# Patient Record
Sex: FEMALE | Race: BLACK OR AFRICAN AMERICAN | NOT HISPANIC OR LATINO | Employment: UNEMPLOYED | URBAN - METROPOLITAN AREA
[De-identification: names, ages, dates, MRNs, and addresses within clinical notes are randomized per-mention and may not be internally consistent; named-entity substitution may affect disease eponyms.]

---

## 2017-01-07 ENCOUNTER — HOSPITAL ENCOUNTER (EMERGENCY)
Facility: OTHER | Age: 57
Discharge: HOME | End: 2017-01-08
Attending: EMERGENCY MEDICINE
Payer: MEDICAID

## 2017-01-07 DIAGNOSIS — F10.921 ALCOHOL INTOXICATION, WITH DELIRIUM: Primary | ICD-10-CM

## 2017-01-07 PROCEDURE — 99283 EMERGENCY DEPT VISIT LOW MDM: CPT

## 2017-01-07 NOTE — ED AVS SNAPSHOT
[facility]  [address]  [facility] [address]               Marianne Gottlieb   2017  6:56 PM   ED    Description:  Female : 1960   Department:  [facility]           Your Care was Coordinated By:     Provider Role From To    Nixon Bennett MD Attending Provider 17 1911 --      Reason for Visit     Alcohol Intoxication           Diagnoses this Visit        Comments    Alcohol intoxication, with delirium    -  Primary       ED Disposition     None           To Do List           Follow-up Information     Follow up with [facility]. Schedule an appointment as soon as possible for a visit in 3 days.    Contact information:    [address]  [facility] [address]  [phone]          Follow up with [facility].    Specialty:  Emergency Medicine    Why:  If symptoms worsen    Contact information:    [address]  [facility] [id number] [phone]      [facility]     [facility] Nurse Care Line -  Assistance  Registered nurses in the [facility] provide clinical advisement, health education, appointment booking, and other advisory services.  Call for this free service at [phone].             Medications           Message regarding Medications     Verify the changes and/or additions to your medication regime listed below are the same as discussed with your clinician today.  If any of these changes or additions are incorrect, please notify your healthcare provider.             Verify that the below list of medications is an accurate representation of the medications you are currently taking.  If none reported, the list may be blank. If incorrect, please contact your healthcare provider. Carry this list with you in case of emergency.           Current Medications     aspirin (ECOTRIN) 81 MG EC tablet Take 81 mg by mouth once daily.    ibuprofen (ADVIL,MOTRIN) 600 MG  "tablet Take 1 tablet (600 mg total) by mouth every 6 (six) hours as needed for Pain.    olmesartan-amlodipin-hcthiazid 40-10-25 mg Tab Take by mouth once daily.    pravastatin (PRAVACHOL) 40 MG tablet Take 40 mg by mouth once daily.    UNKNOWN TO PATIENT            Clinical Reference Information           Your Vitals Were     BP Pulse Temp Resp Height Weight    91/54 72 98.1 °F (36.7 °C) (Oral) 18 5' 3" (1.6 m) 86.2 kg (190 lb)    SpO2 BMI             91% 33.66 kg/m2         Allergies as of 1/8/2017     No Known Allergies      Immunizations Administered on Date of Encounter - 1/8/2017     None      ED Micro, Lab, POCT     None      ED Imaging Orders     None      Discharge References/Attachments     ALCOHOL INTOXICATION (ENGLISH)      [facility] Sign-Up     Activating your [facility] account is as easy as 1-2-3!     1) Visit [email or web address], select Sign Up Now, enter this activation code and your date of birth, then select Next.  [email or web address]  Expires: 2/22/2017 12:15 AM      2) Create a username and password to use when you visit [facility] in the future and select a security question in case you lose your password and select Next.    3) Enter your e-mail address and click Sign Up!    Additional Information  If you have questions, please e-mail [email or web address] or call [phone] to talk to our [facility] staff. Remember, [facility] is NOT to be used for urgent needs. For medical emergencies, dial 911.         Smoking Cessation     If you would like to quit smoking:   You may be eligible for free services if you are a [place] resident and started smoking cigarettes before September 1, 1988.  Call the Smoking Cessation Trust (SCT) toll free at (501) 628-5282 or (997) 152-5311.   Call 6-795-QUIT-NOW if you do not meet the above criteria.             [facility] complies with applicable Federal civil rights laws and does not discriminate on the basis of race, color, national origin, age, " disability, or sex.        Language Assistance Services     ATTENTION: Language assistance services are available, free of charge. Please call [phone].      ATENCIÓN: Si habla hugoañol, tiene a mckeon disposición servicios gratuitos de asistencia lingüística. Llame al [phone].     CHÚ Ý: N?u b?n nói Ti?ng Vi?t, có các d?ch v? h? tr? ngôn ng? mi?n phí dành cho b?n. G?i s? [phone].

## 2017-01-08 VITALS
SYSTOLIC BLOOD PRESSURE: 115 MMHG | DIASTOLIC BLOOD PRESSURE: 57 MMHG | HEART RATE: 72 BPM | RESPIRATION RATE: 18 BRPM | BODY MASS INDEX: 33.66 KG/M2 | HEIGHT: 63 IN | OXYGEN SATURATION: 92 % | WEIGHT: 190 LBS | TEMPERATURE: 98 F

## 2017-01-08 NOTE — ED NOTES
Rounding on the patient has been done. Pt is AAOx 4, no acute distress noted, respirations even, unlabored, vital signs stable with monitoring in process, skin warm and dry. The patient has been updated on the plan of care and current status. Pain was assessed and at pain level 0/10 . Comfort positioning and restroom needs were addressed. She denies any needs. She complains of 0. Necessary items were placed within reach and was advised when a reassessment would take place. The call bell remains at the bedside for any additional patient needs. The patient is resting comfortably on the stretcher, bed locked, low position, side rails up x 2.  Will continue to monitor.
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
## 2017-01-08 NOTE — ED NOTES
Pt able to ambulate without assistance to restroom and back without incident.  Speech clear, no nystagmus noted, ate 100 supper, ready for discharge

## 2017-03-03 ENCOUNTER — HOSPITAL ENCOUNTER (EMERGENCY)
Facility: OTHER | Age: 57
Discharge: HOME | End: 2017-03-03
Attending: EMERGENCY MEDICINE
Payer: MEDICAID

## 2017-03-03 VITALS
TEMPERATURE: 98 F | WEIGHT: 229 LBS | HEART RATE: 72 BPM | SYSTOLIC BLOOD PRESSURE: 150 MMHG | BODY MASS INDEX: 40.57 KG/M2 | OXYGEN SATURATION: 96 % | RESPIRATION RATE: 18 BRPM | HEIGHT: 63 IN | DIASTOLIC BLOOD PRESSURE: 72 MMHG
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
## 2017-03-03 DIAGNOSIS — S20.211A CONTUSION OF RIGHT FRONT WALL OF THORAX, INITIAL ENCOUNTER: ICD-10-CM

## 2017-03-03 PROCEDURE — 99283 EMERGENCY DEPT VISIT LOW MDM: CPT

## 2017-03-03 RX ORDER — IBUPROFEN 600 MG/1
600 TABLET ORAL EVERY 6 HOURS PRN
Qty: 30 TABLET | Refills: 0 | Status: SHIPPED | OUTPATIENT
Start: 2017-03-03 | End: 2017-09-15
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
## 2017-03-03 NOTE — DISCHARGE INSTRUCTIONS
Chest Contusion    A contusion is a bruise to the skin, muscle, or ribs. It may cause pain, tenderness, and swelling. It may turn the skin purple until it heals. Contusions take a few days to a few weeks to heal.  Home care  Follow these guidelines when caring for yourself at home:  · Rest. Dont do any heavy lifting or strenuous activity. Dont do any activity that causes pain.  · Put an ice pack on the injured area. Do this for 20 minutes every 1 to 2 hours the first day. You can make an ice pack by wrapping a plastic bag of ice cubes in a thin towel. Continue to use the ice pack 3 to 4 times a day for the next 2 days. Then use the ice pack as needed to ease pain and swelling.  · After 1 to 2 days you may put a warm compress on the area. Do this for 10 minutes several times a day. A warm compress is a clean cloth thats damp with warm water.  · Hold a pillow to the affected area when you cough. This will help ease pain.  · You may use acetaminophen or ibuprofen to control pain, unless another pain medicine was prescribed. If you have chronic liver or kidney disease, talk with your health care provider before using these medicines. Also talk with your provider if youve had a stomach ulcer or GI bleeding.  Follow-up care  Follow up with your health care provider during the next week, or as advised.  When to seek medical advice  Call your health care provider right away if any of these occur:  · Shortness of breath, difficulty breathing, or breathing fast  · Chest pain gets worse when you breathe  · Severe pain that comes on suddenly or lasts more than an hour  · Dizziness, weakness, or fainting  · New abdominal pain or abdominal pain that gets worse  ·  Fever of 101ºF (38.3ºC) or higher, or as directed by your health care provider  Date Last Reviewed: 2/15/2015  © 9704-5956 [employer]. [address] [place] [address], [place] [address]. All rights reserved. This information is not intended as a substitute  for professional medical care. Always follow your healthcare professional's instructions.
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
## 2017-05-05 ENCOUNTER — HOSPITAL ENCOUNTER (EMERGENCY)
Facility: OTHER | Age: 57
Discharge: HOME | End: 2017-05-05
Attending: EMERGENCY MEDICINE
Payer: MEDICAID

## 2017-05-05 VITALS
SYSTOLIC BLOOD PRESSURE: 119 MMHG | TEMPERATURE: 98 F | OXYGEN SATURATION: 97 % | RESPIRATION RATE: 16 BRPM | HEART RATE: 80 BPM | DIASTOLIC BLOOD PRESSURE: 67 MMHG | HEIGHT: 62 IN

## 2017-05-05 DIAGNOSIS — F10.920 ALCOHOL INTOXICATION, UNCOMPLICATED: Primary | ICD-10-CM

## 2017-05-05 DIAGNOSIS — S51.811A LACERATION OF FOREARM, RIGHT, INITIAL ENCOUNTER: ICD-10-CM

## 2017-05-05 DIAGNOSIS — S41.151A DOG BITE OF RIGHT ARM, INITIAL ENCOUNTER: ICD-10-CM
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
## 2017-05-05 LAB — POCT GLUCOSE: 139 MG/DL (ref 70–110)

## 2017-05-05 PROCEDURE — 25000003 PHARM REV CODE 250: Performed by: EMERGENCY MEDICINE

## 2017-05-05 PROCEDURE — 12001 RPR S/N/AX/GEN/TRNK 2.5CM/<: CPT

## 2017-05-05 PROCEDURE — 99284 EMERGENCY DEPT VISIT MOD MDM: CPT | Mod: 25

## 2017-05-05 PROCEDURE — 90471 IMMUNIZATION ADMIN: CPT | Performed by: EMERGENCY MEDICINE

## 2017-05-05 PROCEDURE — 82962 GLUCOSE BLOOD TEST: CPT

## 2017-05-05 PROCEDURE — 63600175 PHARM REV CODE 636 W HCPCS: Performed by: EMERGENCY MEDICINE

## 2017-05-05 PROCEDURE — 90715 TDAP VACCINE 7 YRS/> IM: CPT | Performed by: EMERGENCY MEDICINE

## 2017-05-05 RX ADMIN — BACITRACIN, NEOMYCIN, POLYMYXIN B 1 EACH: 400; 3.5; 5 OINTMENT TOPICAL at 09:05

## 2017-05-05 RX ADMIN — CLOSTRIDIUM TETANI TOXOID ANTIGEN (FORMALDEHYDE INACTIVATED), CORYNEBACTERIUM DIPHTHERIAE TOXOID ANTIGEN (FORMALDEHYDE INACTIVATED), BORDETELLA PERTUSSIS TOXOID ANTIGEN (GLUTARALDEHYDE INACTIVATED), BORDETELLA PERTUSSIS FILAMENTOUS HEMAGGLUTININ ANTIGEN (FORMALDEHYDE INACTIVATED), BORDETELLA PERTUSSIS PERTACTIN ANTIGEN, AND BORDETELLA PERTUSSIS FIMBRIAE 2/3 ANTIGEN 0.5 ML: 5; 2; 2.5; 5; 3; 5 INJECTION, SUSPENSION INTRAMUSCULAR at 09:05

## 2017-05-05 NOTE — ED AVS SNAPSHOT
[facility]  [address]  [facility] [address]               Marianne Gottlieb   2017  8:57 PM   ED    Description:  Female : 1960   Department:  [facility]           Your Care was Coordinated By:     Provider Role From To    Mariana Joseph MD Attending Provider [phone] --      Reason for Visit     Alcohol Intoxication     Animal Bite           Diagnoses this Visit        Comments    Alcohol intoxication, uncomplicated    -  Primary     Dog bite of right arm, initial encounter         Laceration of forearm, right, initial encounter           ED Disposition     None           To Do List           Follow-up Information     Follow up with [facility] In 14 days.    Specialty:  Emergency Medicine    Why:  For wound staple removal    Contact information:    [address]  [facility] [id number] [phone]      [facility]     [facility] Care [facility] - 24/7 Assistance  Unless otherwise directed by your provider, please contact [facility], our nurse care line that is available for 24/7 assistance.     Registered nurses in the [facility] provide: appointment scheduling, clinical advisement, health education, and other advisory services.  Call: [phone] (toll free)               Medications           Message regarding Medications     Verify the changes and/or additions to your medication regime listed below are the same as discussed with your clinician today.  If any of these changes or additions are incorrect, please notify your healthcare provider.        These medications were administered today        Dose Freq    Tdap vaccine injection 0.5 mL 0.5 mL Once    Sig: Inject 0.5 mLs into the muscle once.    Class: Normal    Route: Intramuscular    neomycin-bacitracnZn-polymyxnB packet  ED 1 Time    Sig: Apply topically ED 1 Time.    Class: Normal    Route: Topical (Top)          "  Verify that the below list of medications is an accurate representation of the medications you are currently taking.  If none reported, the list may be blank. If incorrect, please contact your healthcare provider. Carry this list with you in case of emergency.           Current Medications     aspirin (ECOTRIN) 81 MG EC tablet Take 81 mg by mouth once daily.    ibuprofen (ADVIL,MOTRIN) 600 MG tablet Take 1 tablet (600 mg total) by mouth every 6 (six) hours as needed for Pain.    olmesartan-amlodipin-hcthiazid 40-10-25 mg Tab Take by mouth once daily.    pravastatin (PRAVACHOL) 40 MG tablet Take 40 mg by mouth once daily.    UNKNOWN TO PATIENT            Clinical Reference Information           Your Vitals Were     BP Pulse Temp Resp Height SpO2    119/67 84 97.8 °F (36.6 °C) (Oral) 20 5' 2" (1.575 m) 98%      Allergies as of 5/5/2017     No Known Allergies      Immunizations Administered on Date of Encounter - 5/5/2017     Name Date Dose VIS Date Route    TDAP 5/5/2017 0.5 mL 2/24/2015 Intramuscular      ED Micro, Lab, POCT     Start Ordered       Status Ordering Provider    05/05/17 2113 05/05/17 2113  POCT glucose  Once      Final result     05/05/17 2100 05/05/17 2059  POCT glucose  Once      Acknowledged       ED Imaging Orders     Start Ordered       Status Ordering Provider    05/05/17 2125 05/05/17 2126  X-Ray Forearm Right  1 time imaging      Final result       [facility] Sign-Up     Activating your [facility] account is as easy as 1-2-3!     1) Visit [email or web address], select Sign Up Now, enter this activation code and your date of birth, then select Next.  [email or web address]  Expires: 6/19/2017 10:14 PM      2) Create a username and password to use when you visit [facility] in the future and select a security question in case you lose your password and select Next.    3) Enter your e-mail address and click Sign Up!    Additional Information  If you have questions, please e-mail [email or web address] or call " [phone] to talk to our [facility] staff. Remember, [facility] is NOT to be used for urgent needs. For medical emergencies, dial 911.         Smoking Cessation     If you would like to quit smoking:   You may be eligible for free services if you are a [place] resident and started smoking cigarettes before September 1, 1988.  Call the Smoking Cessation Trust (SCT) toll free at (537) 123-3152 or (398) 126-9138.   Call 1-800-QUIT-NOW if you do not meet the above criteria.   Contact us via email: [email or web address]   View our website for more information: [email or web address]         [facility] complies with applicable Federal civil rights laws and does not discriminate on the basis of race, color, national origin, age, disability, or sex.        Language Assistance Services     ATTENTION: Language assistance services are available, free of charge. Please call [phone].      ATENCIÓN: Si habla español, tiene a mckeon disposición servicios gratuitos de asistencia lingüística. Llame al [phone].     CHÚ Ý: N?u b?n nói Ti?ng Vi?t, có các d?ch v? h? tr? ngôn ng? mi?n phí dành cho b?n. G?i s? [phone].

## 2017-05-06 NOTE — ED PROVIDER NOTES
Encounter Date: 5/5/2017    SCRIBE #1 NOTE: I, David Salmon, am scribing for, and in the presence of, Dr. Joseph.       History     Chief Complaint   Patient presents with    Alcohol Intoxication     pt reports she was bitten by a dirty dog on her right forearm. puncture wound noted to right forearm with a teeth payal. pt smells heavily of etoh.     Animal Bite     Review of patient's allergies indicates:  No Known Allergies  HPI Comments: Time seen by provider: 9:06 PM    This is a 56 y.o. female who presents to the ED per EMS with a chief complaint of alcohol intoxication. EMS was called by [employer] after she was found down signs with signs of alcohol intoxication. She was unable to ambulate without assistance on scene. The patient reports no head trauma or LOC. She notes that her only pain is from a recent suspected dog bite. The patient is a poor historian due to her intoxicated state. Hx several presentations for alcohol intoxication. She reports no drug use. Hx of HTN and HLD noted.     The history is provided by the patient.     Past Medical History:   Diagnosis Date    ETOH abuse     Hyperlipemia     Hypertension     Psychiatric illness      Past Surgical History:   Procedure Laterality Date    BREAST SURGERY       History reviewed. No pertinent family history.  Social History   Substance Use Topics    Smoking status: Current Every Day Smoker     Packs/day: 0.50     Types: Cigarettes    Smokeless tobacco: None    Alcohol use 25.2 oz/week     42 Standard drinks or equivalent per week      Comment: heavy drinker     Review of Systems   Constitutional: Negative for fever.   HENT: Negative for sore throat.    Respiratory: Negative for shortness of breath.    Cardiovascular: Negative for chest pain.   Gastrointestinal: Negative for nausea.   Genitourinary: Negative for dysuria.   Musculoskeletal: Negative for back pain.   Skin: Positive for wound (right forearm). Negative for rash.   Neurological: Negative  for weakness.   Hematological: Does not bruise/bleed easily.       Physical Exam   Initial Vitals   BP Pulse Resp Temp SpO2   05/05/17 2100 05/05/17 2100 05/05/17 2100 05/05/17 2100 05/05/17 2100   124/94 90 20 97.8 °F (36.6 °C) 98 %     Physical Exam    Nursing note and vitals reviewed.  Constitutional: She appears well-developed and well-nourished. She is not diaphoretic. No distress.   Smells of EtOH.    HENT:   Head: Normocephalic and atraumatic.   Right Ear: External ear normal.   Left Ear: External ear normal.   Mouth/Throat: Oropharynx is clear and moist.   Eyes: Conjunctivae and EOM are normal. Pupils are equal, round, and reactive to light. Right eye exhibits no discharge. Left eye exhibits no discharge.   No nystagmus.    Neck: Normal range of motion.   Cardiovascular: Normal rate, regular rhythm and normal heart sounds. Exam reveals no gallop and no friction rub.    No murmur heard.  Pulmonary/Chest: Breath sounds normal. No respiratory distress. She has no wheezes. She has no rhonchi. She has no rales.   Abdominal: Soft. There is no tenderness. There is no rebound and no guarding.   Musculoskeletal: Normal range of motion. She exhibits no edema or tenderness.   Neurological: She is alert and oriented to person, place, and time. She has normal strength. No cranial nerve deficit or sensory deficit.   Skin: Skin is warm and dry. No rash and no abscess noted. No erythema. No pallor.   1.5 cm linear laceration to the right medial forearm. No ecchymosis or TTP.   Psychiatric: She has a normal mood and affect. Her behavior is normal. Judgment and thought content normal.         ED Course   Lac Repair  Date/Time: 5/5/2017 10:47 PM  Performed by: ROSIE MCFARLAND.  Authorized by: ROSIE MCFARLAND   Body area: upper extremity  Location details: right lower arm  Laceration length: 1.5 cm  Irrigation solution: saline  Amount of cleaning: standard  Skin closure: staples  Number of sutures: 2        Labs Reviewed   POCT  GLUCOSE - Abnormal; Notable for the following:        Result Value    POCT Glucose 139 (*)     All other components within normal limits         Imaging Results         X-Ray Forearm Right (Final result) Result time:  05/05/17 21:51:59    Final result by Torsten Garner MD (05/05/17 21:51:59)    Impression:       No evidence of a fracture or dislocation of the right forearm.    Nonspecific subcutaneous emphysema.              Electronically signed by: TORSTEN GARNER MD  Date:     05/05/17  Time:    21:51     Narrative:    Exam: [id number]  05/05/17  21:40:48 IMG97 ([facility]) : XR FOREARM RIGHT    Technique:    Frontal and lateral radiographs of the right forearm.    Comparison:     None     Findings:      The bone mineralization is within normal limits.  The  articulation of the radius and ulna are unremarkable.  No definite joint effusion is noted.  There are lucencies over the subcutaneous regions along the dorsal aspect of the right forearm.                      Additional MDM:   Comments: 57 y/o female with h/o alcohol abuse BIB EMS 2/2 public alcohol intoxication and report of a dog bite to her right forearm.  On exam she had only a linear laceration to right forearm which was not c/w the pattern of a dog bite and she was too intoxicated to provide any details.  The forearm was xrayed and c/w soft tissue swelling only.  Laceration was irrigated copiously and stapled closed.  Her TT was updated.  At the time of discharge the patient was able to ambulate without requiring assistance.    .          Scribe Attestation:   Scribe #1: I performed the above scribed service and the documentation accurately describes the services I performed. I attest to the accuracy of the note.    Attending Attestation:           Physician Attestation for Scribe:  Physician Attestation Statement for Scribe #1: I, Dr. Joseph, reviewed documentation, as scribed by David Salmon in my presence, and it is both accurate and complete.                 ED  Course     Clinical Impression:     1. Alcohol intoxication, uncomplicated    2. Dog bite of right arm, initial encounter    3. Laceration of forearm, right, initial encounter                  Mariana Joseph MD  05/06/17 5203

## 2017-05-06 NOTE — ED NOTES
Pt presented to ED via [facility], 911 was activated via [employer] with man down, pt shows signs of clinical intoxication, unable to ambulate without assistance needed, puncture wound to right forearm noted secondary to dog bit, bleeding controlled on arrival with clean dry dressing applied at this time. On arrival pt presents with slurred speech, and nystagmus. RR easy non labored, NAD. Negative complaints of pain or distress at this time, side rails up x2, call light within reach, bed in lowest locked position, will continue to monitor for changes

## 2017-05-06 NOTE — ED NOTES
"Pt ambulates to restroom with steady gait, negative assistance needed at this time, RR easy non labored, NAD. Negative signs of distress noted. Upon finishing voiding in restroom pt becomes verbally aggressive and inappropriate with staff stating "I like white boys, yall got big dicks like cucumbers", pt instructed to not speak with such profanity, MD states pt is appropriate for discharge at this time, pt escorted out of ED down ramp at this time.      "

## 2017-05-11 ENCOUNTER — HOSPITAL ENCOUNTER (EMERGENCY)
Facility: OTHER | Age: 57
Discharge: HOME | End: 2017-05-11
Attending: EMERGENCY MEDICINE
Payer: MEDICAID

## 2017-05-11 VITALS
HEIGHT: 63 IN | OXYGEN SATURATION: 97 % | RESPIRATION RATE: 14 BRPM | WEIGHT: 160 LBS | SYSTOLIC BLOOD PRESSURE: 100 MMHG | DIASTOLIC BLOOD PRESSURE: 56 MMHG | TEMPERATURE: 97 F | BODY MASS INDEX: 28.35 KG/M2 | HEART RATE: 88 BPM

## 2017-05-11 DIAGNOSIS — F10.920 ALCOHOL INTOXICATION, UNCOMPLICATED: Primary | ICD-10-CM

## 2017-05-11 PROCEDURE — 99283 EMERGENCY DEPT VISIT LOW MDM: CPT

## 2017-05-11 RX ORDER — GABAPENTIN 800 MG/1
800 TABLET ORAL 2 TIMES DAILY
COMMUNITY
End: 2019-02-24

## 2017-05-11 NOTE — ED TRIAGE NOTES
Pt found by EMS yelling at her significant other and slurring words and smelling of ETOH. Pt is well known to this ED. Placed in bed, on monitor. Pt cursing and upset about statue removal in [place].

## 2017-05-11 NOTE — ED PROVIDER NOTES
Encounter Date: 5/11/2017    SCRIBE #1 NOTE: I, Stephany Trevino, am scribing for, and in the presence of,  Dr. Bains I have scribed the entire note.       History     Chief Complaint   Patient presents with    Alcohol Intoxication     c/o SOB, ambulatory on scene and yelling at s/o. ETOH on breath, slurred speech     Review of patient's allergies indicates:  No Known Allergies  HPI Comments: Time seen by provider: 6:14 PM    This is a 56 y.o. female who presents with alcohol intoxication. As per EMS the patient's symptoms began hours prior arrival in the ED. EMS note the police were called for the patient who was yelling at significant other. As per police to EMS upon arrival on scene the patient appears intoxicated. The patient was then given to the care of EMS for transport and evaluation of intoxication. EMS note the patient has an odor of alcohol on breath with slurred speech. The patient currently has no complaints. She does not admit to any chest pain, shortness of breath, palpitations, nausea, vomiting, diarrhea, abdominal pain, urinary symptoms, fever or chills. As per medical record the patient has been seen and evaluated in the past for similar symptoms. As noted in her record she drinks alcohol several times a week.     The history is provided by the patient.     Past Medical History:   Diagnosis Date    ETOH abuse     Hyperlipemia     Hypertension     Psychiatric illness      Past Surgical History:   Procedure Laterality Date    BREAST SURGERY       History reviewed. No pertinent family history.  Social History   Substance Use Topics    Smoking status: Current Every Day Smoker     Packs/day: 0.50     Types: Cigarettes    Smokeless tobacco: None    Alcohol use 25.2 oz/week     42 Standard drinks or equivalent per week      Comment: heavy drinker     Review of Systems   Constitutional: Negative for chills and fever.   HENT: Negative for congestion and sore throat.    Eyes: Negative for redness and  visual disturbance.   Respiratory: Negative for cough and shortness of breath.    Cardiovascular: Negative for chest pain and palpitations.   Gastrointestinal: Negative for abdominal pain, diarrhea, nausea and vomiting.   Genitourinary: Negative for dysuria.   Musculoskeletal: Negative for back pain.   Skin: Negative for rash.   Neurological: Negative for weakness and headaches.   Psychiatric/Behavioral: Negative for confusion.       Physical Exam   Initial Vitals   BP Pulse Resp Temp SpO2   05/11/17 1805 05/11/17 1805 05/11/17 1805 05/11/17 1805 05/11/17 1805   131/78 92 16 97.3 °F (36.3 °C) 98 %     Physical Exam    Nursing note and vitals reviewed.  Constitutional: She appears well-developed and well-nourished. She is not diaphoretic. No distress.   HENT:   Head: Normocephalic and atraumatic.   Right Ear: External ear normal.   Left Ear: External ear normal.   Eyes: EOM are normal.   Injected sclera   Neck: Normal range of motion. Neck supple.   Cardiovascular: Normal rate, regular rhythm and normal heart sounds.   Pulmonary/Chest: Breath sounds normal. She has no wheezes. She has no rhonchi. She has no rales.   Abdominal: Soft. Bowel sounds are normal. There is no tenderness. There is no rebound and no guarding.   Musculoskeletal: Normal range of motion. She exhibits no edema or tenderness.   Lymphadenopathy:     She has no cervical adenopathy.   Neurological: She is alert.   Slurred speech and unsteady gait   Skin: Skin is warm and dry. No rash noted.         ED Course   Procedures  Labs Reviewed - No data to display          Medical Decision Making:   History:   Old Medical Records: I decided to obtain old medical records.  Old Records Summarized: records from previous admission(s) and other records.  Initial Assessment:   6:14PM:  Pt is a 57 y/o F who presents to ED with ETOH intoxication. Pt appears well, nontoxic. She had had multiple visits to this ED for similar symptoms. She does have slurred speech and  slightly wobbly on gait. Will plan to observe, monitor, will continue to follow and reassess.      9:22 PM:  I have discussed this patient with Dr. Joseph, who will assume care of the patient.              Scribe Attestation:   Scribe #1: I performed the above scribed service and the documentation accurately describes the services I performed. I attest to the accuracy of the note.    Attending Attestation:           Physician Attestation for Scribe:  Physician Attestation Statement for Scribe #1: I, Dr. Javier, reviewed documentation, as scribed by Stephany Trevino in my presence, and it is both accurate and complete.                 ED Course     Clinical Impression:     1. Alcohol intoxication, uncomplicated                Maria Fernanda Javier MD  05/11/17 2129

## 2017-05-11 NOTE — ED AVS SNAPSHOT
[facility]  [address]  [facility] [address]               Marianne Gottlieb   2017  6:01 PM   ED    Description:  Female : 1960   Department:  [facility]           Your Care was Coordinated By:     Provider Role From To    Maria Fernanda Javier MD Attending Provider [phone] --      Reason for Visit     Alcohol Intoxication           Diagnoses this Visit        Comments    Alcohol intoxication, uncomplicated    -  Primary       ED Disposition     None           To Do List           Follow-up Information     Follow up with Primary Care Physician  .      [facility]     [facility] Care Line -  Assistance  Unless otherwise directed by your provider, please contact [facility], our nurse care line that is available for  assistance.     Registered nurses in the [facility] provide: appointment scheduling, clinical advisement, health education, and other advisory services.  Call: [phone] (toll free)               Medications           Message regarding Medications     Verify the changes and/or additions to your medication regime listed below are the same as discussed with your clinician today.  If any of these changes or additions are incorrect, please notify your healthcare provider.        STOP taking these medications     UNKNOWN TO PATIENT            Verify that the below list of medications is an accurate representation of the medications you are currently taking.  If none reported, the list may be blank. If incorrect, please contact your healthcare provider. Carry this list with you in case of emergency.           Current Medications     aspirin (ECOTRIN) 81 MG EC tablet Take 81 mg by mouth once daily.    gabapentin (NEURONTIN) 800 MG tablet Take 800 mg by mouth 2 (two) times daily.    ibuprofen (ADVIL,MOTRIN) 600 MG tablet Take 1 tablet (600 mg total) by mouth every 6 (six) hours as needed for Pain.  "   olmesartan-amlodipin-hcthiazid 40-10-25 mg Tab Take 1 tablet by mouth once daily.     pravastatin (PRAVACHOL) 40 MG tablet Take 40 mg by mouth once daily.           Clinical Reference Information           Your Vitals Were     BP Pulse Temp Resp Height Weight    132/78 82 97.3 °F (36.3 °C) (Oral) 16 5' 3" (1.6 m) 72.6 kg (160 lb)    SpO2 BMI             95% 28.34 kg/m2         Allergies as of 5/11/2017     No Known Allergies      Immunizations Administered on Date of Encounter - 5/11/2017     None      ED Micro, Lab, POCT     None      ED Imaging Orders     None      Discharge References/Attachments     ALCOHOL INTOXICATION (ENGLISH)      [facility] Sign-Up     Activating your [facility] account is as easy as 1-2-3!     1) Visit [email or web address], select Sign Up Now, enter this activation code and your date of birth, then select Next.  [email or web address]  Expires: 6/19/2017 10:14 PM      2) Create a username and password to use when you visit [facility] in the future and select a security question in case you lose your password and select Next.    3) Enter your e-mail address and click Sign Up!    Additional Information  If you have questions, please e-mail [email or web address] or call [phone] to talk to our [facility] staff. Remember, [facility] is NOT to be used for urgent needs. For medical emergencies, dial 911.         Smoking Cessation     If you would like to quit smoking:   You may be eligible for free services if you are a [place] resident and started smoking cigarettes before September 1, 1988.  Call the Smoking Cessation Trust (SCT) toll free at (097) 214-6736 or (486) 398-0765.   Call 7-308-QUIT-NOW if you do not meet the above criteria.   Contact us via email: [email or web address]   View our website for more information: [email or web address]         [facility] complies with applicable Federal civil rights laws and does not discriminate on the basis of race, color, " national origin, age, disability, or sex.        Language Assistance Services     ATTENTION: Language assistance services are available, free of charge. Please call [phone].      ATENCIÓN: Si habla evelio, tiene a mckeon disposición servicios gratuitos de asistencia lingüística. Llame al [phone].     CHÚ Ý: N?u b?n nói Ti?ng Vi?t, có các d?ch v? h? tr? ngôn ng? mi?n phí dành cho b?n. G?i s? [phone].

## 2017-05-12 NOTE — ED NOTES
"Pt yelling for RN. Pt states she needs to use the restroom. BS commode in @ BS. Pt states she was unable to move her legs, but has the ability to put her legs straight up in the air and "shoot" urine across the floor spraying a RN in the process. No obvious distress noted. Pt denies SOB and N/V/D. VSS. Will continue to monitor closely.  "

## 2017-05-12 NOTE — ED NOTES
"Pt loud and obnoxious, yelling for a RN. Pt advised there was another pt in the room, pt started cursing. Pt wanted to go outside and smoke. Pt advised about this campus' non- smoking policy. Pt said" I don't give a fuck".   "

## 2017-05-12 NOTE — ED NOTES
Pt A & O x 3, yellling for RN, states she is hungry. NO sandwiches available, pt ate the last one. Pt given 2 puddings and 2 apple juices. NO respiratory distress noted. Pt is aware of plan of care and agree Will continue to monitor

## 2017-05-12 NOTE — ED NOTES
Pt in semi- fowlers position in bed yelling for the RN wl the call bell on her lap. Pt wanted to talk about the [place] statue being taken down. Pt had no medical complaint, wants or needs.  Pt has slurred speech, is loud and obnoxious. Pt denies SOB, fever, chills and N/V/D. Skin is warm and dry w/ pink mucosa.  After pt was told politics would not be entertained, she requested another sandwich. VSS.

## 2017-05-20 ENCOUNTER — HOSPITAL ENCOUNTER (EMERGENCY)
Facility: OTHER | Age: 57
Discharge: HOME | End: 2017-05-20
Attending: EMERGENCY MEDICINE
Payer: MEDICAID

## 2017-05-20 VITALS
RESPIRATION RATE: 18 BRPM | WEIGHT: 195 LBS | OXYGEN SATURATION: 94 % | HEIGHT: 63 IN | HEART RATE: 85 BPM | SYSTOLIC BLOOD PRESSURE: 106 MMHG | DIASTOLIC BLOOD PRESSURE: 51 MMHG | BODY MASS INDEX: 34.55 KG/M2 | TEMPERATURE: 98 F

## 2017-05-20 DIAGNOSIS — F10.929 ACUTE ALCOHOL INTOXICATION, WITH UNSPECIFIED COMPLICATION: Primary | ICD-10-CM

## 2017-05-20 PROCEDURE — 96372 THER/PROPH/DIAG INJ SC/IM: CPT

## 2017-05-20 PROCEDURE — 99284 EMERGENCY DEPT VISIT MOD MDM: CPT | Mod: 25

## 2017-05-20 PROCEDURE — 63600175 PHARM REV CODE 636 W HCPCS: Performed by: EMERGENCY MEDICINE

## 2017-05-20 RX ORDER — HALOPERIDOL 5 MG/ML
5 INJECTION INTRAMUSCULAR
Status: COMPLETED | OUTPATIENT
Start: 2017-05-20 | End: 2017-05-20

## 2017-05-20 RX ADMIN — HALOPERIDOL LACTATE 5 MG: 5 INJECTION, SOLUTION INTRAMUSCULAR at 02:05

## 2017-05-20 NOTE — ED AVS SNAPSHOT
[facility]  [address]  [place] [address]               Marianne Gottlieb   2017  1:17 AM   ED    Description:  Female : 1960   Department:  [facility]           Your Care was Coordinated By:     Provider Role From To    Valdemar Barnes II, MD Attending Provider 17 0124 --      Reason for Visit     Alcohol Intoxication           Diagnoses this Visit        Comments    Acute alcohol intoxication, with unspecified complication    -  Primary       ED Disposition     None           To Do List           Follow-up Information     Follow up with [facility] In 1 week.      [facility]     [facility] Care [facility] -  Assistance  Unless otherwise directed by your provider, please contact [facility], our nurse care line that is available for  assistance.     Registered nurses in the [facility] provide: appointment scheduling, clinical advisement, health education, and other advisory services.  Call: [phone] (toll free)               Medications           Message regarding Medications     Verify the changes and/or additions to your medication regime listed below are the same as discussed with your clinician today.  If any of these changes or additions are incorrect, please notify your healthcare provider.        These medications were administered today        Dose Freq    haloperidol lactate injection 5 mg 5 mg ED 1 Time    Sig: Inject 1 mL (5 mg total) into the muscle ED 1 Time.    Class: Normal    Route: Intramuscular           Verify that the below list of medications is an accurate representation of the medications you are currently taking.  If none reported, the list may be blank. If incorrect, please contact your healthcare provider. Carry this list with you in case of emergency.           Current Medications     aspirin (ECOTRIN) 81 MG EC tablet Take 81 mg by mouth once daily.     "gabapentin (NEURONTIN) 800 MG tablet Take 800 mg by mouth 2 (two) times daily.    ibuprofen (ADVIL,MOTRIN) 600 MG tablet Take 1 tablet (600 mg total) by mouth every 6 (six) hours as needed for Pain.    olmesartan-amlodipin-hcthiazid 40-10-25 mg Tab Take 1 tablet by mouth once daily.     pravastatin (PRAVACHOL) 40 MG tablet Take 40 mg by mouth once daily.           Clinical Reference Information           Your Vitals Were     BP Pulse Temp Resp Height Weight    149/78 70 98.1 °F (36.7 °C) (Oral) 14 5' 3" (1.6 m) 88.5 kg (195 lb)    SpO2 BMI             93% 34.54 kg/m2         Allergies as of 5/20/2017     No Known Allergies      Immunizations Administered on Date of Encounter - 5/20/2017     None      ED Micro, Lab, POCT     None      ED Imaging Orders     None        Discharge Instructions         Alcohol Abuse  Alcoholic drinks are harmful when you have too many of them. There is no set number of drinks that defines too much. Drinking that disrupts your life or your health is called alcohol abuse. Alcohol abuse can hurt your relationships with others. You may lose friends, a spouse, or even your job. You may be abusing alcohol if any of the following are true for you:  · Duties at home or with  suffer because of drinking.  · Duties at work or in school suffer because of drinking.  · You have missed work or school because of drinking.  · You use alcohol while driving or operating machinery.  · You have legal problems such as arrests due to drinking.  · You keep drinking even though it causes serious problems in your life.  Health effects  Alcohol abuse causes health problems. Sometimes this can happen after only drinking a little." There is no set number of drinks or amount of alcohol that defines too much. The more you drink at one time, and the more often you drink determine both the short-term and long-term health effects. It affects all parts of your body and your health, including your:  · Brain. " Alcohol is a central nervous system depressant. It can damage parts of the brain that affect your balance, memory, thinking, and emotions. It can cause memory loss, blackouts, depression, agitation, sleep cycle changes, and seizures. These changes may or may not be reversible.  · Heart and vascular system. Alcohol affects multiple areas. It can damage heart muscle causing cardiomyopathy, which is a weakening and stretching of the heart muscle. This can lead to trouble breathing, an irregular heartbeat, atrial fibrillation, leg swelling, and heart failure. Alcohol use makes the blood vessels stiffen causing high blood pressure. All of these problems increase your risk of having heart attacks or strokes.  · Liver. Alcohol causes fat to build up in the liver, affecting its normal function. This increases the risk for hepatitis, leading to abdominal pain, appetite loss, jaundice, bleeding problems, liver fibrosis, and cirrhosis. This, in turn, can affect your ability to fight off infections, and can cause diabetes. The liver changes prevent it from removing toxins in your blood that can cause encephalopathy, which may show with confusion, altered level of consciousness, personality changes, memory loss, seizures, coma, and death.  · Pancreas. Alcohol can cause inflammation of the pancreas, or pancreatitis. This can cause abdominal pain, fever, and diabetes.  · Immune system. Alcohol weakens your immune system in a number of ways. It suppresses your immune system making it harder to fight infections and colds. It also increases the chance of getting pneumonia and tuberculosis.  · Cancer. Alcohol is a risk factor for developing cancer of the mouth, esophagus, pharynx, larynx, liver, and breast.  · Sexual function. Alcohol can lead to sexual problems.  Home care  The following guidelines will help you deal with alcohol abuse:  · Admit you have a problem with alcohol.  · Ask for help from your healthcare provider and  trusted family members or close friends.  · Get help from people trained in dealing with alcohol abuse. This may be individual counseling or group therapy, or it may be a supervised alcohol treatment program.  · Join a self-help group for alcohol abuse such as Alcoholics Anonymous (AA).  · Avoid people who abuse alcohol or tempt you to drink.  Follow-up care  Follow up as advised by the healthcare provider, or as advised. Contact these groups to get help:  · Alcoholics Anonymous (AA): Go to www.aa.org or check the phone book for meetings near you.  · National Alcohol and Substance Abuse Information Center (NASAIC): 464.248.4699 www.addictioncareTribzi.Balloon  · National [place] on Alcoholism and Drug Dependence (NCADD): 667-UQY-AXBJ (557-8320) www.ncadd.org  Call 911  Call 911 if any of these occur:  · Trouble breathing or slow irregular breathing  · Chest pain  · Sudden weakness on one side of your body or sudden trouble speaking  · Heavy bleeding or vomiting blood  · Very drowsy or trouble awakening  · Fainting or loss of consciousness  · Rapid heart rate  · Seizure  When to seek medical care  Call your healthcare provider right away if any of these occur:   · Confusion  · Hallucinations (seeing, hearing, or feeling things that arent there)  · Pain in your upper abdomen that gets worse  · Repeated vomiting or black or tarry stools  · Severe shakiness  Date Last Reviewed: 6/1/2016  © 2259-8881 [employer]. [address] [place] [address]. All rights reserved. This information is not intended as a substitute for professional medical care. Always follow your healthcare professional's instructions.          [facility] Sign-Up     Activating your [facility] account is as easy as 1-2-3!     1) Visit [email or web address], select Sign Up Now, enter this activation code and your date of birth, then select Next.  [email or web address]  Expires: 6/19/2017 10:14 PM      2) Create a username and password to use when  you visit [facility] in the future and select a security question in case you lose your password and select Next.    3) Enter your e-mail address and click Sign Up!    Additional Information  If you have questions, please e-mail [email or web address] or call [phone] to talk to our [facility] staff. Remember, [facility] is NOT to be used for urgent needs. For medical emergencies, dial 911.         Smoking Cessation     If you would like to quit smoking:   You may be eligible for free services if you are a [place] resident and started smoking cigarettes before September 1, 1988.  Call the Smoking Cessation Trust ([facility]) toll free at (793) 284-0271 or (937) 965-4613.   Call 9-800-QUIT-NOW if you do not meet the above criteria.   Contact us via email: [email or web address]   View our website for more information: [email or web address]         [facility] complies with applicable Federal civil rights laws and does not discriminate on the basis of race, color, national origin, age, disability, or sex.        Language Assistance Services     ATTENTION: Language assistance services are available, free of charge. Please call [phone].      ATENCIÓN: Si habla español, tiene a mckeon disposición servicios gratuitos de asistencia lingüística. Llame al [phone].     CHÚ Ý: N?u b?n nói Ti?ng Vi?t, có các d?ch v? h? tr? ngôn ng? mi?n phí dành cho b?n. G?i s? [phone].

## 2017-05-20 NOTE — ED NOTES
Pt presents via [facility] excessive ETOH abuse, Pt smells of alcohol, yelling obscenities, non cooperative. Pt placed on monitor, placed close to nurse's station for close monitoring.

## 2017-05-20 NOTE — DISCHARGE INSTRUCTIONS
"  Alcohol Abuse  Alcoholic drinks are harmful when you have too many of them. There is no set number of drinks that defines too much. Drinking that disrupts your life or your health is called alcohol abuse. Alcohol abuse can hurt your relationships with others. You may lose friends, a spouse, or even your job. You may be abusing alcohol if any of the following are true for you:  · Duties at home or with  suffer because of drinking.  · Duties at work or in school suffer because of drinking.  · You have missed work or school because of drinking.  · You use alcohol while driving or operating machinery.  · You have legal problems such as arrests due to drinking.  · You keep drinking even though it causes serious problems in your life.  Health effects  Alcohol abuse causes health problems. Sometimes this can happen after only drinking a little." There is no set number of drinks or amount of alcohol that defines too much. The more you drink at one time, and the more often you drink determine both the short-term and long-term health effects. It affects all parts of your body and your health, including your:  · Brain. Alcohol is a central nervous system depressant. It can damage parts of the brain that affect your balance, memory, thinking, and emotions. It can cause memory loss, blackouts, depression, agitation, sleep cycle changes, and seizures. These changes may or may not be reversible.  · Heart and vascular system. Alcohol affects multiple areas. It can damage heart muscle causing cardiomyopathy, which is a weakening and stretching of the heart muscle. This can lead to trouble breathing, an irregular heartbeat, atrial fibrillation, leg swelling, and heart failure. Alcohol use makes the blood vessels stiffen causing high blood pressure. All of these problems increase your risk of having heart attacks or strokes.  · Liver. Alcohol causes fat to build up in the liver, affecting its normal function. This " increases the risk for hepatitis, leading to abdominal pain, appetite loss, jaundice, bleeding problems, liver fibrosis, and cirrhosis. This, in turn, can affect your ability to fight off infections, and can cause diabetes. The liver changes prevent it from removing toxins in your blood that can cause encephalopathy, which may show with confusion, altered level of consciousness, personality changes, memory loss, seizures, coma, and death.  · Pancreas. Alcohol can cause inflammation of the pancreas, or pancreatitis. This can cause abdominal pain, fever, and diabetes.  · Immune system. Alcohol weakens your immune system in a number of ways. It suppresses your immune system making it harder to fight infections and colds. It also increases the chance of getting pneumonia and tuberculosis.  · Cancer. Alcohol is a risk factor for developing cancer of the mouth, esophagus, pharynx, larynx, liver, and breast.  · Sexual function. Alcohol can lead to sexual problems.  Home care  The following guidelines will help you deal with alcohol abuse:  · Admit you have a problem with alcohol.  · Ask for help from your healthcare provider and trusted family members or close friends.  · Get help from people trained in dealing with alcohol abuse. This may be individual counseling or group therapy, or it may be a supervised alcohol treatment program.  · Join a self-help group for alcohol abuse such as Alcoholics Anonymous (AA).  · Avoid people who abuse alcohol or tempt you to drink.  Follow-up care  Follow up as advised by the healthcare provider, or as advised. Contact these groups to get help:  · Alcoholics Anonymous (AA): Go to www.aa.org or check the phone book for meetings near you.  · National Alcohol and Substance Abuse Information Center (NASAIC): 577.852.9892 www.addictioncareoptions.com  · National [place] on Alcoholism and Drug Dependence (NCADD): 474-YUK-MMMT (713-7324) www.ncadd.org  Call 911  Call 911 if any of these  occur:  · Trouble breathing or slow irregular breathing  · Chest pain  · Sudden weakness on one side of your body or sudden trouble speaking  · Heavy bleeding or vomiting blood  · Very drowsy or trouble awakening  · Fainting or loss of consciousness  · Rapid heart rate  · Seizure  When to seek medical care  Call your healthcare provider right away if any of these occur:   · Confusion  · Hallucinations (seeing, hearing, or feeling things that arent there)  · Pain in your upper abdomen that gets worse  · Repeated vomiting or black or tarry stools  · Severe shakiness  Date Last Reviewed: 6/1/2016  © 7390-7132 [employer]. [address] [place] [address]. All rights reserved. This information is not intended as a substitute for professional medical care. Always follow your healthcare professional's instructions.

## 2017-05-20 NOTE — ED NOTES
Pt resting in bed with eyes closed, continues to be on cardiac monitor and pulse ox. Will continue to monitor

## 2017-05-20 NOTE — ED NOTES
Patient able to ambulate down hallway with steady gait and without assistance.  Speech clear, recognized me while walking with steady gait to bathroom. AAO to time, place, person and situation

## 2017-05-20 NOTE — ED PROVIDER NOTES
Encounter Date: 5/20/2017    SCRIBE #1 NOTE: I, Faye Weathers , am scribing for, and in the presence of, Dr. Barnes .       History     Chief Complaint   Patient presents with    Alcohol Intoxication     found near gas station, ams secondary to etoh     Review of patient's allergies indicates:  No Known Allergies  HPI Comments: Time seen by provider: 1:44 AM    This is a 56 y.o. female who presents to the ED via EMS with complaint of alcohol intoxication. Gradual onset of symptoms began prior to arrival. Per EMS, the patient was found near a gas station. Pt has no complaints, and denies fever, chills, nausea, vomiting, abdominal pain, chest pain, SOB, head trauma, or pain to the extremities.    The history is provided by the patient and the EMS personnel. The history is limited by the condition of the patient.     Past Medical History:   Diagnosis Date    ETOH abuse     Hyperlipemia     Hypertension     Psychiatric illness      Past Surgical History:   Procedure Laterality Date    BREAST SURGERY       No family history on file.  Social History   Substance Use Topics    Smoking status: Current Every Day Smoker     Packs/day: 0.50     Types: Cigarettes    Smokeless tobacco: None    Alcohol use 25.2 oz/week     42 Standard drinks or equivalent per week      Comment: heavy drinker     Review of Systems   Unable to perform ROS: Acuity of condition       Physical Exam   Initial Vitals   BP Pulse Resp Temp SpO2   05/20/17 0127 05/20/17 0127 05/20/17 0127 05/20/17 0127 05/20/17 0127   129/90 82 18 98.1 °F (36.7 °C) 96 %     Physical Exam    Nursing note and vitals reviewed.  Constitutional: She appears well-developed and well-nourished. She is not diaphoretic. No distress.   Disheveled. Pt smells of alcohol.    HENT:   Head: Normocephalic and atraumatic.   Right Ear: External ear normal.   Left Ear: External ear normal.   Eyes: Conjunctivae and EOM are normal. Right eye exhibits no discharge. Left eye exhibits no  discharge. No scleral icterus.   Neck: Normal range of motion. Neck supple.   Cardiovascular: Normal rate, regular rhythm, normal heart sounds and intact distal pulses. Exam reveals no gallop and no friction rub.    No murmur heard.  Pulmonary/Chest: Breath sounds normal. No stridor. No respiratory distress. She has no wheezes. She has no rhonchi. She has no rales.   Abdominal: Soft. She exhibits no distension. There is no tenderness. There is no rebound and no guarding.   Obese.    Musculoskeletal: Normal range of motion. She exhibits no edema or tenderness.   Neurological: She is alert.   Pt is non-compliant with detailed neurological exam. No focal deficits. Slurred speech.    Skin: Skin is warm and dry. No rash noted. No erythema. No pallor.   Psychiatric:   Hypersexual. Pt is shouting profanity and lewd comments incessantly.          ED Course   Critical Care  Date/Time: 5/20/2017 8:01 AM  Performed by: SARAH BARNES II  Authorized by: SARAH BARNES II   Direct patient critical care time: 20 minutes  Additional history critical care time: 8 minutes  Documentation critical care time: 10 minutes  Total critical care time (exclusive of procedural time) : 38 minutes  Critical care was necessary to treat or prevent imminent or life-threatening deterioration of the following conditions: toxidrome and CNS failure or compromise.        Labs Reviewed - No data to display                     Scribe Attestation:   Scribe #1: I performed the above scribed service and the documentation accurately describes the services I performed. I attest to the accuracy of the note.    Attending Attestation:           Physician Attestation for Scribe:  Physician Attestation Statement for Scribe #1: I, Dr. Barnes , reviewed documentation, as scribed by Faye Weathers  in my presence, and it is both accurate and complete.                 ED Course     Patient with history of chronic alcoholism, presents by EMS at request of  bystanders.  No witnessed trauma.  She is inebriated, quite labile.  Repeatedly cursing and screaming foul sexual comments.  Absolutely unable to be redirected.  This is consistent with prior presentations.  She does not have any focal neurologic deficits.  Patient repeatedly trying to get out of bed.  Required sedation and prolonged observation.  At the end of this she is now calm, is ambulating with steady gait.  Stable for discharge.    Clinical Impression:     1. Acute alcohol intoxication, with unspecified complication                Valdemar Barnes II, MD  05/20/17 0802

## 2017-08-09 ENCOUNTER — HOSPITAL ENCOUNTER (EMERGENCY)
Facility: OTHER | Age: 57
Discharge: HOME | End: 2017-08-09
Attending: EMERGENCY MEDICINE
Payer: MEDICAID

## 2017-08-09 VITALS
SYSTOLIC BLOOD PRESSURE: 118 MMHG | TEMPERATURE: 98 F | BODY MASS INDEX: 30.01 KG/M2 | HEIGHT: 68 IN | HEART RATE: 98 BPM | OXYGEN SATURATION: 97 % | WEIGHT: 198 LBS | DIASTOLIC BLOOD PRESSURE: 67 MMHG | RESPIRATION RATE: 16 BRPM

## 2017-08-09 DIAGNOSIS — F10.920 ALCOHOL INTOXICATION, UNCOMPLICATED: Primary | ICD-10-CM

## 2017-08-09 LAB — POCT GLUCOSE: 104 MG/DL (ref 70–110)

## 2017-08-09 PROCEDURE — 63600175 PHARM REV CODE 636 W HCPCS: Performed by: EMERGENCY MEDICINE

## 2017-08-09 PROCEDURE — 96372 THER/PROPH/DIAG INJ SC/IM: CPT

## 2017-08-09 PROCEDURE — 82962 GLUCOSE BLOOD TEST: CPT

## 2017-08-09 PROCEDURE — 99285 EMERGENCY DEPT VISIT HI MDM: CPT | Mod: 25

## 2017-08-09 RX ORDER — HALOPERIDOL 5 MG/ML
5 INJECTION INTRAMUSCULAR
Status: COMPLETED | OUTPATIENT
Start: 2017-08-09 | End: 2017-08-09

## 2017-08-09 RX ADMIN — HALOPERIDOL LACTATE 5 MG: 5 INJECTION, SOLUTION INTRAMUSCULAR at 07:08

## 2017-08-10 NOTE — ED NOTES
Pt keeps taking the B/P cuff and pulse ox off. RN repeatedly put the pulse ox back on the pt to monitor her HR and pulse ox since she was given Haldol.

## 2017-08-10 NOTE — ED PROVIDER NOTES
Encounter Date: 8/9/2017       History     Chief Complaint   Patient presents with    Alcohol Intoxication     pt to ed smells heavily of etoh. pt unable to stay awake throughout triage. responds to verbal stimuli but falls back asleep. pt is oriented x 2 with no outward signs of trauma.      Patient is a 48-year-old female past medical history of hypertension, alcohol abuse, presenting to the emergency department via EMS for EtOH.  Per EMS, the patient was found drunk outside and brought to the emergency department.  Patient denies any complaints at this time.  She admits to drinking alcohol prior to arrival.  She denies any pain or complaints.      The history is provided by the patient.     Review of patient's allergies indicates:  No Known Allergies  Past Medical History:   Diagnosis Date    ETOH abuse     Hyperlipemia     Hypertension     Psychiatric illness      Past Surgical History:   Procedure Laterality Date    BREAST SURGERY       No family history on file.  Social History   Substance Use Topics    Smoking status: Current Every Day Smoker     Packs/day: 0.50     Types: Cigarettes    Smokeless tobacco: Not on file    Alcohol use 25.2 oz/week     42 Standard drinks or equivalent per week      Comment: heavy drinker     Review of Systems   Reason unable to perform ROS: EtOH intoxication.       Physical Exam     Initial Vitals [08/09/17 1853]   BP Pulse Resp Temp SpO2   113/66 101 14 98.1 °F (36.7 °C) (!) 91 %      MAP       81.67         Physical Exam    Nursing note and vitals reviewed.  Constitutional: Vital signs are normal. She appears well-developed and well-nourished. She is not diaphoretic. She is Obese . She is cooperative.  Non-toxic appearance. She does not have a sickly appearance. She does not appear ill. No distress.   Obese, , presenting via EMS.  She is in no acute distress.  Slurred speech, EtOH on breath.   HENT:   Head: Normocephalic and atraumatic.   Eyes:  Conjunctivae and lids are normal.   Nystagmus noted bilaterally on horizontal gaze   Neck: Normal range of motion. Neck supple.   Cardiovascular: Normal rate and regular rhythm.   Pulmonary/Chest: Breath sounds normal.   Abdominal: Soft.   Musculoskeletal: Normal range of motion.   Neurological: She is alert and oriented to person, place, and time. GCS eye subscore is 4. GCS verbal subscore is 5. GCS motor subscore is 6.   Skin: Skin is warm.         ED Course   Procedures  Labs Reviewed   POCT GLUCOSE   POCT GLUCOSE MONITORING CONTINUOUS             Medical Decision Making:   Initial Assessment:   Urgent evaluation of a 56 y.o. female with a past medical history of HTN, EtOH abuse, presenting to the emergency department complaining of EtOH intoxiciation. Patient is afebrile, nontoxic appearing and hemodynamically stable.  Physical exam reveals regular rate and rhythm, lungs are clear to auscultation bilaterally.  EtOH on breath, slurred speech noted.  Nystagmus on exam.  Patient is clinically intoxicated.  Will plan for cardiac monitoring, Accu-Chek, continue to observe until clinically sober.    Clinical Tests:   Lab Tests: Ordered and Reviewed       <> Summary of Lab: Accu-Chek  ED Management:  Accucheck is 104. Patient has normal vital signs. Will continue to monitor.  7:25 PM Patient is becoming agitated and will not remain in her bed. Will administer IM haldol.  8:20 PM Patient's  has arrived at the bedside. He reports he would like to take her home. Patient is in no acute distress, is responding to questions and can now ambulate with a steady gait. She will be discharged home to the care of the . The patient was instructed to follow up with a primary care provider in 2 days or to return to the emergency department for worsening symptoms. The treatment plan was discussed with the patient who demonstrated understanding and comfort with plan. The patient's history, physical exam, and plan of care  was discussed with and agreed upon with my supervising physician.    Other:   I have discussed this case with another health care provider.       <> Summary of the Discussion: Dr. Alfred  This note was created using Dragon Medical Dictation. There may be typographical errors secondary to dictation.                    ED Course     Clinical Impression:     1. Alcohol intoxication, uncomplicated         Disposition:   Disposition: Discharged  Condition: Stable                        Siomara Orlando PA-C  08/09/17 2025

## 2017-08-10 NOTE — ED NOTES
Pt brought in by EMS for ETOH intoxication, pt was found down. NO obvious trauma observed. Pt is A & O x 3, has slurred speech, denies SOB, fever, chills and N/V/D. No obvious respiratory distress noted. Respirations are even and unlabored. NO nasal flaring and no use of accessory muscles. Pt is able to maintain own airway. Skin is warm and dry w/ pink mucosa. Skin is not cyanotic,clammy or diaphoretic. No redness or flushing to the face. VS. GINO x 3mm. No JVD. BBS- CTA w/out rales, rhonchi or wheezing. All fields equal upon auscultation. =chest rise observed. Abd- SNT. BS x 4 quadrants. Pt denies dysuria and constipation. PSM x 4 exts. MORRISON w/difficulty. +2 pulses x 4 exts. No swelling, redness, difference in temperature or deformity to exts x 4. Relative/Friend @ BS. Bed is locked and in the low position w/ the side rails up and locked for safety. Call bell @ BS. Will continue to monitor closely.

## 2017-08-10 NOTE — ED NOTES
Pt ambulated to restroom and back to stretcher w/out assistance. No staggered gait noted. Pt is A & O x 3. Pt denies SOB, respirations are even and unlabored. Skin is warm and dry w/ pink mucosa. Skin is not pale. Bed remains locked and in the low position w/ the side rails up and locked for safety. Call bell continues @ BS. Will continue to monitor closely. Pts  at her side, states he will take her home.  agrees to this, and will discharge pt to husbands care.

## 2017-08-10 NOTE — ED NOTES
Pt consistently tries to get out of bed. Pt redirected to stay in bed. Pt wants to walk to the restroom, pt offered a bedpan, but refuses. Pt repeatedly told she cannot get up and walk to the restroom, but keeps trying. Pt has to be put back in the bed numerous times. Dr. Alfred notified of pts behavior.

## 2017-08-22 ENCOUNTER — HOSPITAL ENCOUNTER (EMERGENCY)
Facility: OTHER | Age: 57
Discharge: HOME | End: 2017-08-22
Attending: EMERGENCY MEDICINE
Payer: MEDICAID

## 2017-08-22 VITALS
TEMPERATURE: 99 F | RESPIRATION RATE: 16 BRPM | HEART RATE: 104 BPM | WEIGHT: 190 LBS | OXYGEN SATURATION: 92 % | DIASTOLIC BLOOD PRESSURE: 76 MMHG | BODY MASS INDEX: 32.44 KG/M2 | HEIGHT: 64 IN | SYSTOLIC BLOOD PRESSURE: 136 MMHG

## 2017-08-22 DIAGNOSIS — F10.929 ALCOHOL INTOXICATION, WITH UNSPECIFIED COMPLICATION: Primary | ICD-10-CM

## 2017-08-22 PROCEDURE — 99283 EMERGENCY DEPT VISIT LOW MDM: CPT

## 2017-08-22 NOTE — ED NOTES
Patient able to ambulate down hallway with steady gait and without assistance.       LOC: The patient is awake, alert and aware of environment with an appropriate affect, the patient is oriented x 3 and speaking appropriately.    SKIN: The skin is warm and dry, patient has normal skin turgor and moist mucus membranes, skin intact, no breakdown or brusing noted.  MUSKULOSKELETAL: Patient moving all extremities well, no obvious swelling or deformities noted.  RESPIRATORY: Airway is open and patent, respirations are spontaneous, patient has a normal effort and rate.   CARDIAC: Normal heart sounds. No peripheral edema.

## 2017-08-22 NOTE — ED PROVIDER NOTES
Encounter Date: 8/22/2017    SCRIBE #1 NOTE: I, Salina Gordon , am scribing for, and in the presence of, Dr. Zapata .       History     Chief Complaint   Patient presents with    Alcohol Intoxication     brought in by EMS, slurred speech, ambulatory w/ steady gait     08/22/2017  4:52 PM     Chief Complaint: Alcohol intoxication       The patient is a 56 y.o. female who is presenting per EMS for L leg pain and ETOH intoxication. After further questioning, the pt reports that the pain is chronic and has not changed from baseline. No other comments or complaints. Pertinent PMHx includes ETOH abuse, HDL, HTN. No pertinent past surgical hx. HPI and ROS limited secondary to condition of pt.             The history is provided by the patient and the EMS personnel.     Review of patient's allergies indicates:  No Known Allergies  Past Medical History:   Diagnosis Date    ETOH abuse     Hyperlipemia     Hypertension     Psychiatric illness      Past Surgical History:   Procedure Laterality Date    BREAST SURGERY       No family history on file.  Social History   Substance Use Topics    Smoking status: Current Every Day Smoker     Packs/day: 0.50     Types: Cigarettes    Smokeless tobacco: Not on file    Alcohol use 25.2 oz/week     42 Standard drinks or equivalent per week      Comment: heavy drinker     Review of Systems   Unable to perform ROS: Other       Physical Exam     Initial Vitals [08/22/17 1629]   BP Pulse Resp Temp SpO2   136/76 104 16 98.5 °F (36.9 °C) (!) 92 %      MAP       96         Physical Exam    Nursing note and vitals reviewed.  Constitutional: She appears well-developed and well-nourished.   Pt smells of ETOH.    HENT:   Head: Normocephalic and atraumatic.   Cardiovascular: Normal rate, regular rhythm, normal heart sounds and intact distal pulses. Exam reveals no gallop and no friction rub.    No murmur heard.  Pulmonary/Chest: Breath sounds normal. She has no wheezes. She has no rhonchi.  She has no rales.   Musculoskeletal: Normal range of motion. She exhibits no edema or tenderness.   Upon exam there are is no sign of deformity, tenderness, is able bear weight without complaint. Full ROM of the LLE. Legs are symmetric and non tender.    Neurological: She is alert and oriented to person, place, and time. She has normal strength. No sensory deficit.   Able to ambulate normally without ataxic gait and is dancing in hallway during physical exam. Slightly slurred speech.    Skin: Skin is warm and dry. Capillary refill takes less than 2 seconds. No erythema.   No erythema or overlaying skin changes noted to LLE.          ED Course   Procedures  Labs Reviewed - No data to display          Medical Decision Making:   History:   Old Medical Records: I decided to obtain old medical records.  ED Management:  56-year-old female brought in for alcohol intoxication.  Patient well-known to this emergency Department.  Patient does complain of some leg pain.  I do not see any signs of injury or infection or anything that needs acute workup.  Patient is able ambulate on her own.  Can answer all questions appropriately.  I feel she is stable for discharge.  I feel she is clinically sober.            Scribe Attestation:   Scribe #1: I performed the above scribed service and the documentation accurately describes the services I performed. I attest to the accuracy of the note.    Attending Attestation:           Physician Attestation for Scribe:  Physician Attestation Statement for Scribe #1: I, Dr. Zapata, reviewed documentation, as scribed by  Salina Gordon  in my presence, and it is both accurate and complete.                 ED Course     Clinical Impression:     1. Alcohol intoxication, with unspecified complication        Disposition:   Disposition: Discharged                        Andrew Zapata, DO  08/22/17 6761

## 2017-09-15 ENCOUNTER — HOSPITAL ENCOUNTER (EMERGENCY)
Facility: OTHER | Age: 57
Discharge: LEFT BEFORE DISCHARGE | End: 2017-09-15
Attending: EMERGENCY MEDICINE
Payer: MEDICAID

## 2017-09-15 VITALS
TEMPERATURE: 98 F | WEIGHT: 204 LBS | RESPIRATION RATE: 16 BRPM | SYSTOLIC BLOOD PRESSURE: 127 MMHG | HEART RATE: 94 BPM | BODY MASS INDEX: 32.78 KG/M2 | HEIGHT: 66 IN | OXYGEN SATURATION: 95 % | DIASTOLIC BLOOD PRESSURE: 73 MMHG

## 2017-09-15 DIAGNOSIS — R10.9 ACUTE RIGHT FLANK PAIN: Primary | ICD-10-CM

## 2017-09-15 DIAGNOSIS — H57.89 EYE REDNESS: ICD-10-CM

## 2017-09-15 PROCEDURE — 99283 EMERGENCY DEPT VISIT LOW MDM: CPT

## 2017-09-15 NOTE — ED TRIAGE NOTES
"Pt states "I'm a mental patient, and I need to rest myself." Unable to obtain any complaints from pt. + ETOH today. Pt appears intoxicated at this time.   "

## 2017-09-15 NOTE — ED NOTES
Pt's speech is slurred during triage. Pt walks with an unsteady gait and is falling asleep in waiting room. In NAD, even, unlabored respirations.

## 2017-09-16 NOTE — ED PROVIDER NOTES
Encounter Date: 9/15/2017       History     Chief Complaint   Patient presents with    eye redness     c/o eye redness x 5 days; denies eye pain, vision changes or itching    Flank Pain     also c/o 10/10 right flank pain     Patient is a 56 y.o. female with a past medical history of EtOH abuse, HTN, HLD, presenting to the emergency department with complaints of right sided flank pain and bilateral eye redness.  The patient reports that her right-sided flank pain started approximately 2 weeks ago.  She denies dysuria or hematuria.  She denies urinary frequency or urgency, denies fever.  She also reports that she's had bilateral eye redness for the past 2 days.  She denies drainage, itching, pain, blurred vision.  Of note, she does report that she has had at least 2 x 40oz beers today.      The history is provided by the patient.     Review of patient's allergies indicates:  No Known Allergies  Past Medical History:   Diagnosis Date    ETOH abuse     Hyperlipemia     Hypertension     Psychiatric illness      Past Surgical History:   Procedure Laterality Date    BREAST SURGERY       History reviewed. No pertinent family history.  Social History   Substance Use Topics    Smoking status: Current Some Day Smoker     Packs/day: 0.50     Types: Cigarettes    Smokeless tobacco: Never Used    Alcohol use 25.2 oz/week     42 Standard drinks or equivalent per week      Comment: heavy drinker     Review of Systems   Constitutional: Negative for activity change, appetite change, chills, fatigue and fever.   HENT: Negative for congestion, rhinorrhea and sore throat.    Eyes: Positive for redness. Negative for photophobia and visual disturbance.   Respiratory: Negative for cough, shortness of breath and wheezing.    Cardiovascular: Negative for chest pain.   Gastrointestinal: Negative for abdominal pain, diarrhea, nausea and vomiting.   Genitourinary: Positive for flank pain. Negative for dysuria, hematuria and urgency.    Musculoskeletal: Negative for back pain, myalgias and neck pain.   Skin: Negative for color change and wound.   Neurological: Negative for weakness and headaches.   Psychiatric/Behavioral: Negative for agitation and confusion.       Physical Exam     Initial Vitals [09/15/17 1802]   BP Pulse Resp Temp SpO2   127/73 94 16 98.4 °F (36.9 °C) 95 %      MAP       91         Physical Exam    Nursing note and vitals reviewed.  Constitutional: Vital signs are normal. She appears well-developed and well-nourished. She is not diaphoretic. She is cooperative.  Non-toxic appearance. She does not have a sickly appearance. She does not appear ill. No distress.   Well appearing, obese, -American female, unaccompanied in the emergency department.  She is speaking in clear and full sentences.  She is in no acute distress.   HENT:   Head: Normocephalic and atraumatic.   Right Ear: External ear normal.   Left Ear: External ear normal.   Nose: Nose normal.   Mouth/Throat: Oropharynx is clear and moist.   Eyes: Lids are normal. Pupils are equal, round, and reactive to light.   Injected conjunctiva bilaterally   Neck: Normal range of motion. Neck supple.   Cardiovascular: Normal rate, regular rhythm and normal heart sounds.   Pulmonary/Chest: Breath sounds normal. No respiratory distress. She has no wheezes.   Abdominal: There is no CVA tenderness.   Musculoskeletal: Normal range of motion.   Neurological: She is alert and oriented to person, place, and time. GCS eye subscore is 4. GCS verbal subscore is 5. GCS motor subscore is 6.   Skin: Skin is warm.   Psychiatric: She has a normal mood and affect. Her behavior is normal. Judgment and thought content normal.         ED Course   Procedures  Labs Reviewed   URINALYSIS             Medical Decision Making:   Initial Assessment:   Urgent evaluation of a 56-year-old female with a past medical history of EtOH abuse, presenting to the emergency department with complaints of  right-sided flank pain and bilateral eye redness.  Patient is afebrile, nontoxic appearing, hemodynamically stable.  Injected bilateral conjunctivae with no active drainage, no pain with EOM.  No CVA tenderness bilaterally.  Will plan for visual acuity and UA.  ED Management:  Prior to completing orders, the patient reported that she no longer desired to stay in the emergency department with like to leave.  I explained the patient that I would like to perform these tests for further evaluation.  She declined and stated she was ready to go home.  Patient signed AMA forms. The patient's history, physical exam, and plan of care was discussed with and agreed upon with my supervising physician.    Other:   I have discussed this case with another health care provider.       <> Summary of the Discussion: Dr. Valencia  This note was created using Dragon Medical Dictation. There may be typographical errors secondary to dictation.                    ED Course      Clinical Impression:     1. Acute right flank pain    2. Eye redness         Disposition:   Disposition: Discharged  Condition: Stable                        Siomara Orlando PA-C  09/15/17 1949

## 2017-09-16 NOTE — ED NOTES
"While patient was in [facility] awaiting orders she went to the registration window and stated, "Them hoes dont know nothing about my eyes, Im ready to go."  Registration came to nursing station and informed us of patient wanting to go.  Provider, SUNNY Orlando PA-C, went to speak with patient and patient stated she wanted to AMA.  AMA form filled out by provider, signed by Charge nurse and myself.   "

## 2017-10-17 ENCOUNTER — HOSPITAL ENCOUNTER (EMERGENCY)
Facility: OTHER | Age: 57
Discharge: HOME | End: 2017-10-18
Attending: EMERGENCY MEDICINE
Payer: MEDICAID

## 2017-10-17 DIAGNOSIS — F10.920 ALCOHOLIC INTOXICATION WITHOUT COMPLICATION: Primary | ICD-10-CM

## 2017-10-17 PROCEDURE — 99283 EMERGENCY DEPT VISIT LOW MDM: CPT | Mod: 25

## 2017-10-17 PROCEDURE — 82962 GLUCOSE BLOOD TEST: CPT

## 2017-10-18 VITALS
DIASTOLIC BLOOD PRESSURE: 72 MMHG | HEART RATE: 82 BPM | RESPIRATION RATE: 16 BRPM | SYSTOLIC BLOOD PRESSURE: 138 MMHG | TEMPERATURE: 99 F | OXYGEN SATURATION: 95 %

## 2017-10-18 LAB
BACTERIA #/AREA URNS HPF: ABNORMAL /HPF
BILIRUB UR QL STRIP: NEGATIVE
CLARITY UR: CLEAR
COLOR UR: YELLOW
GLUCOSE UR QL STRIP: NEGATIVE
HGB UR QL STRIP: ABNORMAL
KETONES UR QL STRIP: NEGATIVE
LEUKOCYTE ESTERASE UR QL STRIP: ABNORMAL
MICROSCOPIC COMMENT: ABNORMAL
NITRITE UR QL STRIP: NEGATIVE
PH UR STRIP: 6 [PH] (ref 5–8)
POCT GLUCOSE: 145 MG/DL (ref 70–110)
PROT UR QL STRIP: NEGATIVE
RBC #/AREA URNS HPF: 8 /HPF (ref 0–4)
SP GR UR STRIP: 1.01 (ref 1–1.03)
SQUAMOUS #/AREA URNS HPF: 12 /HPF
URN SPEC COLLECT METH UR: ABNORMAL
UROBILINOGEN UR STRIP-ACNC: NEGATIVE EU/DL
WBC #/AREA URNS HPF: 2 /HPF (ref 0–5)
WBC CLUMPS URNS QL MICRO: ABNORMAL
YEAST URNS QL MICRO: ABNORMAL

## 2017-10-18 PROCEDURE — 81000 URINALYSIS NONAUTO W/SCOPE: CPT

## 2017-10-18 PROCEDURE — 25000003 PHARM REV CODE 250: Performed by: EMERGENCY MEDICINE

## 2017-10-18 RX ORDER — ACETAMINOPHEN 325 MG/1
650 TABLET ORAL
Status: COMPLETED | OUTPATIENT
Start: 2017-10-18 | End: 2017-10-18

## 2017-10-18 RX ADMIN — ACETAMINOPHEN 650 MG: 325 TABLET ORAL at 01:10

## 2017-10-18 NOTE — ED NOTES
Pt ambulated to bathroom and back to stretcher without complication. Pt directly across from nurses station to maintain safety

## 2017-10-18 NOTE — ED TRIAGE NOTES
"PT presented to the ed with a complaint of "my liver is shrivelling up and I need to get it checked."  ETOH odor on breath.   "

## 2017-10-18 NOTE — ED NOTES
Rounding on patient completed. Pt sleeping on stretcher, respirations even and unlabored, pt in no distress. Will continue to monitor.

## 2017-10-18 NOTE — ED PROVIDER NOTES
"Encounter Date: 10/17/2017    SCRIBE #1 NOTE: I, Faye Jasiel , am scribing for, and in the presence of, Dr. Joseph.       History     Chief Complaint   Patient presents with    Fatty Liver     "I think my liver is going bad, I need it checked."     Time seen by provider: 11:52 PM    This is a 57 y.o. female who presents via EMS with complaint of mild right-sided back pain. Pt has noticed pain since she was told that her "liver is going bad" by a PCP one week ago. She admits to consuming alcohol prior to arrival, and has been seen in the ED multiple times for alcohol intoxication.  She has no other complaints, and denies fever, chills, nausea, vomiting, diarrhea, constipation, abdominal pain, chest pain, SOB, or any urinary symptoms.        The history is provided by the patient.     Review of patient's allergies indicates:  No Known Allergies  Past Medical History:   Diagnosis Date    ETOH abuse     Hyperlipemia     Hypertension     Psychiatric illness      Past Surgical History:   Procedure Laterality Date    BREAST SURGERY       No family history on file.  Social History   Substance Use Topics    Smoking status: Current Some Day Smoker     Packs/day: 0.50     Types: Cigarettes    Smokeless tobacco: Never Used    Alcohol use 25.2 oz/week     42 Standard drinks or equivalent per week      Comment: heavy drinker     Review of Systems   Constitutional: Negative for chills and fever.   HENT: Negative for congestion and sore throat.    Eyes: Negative for photophobia and redness.   Respiratory: Negative for cough and shortness of breath.    Cardiovascular: Negative for chest pain.   Gastrointestinal: Negative for abdominal pain, constipation, diarrhea, nausea and vomiting.   Genitourinary: Negative for decreased urine volume, difficulty urinating, dysuria, frequency, hematuria and urgency.   Musculoskeletal: Positive for back pain.   Skin: Negative for rash.   Neurological: Negative for weakness, " light-headedness and headaches.   Psychiatric/Behavioral: Negative for confusion.       Physical Exam     Initial Vitals [10/17/17 2222]   BP Pulse Resp Temp SpO2   131/84 104 16 98 °F (36.7 °C) 98 %      MAP       99.67         Physical Exam    Nursing note and vitals reviewed.  Constitutional: She appears well-developed and well-nourished. She is not diaphoretic. No distress.   Pt smells of alcohol.    HENT:   Head: Normocephalic and atraumatic.   Right Ear: External ear normal.   Left Ear: External ear normal.   Eyes: EOM are normal. Pupils are equal, round, and reactive to light. Right eye exhibits no discharge. Left eye exhibits no discharge.   Neck: Normal range of motion.   Cardiovascular: Normal rate, regular rhythm and normal heart sounds. Exam reveals no gallop and no friction rub.    No murmur heard.  Pulmonary/Chest: Breath sounds normal. No respiratory distress. She has no wheezes. She has no rhonchi. She has no rales.   Abdominal: Soft. There is no tenderness. There is no rebound and no guarding.   No CVA tenderness.    Musculoskeletal: Normal range of motion. She exhibits no edema or tenderness.   Neurological: She is alert and oriented to person, place, and time.   Slurred speech.    Skin: Skin is warm and dry. No rash and no abscess noted. No erythema. No pallor.   Psychiatric: She has a normal mood and affect. Her behavior is normal. Judgment and thought content normal.         ED Course   Procedures  Labs Reviewed   URINALYSIS - Abnormal; Notable for the following:        Result Value    Occult Blood UA 2+ (*)     Leukocytes, UA 3+ (*)     All other components within normal limits   URINALYSIS MICROSCOPIC - Abnormal; Notable for the following:     RBC, UA 8 (*)     All other components within normal limits   POCT GLUCOSE - Abnormal; Notable for the following:     POCT Glucose 145 (*)     All other components within normal limits             Medical Decision Making:   Clinical Tests:   Lab Tests:  Ordered and Reviewed    Additional MDM:   Comments: 57-year-old female well-known to this department secondary to frequent visits for alcohol intoxication presented with complaint that her liver  Was shriveling and wanted to be evaluated for this.  She reported that her primary care doctor told her last week that she had liver problems and complained of having right sided back pain since then.  On exam she was intoxicated with smell of alcohol and slurred speech. The remainder of her exam was unremarkable.  She was observed in the emergency department for over 6 hours without any events.  At the time of discharge patient was clinically sober and asymptomatic.  She stated that she needed to go home to get her nephew on the bus.  Patient discharged in stable condition..                 ED Course      Clinical Impression:     1. Alcoholic intoxication without complication                                 Mariana Joseph MD  10/18/17 0610

## 2017-10-18 NOTE — ED NOTES
"Pt got up from wheelchair and laid on the floor. Pt acting like she is asleep, pt sternal rubbed and opened her eyes and states "I just want to lie down." Pt educated she could not lay on the floor. Pt states " i'm sorry I just didn't want to go home yet, can I pee." pt able to get herself up off the floor and ambulated to bathroom with tech. Tech stayed in bathroom to ensure pt safety. Stretcher brought to hallway and pt got on stretcher without assistance and placed on portable pulse ox and HR monitor.   "

## 2017-10-26 ENCOUNTER — HOSPITAL ENCOUNTER (EMERGENCY)
Facility: OTHER | Age: 57
Discharge: HOME | End: 2017-10-27
Attending: EMERGENCY MEDICINE
Payer: MEDICAID

## 2017-10-26 DIAGNOSIS — F10.920 ALCOHOLIC INTOXICATION WITHOUT COMPLICATION: Primary | ICD-10-CM

## 2017-10-26 PROCEDURE — 99283 EMERGENCY DEPT VISIT LOW MDM: CPT | Mod: 25

## 2017-10-26 PROCEDURE — 96372 THER/PROPH/DIAG INJ SC/IM: CPT

## 2017-10-26 PROCEDURE — 63600175 PHARM REV CODE 636 W HCPCS: Performed by: EMERGENCY MEDICINE

## 2017-10-26 RX ORDER — DIPHENHYDRAMINE HYDROCHLORIDE 50 MG/ML
12.5 INJECTION INTRAMUSCULAR; INTRAVENOUS
Status: COMPLETED | OUTPATIENT
Start: 2017-10-26 | End: 2017-10-26

## 2017-10-26 RX ORDER — HALOPERIDOL 5 MG/ML
2.5 INJECTION INTRAMUSCULAR
Status: COMPLETED | OUTPATIENT
Start: 2017-10-26 | End: 2017-10-26

## 2017-10-26 RX ADMIN — LORAZEPAM 2 MG: 2 INJECTION INTRAMUSCULAR; INTRAVENOUS at 08:10

## 2017-10-26 RX ADMIN — HALOPERIDOL LACTATE 2.5 MG: 5 INJECTION, SOLUTION INTRAMUSCULAR at 08:10

## 2017-10-26 RX ADMIN — DIPHENHYDRAMINE HYDROCHLORIDE 12.5 MG: 50 INJECTION, SOLUTION INTRAMUSCULAR; INTRAVENOUS at 08:10

## 2017-10-26 RX ADMIN — HALOPERIDOL LACTATE 2.5 MG: 5 INJECTION, SOLUTION INTRAMUSCULAR at 10:10

## 2017-10-26 RX ADMIN — LORAZEPAM 1 MG: 2 INJECTION INTRAMUSCULAR; INTRAVENOUS at 10:10

## 2017-10-27 VITALS
OXYGEN SATURATION: 94 % | RESPIRATION RATE: 18 BRPM | TEMPERATURE: 99 F | DIASTOLIC BLOOD PRESSURE: 69 MMHG | HEIGHT: 66 IN | BODY MASS INDEX: 32.78 KG/M2 | WEIGHT: 204 LBS | HEART RATE: 94 BPM | SYSTOLIC BLOOD PRESSURE: 106 MMHG

## 2017-10-27 NOTE — ED NOTES
Pt in bed, respirations even/unlabored. Pt trying to get out of bed. [place] at bedside to sit and monitor pt. Will continue to monitor.

## 2017-10-27 NOTE — ED NOTES
Pt in bed, respirations even/unlabored.NAD noted. Pt reacts to tactile stimuli. [place] at bedside to sit and monitor pt. Will continue to monitor.

## 2017-10-27 NOTE — ED NOTES
Pt in bed, respirations even/unlabored.NAD noted. [place] at bedside to sit and monitor pt. Will continue to monitor.

## 2017-10-27 NOTE — ED PROVIDER NOTES
Encounter Date: 10/26/2017    SCRIBE #1 NOTE: I, Harriett Guzman, am scribing for, and in the presence of,  Dr. Zapata. I have scribed the entire note.       History     Chief Complaint   Patient presents with    Alcohol Intoxication     patient walked into ER lobby with unsteady gait, and incoherent slurred speech.     Time seen by provider: 8:34 PM    This is a 57 y.o. female with HTN, HLD, psychiatric illness, and hx of EtOH abuse who presents to ED due to EtOH intoxication at the moment. She came to the ED on her own with unsteady gait and slurred speech. She has been seen multiple times in this ED for alcohol intoxication. Pt is a poor historian due to her current condition.       The history is provided by the patient. The history is limited by the condition of the patient.     Review of patient's allergies indicates:  No Known Allergies  Past Medical History:   Diagnosis Date    ETOH abuse     Hyperlipemia     Hypertension     Psychiatric illness      Past Surgical History:   Procedure Laterality Date    BREAST SURGERY       No family history on file.  Social History   Substance Use Topics    Smoking status: Current Some Day Smoker     Packs/day: 0.50     Types: Cigarettes    Smokeless tobacco: Never Used    Alcohol use 25.2 oz/week     42 Standard drinks or equivalent per week      Comment: heavy drinker     Review of Systems   Unable to perform ROS: Acuity of condition   Neurological: Positive for speech difficulty (slurred).        Positive for unsteady gate.       Physical Exam     Initial Vitals [10/26/17 2038]   BP Pulse Resp Temp SpO2   135/68 101 18 98.9 °F (37.2 °C) 97 %      MAP       90.33         Physical Exam    Nursing note and vitals reviewed.  Constitutional: She appears well-developed and well-nourished. She is not diaphoretic. No distress.   Intoxicated.   HENT:   Head: Normocephalic and atraumatic.   Right Ear: External ear normal.   Left Ear: External ear normal.   Eyes:  Right eye exhibits no discharge. Left eye exhibits no discharge.   Nystagmus.   Neck: Normal range of motion. Neck supple.   Pulmonary/Chest: No respiratory distress.   Musculoskeletal: Normal range of motion.   No obvious deformity. Moving all extremities.   Neurological: She is alert and oriented to person, place, and time.   Ataxic gait.   Skin: Skin is warm and dry. No rash noted. No erythema.   Psychiatric:   Slurred speech. Belligerent.         ED Course   Procedures  Labs Reviewed - No data to display          Medical Decision Making:   ED Management:  Marianne Gottlieb is a 57 y.o. female Smells of alcohol and appears to be clinically intoxicated.  Vital signs otherwise normal. No signs of trauma.  At this time I do not feel any urgent intervention such as IV fluids or any blood work is indicated.  There is no indication for radiologic studies at this time.  We'll observe the patient and reevaluate when he is clinically sober, with no slurred speech, being alert and oriented, and able to ambulate on their own.     1:45 AM patient resting comfortably.  Vital signs stable.  3:30 AM easily arousable.  Still somnolent.  We'll continue to observe.    5:40 AM patient able to stand and ambulate but with assistance.  Speech is coming more clear but still slurred.  We'll continue to observe.    6:07 AM patient's  came and picked the patient up.  She was alert and oriented person place and time.            Scribe Attestation:   Scribe #1: I performed the above scribed service and the documentation accurately describes the services I performed. I attest to the accuracy of the note.    I, Dr. Andrew Zapata, personally performed the services described in this documentation. All medical record entries made by the scribe were at my direction and in my presence.  I have reviewed the chart and agree that the record reflects my personal performance and is accurate and complete. Andrew Zapata, DO.  2:28 AM  10/27/2017         ED Course      Clinical Impression:     1. Alcoholic intoxication without complication                                 Andrew Zapata, DO  10/27/17 0607

## 2017-11-05 ENCOUNTER — HOSPITAL ENCOUNTER (EMERGENCY)
Facility: OTHER | Age: 57
Discharge: HOME | End: 2017-11-05
Attending: EMERGENCY MEDICINE
Payer: MEDICAID

## 2017-11-05 VITALS
HEIGHT: 62 IN | TEMPERATURE: 98 F | OXYGEN SATURATION: 97 % | RESPIRATION RATE: 18 BRPM | HEART RATE: 78 BPM | DIASTOLIC BLOOD PRESSURE: 78 MMHG | SYSTOLIC BLOOD PRESSURE: 130 MMHG | WEIGHT: 208 LBS | BODY MASS INDEX: 38.28 KG/M2

## 2017-11-05 DIAGNOSIS — M79.2 NEUROPATHIC PAIN OF RIGHT THIGH: Primary | ICD-10-CM

## 2017-11-05 DIAGNOSIS — M54.31 SCIATICA OF RIGHT SIDE: ICD-10-CM

## 2017-11-05 PROCEDURE — 63600175 PHARM REV CODE 636 W HCPCS: Performed by: EMERGENCY MEDICINE

## 2017-11-05 PROCEDURE — 99283 EMERGENCY DEPT VISIT LOW MDM: CPT | Mod: 25

## 2017-11-05 PROCEDURE — 96372 THER/PROPH/DIAG INJ SC/IM: CPT

## 2017-11-05 RX ORDER — KETOROLAC TROMETHAMINE 30 MG/ML
15 INJECTION, SOLUTION INTRAMUSCULAR; INTRAVENOUS
Status: COMPLETED | OUTPATIENT
Start: 2017-11-05 | End: 2017-11-05

## 2017-11-05 RX ORDER — NAPROXEN 500 MG/1
500 TABLET ORAL 2 TIMES DAILY WITH MEALS
Qty: 30 TABLET | Refills: 1 | OUTPATIENT
Start: 2017-11-05 | End: 2019-02-24

## 2017-11-05 RX ORDER — METHOCARBAMOL 500 MG/1
1000 TABLET, FILM COATED ORAL 3 TIMES DAILY PRN
Qty: 30 TABLET | Refills: 0 | Status: SHIPPED | OUTPATIENT
Start: 2017-11-05 | End: 2017-11-10

## 2017-11-05 RX ADMIN — KETOROLAC TROMETHAMINE 15 MG: 30 INJECTION, SOLUTION INTRAMUSCULAR at 03:11

## 2017-11-05 NOTE — ED PROVIDER NOTES
"Encounter Date: 11/5/2017    SCRIBE #1 NOTE: I, Harriett Guzman, am scribing for, and in the presence of,  Dr. Alfred. I have scribed the entire note.       History     Chief Complaint   Patient presents with    Leg Pain     c/o right leg pain x1 day pta, denies recent trauma      Time seen by provider: 3:38 AM    This is a 57 y.o. female with HTN, HLD, and hx of EtOH abuse who presents with complaint of chronic R leg pain. Pain has increased in severity. She states that the pain is most severe at the thigh, radiating to the R lower back, and worse with ambulation and movement. It is described as a "thumping" pain. She took 800 mg gabapentin earlier this morning and 10 hours PTA, with no relief. Pt has not taken anything else for relief. Pt states that the area of pain used to feel constantly numb. She denies any current numbness or tingling. She reports no associated weakness, bowel incontinence, urinary incontinence, or neck pain. Pt admits to binge drinking twice a week. She is currently being evaluated for liver problems by her PCP. She does not use drugs. She denies any chance of pregnancy. Pt has NKDA.      The history is provided by the patient.     Review of patient's allergies indicates:  No Known Allergies  Past Medical History:   Diagnosis Date    ETOH abuse     Hyperlipemia     Hypertension     Psychiatric illness      Past Surgical History:   Procedure Laterality Date    BREAST SURGERY       History reviewed. No pertinent family history.  Social History   Substance Use Topics    Smoking status: Current Some Day Smoker     Packs/day: 0.50     Types: Cigarettes    Smokeless tobacco: Never Used    Alcohol use 25.2 oz/week     42 Standard drinks or equivalent per week      Comment: heavy drinker     Review of Systems   Constitutional: Negative for chills and fever.   Eyes: Negative for visual disturbance.   Respiratory: Negative for cough and shortness of breath.    Cardiovascular: Negative for " chest pain and palpitations.   Gastrointestinal: Negative for abdominal pain, diarrhea, nausea and vomiting.        No bowel incontinence.    Genitourinary: Negative for dysuria and vaginal discharge.        No urinary incontinence.   Musculoskeletal: Positive for back pain (R, lower). Negative for joint swelling, neck pain and neck stiffness.        R leg pain.   Skin: Negative for rash.   Neurological: Negative for weakness, numbness and headaches.        No tingling.   Psychiatric/Behavioral: Negative for confusion.       Physical Exam     Initial Vitals [11/05/17 0109]   BP Pulse Resp Temp SpO2   128/72 72 16 98.3 °F (36.8 °C) 96 %      MAP       90.67         Physical Exam    Nursing note and vitals reviewed.  Constitutional: She appears well-developed and well-nourished. No distress.   HENT:   Head: Normocephalic and atraumatic.   Mouth/Throat: Oropharynx is clear and moist.   Eyes: Conjunctivae and EOM are normal. Pupils are equal, round, and reactive to light.   Neck: Normal range of motion. Neck supple.   Cardiovascular: Normal rate, regular rhythm and normal heart sounds.   No murmur heard.  Pulmonary/Chest: Breath sounds normal. No respiratory distress. She has no wheezes. She has no rhonchi. She has no rales.   Abdominal: Soft. Bowel sounds are normal. There is no tenderness.   Musculoskeletal: Normal range of motion.   No midline C-T-L-spine tenderness to palpation. No reproducible TTP of the back or R thigh.   Neurological: She is alert and oriented to person, place, and time. She has normal strength. No cranial nerve deficit or sensory deficit.   Strength of all 4 extremities and sensation normal    Skin: Skin is warm and dry. No rash noted.   Psychiatric: She has a normal mood and affect. Her behavior is normal.         ED Course   Procedures  Labs Reviewed - No data to display       X-Rays:   Independently Interpreted Readings:   Other Readings:  X-Ray Lumbar Spine Ap And Lateral: Mild degenerative  changes throughout. No fracture or dislocation. Slight anterolisthesis of L5 on S1.      Imaging Results          X-Ray Lumbar Spine Ap And Lateral (Final result)  Result time 11/05/17 04:15:54    Final result by Maggie Gong MD (11/05/17 04:15:54)                 Impression:        No acute fractures.      Electronically signed by: MAGGIE GONG MD  Date:     11/05/17  Time:    04:15              Narrative:    Technique: AP and lateral views of the lumbosacral spine.    Comparison: None.    Findings:    There are 2-3 mm of anterolisthesis of L4 on L5.  No spondylolysis.  Vertebral body heights are well-maintained without evidence for fracture.  Vertebral body heights are well-maintained without evidence for fracture.  No lytic or blastic lesions.  Facet joints are well aligned.  Spinous processes, pedicles and transverse processes are unremarkable.  SI joints are normal and symmetric.  Visualized sacrum is intact.                              Medical Decision Making:   Independently Interpreted Test(s):   I have ordered and independently interpreted X-rays - see prior notes.  Clinical Tests:   Radiological Study: Ordered and Reviewed  ED Management:  Emergent evaluation a 57-year-old female with complaint of thigh pain, acute on chronic.  Vital signs are benign, afebrile.  Physical exam reveals no evidence of cauda equina syndrome or cord compression, no reproducible pain.  Area of pain is consistent with likely sciatica.  Patient's never had x-rays of her back.  X-ray shows slight anterolisthesis of L5 on S1, no fracture.  She was treated with Toradol injection here with improvement.  She is discharged in good condition with prescriptions for Naproxen and Robaxin, encouraged close follow-up with PCP or return for any new or worsening symptoms.  I suspect neuropathic pain and sciatica as cause of her symptoms.            Scribe Attestation:   Scribe #1: I performed the above scribed service and the  documentation accurately describes the services I performed. I attest to the accuracy of the note.    I, Dr. Ijeoma Alfred, personally performed the services described in this documentation. All medical record entries made by the scribe were at my direction and in my presence.  I have reviewed the chart and agree that the record reflects my personal performance and is accurate and complete. Ijeoma Alfred MD.  6:40 AM 11/06/2017          ED Course      Clinical Impression:     1. Neuropathic pain of right thigh    2. Sciatica of right side                                 Ijeoma Alfred MD  11/06/17 0641

## 2017-11-14 ENCOUNTER — HOSPITAL ENCOUNTER (EMERGENCY)
Facility: OTHER | Age: 57
Discharge: HOME | End: 2017-11-14
Attending: EMERGENCY MEDICINE
Payer: MEDICAID

## 2017-11-14 VITALS
SYSTOLIC BLOOD PRESSURE: 131 MMHG | OXYGEN SATURATION: 94 % | TEMPERATURE: 99 F | DIASTOLIC BLOOD PRESSURE: 74 MMHG | RESPIRATION RATE: 18 BRPM | HEART RATE: 88 BPM

## 2017-11-14 DIAGNOSIS — F10.921 ALCOHOL INTOXICATION WITH DELIRIUM: Primary | ICD-10-CM

## 2017-11-14 PROCEDURE — 99283 EMERGENCY DEPT VISIT LOW MDM: CPT

## 2017-11-15 NOTE — ED NOTES
Patient repeatedly out of bed, attempted to redirect patient to bed, security at bedside. Patient given sandwich tray, sitting up in bed alert, eating meal tray at this time. Sitter at bedside in direct observation monitoring patient.

## 2017-11-15 NOTE — ED NOTES
Patient lying in bed resting calmly, in no acute distress. Respirations E/UL. Sitter at bedside. Will continue to monitor.
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
## 2017-11-15 NOTE — ED NOTES
Pt standing and banging on the door stating she is ready to come home. MD came to bedside to evaluate pt and found ready for d/c. Pt ambulating with steady gait, no nystagmus and clear speech. Pt given d/c instructions to include follow up care and verbalized understanding. Pt ambulated with steady gait to d/c window and d/c in stable condition.

## 2017-11-15 NOTE — ED NOTES
Patient lying in bed resting calmly. Respirations E/UL, in no acute distress. Sitter at bedside monitoring patient. Will continue to monitor.

## 2017-11-28 ENCOUNTER — HOSPITAL ENCOUNTER (EMERGENCY)
Facility: OTHER | Age: 57
Discharge: HOME | End: 2017-11-29
Attending: EMERGENCY MEDICINE
Payer: MEDICAID

## 2017-11-28 VITALS
RESPIRATION RATE: 18 BRPM | HEART RATE: 93 BPM | OXYGEN SATURATION: 97 % | TEMPERATURE: 99 F | DIASTOLIC BLOOD PRESSURE: 88 MMHG | SYSTOLIC BLOOD PRESSURE: 140 MMHG

## 2017-11-28 DIAGNOSIS — F10.921 ALCOHOL INTOXICATION WITH DELIRIUM: Primary | ICD-10-CM

## 2017-11-28 PROCEDURE — 99282 EMERGENCY DEPT VISIT SF MDM: CPT

## 2017-11-29 NOTE — ED NOTES
Pt laying in bed, appears to be sleeping, visible rise and fall of the chest.  Bed in lowest, locked position, call light within reach, side rails up x 2.  Will continue to monitor.

## 2017-11-29 NOTE — ED TRIAGE NOTES
Pt states has been drinking since earlier in the day.  States drank liquor but does not state how much or what kind.

## 2017-11-29 NOTE — ED NOTES
Patient Identifiers for Marianne Gottlieb checked and correct  LOC: The patient is awake, alert and aware of environment with an appropriate affect, the patient is oriented x 3 and speaking appropriate.  APPEARANCE: Patient resting comfortably and in no acute distress. The patient is clean and well groomed. The patient's clothing is properly fastened.  SKIN: The skin is warm and dry. The patient has normal skin turgor and moist mucus membranes. No rashes or lesions upon observation. Skin Intact , no breakdown noted.  Musculoskeletal :  Normal range of motion noted. Moves all extremities well, No swelling or tenderness noted  RESPIRATORY: Airway is open and patent, respirations are spontaneous, patient has a normal effort and rate.   PULSES: 2+ radialpulses, symmetrical in all extremities.  NEUROLOGIC: PERRL,  Motor strength 5/5 all extremities.  The pt's facial expression is symmetrical, patient moving all extremities, normal sensation in all extremities when touched with a finger.The patient is awake, alert and cooperative with a calm affect, patient is aware of environment.      Will continue to monitor

## 2017-11-29 NOTE — ED NOTES
Pt getting out of bed, ambulating around the room.  Pt informed that she needs to stay in her bed.  Pt assisted back to bed.  Bed in lowest, locked position, call light within reach.  Pt given a sandwich and juice that was requested.  Will continue to monitor.

## 2017-11-29 NOTE — ED PROVIDER NOTES
"Encounter Date: 11/28/2017    SCRIBE #1 NOTE: I, Faye Weathers , am scribing for, and in the presence of, Dr. Bennett.       History     Chief Complaint   Patient presents with    Alcohol Intoxication     "Im drunk, and I pissed on myself"     Time seen by provider: 10:04 PM    This is a 57 y.o. female who presents with complaint of alcohol intoxication. Gradual onset began prior to arrival. The patient has no complaints, and denies nausea, vomiting, abdominal pain, chest pain, SOB, head trauma, back pain, neck pain, pain to the extremities, light-headedness, dizziness, or headache. She has been seen in the ED on multiple occasions for alcohol intoxication.       The history is provided by the patient. The history is limited by the condition of the patient.     Review of patient's allergies indicates:  No Known Allergies  Past Medical History:   Diagnosis Date    ETOH abuse     Hyperlipemia     Hypertension     Psychiatric illness      Past Surgical History:   Procedure Laterality Date    BREAST SURGERY       History reviewed. No pertinent family history.  Social History   Substance Use Topics    Smoking status: Current Some Day Smoker     Packs/day: 0.50     Types: Cigarettes    Smokeless tobacco: Never Used    Alcohol use 25.2 oz/week     42 Standard drinks or equivalent per week      Comment: heavy drinker     Review of Systems   Unable to perform ROS: Acuity of condition       Physical Exam     Initial Vitals [11/28/17 2140]   BP Pulse Resp Temp SpO2   (!) 140/88 93 18 98.9 °F (37.2 °C) 97 %      MAP       105.33         Physical Exam    Nursing note and vitals reviewed.  Constitutional: She appears well-developed and well-nourished. She is not diaphoretic. No distress.   Dirty and disheveled.    HENT:   Head: Normocephalic and atraumatic.   Right Ear: External ear normal.   Left Ear: External ear normal.   Neck: Normal range of motion.   Cardiovascular: Normal rate, regular rhythm and normal heart sounds. " "Exam reveals no gallop and no friction rub.    No murmur heard.  Pulmonary/Chest: Breath sounds normal. No respiratory distress. She has no wheezes. She has no rhonchi. She has no rales.   Abdominal: Soft. There is no tenderness. There is no rebound and no guarding.   Musculoskeletal: Normal range of motion. She exhibits no edema or tenderness.   Neurological: She is alert.   Slurred speech. Ataxic gait.    Skin: Skin is warm and dry. No rash and no abscess noted. No erythema. No pallor.   Psychiatric: She has a normal mood and affect. Her behavior is normal. Judgment and thought content normal.         ED Course   Procedures  Labs Reviewed - No data to display          Medical Decision Making:   ED Management:  Patient with frequent presentations, her eighth in the past 6 months, 14th in the past 12 months, comes in reporting that she is "drunk".  Patient reports she lives around the neighborhood and she came in tonight because she was drunk.  She denies any injuries.  Denies any medical complaints.  This is a familiar presentation for her.  There is no obvious signs of trauma.  She is allowed to rest and sober, reevaluation complaining of some right leg pain, no obvious injuries swellings or signs of infection.  Encouraged to take Tylenol and Motrin at home, discharge follow-up with primary care.    I did have an extensive talk regarding signs to return for and need for follow up. Patient expressed understanding and will monitor symptoms closely and follow-up as needed.    JENN Bennett M.D.  11/29/2017  3:37 AM              Scribe Attestation:   Scribe #1: I performed the above scribed service and the documentation accurately describes the services I performed. I attest to the accuracy of the note.    Attending Attestation:           Physician Attestation for Scribe:  Physician Attestation Statement for Scribe #1: I, Dr. Bennett, reviewed documentation, as scribed by Faye Weathers  in my presence, and it is " both accurate and complete.                 ED Course      Clinical Impression:     1. Alcohol intoxication with delirium                               Nixon Bennett MD  11/29/17 2656

## 2017-12-03 ENCOUNTER — HOSPITAL ENCOUNTER (EMERGENCY)
Facility: OTHER | Age: 57
Discharge: HOME | End: 2017-12-04
Attending: EMERGENCY MEDICINE
Payer: MEDICAID

## 2017-12-03 DIAGNOSIS — F10.920 ALCOHOLIC INTOXICATION WITHOUT COMPLICATION: Primary | ICD-10-CM

## 2017-12-03 PROCEDURE — 82962 GLUCOSE BLOOD TEST: CPT

## 2017-12-03 PROCEDURE — 99283 EMERGENCY DEPT VISIT LOW MDM: CPT | Mod: 25

## 2017-12-04 VITALS
HEIGHT: 68 IN | RESPIRATION RATE: 18 BRPM | SYSTOLIC BLOOD PRESSURE: 148 MMHG | DIASTOLIC BLOOD PRESSURE: 86 MMHG | BODY MASS INDEX: 31.52 KG/M2 | HEART RATE: 81 BPM | WEIGHT: 208 LBS | OXYGEN SATURATION: 98 % | TEMPERATURE: 98 F

## 2017-12-04 LAB — POCT GLUCOSE: 140 MG/DL (ref 70–110)
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
## 2017-12-04 NOTE — ED NOTES
Has been sleeping soundly for about 1 1/2 hours.  Respirations are easy and non labored. Skin warm and dry

## 2017-12-04 NOTE — ED PROVIDER NOTES
"Encounter Date: 12/3/2017    SCRIBE #1 NOTE: I, Awa Ibrahim, am scribing for, and in the presence of, Dr. Joseph.       History     Chief Complaint   Patient presents with    Alcohol Intoxication     pt has no complaints at this time, obvious intoxication     Time seen by provider: 9:37 PM    This is a 57 y.o. female, with history of ETOH abuse, HTN, and HLD, who presents for concern of ETOH intoxication. The patient admits to use of ETOH PTA, but denies use of tobacco or illicit drugs. Patient states "i've been drinking all day." She has no additional complaints.      The history is provided by the patient. The history is limited by the condition of the patient (ETOH intoxication).     Review of patient's allergies indicates:  No Known Allergies  Past Medical History:   Diagnosis Date    ETOH abuse     Hyperlipemia     Hypertension     Psychiatric illness      Past Surgical History:   Procedure Laterality Date    BREAST SURGERY       No family history on file.  Social History   Substance Use Topics    Smoking status: Current Some Day Smoker     Packs/day: 0.50     Types: Cigarettes    Smokeless tobacco: Never Used    Alcohol use 25.2 oz/week     42 Standard drinks or equivalent per week      Comment: heavy drinker     Review of Systems   Unable to perform ROS: Other (ETOH intoxication)       Physical Exam     Initial Vitals [12/03/17 2132]   BP Pulse Resp Temp SpO2   (!) 152/88 98 18 98.2 °F (36.8 °C) 96 %      MAP       109.33         Physical Exam    Constitutional: She appears well-developed and well-nourished. She is not diaphoretic. No distress.   Slurred speech. Unsteady gait.   HENT:   Head: Normocephalic and atraumatic.   Mouth/Throat: Oropharynx is clear and moist.   Eyes: EOM are normal. Pupils are equal, round, and reactive to light. Right eye exhibits no discharge. Left eye exhibits no discharge.   Neck: Normal range of motion. Neck supple.   Cardiovascular: Normal rate, regular rhythm, normal " heart sounds and intact distal pulses. Exam reveals no gallop and no friction rub.    No murmur heard.  Pulmonary/Chest: Breath sounds normal. No respiratory distress. She has no wheezes. She has no rhonchi. She has no rales.   Abdominal: Soft. Bowel sounds are normal. She exhibits no distension. There is no tenderness. There is no rebound and no guarding.   Musculoskeletal: Normal range of motion. She exhibits no edema or tenderness.   Neurological: She is alert and oriented to person, place, and time.   Skin: Skin is warm and dry. Capillary refill takes less than 2 seconds. No rash and no abscess noted. No erythema. No pallor.   Psychiatric: She has a normal mood and affect. Her behavior is normal. Judgment and thought content normal.         ED Course   Procedures  Labs Reviewed   POCT GLUCOSE MONITORING CONTINUOUS             Medical Decision Making:   Clinical Tests:   Lab Tests: Ordered and Reviewed    Additional MDM:   Comments: 57-year-old female well-known to this department secondary to frequent visits with alcohol intoxication presented asymptomatic but intoxicated.  Patient was observed on a cardiac monitor throughout her emergency department visit without any events.  At the time of discharge patient was alert and oriented and remained asymptomatic.  She is able to ambulate without difficulty and requiring assistance.  Patient was discharged home in stable condition..          Scribe Attestation:   Scribe #1: I performed the above scribed service and the documentation accurately describes the services I performed. I attest to the accuracy of the note.    Attending Attestation:           Physician Attestation for Scribe:  Physician Attestation Statement for Scribe #1: I, Dr. Joseph, reviewed documentation, as scribed by Awa Ibrahim in my presence, and it is both accurate and complete.                 ED Course      Clinical Impression:     1. Alcoholic intoxication without complication                                  Mariana Joseph MD  12/04/17 0211

## 2017-12-20 ENCOUNTER — HOSPITAL ENCOUNTER (EMERGENCY)
Facility: OTHER | Age: 57
Discharge: HOME | End: 2017-12-21
Attending: EMERGENCY MEDICINE
Payer: MEDICAID

## 2017-12-20 DIAGNOSIS — F10.920 ALCOHOLIC INTOXICATION WITHOUT COMPLICATION: Primary | ICD-10-CM

## 2017-12-20 LAB — POCT GLUCOSE: 129 MG/DL (ref 70–110)

## 2017-12-20 PROCEDURE — 82962 GLUCOSE BLOOD TEST: CPT

## 2017-12-20 PROCEDURE — 99283 EMERGENCY DEPT VISIT LOW MDM: CPT | Mod: 25

## 2017-12-21 VITALS
RESPIRATION RATE: 16 BRPM | HEART RATE: 86 BPM | DIASTOLIC BLOOD PRESSURE: 66 MMHG | SYSTOLIC BLOOD PRESSURE: 118 MMHG | OXYGEN SATURATION: 99 % | TEMPERATURE: 99 F

## 2017-12-21 NOTE — ED PROVIDER NOTES
"Encounter Date: 12/20/2017    SCRIBE #1 NOTE: I, Harriettprabhjot Guzman, am scribing for, and in the presence of, Dr. Joseph.       History     Chief Complaint   Patient presents with    Alcohol Intoxication     slurred speech with heavy amounts of etoh odor noted, states "I just want to lay down"     Time seen by provider: 8:34 PM    This is a 57 y.o. female with HTN, HLD, psychiatric illness, and hx of EtOH abuse who presents due to alcohol intoxication this evening. She denies any other drug use. Pt reports no N/V/D, fever, chills, abdominal pain, chest pain, and SOB.      The history is provided by the patient.     Review of patient's allergies indicates:  No Known Allergies  Past Medical History:   Diagnosis Date    ETOH abuse     Hyperlipemia     Hypertension     Psychiatric illness      Past Surgical History:   Procedure Laterality Date    BREAST SURGERY       No family history on file.  Social History   Substance Use Topics    Smoking status: Current Some Day Smoker     Packs/day: 0.50     Types: Cigarettes    Smokeless tobacco: Never Used    Alcohol use 25.2 oz/week     42 Standard drinks or equivalent per week      Comment: heavy drinker     Review of Systems   Constitutional: Negative for chills and fever.   HENT: Negative for congestion, rhinorrhea and sore throat.    Respiratory: Negative for cough and shortness of breath.    Cardiovascular: Negative for chest pain.   Gastrointestinal: Negative for abdominal pain, diarrhea, nausea and vomiting.   Endocrine: Negative for polyuria.   Genitourinary: Negative for decreased urine volume and dysuria.   Musculoskeletal: Negative for back pain.   Skin: Negative for rash.   Allergic/Immunologic: Negative for immunocompromised state.   Neurological: Negative for dizziness and weakness.   Hematological: Does not bruise/bleed easily.   Psychiatric/Behavioral: Negative for confusion.       Physical Exam     Initial Vitals [12/20/17 2012]   BP Pulse Resp Temp SpO2 "   (!) 142/82 (!) 112 14 98.2 °F (36.8 °C) 98 %      MAP       102         Physical Exam    Nursing note and vitals reviewed.  Constitutional: She appears well-developed and well-nourished. She is not diaphoretic. No distress.   Smells of alcohol.   HENT:   Head: Normocephalic and atraumatic.   Right Ear: External ear normal.   Left Ear: External ear normal.   Eyes: Pupils are equal, round, and reactive to light. Right eye exhibits no discharge. Left eye exhibits no discharge. Right conjunctiva is injected. Left conjunctiva is injected.   Neck: Normal range of motion. Neck supple.   Cardiovascular: Normal rate, regular rhythm and normal heart sounds. Exam reveals no gallop and no friction rub.    No murmur heard.  Pulmonary/Chest: Breath sounds normal. No respiratory distress. She has no wheezes. She has no rhonchi. She has no rales.   Abdominal: Soft. Bowel sounds are normal. She exhibits no distension. There is no tenderness. There is no rebound and no guarding.   Musculoskeletal: Normal range of motion. She exhibits no edema or tenderness.   Lymphadenopathy:     She has no cervical adenopathy.   Neurological: She is alert and oriented to person, place, and time. She has normal strength. No sensory deficit.   Skin: Skin is warm and dry. No rash noted. No erythema.   Psychiatric: Thought content normal. Her speech is slurred.         ED Course   Procedures  Labs Reviewed   POCT GLUCOSE - Abnormal; Notable for the following:        Result Value    POCT Glucose 129 (*)     All other components within normal limits   POCT GLUCOSE MONITORING CONTINUOUS             Medical Decision Making:   Clinical Tests:   Lab Tests: Ordered and Reviewed    Additional MDM:   Comments:  57-year-ol female well-known to this department secondary to frequent visits for alcohol intoxication presented again intoxicated.  She denied any complaints.  She was observed on a cardiac monitor without any events.  At time of discharge patient was  alert ad oriented and able to ambulate without any difficulty..          Scribe Attestation:   Scribe #1: I performed the above scribed service and the documentation accurately describes the services I performed. I attest to the accuracy of the note.    Attending Attestation:           Physician Attestation for Scribe:  Physician Attestation Statement for Scribe #1: I, Dr. Joseph, reviewed documentation, as scribed by Harriett Guzman in my presence, and it is both accurate and complete.                 ED Course      Clinical Impression:     1. Alcoholic intoxication without complication                               Mariana Joseph MD  12/21/17 0418

## 2017-12-21 NOTE — ED NOTES
Pt presents to the ED with ETOH. Patient dirty, disshelved, and malodorous. ETOH like odor on breath. Incontinent of urine. Unsteady gait noted. Pt reported to have been laying out on the floor in the lobby. Pt states she wants her heart and her liver checked. Admits to heavy drinking PTA.

## 2018-02-04 ENCOUNTER — HOSPITAL ENCOUNTER (EMERGENCY)
Facility: OTHER | Age: 58
Discharge: HOME | End: 2018-02-05
Attending: EMERGENCY MEDICINE
Payer: MEDICAID

## 2018-02-04 DIAGNOSIS — F10.921 ALCOHOL INTOXICATION WITH DELIRIUM: Primary | ICD-10-CM

## 2018-02-04 PROCEDURE — 99285 EMERGENCY DEPT VISIT HI MDM: CPT

## 2018-02-05 VITALS
WEIGHT: 230 LBS | SYSTOLIC BLOOD PRESSURE: 155 MMHG | HEIGHT: 68 IN | HEART RATE: 99 BPM | BODY MASS INDEX: 34.86 KG/M2 | RESPIRATION RATE: 18 BRPM | DIASTOLIC BLOOD PRESSURE: 85 MMHG | OXYGEN SATURATION: 97 %

## 2018-02-05 NOTE — ED NOTES
Pt is sleeping in stretcher. In no acute distress. Respirations even and non labored. Will continue to monitor.

## 2018-02-05 NOTE — ED NOTES
Pt cursing and stating she needs to go to the restroom. Pt removed from stretcher with three personnel assisting patient to restroom, pt was continuing to curse and urinated on self while standing in doorway of restroom. Pt returned to EMS stretcher.

## 2018-02-05 NOTE — ED TRIAGE NOTES
Pt presents to the ED via [facility] with + ETOH. Pt was in front of [employer]. Patient dirty, disshelved, and malodorous. ETOH like odor on breath. Incontinent of urine. Pt yelling vulgar statements at ED staff. Pt arrived restrained and remains in restraints. MD at bedside.

## 2018-02-05 NOTE — ED NOTES
Pt placed on bed pan to address bathroom needs. Pt tossed bed pan across room. Will continue to monitor.

## 2018-02-05 NOTE — ED PROVIDER NOTES
Encounter Date: 2/4/2018    SCRIBE #1 NOTE: I, Awa Ibrahim, am scribing for, and in the presence of, Dr. Bennett.       History     Chief Complaint   Patient presents with    Alcohol Intoxication     Bystanders called 911 for patient who was publically intoxicated in front of a restaurant and was being aggressive towards its customers.      Time seen by provider: 10:01 PM    This is a 57 y.o. female who was brought in for ETOH intoxication which occurred prior to arrival. [facility] after patient was found outside being aggressive toward customers. She admits to use of ETOH this evening. Patient currently denies any pain or injury. She has no additional complaints at this time. History is limited by intoxicated status of the patient.      The history is provided by the patient and the EMS personnel. The history is limited by the condition of the patient (ETOH intoxication).     Review of patient's allergies indicates:  No Known Allergies  Past Medical History:   Diagnosis Date    ETOH abuse     Hyperlipemia     Hypertension     Psychiatric illness      Past Surgical History:   Procedure Laterality Date    BREAST SURGERY       No family history on file.  Social History   Substance Use Topics    Smoking status: Current Some Day Smoker     Packs/day: 0.50     Types: Cigarettes    Smokeless tobacco: Never Used    Alcohol use 25.2 oz/week     42 Standard drinks or equivalent per week      Comment: heavy drinker     Review of Systems   Unable to perform ROS: Other (ETOH intoxication)       Physical Exam     Initial Vitals [02/04/18 2101]   BP Pulse Resp Temp SpO2   120/78 103 18 -- 97 %      MAP       92         Physical Exam    Nursing note and vitals reviewed.  Constitutional: She appears well-developed and well-nourished. She is not diaphoretic. No distress.   Smells of alcohol.   HENT:   Head: Normocephalic and atraumatic.   Mouth/Throat: Oropharynx is  clear and moist.   Eyes: EOM are normal. Pupils are equal, round, and reactive to light. Right eye exhibits no discharge. Left eye exhibits no discharge.   Neck: Normal range of motion. Neck supple.   Cardiovascular: Normal rate, regular rhythm, normal heart sounds and intact distal pulses. Exam reveals no gallop and no friction rub.    No murmur heard.  Pulmonary/Chest: Breath sounds normal. No respiratory distress. She has no wheezes. She has no rhonchi. She has no rales.   Abdominal: Soft. Bowel sounds are normal. She exhibits no distension. There is no tenderness. There is no rebound and no guarding.   Musculoskeletal: Normal range of motion. She exhibits no edema or tenderness.   Neurological: She is alert and oriented to person, place, and time.   Slurred speech. Nystagmus. Unsteady gait. Moves all four extremities on command.   Skin: Skin is warm and dry. Capillary refill takes less than 2 seconds. No rash and no abscess noted. No erythema. No pallor.   Psychiatric: She has a normal mood and affect. Her behavior is normal. Judgment and thought content normal.         ED Course   Procedures  Labs Reviewed - No data to display          Medical Decision Making:   ED Management:  Patient well known to this emergency room for frequent presentations while intoxicated.  Patient transfer by EMS after she was reportedly intoxicated and belligerent at a local [employer] establishment.  She is clinically intoxicated here.  She denies drug use.  Denies trauma.  Initially belligerent and aggressive with staff.  One she calms she sleeps for several hours.  On awakening she images steady gait, no further slurred speech.  Denies any complaints.  Stable for discharge.    I did have an extensive talk regarding signs to return for and need for follow up. Patient expressed understanding and will monitor symptoms closely and follow-up as needed.    JENN Bennett M.D.  02/05/2018  6:15 AM              Scribe Attestation:    Scribe #1: I performed the above scribed service and the documentation accurately describes the services I performed. I attest to the accuracy of the note.    Attending Attestation:           Physician Attestation for Scribe:  Physician Attestation Statement for Scribe #1: I, Dr. Bennett, reviewed documentation, as scribed by Awa Ibrahim in my presence, and it is both accurate and complete.                 ED Course      Clinical Impression:     1. Alcohol intoxication with delirium                                   Nixon Bennett MD  02/05/18 0615

## 2018-02-14 ENCOUNTER — HOSPITAL ENCOUNTER (EMERGENCY)
Facility: OTHER | Age: 58
Discharge: HOME | End: 2018-02-15
Attending: EMERGENCY MEDICINE
Payer: MEDICAID

## 2018-02-14 DIAGNOSIS — F43.21 GRIEF REACTION: Primary | ICD-10-CM

## 2018-02-14 PROCEDURE — 63600175 PHARM REV CODE 636 W HCPCS: Performed by: EMERGENCY MEDICINE

## 2018-02-14 PROCEDURE — 99283 EMERGENCY DEPT VISIT LOW MDM: CPT | Mod: 25

## 2018-02-14 PROCEDURE — 96372 THER/PROPH/DIAG INJ SC/IM: CPT

## 2018-02-14 PROCEDURE — 25000003 PHARM REV CODE 250: Performed by: EMERGENCY MEDICINE

## 2018-02-14 RX ORDER — HALOPERIDOL 5 MG/ML
5 INJECTION INTRAMUSCULAR
Status: COMPLETED | OUTPATIENT
Start: 2018-02-14 | End: 2018-02-14

## 2018-02-14 RX ORDER — OLANZAPINE 10 MG/1
10 TABLET, ORALLY DISINTEGRATING ORAL
Status: DISCONTINUED | OUTPATIENT
Start: 2018-02-14 | End: 2018-02-14

## 2018-02-14 RX ORDER — OLANZAPINE 10 MG/1
10 TABLET, ORALLY DISINTEGRATING ORAL
Status: DISCONTINUED | OUTPATIENT
Start: 2018-02-14 | End: 2018-02-15 | Stop reason: HOSPADM

## 2018-02-14 RX ADMIN — HALOPERIDOL LACTATE 5 MG: 5 INJECTION, SOLUTION INTRAMUSCULAR at 08:02

## 2018-02-14 RX ADMIN — LORAZEPAM 2 MG: 2 INJECTION INTRAMUSCULAR; INTRAVENOUS at 08:02

## 2018-02-14 RX ADMIN — OLANZAPINE 10 MG: 10 TABLET, ORALLY DISINTEGRATING ORAL at 10:02

## 2018-02-15 VITALS
SYSTOLIC BLOOD PRESSURE: 110 MMHG | OXYGEN SATURATION: 94 % | RESPIRATION RATE: 18 BRPM | HEART RATE: 72 BPM | DIASTOLIC BLOOD PRESSURE: 53 MMHG

## 2018-02-15 NOTE — ED NOTES
Pt ambulated to bathroom with unsteady gait.  Pt assisted back to bed.  Pt placed on automatic blood pressure cuff and continuous pulse oximetry.  Will continue to monitor.

## 2018-02-15 NOTE — ED NOTES
Pt resting w/ eyes closed, easily arouses. NO distress. Respirations are even and unlabored. Skin is warm and dry w/ pink mucosa. VSS. Will continue to monitor closely.

## 2018-02-15 NOTE — ED NOTES
Pt in L lateral recumbent position, resting w/ her eyes closed and in no apparent distress at this time. Will not disturb. No respiratory distress observed. Skin is warm and dry w/ pink mucosa. VSS. Will continue to monitor closely.

## 2018-02-15 NOTE — ED NOTES
Pt resting w/ eyes closed, awakens easily. No respiratory distress, nasal flaring or use of accessory muscles. Pt is in no apparent distress or discomfort. Pt is continually monitored and in direct view of the RN station. Will continue to monitor closely.

## 2018-02-15 NOTE — ED NOTES
Pt R lateral recumbent resting w/ eyes closed and in no respiratory distress. Respirations are even and unlabored. Pt remains in direct view of the RN station. Will continue to monitor closely.

## 2018-02-15 NOTE — ED NOTES
Attempted to get patient up and ambulate her.  Patient was unable to sit up in the bed.  Patient repositioned in the bed and attached back to monitoring.

## 2018-02-15 NOTE — ED NOTES
"Pt ambulated to the bathroom with a little steadier of a gait.  Pt states "i'm ready to go home."  Dr. Javier made aware.  per Dr. Javier states to give patient some breakfast and then patient will be ready to go.  Pt provided apple juice, sandwich, and elida crackers.  Will continue to monitor.   "

## 2018-02-15 NOTE — ED NOTES
Pt resting L lateral recumbent position, resting w/ eyes closed and snorous respirations. Pt arouses easily and goes back to sleep. NO distress noted. Respirations are even and unlabored. VSS. Will continue to monitor closely.

## 2018-02-15 NOTE — ED NOTES
Pt lying semi- fowlers position, resting w/ her eyes closed and in no respiratory distress. Pt appears comfortable. VSS. Rails are up and locked for pt safety. Pt is under constant view of the RN station. Will continue to monitor closely.

## 2018-02-15 NOTE — ED PROVIDER NOTES
"Encounter Date: 2018    SCRIBE #1 NOTE: I, Harriett Thomas, am scribing for, and in the presence of, Dr. Carlin.       History     Chief Complaint   Patient presents with    Alcohol Intoxication     Pt has no personal complaints at this time. Pt states "My brother  today"      Time seen by provider: 8:11 PM    This is a 57 y.o. female with hx of HTN, HLD, psychiatric illness, and EtOH abuse who presents to this ED stating her brother  today. She did have any additional complaints during triage. Pt began to cry in the waiting room. She has been to this ED multiple times in the past secondary to alcohol intoxication.    Additional past medical, surgical, and social history as outlined in the nursing assessment was reviewed by me.      The history is provided by the patient. The history is limited by the condition of the patient.     Review of patient's allergies indicates:  No Known Allergies  Past Medical History:   Diagnosis Date    ETOH abuse     Hyperlipemia     Hypertension     Psychiatric illness      Past Surgical History:   Procedure Laterality Date    BREAST SURGERY       No family history on file.  Social History   Substance Use Topics    Smoking status: Current Some Day Smoker     Packs/day: 0.50     Types: Cigarettes    Smokeless tobacco: Never Used    Alcohol use 25.2 oz/week     42 Standard drinks or equivalent per week      Comment: heavy drinker     Review of Systems   Unable to perform ROS: Acuity of condition       Physical Exam     Initial Vitals [18 1920]   BP Pulse Resp Temp SpO2   (!) 141/98 107 20 -- 97 %      MAP       112.33         Physical Exam    Nursing note and vitals reviewed.  Constitutional: She appears well-developed and well-nourished. She is not diaphoretic. No distress.   Alcohol on breath.    HENT:   Head: Normocephalic and atraumatic.   Right Ear: External ear normal.   Left Ear: External ear normal.   Eyes: EOM are normal. Right eye exhibits no " discharge. Left eye exhibits no discharge.   Sclera injected bilaterally.   Neck: Normal range of motion. Neck supple. No tracheal deviation present.   Cardiovascular: Normal rate, regular rhythm, normal heart sounds and intact distal pulses. Exam reveals no gallop and no friction rub.    No murmur heard.  Pulmonary/Chest: Breath sounds normal. No stridor. No respiratory distress. She has no wheezes. She has no rhonchi. She has no rales.   Abdominal: Soft. Bowel sounds are normal. She exhibits no distension. There is no tenderness. There is no rebound and no guarding.   Musculoskeletal: Normal range of motion. She exhibits no edema or tenderness.   Neurological: She is alert and oriented to person, place, and time. She has normal strength. No cranial nerve deficit.   Skin: Skin is warm and dry. Capillary refill takes less than 2 seconds. No erythema. No pallor.   Psychiatric: Her mood appears anxious.   Tearful.         ED Course   Procedures  Labs Reviewed - No data to display          Medical Decision Making:   Initial Assessment:   Patient presenting with a grief reaction. She has a history of alcohol intoxication and has alcohol on her breath at this time. She is acutely agitated and tearful. I will give Haldol and Ativan for relief. I will reassess.       ED Management:  20:40 - Patient resting comfortably. She is able to awaken to voice and now is apologizing for her behavior. I will continue to monitor.     11:19 PM - Patient currently sleeping. Care endorsed to Dr. Javier at this time. Patient can be discharged once she clinically appears sober.             Scribe Attestation:   Scribe #1: I performed the above scribed service and the documentation accurately describes the services I performed. I attest to the accuracy of the note.    Attending Attestation:           Physician Attestation for Scribe:  Physician Attestation Statement for Scribe #1: I, Dr. Carlin, reviewed documentation, as scribed by Harriett  Thomas in my presence, and it is both accurate and complete.                 ED Course      Clinical Impression:     1. Grief reaction                               Rosemarie Carlin MD  02/14/18 5836

## 2018-02-15 NOTE — ED NOTES
Pt in semi- fowlers position crying, A & O x 3, yelling obsenities, being rude and obnoxious. NO respiratory distress observed while pt is yelling and cursing. Pain addressed and bathroom needs met. Will continue to monitor closely.

## 2018-02-15 NOTE — ED NOTES
Pt hysterical and twerking in the waiting room. Pt was initially happy, but after awhile became hysterical stating her brother had . +ETOH on breath and slurred speech. Pt is A & O x 3, loud, cursing and belligerent. Pt in no obvious respiratory distress. No nasal flaring and no use of accessory muscles. Skin is warm and dry w/ pink mucosa. VS. GINO x 3mm. BBS- CTA. Abd- SNT. PSM x 4 ext. Pt connected to the pulse ox, B/P cuff and EKG monitor. Call bell @ the BS. MD notified of pt hysteria. Bed is locked and in the low position w/ the side rails up and locked for safety. Will continue to monitor closely.

## 2018-03-04 ENCOUNTER — HOSPITAL ENCOUNTER (EMERGENCY)
Facility: OTHER | Age: 58
Discharge: HOME | End: 2018-03-04
Attending: EMERGENCY MEDICINE
Payer: MEDICAID

## 2018-03-04 VITALS
OXYGEN SATURATION: 100 % | RESPIRATION RATE: 20 BRPM | HEART RATE: 95 BPM | SYSTOLIC BLOOD PRESSURE: 124 MMHG | BODY MASS INDEX: 40.75 KG/M2 | TEMPERATURE: 98 F | WEIGHT: 230 LBS | DIASTOLIC BLOOD PRESSURE: 83 MMHG | HEIGHT: 63 IN

## 2018-03-04 DIAGNOSIS — F10.120 ALCOHOL ABUSE WITH UNCOMPLICATED INTOXICATION: Primary | ICD-10-CM

## 2018-03-04 PROCEDURE — 99283 EMERGENCY DEPT VISIT LOW MDM: CPT

## 2018-03-04 NOTE — ED NOTES
PT in hallway yelling and cursing after being evaluated by MD. MD states Pt to be discharged, Pt refusing to leave, and continues to curse and yell in the hallway. Security called and pt escorted from ED without further incident.

## 2018-03-04 NOTE — ED PROVIDER NOTES
Encounter Date: 3/4/2018    SCRIBE #1 NOTE: IAwa, am scribing for, and in the presence of, Dr. Herrera.       History     Chief Complaint   Patient presents with    detox     wants to stop drinking, needs to lay down     Time seen by provider: 1:49 AM    This is a 57 y.o. female who presents with complaint of ETOH intoxication which occurred some time prior to arrival. Patient has had multiple visits to the ED for previous episodes of intoxication. History is limited secondary to belligerent behavior.      The history is provided by the patient. The history is limited by the condition of the patient (ETOH intoxication).     Review of patient's allergies indicates:  No Known Allergies  Past Medical History:   Diagnosis Date    ETOH abuse     Hyperlipemia     Hypertension     Psychiatric illness      Past Surgical History:   Procedure Laterality Date    BREAST SURGERY       History reviewed. No pertinent family history.  Social History   Substance Use Topics    Smoking status: Current Some Day Smoker     Packs/day: 0.50     Types: Cigarettes    Smokeless tobacco: Never Used    Alcohol use 25.2 oz/week     42 Standard drinks or equivalent per week      Comment: heavy drinker     Review of Systems   Unable to perform ROS: Other (ETOH intoxication)       Physical Exam     Initial Vitals [03/04/18 0145]   BP Pulse Resp Temp SpO2   124/83 95 20 98.3 °F (36.8 °C) 100 %      MAP       96.67         Physical Exam    Nursing note and vitals reviewed.  Constitutional: She appears well-developed and well-nourished. She is not diaphoretic.   Disruptive behavior, unwilling to comply with exam   HENT:   Head: Normocephalic and atraumatic.   Eyes: EOM are normal. Pupils are equal, round, and reactive to light. Right eye exhibits no discharge. Left eye exhibits no discharge.   Neck: Normal range of motion. Neck supple.   Pulmonary/Chest: No respiratory distress.   Musculoskeletal: Normal range of motion.    Neurological: She is alert.   Ambulatory in ED without instability, mild slurred speech   Skin: Skin is warm and dry. No rash and no abscess noted. No erythema. No pallor.   Psychiatric:   Belligerent. Acting inappropriately, verbally threatening         ED Course   Procedures  Labs Reviewed - No data to display          Medical Decision Making:   Initial Assessment:   Urgent evaluation of 58 yo F well known to the ED here with no concrete complaints, but similar to prior visits is obviously under the influence of etoh and endorses drinking prior to arrival. Pt is disruptive, verbally threatening and not participating in exam or history. Currently pt is ambulatory without instability, coherent and stable for immediate discharge from the ED, requiring support of security presence.              Scribe Attestation:   Scribe #1: I performed the above scribed service and the documentation accurately describes the services I performed. I attest to the accuracy of the note.    Attending Attestation:           Physician Attestation for Scribe:  Physician Attestation Statement for Scribe #1: I, Dr. Herrera, reviewed documentation, as scribed by Awa Ibrahim in my presence, and it is both accurate and complete.                    Clinical Impression:     1. Alcohol abuse with uncomplicated intoxication          Disposition:   Disposition: Discharged  Condition: Adryan Herrera MD  03/04/18 1954

## 2018-03-16 ENCOUNTER — HOSPITAL ENCOUNTER (EMERGENCY)
Facility: OTHER | Age: 58
Discharge: HOME | End: 2018-03-16
Attending: EMERGENCY MEDICINE
Payer: MEDICAID

## 2018-03-16 VITALS
SYSTOLIC BLOOD PRESSURE: 128 MMHG | HEIGHT: 64 IN | TEMPERATURE: 98 F | OXYGEN SATURATION: 94 % | BODY MASS INDEX: 39.27 KG/M2 | RESPIRATION RATE: 18 BRPM | WEIGHT: 230 LBS | HEART RATE: 94 BPM | DIASTOLIC BLOOD PRESSURE: 70 MMHG

## 2018-03-16 DIAGNOSIS — F10.920 ALCOHOLIC INTOXICATION WITHOUT COMPLICATION: Primary | ICD-10-CM

## 2018-03-16 DIAGNOSIS — Z00.00 WELL ADULT EXAM: ICD-10-CM

## 2018-03-16 PROCEDURE — 99283 EMERGENCY DEPT VISIT LOW MDM: CPT

## 2018-03-17 NOTE — ED NOTES
"PA-C at bedside. Pt states "I need to sleep right now. I need to rest right now. Let me lay here."  "

## 2018-03-17 NOTE — ED NOTES
"Security and house supervisor at bedside to assist with discharge of pt. Pt is unwilling and not wanting to leave, stating "just let me rest".   "

## 2018-03-17 NOTE — ED PROVIDER NOTES
"Encounter Date: 3/16/2018       History     Chief Complaint   Patient presents with    Headache     pt states she needs to see a Dr. for her "brain", for her "knowledge",     Patient is a 57-year-old female with a past medical history of EtOH abuse, presenting to the emergency department for evaluation.  She initially told triage that she was having headache, but upon my examination the patient denies any complaints.  She states she is just in the emergency Department to rest, and that her body feels perfect.  She denies any pain or complaints at this time.  She does admit to drinking alcohol today.  No other complaints.      The history is provided by the patient.     Review of patient's allergies indicates:  No Known Allergies  Past Medical History:   Diagnosis Date    ETOH abuse     Hyperlipemia     Hypertension     Psychiatric illness      Past Surgical History:   Procedure Laterality Date    BREAST SURGERY       History reviewed. No pertinent family history.  Social History   Substance Use Topics    Smoking status: Current Some Day Smoker     Packs/day: 0.50     Types: Cigarettes    Smokeless tobacco: Never Used    Alcohol use 25.2 oz/week     42 Standard drinks or equivalent per week      Comment: heavy drinker     Review of Systems   Constitutional: Negative for activity change, fatigue and fever.   HENT: Negative for congestion and sore throat.    Eyes: Negative for photophobia and visual disturbance.   Respiratory: Negative for cough, shortness of breath and wheezing.    Cardiovascular: Negative for chest pain.   Gastrointestinal: Negative for abdominal pain, nausea and vomiting.   Genitourinary: Negative for dysuria, hematuria and urgency.   Musculoskeletal: Negative for back pain, myalgias and neck pain.   Skin: Negative for color change and wound.   Neurological: Negative for weakness and headaches.   Psychiatric/Behavioral: Negative for agitation and confusion.       Physical Exam     Initial " Vitals [03/16/18 1918]   BP Pulse Resp Temp SpO2   126/73 108 20 98.3 °F (36.8 °C) 97 %      MAP       90.67         Physical Exam    Nursing note and vitals reviewed.  Constitutional: Vital signs are normal. She appears well-developed and well-nourished. She is not diaphoretic. She is cooperative.  Non-toxic appearance. She does not have a sickly appearance. She does not appear ill. No distress.   -American female unaccompanied in the emergency department.  She is able to ambulate on her own.  Slurred speech, EtOH on breath.  No acute distress.   HENT:   Head: Normocephalic.   Right Ear: External ear normal.   Left Ear: External ear normal.   Mouth/Throat: Oropharynx is clear and moist.   Eyes: Conjunctivae and EOM are normal.   Neck: Normal range of motion.   Musculoskeletal: Normal range of motion.   Neurological: She is alert and oriented to person, place, and time.         ED Course   Procedures  Labs Reviewed - No data to display          Medical Decision Making:   Initial Assessment:   Urgent evaluation of a 57-year-old female past medical history of hypertension, EtOH abuse, presenting to the emergency department for evaluation with no obvious complaints.  Patient is afebrile, nontoxic appearing, hemodynamically stable.  Upon my evaluation, patient denies any medical complaints and states she is just here for rest.  Denies headache, chest pain, shortness of breath.  Clinical intoxication noted however she is able to speak exam today with a steady gait without assistance.  ED Management:  No need for medical evaluation or examination further at this time.  Patient is discharged home in stable condition. The patient was instructed to follow up with a primary care provider in 2 days or to return to the emergency department for worsening symptoms. The treatment plan was discussed with the patient who demonstrated understanding and comfort with plan. The patient's history, physical exam, and plan of care  was discussed with and agreed upon with my supervising physician.    Other:   I have discussed this case with another health care provider.       <> Summary of the Discussion: Dr. Joseph  This note was created using Dragon Medical Dictation. There may be typographical errors secondary to dictation.                       Clinical Impression:     1. Alcoholic intoxication without complication    2. Well adult exam         Disposition:   Disposition: Discharged  Condition: Stable                        Siomara Orlando PA-C  03/16/18 6836
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
## 2018-04-23 ENCOUNTER — HOSPITAL ENCOUNTER (EMERGENCY)
Facility: OTHER | Age: 58
Discharge: HOME | End: 2018-04-23
Attending: EMERGENCY MEDICINE
Payer: MEDICAID

## 2018-04-23 VITALS
DIASTOLIC BLOOD PRESSURE: 74 MMHG | OXYGEN SATURATION: 92 % | RESPIRATION RATE: 20 BRPM | HEART RATE: 92 BPM | TEMPERATURE: 99 F | WEIGHT: 230 LBS | HEIGHT: 69 IN | BODY MASS INDEX: 34.07 KG/M2 | SYSTOLIC BLOOD PRESSURE: 133 MMHG

## 2018-04-23 DIAGNOSIS — F10.10 ALCOHOL ABUSE: ICD-10-CM

## 2018-04-23 DIAGNOSIS — F10.920 ALCOHOLIC INTOXICATION WITHOUT COMPLICATION: Primary | ICD-10-CM

## 2018-04-23 LAB — POCT GLUCOSE: 120 MG/DL (ref 70–110)

## 2018-04-23 PROCEDURE — 99284 EMERGENCY DEPT VISIT MOD MDM: CPT

## 2018-04-23 PROCEDURE — 82962 GLUCOSE BLOOD TEST: CPT

## 2018-04-24 NOTE — ED PROVIDER NOTES
"Encounter Date: 4/23/2018    SCRIBE #1 NOTE: I, Alejandro Ruffin, am scribing for, and in the presence of, Dr. Joseph.       History     Chief Complaint   Patient presents with    Alcohol Intoxication     found lying on ground at bar     Seen by provider: 9:15 PM    Patient is a 57 y.o. female with a history of HTN and alcohol abuse who presents to the ED via EMS with complaint of alcohol intoxication. Patient was picked up by EMS after she was found laying on the ground in front of [employer]. She admits to use of alcohol. She denies use of any illicit drugs. She has no additional complaints, and states she is here "to sober up."      The history is provided by the patient.     Review of patient's allergies indicates:  No Known Allergies  Past Medical History:   Diagnosis Date    ETOH abuse     Hyperlipemia     Hypertension     Psychiatric illness      Past Surgical History:   Procedure Laterality Date    BREAST SURGERY       No family history on file.  Social History   Substance Use Topics    Smoking status: Current Some Day Smoker     Packs/day: 0.50     Types: Cigarettes    Smokeless tobacco: Never Used    Alcohol use 25.2 oz/week     42 Standard drinks or equivalent per week      Comment: heavy drinker     Review of Systems   Constitutional: Negative for fever.   HENT: Negative for drooling.    Respiratory: Negative for shortness of breath.    Cardiovascular: Negative for chest pain.   Gastrointestinal: Negative for vomiting.   Genitourinary: Negative for difficulty urinating.   Musculoskeletal: Negative for back pain.   Skin: Negative for rash and wound.   Neurological: Negative for headaches.   Psychiatric/Behavioral:        Positive for alcohol intoxication.       Physical Exam     Vitals:    04/23/18 2105   BP: (!) 167/102   Pulse: 101   Resp: 16   Temp: 98.6 °F (37 °C)   TempSrc: Oral   SpO2: (!) 94%   Weight: 104.3 kg (230 lb)   Height: 5' 9" (1.753 m)      Physical Exam    Nursing note and vitals " reviewed.  Constitutional: She appears well-developed and well-nourished. No distress.   Smells of alcohol.   HENT:   Head: Normocephalic and atraumatic.   Eyes: EOM are normal. Pupils are equal, round, and reactive to light.   Bilateral conjunctival injection.   Neck: Normal range of motion. Neck supple.   Cardiovascular: Normal rate, regular rhythm and normal heart sounds. Exam reveals no gallop and no friction rub.    No murmur heard.  Pulmonary/Chest: Breath sounds normal. No respiratory distress. She has no wheezes. She has no rhonchi. She has no rales.   Abdominal: Soft. She exhibits no distension. There is no tenderness. There is no rebound and no guarding.   Musculoskeletal: Normal range of motion.   Neurological: She is alert and oriented to person, place, and time.   Skin: Skin is warm and dry.   Psychiatric: Her speech is slurred.         ED Course   Procedures  Labs Reviewed   POCT GLUCOSE - Abnormal; Notable for the following:        Result Value    POCT Glucose 120 (*)     All other components within normal limits   POCT GLUCOSE MONITORING CONTINUOUS                Additional MDM:   Comments: 56 y/o female well-known to this department with h/o alcohol abuse presented intoxicated without any complaints.  Pt alerted and oriented x 3 but has unsteady gait.  She will be observed until she is able to ambulate without difficulty and requiring no assistance.        10:51 PM  Pt has been walking to and from the restroom without assistance multiple times and states she is ready to go home.  Will d/c home at this time.  .          Scribe Attestation:   Scribe #1: I performed the above scribed service and the documentation accurately describes the services I performed. I attest to the accuracy of the note.    Attending Attestation:           Physician Attestation for Scribe:  Physician Attestation Statement for Scribe #1: I, Dr. Joseph, reviewed documentation, as scribed by Alejandro Ruffin in my presence, and it  is both accurate and complete.                    Clinical Impression:     1. Alcoholic intoxication without complication    2. Alcohol abuse                              Mariana Joseph MD  04/23/18 0358

## 2018-04-24 NOTE — ED NOTES
Pt ambulated to the restroom w/out incident, no staggered gait and no signs of unbalance noted. Pt has clear speech and no nystagmus. Pt did not need any assistance while ambulating. Pt is A & O x 3, denies SOB, fever, chills and N/V/D. Skin is warm and dry w/ pink mucosa. VSS. Pt states she is ready to go. Pt has no complaint at this time.
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
## 2018-08-06 ENCOUNTER — HOSPITAL ENCOUNTER (EMERGENCY)
Facility: OTHER | Age: 58
Discharge: HOME | End: 2018-08-06
Attending: EMERGENCY MEDICINE
Payer: MEDICAID

## 2018-08-06 VITALS
TEMPERATURE: 98 F | HEART RATE: 111 BPM | DIASTOLIC BLOOD PRESSURE: 97 MMHG | RESPIRATION RATE: 20 BRPM | SYSTOLIC BLOOD PRESSURE: 160 MMHG | OXYGEN SATURATION: 98 %

## 2018-08-06 DIAGNOSIS — M25.519 SHOULDER PAIN: ICD-10-CM

## 2018-08-06 DIAGNOSIS — S09.90XA INJURY OF HEAD, INITIAL ENCOUNTER: Primary | ICD-10-CM

## 2018-08-06 PROCEDURE — 99284 EMERGENCY DEPT VISIT MOD MDM: CPT | Mod: 25

## 2018-08-07 NOTE — ED TRIAGE NOTES
Pt came to the ED tonight c/o fall s/p drinking alcohol today. Pt is aaox4 and has no obvious signs of trauma noted to the head. Pt is able to move all extremities at this time with no difficulty

## 2018-08-07 NOTE — ED NOTES
Pt cursing at staff and yelling in the hallway. Pt is able to ambulate down the hallway with no assistance needed. MD made aware of pt yelling at staff and requesting to leave. Pt is leaving without D/C paperwork

## 2018-08-07 NOTE — ED PROVIDER NOTES
Encounter Date: 8/6/2018       History     Chief Complaint   Patient presents with    Fall     Pt Co Slipping from standing, and struck the back of her head. Denies LOC, and neck pain.      Time seen by provider: 9:31 PM    This is a 57 y.o. female who presents with complaint of head injury s/p fall. Pt appears well, nontoxic.  She does admit to drinking tonight.  She also c/o R shoulder pain.  She denies any numbness/tingling. She denies neck pain/back pain. Denies chest pain/SOB. Denies N/V/D.          The history is provided by the patient.     Review of patient's allergies indicates:  No Known Allergies  Past Medical History:   Diagnosis Date    ETOH abuse     Hyperlipemia     Hypertension     Psychiatric illness      Past Surgical History:   Procedure Laterality Date    BREAST SURGERY       History reviewed. No pertinent family history.  Social History   Substance Use Topics    Smoking status: Current Some Day Smoker     Packs/day: 0.50     Types: Cigarettes    Smokeless tobacco: Never Used    Alcohol use 25.2 oz/week     42 Standard drinks or equivalent per week      Comment: heavy drinker     Review of Systems   Constitutional: Negative for activity change, chills and fever.   HENT: Negative for congestion, sore throat and trouble swallowing.    Respiratory: Negative for cough, chest tightness and shortness of breath.    Cardiovascular: Negative for chest pain.   Gastrointestinal: Negative for abdominal pain, nausea and vomiting.   Genitourinary: Negative for difficulty urinating and flank pain.   Musculoskeletal: Negative for back pain and neck pain.        R shoulder pain.   Skin: Negative for color change and rash.   Neurological: Negative for dizziness, weakness and headaches.   Psychiatric/Behavioral: Negative for confusion.       Physical Exam     Initial Vitals [08/06/18 2116]   BP Pulse Resp Temp SpO2   (!) 160/97 (!) 111 20 97.9 °F (36.6 °C) 98 %      MAP       --         Physical  Exam    Nursing note and vitals reviewed.  Constitutional: She appears well-developed and well-nourished. She is not diaphoretic. No distress.   HENT:   Head: Normocephalic and atraumatic.   Right Ear: External ear normal.   Left Ear: External ear normal.   Eyes: EOM are normal. Right eye exhibits no discharge. Left eye exhibits no discharge.   Injected sclera.   Neck: Normal range of motion. Neck supple.   Cardiovascular: Regular rhythm and normal heart sounds.   tachcyardic   Pulmonary/Chest: Breath sounds normal. No respiratory distress. She has no wheezes. She has no rhonchi. She has no rales.   Abdominal: Soft. Bowel sounds are normal. She exhibits no distension. There is no tenderness. There is no rebound and no guarding.   Musculoskeletal: Normal range of motion. She exhibits no edema or tenderness.   No C/T/L midline tenderness.     Neurological: She is alert and oriented to person, place, and time.   Skin: Skin is warm and dry. No rash and no abscess noted. No erythema. No pallor.   Psychiatric: She has a normal mood and affect. Her behavior is normal. Judgment and thought content normal.         ED Course   Procedures  Labs Reviewed - No data to display       Imaging Results          X-Ray Shoulder Complete 2 View Right (Final result)  Result time 08/06/18 21:59:34    Final result by Anatoly Elizabeth MD (08/06/18 21:59:34)                 Impression:      No acute displaced fracture-dislocation identified.      Electronically signed by: Anatoly Elizabeth MD  Date:    08/06/2018  Time:    21:59             Narrative:    EXAMINATION:  XR SHOULDER COMPLETE 2 OR MORE VIEWS RIGHT    CLINICAL HISTORY:  Pain in unspecified shoulder    TECHNIQUE:  Two views of the right shoulder were performed.    COMPARISON:  Chest radiograph 05/17/2013    FINDINGS:  Overall alignment is within normal limits.  No displaced fracture, dislocation or destructive osseous process.  Mild degenerative change at the right AC joint.  No  subcutaneous emphysema or radiodense retained foreign body.  Right lung apex is clear.                               CT Cervical Spine Without Contrast (Final result)  Result time 08/06/18 22:06:53    Final result by Javier Gong MD (08/06/18 22:06:53)                 Impression:      1. No displaced fractures.  No traumatic subluxation or dislocation.      Electronically signed by: Javier Gong MD  Date:    08/06/2018  Time:    22:06             Narrative:    EXAMINATION:  CT CERVICAL SPINE WITHOUT CONTRAST    CLINICAL HISTORY:  neck pain;    TECHNIQUE:  Low dose axial images, sagittal and coronal reformations were performed though the cervical spine.  Contrast was not administered.    COMPARISON:  None    FINDINGS:  Cervical spine alignment is within normal limits.  No spondylolisthesis.  Occipital condyles and odontoid process are within normal limits its.  Atlantoaxial alignment is maintained.  Vertebral body heights are preserved without evidence for fracture.  Facet joints are aligned.  Transverse foramina are patent.  Posterior elements demonstrate no significant abnormalities.  Visualized clavicles and ribs are normal.    Prevertebral soft tissues are within normal limits.  Parotid, submandibular and thyroid glands are preserved.  Lung apices are clear.  No cervical lymph node enlargement.  There is a small mucosal retention cyst within the left maxillary antrum.    No focal disc protrusion by CT criteria.  Mild degenerative changes noted at C5-C6.                               CT Head Without Contrast (Final result)  Result time 08/06/18 21:58:55    Final result by Anatoly Elizabeth MD (08/06/18 21:58:55)                 Impression:      No acute intracranial abnormality.    Mild senescent changes as above.      Electronically signed by: Anatoly Elizabeth MD  Date:    08/06/2018  Time:    21:58             Narrative:    EXAMINATION:  CT HEAD WITHOUT CONTRAST    CLINICAL HISTORY:  Headache, post  trauma;    TECHNIQUE:  Low dose axial CT images obtained throughout the head without intravenous contrast. Sagittal and coronal reconstructions were performed.    COMPARISON:  Head CT 10/16/2016    FINDINGS:  Intracranial compartment:    Ventricles and sulci are normal in size for age without evidence of hydrocephalus. No extra-axial blood or fluid collections.    Supratentorial white matter mild chronic small vessel ischemic change.  No parenchymal mass, hemorrhage, edema or major vascular distribution infarct.  Skull base vascular calcifications noted.    Skull/extracranial contents (limited evaluation): No fracture. Mastoid air cells and paranasal sinuses are essentially clear.  Visualized portions of the orbits are within normal limits.                              X-Rays:   Independently Interpreted Readings:   Other Readings:  Shoulder XR:  No acute fracture or dislocation.    Medical Decision Making:   History:   Old Medical Records: I decided to obtain old medical records.  Old Records Summarized: other records.  Initial Assessment:   9:31PM:  Pt is a 56 y/o F who presents to ED s/p fall.  Pt appears well, nontoxic.  She is neurologically intact. Will plan for imaging, will continue to follow and reassess.    Independently Interpreted Test(s):   I have ordered and independently interpreted X-rays - see prior notes.  Clinical Tests:   Radiological Study: Ordered and Reviewed    10:18PM:  Pt doing well, requesting to leave. She is ambulatory with steady gait. Her CT and xrays are all negative for any acute findings.  I updated pt regarding results and I counseled pt regarding supportive care measures.  I have discussed with the pt ED return warnings and need for close PCP f/u.  Pt agreeable to plan and all questions answered.  I feel that pt is stable for discharge and management as an outpatient and no further intervention is needed at this time.  Pt is comfortable returning to the ED if needed.  Will DC home  in stable condition.                          Clinical Impression:     1. Injury of head, initial encounter    2. Shoulder pain                                   Maria Fernanda Javier MD  08/06/18 2251

## 2018-08-22 ENCOUNTER — HOSPITAL ENCOUNTER (EMERGENCY)
Facility: OTHER | Age: 58
Discharge: HOME | End: 2018-08-23
Attending: EMERGENCY MEDICINE
Payer: MEDICAID

## 2018-08-22 DIAGNOSIS — M54.2 NECK PAIN ON RIGHT SIDE: ICD-10-CM

## 2018-08-22 DIAGNOSIS — I10 POORLY-CONTROLLED HYPERTENSION: Primary | ICD-10-CM

## 2018-08-22 LAB
ANION GAP SERPL CALC-SCNC: 10 MMOL/L
BASOPHILS # BLD AUTO: 0.02 K/UL
BASOPHILS NFR BLD: 0.3 %
BILIRUB UR QL STRIP: NEGATIVE
BUN SERPL-MCNC: 12 MG/DL
CALCIUM SERPL-MCNC: 9.3 MG/DL
CHLORIDE SERPL-SCNC: 106 MMOL/L
CLARITY UR: CLEAR
CO2 SERPL-SCNC: 25 MMOL/L
COLOR UR: YELLOW
CREAT SERPL-MCNC: 0.9 MG/DL
DIFFERENTIAL METHOD: NORMAL
EOSINOPHIL # BLD AUTO: 0.3 K/UL
EOSINOPHIL NFR BLD: 4.7 %
ERYTHROCYTE [DISTWIDTH] IN BLOOD BY AUTOMATED COUNT: 14.1 %
EST. GFR  (AFRICAN AMERICAN): >60 ML/MIN/1.73 M^2
EST. GFR  (NON AFRICAN AMERICAN): >60 ML/MIN/1.73 M^2
GLUCOSE SERPL-MCNC: 119 MG/DL
GLUCOSE UR QL STRIP: NEGATIVE
HCT VFR BLD AUTO: 45.3 %
HGB BLD-MCNC: 14.5 G/DL
HGB UR QL STRIP: NEGATIVE
KETONES UR QL STRIP: NEGATIVE
LEUKOCYTE ESTERASE UR QL STRIP: NEGATIVE
LYMPHOCYTES # BLD AUTO: 2.9 K/UL
LYMPHOCYTES NFR BLD: 42.6 %
MCH RBC QN AUTO: 27 PG
MCHC RBC AUTO-ENTMCNC: 32 G/DL
MCV RBC AUTO: 84 FL
MONOCYTES # BLD AUTO: 0.4 K/UL
MONOCYTES NFR BLD: 6.5 %
NEUTROPHILS # BLD AUTO: 3.1 K/UL
NEUTROPHILS NFR BLD: 45.8 %
NITRITE UR QL STRIP: NEGATIVE
PH UR STRIP: 8 [PH] (ref 5–8)
PLATELET # BLD AUTO: 214 K/UL
PMV BLD AUTO: 10.5 FL
POTASSIUM SERPL-SCNC: 4.2 MMOL/L
PROT UR QL STRIP: NEGATIVE
RBC # BLD AUTO: 5.38 M/UL
SODIUM SERPL-SCNC: 141 MMOL/L
SP GR UR STRIP: 1.01 (ref 1–1.03)
URN SPEC COLLECT METH UR: NORMAL
UROBILINOGEN UR STRIP-ACNC: NEGATIVE EU/DL
WBC # BLD AUTO: 6.8 K/UL

## 2018-08-22 PROCEDURE — 63600175 PHARM REV CODE 636 W HCPCS: Performed by: EMERGENCY MEDICINE

## 2018-08-22 PROCEDURE — 81003 URINALYSIS AUTO W/O SCOPE: CPT

## 2018-08-22 PROCEDURE — 96372 THER/PROPH/DIAG INJ SC/IM: CPT

## 2018-08-22 PROCEDURE — 85025 COMPLETE CBC W/AUTO DIFF WBC: CPT

## 2018-08-22 PROCEDURE — 99283 EMERGENCY DEPT VISIT LOW MDM: CPT | Mod: 25

## 2018-08-22 PROCEDURE — 25000003 PHARM REV CODE 250: Performed by: EMERGENCY MEDICINE

## 2018-08-22 PROCEDURE — 80048 BASIC METABOLIC PNL TOTAL CA: CPT

## 2018-08-22 RX ORDER — LOSARTAN POTASSIUM 100 MG/1
100 TABLET ORAL DAILY
COMMUNITY
End: 2018-08-24

## 2018-08-22 RX ORDER — LOSARTAN POTASSIUM 50 MG/1
50 TABLET ORAL
Status: COMPLETED | OUTPATIENT
Start: 2018-08-22 | End: 2018-08-22

## 2018-08-22 RX ORDER — ORPHENADRINE CITRATE 30 MG/ML
30 INJECTION INTRAMUSCULAR; INTRAVENOUS
Status: COMPLETED | OUTPATIENT
Start: 2018-08-22 | End: 2018-08-22

## 2018-08-22 RX ORDER — CLONIDINE HYDROCHLORIDE 0.1 MG/1
0.1 TABLET ORAL
Status: COMPLETED | OUTPATIENT
Start: 2018-08-22 | End: 2018-08-22

## 2018-08-22 RX ADMIN — LOSARTAN POTASSIUM 50 MG: 50 TABLET, FILM COATED ORAL at 10:08

## 2018-08-22 RX ADMIN — CLONIDINE HYDROCHLORIDE 0.1 MG: 0.1 TABLET ORAL at 09:08

## 2018-08-22 RX ADMIN — ORPHENADRINE CITRATE 30 MG: 30 INJECTION INTRAMUSCULAR; INTRAVENOUS at 09:08

## 2018-08-23 VITALS
TEMPERATURE: 98 F | WEIGHT: 192.69 LBS | DIASTOLIC BLOOD PRESSURE: 84 MMHG | HEIGHT: 63 IN | SYSTOLIC BLOOD PRESSURE: 178 MMHG | HEART RATE: 64 BPM | OXYGEN SATURATION: 96 % | RESPIRATION RATE: 18 BRPM | BODY MASS INDEX: 34.14 KG/M2

## 2018-08-23 NOTE — ED PROVIDER NOTES
Encounter Date: 8/22/2018    SCRIBE #1 NOTE: I, Harriett Guzman, am scribing for, and in the presence of, Dr. Joseph.       History     Chief Complaint   Patient presents with    Hypertension     188/113 today and funny feeling on the R side of her head when she moves it, no ETOH and no double vision     Time seen by provider: 9:29 PM    This is a 57 y.o. female with hx of HTN who presents due to elevated blood pressure today. Pt states that she had a blood pressure of 188/113 PTA. She is unsure what her baseline is. She also reports R sided head, neck, and arm pain. Head pain only occurs with certain movements. She denies HA. Pt reports taking her antihypertensive medications this morning. She was recently started on Losartan 4 months ago. She is not on any other new prescription or OTC medications. She denies any fever, chills, HA, numbness, weakness, dizziness, lightheadedness, visual disturbance, chest pain, or SOB.      The history is provided by the patient.     Review of patient's allergies indicates:  No Known Allergies  Past Medical History:   Diagnosis Date    ETOH abuse     Hyperlipemia     Hypertension     Psychiatric illness      Past Surgical History:   Procedure Laterality Date    BREAST SURGERY       History reviewed. No pertinent family history.  Social History     Tobacco Use    Smoking status: Current Some Day Smoker     Packs/day: 0.50     Types: Cigarettes    Smokeless tobacco: Never Used   Substance Use Topics    Alcohol use: Yes     Alcohol/week: 25.2 oz     Types: 42 Standard drinks or equivalent per week     Comment: heavy drinker    Drug use: No     Review of Systems   Constitutional: Negative for chills and fever.   HENT: Negative for congestion, rhinorrhea and sore throat.         R sided head pain.   Eyes: Negative for photophobia and visual disturbance.   Respiratory: Negative for cough and shortness of breath.    Cardiovascular: Negative for chest pain.   Gastrointestinal:  Negative for abdominal pain, diarrhea, nausea and vomiting.   Endocrine: Negative for polyuria.   Genitourinary: Negative for decreased urine volume and dysuria.   Musculoskeletal: Positive for neck pain (R side). Negative for back pain.        R arm pain.   Skin: Negative for rash.   Allergic/Immunologic: Negative for immunocompromised state.   Neurological: Negative for dizziness, syncope, weakness, light-headedness, numbness and headaches.   Hematological: Does not bruise/bleed easily.   Psychiatric/Behavioral: Negative for confusion.       Physical Exam     Initial Vitals [08/22/18 2121]   BP Pulse Resp Temp SpO2   (!) 225/121 76 16 98.2 °F (36.8 °C) 96 %      MAP       --         Physical Exam    Nursing note and vitals reviewed.  Constitutional: She appears well-developed and well-nourished. She is not diaphoretic. No distress.   No slurred speech. No smell of alcohol.   HENT:   Head: Normocephalic and atraumatic.   Right Ear: External ear normal.   Left Ear: External ear normal.   Eyes: EOM are normal. Right eye exhibits no discharge. Left eye exhibits no discharge.   Pupils are equal and reactive. EOMI.   Neck: Normal range of motion. Neck supple.   Cardiovascular: Normal rate, regular rhythm and normal heart sounds. Exam reveals no gallop and no friction rub.    No murmur heard.  Pulmonary/Chest: Breath sounds normal. No respiratory distress. She has no wheezes. She has no rhonchi. She has no rales.   Abdominal: Soft. Bowel sounds are normal. She exhibits no distension. There is no tenderness. There is no rebound and no guarding.   Musculoskeletal: Normal range of motion. She exhibits no edema.   TTP of the R deltoid area and arm. R upper arm pain with movement against resistance.   Lymphadenopathy:     She has no cervical adenopathy.   Neurological: She is alert and oriented to person, place, and time. She has normal strength. No sensory deficit.   Skin: Skin is warm and dry. No rash noted. No erythema.    Psychiatric: She has a normal mood and affect. Her behavior is normal.         ED Course   Procedures  Labs Reviewed   BASIC METABOLIC PANEL - Abnormal; Notable for the following components:       Result Value    Glucose 119 (*)     All other components within normal limits   CBC W/ AUTO DIFFERENTIAL   URINALYSIS, REFLEX TO URINE CULTURE    Narrative:     Preferred Collection Type->Urine, Clean Catch           Medical Decision Making:   Clinical Tests:   Lab Tests: Ordered and Reviewed    Additional MDM:   Comments: 57-year-old female with history of hypertension presents complaining of her blood pressure being elevated despite being compliant with her medications today.  She also reported right-sided head/neck pain that occurs only with certain movements of her head and arms.  On exam the pain was reproducible by having her sit up in bed and with arm movement against resistance.  The remainder of her exam was unremarkable. Blood pressure was significantly elevated in triage.  After receiving 0.1 mg of clonidine and half the dose for daily losartan, her blood pressure did improve significantly.  Pain in the neck and shoulder also relieved with Norflex.  Labs and urinalysis within normal limits. Patient will be discharged home at this time in stable condition..          Scribe Attestation:   Scribe #1: I performed the above scribed service and the documentation accurately describes the services I performed. I attest to the accuracy of the note.    Attending Attestation:           Physician Attestation for Scribe:  Physician Attestation Statement for Scribe #1: I, Dr. Joseph, reviewed documentation, as scribed by Harriett Guzman in my presence, and it is both accurate and complete.                    Clinical Impression:     1. Poorly-controlled hypertension    2. Neck pain on right side                                 Mariana Joseph MD  08/23/18 0016

## 2018-08-23 NOTE — ED NOTES
Pt lying down, respirations even/unlabored. NAD Noted. Pt denies any needs at this time. Side rails up x2, call bell within reach. Will continue to monitor.

## 2018-08-23 NOTE — ED TRIAGE NOTES
"Pt arrived to ED with c/o HTN. Pt states " I quit drinking about 2 months ago and they started me on Losartan. I took my pressure at home and it was 188/113. My head is throbbing and from my neck down to my shoulder it is painful. " pt denies sob, cp, fever, chills, n/v, visual change.   "

## 2018-08-24 ENCOUNTER — HOSPITAL ENCOUNTER (EMERGENCY)
Facility: OTHER | Age: 58
Discharge: HOME | End: 2018-08-24
Attending: EMERGENCY MEDICINE
Payer: MEDICAID

## 2018-08-24 VITALS
HEIGHT: 63 IN | HEART RATE: 72 BPM | TEMPERATURE: 99 F | WEIGHT: 192 LBS | BODY MASS INDEX: 34.02 KG/M2 | SYSTOLIC BLOOD PRESSURE: 186 MMHG | DIASTOLIC BLOOD PRESSURE: 84 MMHG | RESPIRATION RATE: 18 BRPM | OXYGEN SATURATION: 98 %

## 2018-08-24 DIAGNOSIS — I10 HTN (HYPERTENSION): ICD-10-CM

## 2018-08-24 DIAGNOSIS — I10 UNCONTROLLED HYPERTENSION: Primary | ICD-10-CM

## 2018-08-24 PROCEDURE — 82962 GLUCOSE BLOOD TEST: CPT

## 2018-08-24 PROCEDURE — 99283 EMERGENCY DEPT VISIT LOW MDM: CPT | Mod: 25

## 2018-08-24 PROCEDURE — 93010 ELECTROCARDIOGRAM REPORT: CPT | Mod: ,,, | Performed by: INTERNAL MEDICINE

## 2018-08-24 PROCEDURE — 93005 ELECTROCARDIOGRAM TRACING: CPT

## 2018-08-24 PROCEDURE — 25000003 PHARM REV CODE 250: Performed by: PHYSICIAN ASSISTANT

## 2018-08-24 RX ORDER — CLONIDINE HYDROCHLORIDE 0.1 MG/1
0.2 TABLET ORAL
Status: COMPLETED | OUTPATIENT
Start: 2018-08-24 | End: 2018-08-24

## 2018-08-24 RX ADMIN — CLONIDINE HYDROCHLORIDE 0.2 MG: 0.1 TABLET ORAL at 06:08

## 2018-08-24 NOTE — ED PROVIDER NOTES
Encounter Date: 8/24/2018       History     Chief Complaint   Patient presents with    Hypertension     recent poorly controlled HTN and saw PCP today and had adjustments to medications. pt reports BP still elevated. pt denies any symptoms.     57-year-old female well-known to the emergency department with history of alcohol abuse, hyperlipidemia, hypertension and psychiatric illness presents to the emergency department with complaints of elevated blood pressure.  She states that her primary care physician recently changed her blood pressure medication secondary to her initial medication not being covered.  She states that she had never had issues in the past with her old medication however since starting the losartan her pressure has been elevated.  She was seen here 2 days ago for similar symptoms.  She states that she did take the losartan today and noted that her pressure was elevated at home so she came back to the emergency department.  She has no complaints of chest pain, shortness breath, numbness, weakness, confusion, lightheadedness, dizziness or other associated symptoms. She does admit that she is a heavy drinker however states that she has not been drinking alcohol and feels that this is secondary to the change in her blood pressure medication.  She states that her old medication was approved and she was able to pick it up from the pharmacy today.  She states that she was concerned about mixing the medications so she did not take it.  She does admit that she will restart this medication tomorrow however wanted to make sure that her pressure was better controlled this evening.      The history is provided by the patient.     Review of patient's allergies indicates:  No Known Allergies  Past Medical History:   Diagnosis Date    ETOH abuse     Hyperlipemia     Hypertension     Psychiatric illness      Past Surgical History:   Procedure Laterality Date    BREAST SURGERY       No family history on  file.  Social History     Tobacco Use    Smoking status: Current Some Day Smoker     Packs/day: 0.50     Types: Cigarettes    Smokeless tobacco: Never Used   Substance Use Topics    Alcohol use: Yes     Alcohol/week: 25.2 oz     Types: 42 Standard drinks or equivalent per week     Comment: heavy drinker    Drug use: No     Review of Systems   Constitutional: Negative for chills and fever.   HENT: Negative for sore throat.    Eyes: Negative for visual disturbance.   Respiratory: Negative for shortness of breath.    Cardiovascular: Negative for chest pain.   Gastrointestinal: Negative for nausea and vomiting.   Genitourinary: Negative for difficulty urinating and dysuria.   Musculoskeletal: Negative for back pain.   Skin: Negative for rash.   Neurological: Negative for dizziness, syncope, weakness, light-headedness, numbness and headaches.   Hematological: Does not bruise/bleed easily.   Psychiatric/Behavioral: Negative for agitation and confusion.       Physical Exam     Initial Vitals [08/24/18 1802]   BP Pulse Resp Temp SpO2   (!) 224/107 69 18 98.6 °F (37 °C) 97 %      MAP       --         Physical Exam    Nursing note and vitals reviewed.  Constitutional: She appears well-developed and well-nourished. She is not diaphoretic. She is Obese .  Non-toxic appearance. No distress.   HENT:   Head: Normocephalic and atraumatic.   Right Ear: External ear normal.   Left Ear: External ear normal.   Nose: Nose normal.   Mouth/Throat: Oropharynx is clear and moist.   Eyes: Conjunctivae, EOM and lids are normal. Pupils are equal, round, and reactive to light. No scleral icterus.   Neck: Normal range of motion and phonation normal. Neck supple.   Cardiovascular: Normal rate, regular rhythm, normal heart sounds and intact distal pulses. Exam reveals no gallop and no friction rub.    No murmur heard.  Pulmonary/Chest: Effort normal and breath sounds normal. No respiratory distress. She has no decreased breath sounds. She has  no wheezes. She has no rhonchi. She has no rales.   Abdominal: Normal appearance.   Musculoskeletal: Normal range of motion.   No obvious deformities, moving all extremities, normal gait   Neurological: She is alert and oriented to person, place, and time. She has normal strength. No sensory deficit.   Skin: Skin is warm, dry and intact. No lesion and no rash noted. No erythema.   Psychiatric: She has a normal mood and affect. Her speech is normal and behavior is normal. Judgment normal. Her affect is not inappropriate. Cognition and memory are normal.         ED Course   Procedures  Labs Reviewed - No data to display  EKG Readings: (Independently Interpreted)   Initial Reading: No STEMI. Previous EKG: Compared with most recent EKG Previous EKG Date: 11/2015. Rhythm: Sinus Arrhythmia. Heart Rate: 70. Ectopy: No Ectopy. Conduction: Normal. ST Segments: Normal ST Segments. T Waves: Normal. Clinical Impression: Normal Sinus Rhythm       Imaging Results    None          Medical Decision Making:   History:   Old Medical Records: I decided to obtain old medical records.  Initial Assessment:   57-year-old female with complaints consistent with hypertension.  Hypertensive on arrival to the emergency department.  Vital signs otherwise benign.  She is asymptomatic.  Exam benign.  Documented above.  Patient seen here 2 days ago for the same symptoms.  Independently Interpreted Test(s):   I have ordered and independently interpreted EKG Reading(s) - see prior notes  Clinical Tests:   Medical Tests: Ordered and Reviewed  ED Management:  Patient seen here on the 22nd.  Basic labs and urine were obtained.  There is no protein in her urine and no evidence of end-organ damage.  I do not feel that repeat labs are indicated at this time.  Will administer Catapres and discharged home.  She is urged to discontinue the losartan and restart her initial medication as planned in the morning.  She is to follow up with her doctor in the next  48 hr or return for any worsening signs or symptoms. She states understanding agrees with this plan.  This is the extent of patient's complaints today.  This patient was discussed with the attending physician who agrees with treatment plan.  Other:   I have discussed this case with another health care provider.       <> Summary of the Discussion: Molly  This note was created using [employer] Medical dictation.  There may be typographical errors secondary to dictation.                        Clinical Impression:     1. Uncontrolled hypertension    2. HTN (hypertension)            Disposition:   Disposition: Discharged  Condition: Stable                        Jacqui Magallanes PA-C  08/24/18 9030

## 2018-08-24 NOTE — ED NOTES
Pt presents to ED via self with complaints of hypertension. Pt was seen in clinic, BP elevated, sent to ED for further evaluation. /117 in Triage. Pt denies taking her home BP medicaitons. Pt currently asymptomatic, speaking in clear and complete sentences. Pt denies HA, N/V/D, blurred vision. AAOx4, RR even and unlabored. Will continue to monitor.

## 2018-08-31 ENCOUNTER — HOSPITAL ENCOUNTER (EMERGENCY)
Facility: OTHER | Age: 58
Discharge: HOME | End: 2018-09-01
Attending: EMERGENCY MEDICINE
Payer: MEDICAID

## 2018-08-31 DIAGNOSIS — F10.920 ACUTE ALCOHOLIC INTOXICATION WITHOUT COMPLICATION: Primary | ICD-10-CM

## 2018-08-31 PROCEDURE — 99282 EMERGENCY DEPT VISIT SF MDM: CPT

## 2018-09-01 VITALS
WEIGHT: 200 LBS | HEIGHT: 67 IN | SYSTOLIC BLOOD PRESSURE: 107 MMHG | DIASTOLIC BLOOD PRESSURE: 57 MMHG | TEMPERATURE: 98 F | HEART RATE: 78 BPM | BODY MASS INDEX: 31.39 KG/M2 | OXYGEN SATURATION: 95 %

## 2018-09-01 NOTE — ED NOTES
PT lying in bed with eyes closed, respirations even and unlabored, arouses to voice, bed locked and low, rails up Xs 2, call bell in reach, pt in direct view of nurses station, will continue to monitor.

## 2018-09-01 NOTE — ED PROVIDER NOTES
Encounter Date: 8/31/2018    SCRIBE #1 NOTE: I, Harriett Guzman, am scribing for, and in the presence of, Dr. Herrera.       History     Chief Complaint   Patient presents with    Alcohol Intoxication     picked up by GI[facility] from [facility] and valence     Time seen by provider: 12:09 AM    This is a 57 y.o. female who presents via EMS due to alcohol intoxication. [facility] department attempted to escort the pt home, but she was unable to ambulate the stairs. On arrival to the ED pt is belligerent, yelling incoherently, and thus no further history obtained.       The history is provided by the patient.     Review of patient's allergies indicates:  No Known Allergies  Past Medical History:   Diagnosis Date    ETOH abuse     Hyperlipemia     Hypertension     Psychiatric illness      Past Surgical History:   Procedure Laterality Date    BREAST SURGERY       No family history on file.  Social History     Tobacco Use    Smoking status: Current Some Day Smoker     Packs/day: 0.50     Types: Cigarettes    Smokeless tobacco: Never Used   Substance Use Topics    Alcohol use: Yes     Alcohol/week: 25.2 oz     Types: 42 Standard drinks or equivalent per week     Comment: heavy drinker    Drug use: No     Review of Systems   Unable to perform ROS: Other (intoxication)       Physical Exam     Initial Vitals   BP Pulse Resp Temp SpO2   09/01/18 0011 09/01/18 0011 -- 08/31/18 2309 09/01/18 0011   (!) 101/55 74  97.6 °F (36.4 °C) (!) 94 %      MAP       --                Physical Exam    Nursing note and vitals reviewed.  Constitutional: She appears well-developed. She appears listless. She is cooperative.  Non-toxic appearance. She appears distressed.   Disheveled dress   HENT:   Head: Normocephalic and atraumatic.   Foaming at mouth, slurred speech   Eyes: Conjunctivae are normal.   Neck: Normal range of motion and full passive range of motion without pain. Thyromegaly present. JVD present.   Cardiovascular: Normal  rate and normal pulses.   Pulmonary/Chest: Effort normal. No respiratory distress.   Abdominal: Normal appearance.   Musculoskeletal: Normal range of motion.   Neurological: She appears listless. No cranial nerve deficit or sensory deficit. Coordination and gait abnormal. GCS eye subscore is 3. GCS verbal subscore is 4. GCS motor subscore is 5.   Intoxicated, unsteady gait   Skin: Skin is warm, dry and intact. No rash noted.   Psychiatric: She is aggressive.   Disorganized intoxication         ED Course   Procedures  Labs Reviewed - No data to display        Medical Decision Making:   Initial Assessment:      Urgent evaluation of 57-year-old female brought in by EMS for intoxication.  Patient is well-known to the emergency department, is frequently brought here for belligerent behavior.  Patient reports drinking this evening, was reportedly unable to safely ambulate of her stairs by EMS and fire department.  Here patient is intoxicated, yelling inappopriately vulgar language. Will plan to observe for clinical sobriety.  ED Management:  Pt observed x several hours w resolution of clinical intoxication. Able to ambulate from the ED w steady gait prior to discharge home.             Scribe Attestation:   Scribe #1: I performed the above scribed service and the documentation accurately describes the services I performed. I attest to the accuracy of the note.    Attending Attestation:           Physician Attestation for Scribe:  Physician Attestation Statement for Scribe #1: I, Dr. Herrera, reviewed documentation, as scribed by Harriett Guzman in my presence, and it is both accurate and complete.                    Clinical Impression:     1. Acute alcoholic intoxication without complication          Disposition:   Disposition: Discharged  Condition: Stable                        Mita Herrera MD  09/01/18 0257

## 2018-09-01 NOTE — ED TRIAGE NOTES
"Pt to ED via EMS who reports ETOH intoxication. Pt is loud with slurred speech, stating "I like to get fucked up", nystagmus noted, ETOH odor noted on breath.   "

## 2018-09-23 ENCOUNTER — HOSPITAL ENCOUNTER (EMERGENCY)
Facility: OTHER | Age: 58
Discharge: HOME | End: 2018-09-23
Attending: EMERGENCY MEDICINE
Payer: MEDICAID

## 2018-09-23 VITALS
HEART RATE: 90 BPM | SYSTOLIC BLOOD PRESSURE: 92 MMHG | RESPIRATION RATE: 16 BRPM | TEMPERATURE: 99 F | OXYGEN SATURATION: 95 % | DIASTOLIC BLOOD PRESSURE: 50 MMHG

## 2018-09-23 DIAGNOSIS — F10.929 ALCOHOLIC INTOXICATION WITH COMPLICATION: Primary | ICD-10-CM

## 2018-09-23 PROCEDURE — 99282 EMERGENCY DEPT VISIT SF MDM: CPT

## 2018-09-24 NOTE — ED NOTES
Pt resting in bed asleep, bed in the lowest position, side rails up x 2, call light within reach. NAD noted, will continue to monitor.

## 2018-09-24 NOTE — ED NOTES
Pt resting in bed, still intoxicated, loud outburst, inappropriate remarks being made. Bed in the lowest position, side rails up x 2, call light within reach, NAD noted. Will continue to monitor

## 2018-09-24 NOTE — ED PROVIDER NOTES
Encounter Date: 9/23/2018    SCRIBE #1 NOTE: IJeanna, am scribing for, and in the presence of, Dr. Zapata.       History     Chief Complaint   Patient presents with    Alcohol Intoxication     Time seen by provider: 9:11 PM    This is a 58 y.o. female, with a history of alcohol abuse, who presents with complaint of alcohol intoxication. On arrival, patient is belligerent and yelling at staff therefore no further history was obtained.       The history is provided by the patient. The history is limited by the condition of the patient.     Review of patient's allergies indicates:  No Known Allergies  Past Medical History:   Diagnosis Date    ETOH abuse     Hyperlipemia     Hypertension     Psychiatric illness      Past Surgical History:   Procedure Laterality Date    BREAST SURGERY       No family history on file.  Social History     Tobacco Use    Smoking status: Current Some Day Smoker     Packs/day: 0.50     Types: Cigarettes    Smokeless tobacco: Never Used   Substance Use Topics    Alcohol use: Yes     Alcohol/week: 25.2 oz     Types: 42 Standard drinks or equivalent per week     Comment: heavy drinker    Drug use: No     Review of Systems   Unable to perform ROS: Other (alcohol intoxication)       Physical Exam     Initial Vitals [09/23/18 2022]   BP Pulse Resp Temp SpO2   119/79 (!) 114 16 98.5 °F (36.9 °C) 97 %      MAP       --         Physical Exam    Nursing note and vitals reviewed.  Constitutional: She appears well-developed and well-nourished.   Odor of alcohol. Slurred speech.    HENT:   Head: Normocephalic and atraumatic.   Eyes: Right eye exhibits nystagmus. Left eye exhibits nystagmus.   Neck: Normal range of motion.   Cardiovascular: Normal rate, regular rhythm and normal heart sounds. Exam reveals no gallop and no friction rub.    No murmur heard.  Abdominal: There is no tenderness.   Neurological: She is alert and oriented to person, place, and time. She has normal strength.    Unsteady gait.    Skin: Skin is warm and dry.         ED Course   Procedures  Labs Reviewed - No data to display       Imaging Results    None          Medical Decision Making:   ED Management:  Marianne Gottlieb is a 58 y.o. female Smells of alcohol and appears to be clinically intoxicated.  Vital signs otherwise normal. No signs of trauma.  At this time I do not feel any urgent intervention such as IV fluids or any blood work is indicated.  There is no indication for radiologic studies at this time.  We'll observe the patient and reevaluate when he is clinically sober, with no slurred speech, being alert and oriented, and able to ambulate on their own.    10:40 p.m. on re-evaluation patient resting comfortably.  No distress.  Vitals are normal.    I have reevaluated the patient.  They are now alert and oriented person place and time.  They are answering all questions appropriately.  They have no slurred speech and having normal non-ataxic gait.  At this time I feel they're clinically sober.  They have medical decision-making capacity and will be discharged.     This is the extent to the patients complaints today here in the emergency department.              Attending Attestation:           Physician Attestation for Scribe:  Physician Attestation Statement for Scribe #1: I, Dr. Zapata, reviewed documentation, as scribed by Jeanna Norman in my presence, and it is both accurate and complete.                    Clinical Impression:     1. Alcoholic intoxication with complication                                 Andrew Zapata, DO  09/23/18 9901

## 2018-10-10 ENCOUNTER — HOSPITAL ENCOUNTER (EMERGENCY)
Facility: OTHER | Age: 58
Discharge: HOME | End: 2018-10-11
Attending: EMERGENCY MEDICINE
Payer: MEDICAID

## 2018-10-10 DIAGNOSIS — F10.10 ALCOHOL ABUSE: ICD-10-CM

## 2018-10-10 DIAGNOSIS — F10.920 ALCOHOLIC INTOXICATION WITHOUT COMPLICATION: Primary | ICD-10-CM

## 2018-10-10 PROCEDURE — 99283 EMERGENCY DEPT VISIT LOW MDM: CPT

## 2018-10-11 VITALS
WEIGHT: 200 LBS | OXYGEN SATURATION: 93 % | TEMPERATURE: 99 F | SYSTOLIC BLOOD PRESSURE: 98 MMHG | RESPIRATION RATE: 18 BRPM | BODY MASS INDEX: 31.39 KG/M2 | HEART RATE: 76 BPM | DIASTOLIC BLOOD PRESSURE: 65 MMHG | HEIGHT: 67 IN

## 2018-10-11 NOTE — ED PROVIDER NOTES
"Encounter Date: 10/10/2018    SCRIBE #1 NOTE: I, Tigist Chu, am scribing for, and in the presence of, Dr. Joseph.       History     Chief Complaint   Patient presents with    NO MEDICAL COMPLAINTS     Pt here because "I'm a woman" no complaints ETOH on board     Time seen by provider: 10:57 PM    This is a 58 y.o. Female, with history of ETOH abuse, who presents with complaint of alcohol intoxication. The patient states  "I'm here because I'm drunk." She admits to use of alcohol, but denies use of illicit drugs. She denies any recent falls or trauma. She denies fever, chills, nausea, vomiting, chest pain, SOB, abdominal pain, headache, dizziness, or light headedness.      The history is provided by the patient.     Review of patient's allergies indicates:  No Known Allergies  Past Medical History:   Diagnosis Date    ETOH abuse     Hyperlipemia     Hypertension     Psychiatric illness      Past Surgical History:   Procedure Laterality Date    BREAST SURGERY       No family history on file.  Social History     Tobacco Use    Smoking status: Current Some Day Smoker     Packs/day: 0.50     Types: Cigarettes    Smokeless tobacco: Never Used   Substance Use Topics    Alcohol use: Yes     Alcohol/week: 25.2 oz     Types: 42 Standard drinks or equivalent per week     Comment: heavy drinker    Drug use: No     Review of Systems   Constitutional: Negative for activity change, chills and fever.   HENT: Negative for congestion, rhinorrhea, sore throat and trouble swallowing.    Eyes: Negative for visual disturbance.   Respiratory: Negative for cough, chest tightness and shortness of breath.    Cardiovascular: Negative for chest pain.   Gastrointestinal: Negative for abdominal pain, nausea and vomiting.   Endocrine: Negative.    Genitourinary: Negative for difficulty urinating and flank pain.   Musculoskeletal: Negative for back pain and neck pain.   Skin: Negative for rash.   Neurological: Negative for dizziness, " weakness, light-headedness and headaches.   Psychiatric/Behavioral: Negative for confusion.       Physical Exam     Initial Vitals [10/10/18 2251]   BP Pulse Resp Temp SpO2   124/79 90 18 98.5 °F (36.9 °C) 97 %      MAP       --         Physical Exam    Nursing note and vitals reviewed.  Constitutional: She appears well-developed and well-nourished. No distress.   HENT:   Head: Normocephalic and atraumatic.   Right Ear: External ear normal.   Left Ear: External ear normal.   Eyes: Right conjunctiva is injected. Left conjunctiva is injected.   Neck: Normal range of motion.   Cardiovascular: Normal rate, regular rhythm and normal heart sounds. Exam reveals no gallop and no friction rub.    No murmur heard.  Pulmonary/Chest: Breath sounds normal. No respiratory distress. She has no wheezes. She has no rhonchi. She has no rales.   Abdominal: Soft. There is no tenderness.   Musculoskeletal: Normal range of motion. She exhibits no edema or tenderness.   Neurological:   Oriented to person and place.   Skin: Skin is warm and dry. No rash noted.   Psychiatric: She has a normal mood and affect. Her behavior is normal. Judgment and thought content normal.         ED Course   Procedures  Labs Reviewed - No data to display       Imaging Results    None             Additional MDM:   Comments: 58-year-old female well known to this department secondary to multiple visits for alcohol intoxication presents intoxicated.  Patient walked here as opposed to walking home because were closed by and she has stairs at home.  She denies any medical complaints. She was observed on a cardiac monitor for approximately 5 hr without any events.  At this time she is clinically sober with a steady gait and was discharged home in stable condition.          Scribe Attestation:   Scribe #1: I performed the above scribed service and the documentation accurately describes the services I performed. I attest to the accuracy of the note.    Attending  Attestation:           Physician Attestation for Scribe:  Physician Attestation Statement for Scribe #1: I, Dr. Joseph, reviewed documentation, as scribed by Tigist Chu in my presence, and it is both accurate and complete.                    Clinical Impression:     1. Alcoholic intoxication without complication    2. Alcohol abuse                                 Mariana Joseph MD  10/11/18 040

## 2018-10-11 NOTE — ED NOTES
Pt lying in bed comfortably, call bell within reach, respirations even, unlabored, eyes closed appears to be resting, NAD noted. Will continue to monitor with BP cycling, pulse ox, and cardiac monitoring

## 2018-10-11 NOTE — ED NOTES
Pt lying in bed comfortably on right side, call bell within reach, respirations even, unlabored, NAD noted, eyes closed appears to be sleeping. Will continue to monitor

## 2018-10-11 NOTE — ED NOTES
Pt lying in bed comfortably, respirations even, unlabored, eyes open spontaneously, NAD noted. Will continue to monitor with BP cycling, pulse ox, and cardiac monitoring.

## 2018-10-11 NOTE — ED NOTES
"Pt repeatedly calling for nurse, pt states "i need some bread and meat or something, I feel nauseous"  "

## 2018-10-27 ENCOUNTER — HOSPITAL ENCOUNTER (EMERGENCY)
Facility: OTHER | Age: 58
Discharge: HOME | End: 2018-10-27
Attending: EMERGENCY MEDICINE
Payer: MEDICAID

## 2018-10-27 VITALS
RESPIRATION RATE: 14 BRPM | DIASTOLIC BLOOD PRESSURE: 57 MMHG | WEIGHT: 191 LBS | BODY MASS INDEX: 35.15 KG/M2 | TEMPERATURE: 99 F | OXYGEN SATURATION: 99 % | HEART RATE: 80 BPM | HEIGHT: 62 IN | SYSTOLIC BLOOD PRESSURE: 103 MMHG

## 2018-10-27 DIAGNOSIS — F10.121 ALCOHOL INTOXICATION DELIRIUM: Primary | ICD-10-CM

## 2018-10-27 PROCEDURE — 99282 EMERGENCY DEPT VISIT SF MDM: CPT

## 2018-10-27 NOTE — ED PROVIDER NOTES
"Encounter Date: 10/27/2018    SCRIBE #1 NOTE: I, Adarsh Cedillo, am scribing for, and in the presence of, Dr. Bennett.       History     Chief Complaint   Patient presents with    Alcohol Intoxication     pt states "I'm f'd up"; no other complaints at triage     Time seen by provider: 3:29 AM    This is a 58 y.o. female who presents with EtOH intoxication.  She does not complain of anything.  She says she is drunk and that is why she is here.        The history is provided by the patient. The history is limited by the condition of the patient.     Review of patient's allergies indicates:  No Known Allergies  Past Medical History:   Diagnosis Date    ETOH abuse     Hyperlipemia     Hypertension     Psychiatric illness      Past Surgical History:   Procedure Laterality Date    BREAST SURGERY       History reviewed. No pertinent family history.  Social History     Tobacco Use    Smoking status: Current Some Day Smoker     Packs/day: 0.50     Types: Cigarettes    Smokeless tobacco: Never Used   Substance Use Topics    Alcohol use: Yes     Alcohol/week: 25.2 oz     Types: 42 Standard drinks or equivalent per week     Comment: heavy drinker    Drug use: No     Review of Systems   Unable to perform ROS: Mental status change       Physical Exam     Initial Vitals [10/27/18 0305]   BP Pulse Resp Temp SpO2   124/75 92 14 98.5 °F (36.9 °C) 99 %      MAP       --         Physical Exam    Nursing note and vitals reviewed.  Constitutional: She is not diaphoretic. No distress.   HENT:   Head: Atraumatic.   Eyes: EOM are normal.   Neck: Normal range of motion.   Neurological: She is alert and oriented to person, place, and time.   Slurred speech. Dysmetria. Moves all extremities, and walks in an ataxic gait.   Skin: Skin is warm and dry. No rash noted. No erythema. No pallor.         ED Course   Procedures  Labs Reviewed - No data to display       Imaging Results    None          Medical Decision Making:   ED " Management:  Patient well known to this emergency department for frequent presentations while intoxicated.  Patient [place] the neighborhood and when she drinks alcohol she tends to come to the emergency department.  This appears to be the case tonight.  She has no medical complaints.  No psychiatric complaints. She falls asleep was monitored for several hours.  When she begins to awaken spontaneously and ambulating with steady gait the bathroom.  We again assess for medical or psychiatric complaints.  She has non is stable for discharge.    I did have an extensive talk regarding signs to return for and need for follow up. Patient expressed understanding and will monitor symptoms closely and follow-up as needed.    JENN Bennett M.D.  10/27/2018  7:28 AM              Scribe Attestation:   Scribe #1: I performed the above scribed service and the documentation accurately describes the services I performed. I attest to the accuracy of the note.    Attending Attestation:           Physician Attestation for Scribe:  Physician Attestation Statement for Scribe #1: I, Dr. Bennett, reviewed documentation, as scribed by Adarsh Cedillo in my presence, and it is both accurate and complete.                    Clinical Impression:     1. Alcohol intoxication delirium                                   Nixon Bennett MD  10/27/18 0728

## 2018-10-27 NOTE — ED NOTES
"Pt c/o "being drunk" and digging a ditch yesterday. Pt is A & O x  3, denies SOB, fever, chills and N/V/D. Skin is warm and dry w/ pink mucosa. VS. GINO x 3mm. BBS- CTA. Abd- SNT. PSM x 4 exts. Will continue to monitor closely.  "

## 2018-11-22 ENCOUNTER — HOSPITAL ENCOUNTER (EMERGENCY)
Facility: OTHER | Age: 58
Discharge: HOME | End: 2018-11-23
Attending: EMERGENCY MEDICINE
Payer: MEDICAID

## 2018-11-22 DIAGNOSIS — F10.920 ALCOHOLIC INTOXICATION WITHOUT COMPLICATION: Primary | ICD-10-CM

## 2018-11-22 PROCEDURE — 99282 EMERGENCY DEPT VISIT SF MDM: CPT

## 2018-11-23 VITALS
OXYGEN SATURATION: 97 % | HEART RATE: 88 BPM | SYSTOLIC BLOOD PRESSURE: 111 MMHG | DIASTOLIC BLOOD PRESSURE: 72 MMHG | TEMPERATURE: 98 F | RESPIRATION RATE: 15 BRPM

## 2018-11-23 NOTE — ED TRIAGE NOTES
"Pt presents to ED complaining of alcohol intoxication. States, "I didn't have enough to drink yet." Placed in rm 3 directly across from nurses station, placed on continuous pulse ox, BP cycling q 30. Respirations even and unlabored, appears in  No acute distress.   "

## 2018-11-23 NOTE — ED NOTES
Pt lying in bed with eyes closed, even rise and fall of chest noted. Remains on continuous pulse ox, BP cycling q 30.

## 2018-11-23 NOTE — ED PROVIDER NOTES
Encounter Date: 11/22/2018    SCRIBE #1 NOTE: I, Juaquin Aden, am scribing for, and in the presence of, Dr. Joseph.       History     Chief Complaint   Patient presents with    Alcohol Intoxication     pt arrived to ed intoxicated, presents no complaints upon assessment      Time seen by provider: 8:41 PM    This is a 58 y.o. female with a history of HTN, alcohol abuse, and HLD who presents with complaint of alcohol intoxication that occurred prior to arrival. The patient is a poor historian, due to her intoxication. She was seen on 10/27/18 for a similar complaint. She denies fever, sore throat, shortness of breath, chest pain, nausea, and dysuria.      The history is provided by the patient. The history is limited by the condition of the patient.     Review of patient's allergies indicates:  No Known Allergies  Past Medical History:   Diagnosis Date    ETOH abuse     Hyperlipemia     Hypertension     Psychiatric illness      Past Surgical History:   Procedure Laterality Date    BREAST SURGERY       No family history on file.  Social History     Tobacco Use    Smoking status: Current Some Day Smoker     Packs/day: 0.50     Types: Cigarettes    Smokeless tobacco: Never Used   Substance Use Topics    Alcohol use: Yes     Alcohol/week: 25.2 oz     Types: 42 Standard drinks or equivalent per week     Comment: heavy drinker    Drug use: No     Review of Systems   Constitutional: Negative for fever.   HENT: Negative for sore throat.    Respiratory: Negative for shortness of breath.    Cardiovascular: Negative for chest pain.   Gastrointestinal: Negative for nausea.   Genitourinary: Negative for dysuria.   Musculoskeletal: Negative for back pain.   Skin: Negative for rash.   Neurological: Negative for weakness.   Hematological: Does not bruise/bleed easily.       Physical Exam     Initial Vitals   BP Pulse Resp Temp SpO2   11/22/18 2044 11/22/18 2044 11/22/18 2114 11/22/18 2046 11/22/18 2044   (!) 140/86 100 17  97.9 °F (36.6 °C) 95 %      MAP       --                Physical Exam    Nursing note and vitals reviewed.  Constitutional: She appears well-developed and well-nourished. She is not diaphoretic. No distress.   Smells of alcohol.   HENT:   Head: Normocephalic and atraumatic.   Cardiovascular: Normal rate, regular rhythm and normal heart sounds. Exam reveals no gallop and no friction rub.    No murmur heard.  Pulmonary/Chest: Breath sounds normal. No respiratory distress. She has no wheezes. She has no rhonchi. She has no rales.   Abdominal: Soft. She exhibits no distension. There is no tenderness. There is no rebound and no guarding.   Musculoskeletal: Normal range of motion. She exhibits no edema or tenderness.   Neurological: She is alert and oriented to person, place, and time.   Skin: Skin is warm and dry. No rash and no abscess noted. No erythema. No pallor.   Psychiatric: She has a normal mood and affect. Her behavior is normal. Judgment and thought content normal. Her speech is slurred.         ED Course   Procedures  Labs Reviewed - No data to display       Imaging Results    None             Additional MDM:   Comments: 58-year-old female well known to this department secondary to frequent visits for alcohol intoxication presented intoxicated.  She denies any other complaints. She has been observed on the cardiac monitor for approximately 4 hr without the events.  At this time she is able to ambulate without difficulty.  She is alert and oriented appropriate for discharge..          Scribe Attestation:   Scribe #1: I performed the above scribed service and the documentation accurately describes the services I performed. I attest to the accuracy of the note.    Attending Attestation:           Physician Attestation for Scribe:  Physician Attestation Statement for Scribe #1: I, Dr. Joseph, reviewed documentation, as scribed by Juaquin Aden in my presence, and it is both accurate and complete.                     Clinical Impression:     1. Alcoholic intoxication without complication                                   Mariana Joseph MD  11/23/18 0054

## 2018-11-23 NOTE — ED NOTES
Pt urinated on self in the lobby. Was was brought to ed bed 3 in a wheelchair, cleaned and changed into paper scrubs. Personal belongings placed in belongings bad.

## 2019-01-05 ENCOUNTER — HOSPITAL ENCOUNTER (EMERGENCY)
Facility: OTHER | Age: 59
Discharge: HOME | End: 2019-01-06
Attending: EMERGENCY MEDICINE
Payer: MEDICAID

## 2019-01-05 VITALS
HEART RATE: 73 BPM | SYSTOLIC BLOOD PRESSURE: 148 MMHG | OXYGEN SATURATION: 97 % | TEMPERATURE: 98 F | DIASTOLIC BLOOD PRESSURE: 87 MMHG

## 2019-01-05 DIAGNOSIS — F10.10 ALCOHOL ABUSE: Primary | ICD-10-CM

## 2019-01-05 DIAGNOSIS — F10.920 ALCOHOLIC INTOXICATION WITHOUT COMPLICATION: ICD-10-CM

## 2019-01-05 PROCEDURE — 99281 EMR DPT VST MAYX REQ PHY/QHP: CPT

## 2019-01-06 NOTE — ED PROVIDER NOTES
Encounter Date: 1/5/2019    SCRIBE #1 NOTE: I, Tigist Chu, am scribing for, and in the presence of, Dr. Valencia.       History     Chief Complaint   Patient presents with    Alcohol Intoxication     Pt to ED via EMS who reports pt called 911 because she was too intoxicated to walk home.      This is a 58 y.o. female who presents via EMS with complaint of alcohol intoxication. The patient was a few blocks from her house and could not walk home so she called EMS who brought her here. She denies loss of consciousness. She denies fever, chills, nausea, vomiting, chest pain, SOB, headache, dizziness, or light headedness.       The history is provided by the patient.     Review of patient's allergies indicates:  No Known Allergies  Past Medical History:   Diagnosis Date    ETOH abuse     Hyperlipemia     Hypertension     Psychiatric illness      Past Surgical History:   Procedure Laterality Date    BREAST SURGERY       No family history on file.  Social History     Tobacco Use    Smoking status: Current Some Day Smoker     Packs/day: 0.50     Types: Cigarettes    Smokeless tobacco: Never Used   Substance Use Topics    Alcohol use: Yes     Alcohol/week: 25.2 oz     Types: 42 Standard drinks or equivalent per week     Comment: heavy drinker    Drug use: No     Review of Systems   Constitutional: Negative for chills and fever.   HENT: Negative for congestion, sore throat and trouble swallowing.    Eyes: Negative for photophobia and visual disturbance.   Respiratory: Negative for cough and shortness of breath.    Cardiovascular: Negative for chest pain.   Gastrointestinal: Negative for abdominal pain, nausea and vomiting.   Endocrine: Negative for polydipsia and polyuria.   Genitourinary: Negative for dysuria and hematuria.   Musculoskeletal: Negative for back pain and neck pain.   Skin: Negative for rash and wound.   Neurological: Negative for dizziness, syncope, weakness, light-headedness and headaches.    Psychiatric/Behavioral: Negative for confusion.       Physical Exam     Initial Vitals [01/05/19 2350]   BP Pulse Resp Temp SpO2   (!) 148/87 73 -- 97.9 °F (36.6 °C) 97 %      MAP       --         Physical Exam    Nursing note and vitals reviewed.  Constitutional: She appears well-developed and well-nourished. No distress.   ETOH on breath.   HENT:   Head: Normocephalic and atraumatic.   Eyes: Conjunctivae and EOM are normal. Pupils are equal, round, and reactive to light. Right eye exhibits no discharge. Left eye exhibits no discharge.   Neck: Normal range of motion.   Cardiovascular: Normal rate, regular rhythm and normal heart sounds. Exam reveals no gallop and no friction rub.    No murmur heard.  Pulmonary/Chest: Breath sounds normal. No respiratory distress. She has no wheezes. She has no rhonchi. She has no rales.   Abdominal: Soft. There is no tenderness. There is no rebound and no guarding.   Musculoskeletal: Normal range of motion. She exhibits no edema or tenderness.   Neurological: She is alert and oriented to person, place, and time. She has normal strength.   Skin: Skin is warm and dry. No rash and no abscess noted. No erythema. No pallor.   Psychiatric: She has a normal mood and affect. Her behavior is normal. Judgment and thought content normal.         ED Course   Procedures  Labs Reviewed - No data to display       Imaging Results    None                     Scribe Attestation:   Scribe #1: I performed the above scribed service and the documentation accurately describes the services I performed. I attest to the accuracy of the note.    Attending Attestation:           Physician Attestation for Scribe:  Physician Attestation Statement for Scribe #1: I, Dr. Valencia, reviewed documentation, as scribed by Tigist Chu in my presence, and it is both accurate and complete.         Attending ED Notes:   Emergent evaluation a 4-year-old female with complaint of feeling too intoxicated to be able to make  it home so she called EMS to come to the emergency room.  Patient is afebrile, nontoxic appearing with stable vital signs.  Patient is neurovascularly intact without focal neurologic deficits.  GCS of 15.  After period of observation, patient is discharged in good condition.  On discharge patient is alert oriented x4 and without evidence of acute intoxication, slurred speech, nice diagnose or altered gait.  The patient extensively counseled on her diagnosis and treatment, discharged good condition and directed to follow up with her PCP in the next 24-48 hours.             Clinical Impression:     1. Alcohol abuse    2. Alcoholic intoxication without complication                                 Julien Neal MD  01/07/19 0514

## 2019-02-24 ENCOUNTER — HOSPITAL ENCOUNTER (EMERGENCY)
Facility: OTHER | Age: 59
Discharge: HOME | End: 2019-02-24
Attending: EMERGENCY MEDICINE
Payer: MEDICAID

## 2019-02-24 VITALS
HEIGHT: 66 IN | TEMPERATURE: 98 F | SYSTOLIC BLOOD PRESSURE: 142 MMHG | DIASTOLIC BLOOD PRESSURE: 89 MMHG | OXYGEN SATURATION: 100 % | WEIGHT: 206 LBS | RESPIRATION RATE: 20 BRPM | HEART RATE: 75 BPM | BODY MASS INDEX: 33.11 KG/M2

## 2019-02-24 DIAGNOSIS — M79.644 PAIN OF RIGHT THUMB: Primary | ICD-10-CM

## 2019-02-24 PROCEDURE — 99283 EMERGENCY DEPT VISIT LOW MDM: CPT | Mod: 25

## 2019-02-24 PROCEDURE — 25000003 PHARM REV CODE 250: Performed by: EMERGENCY MEDICINE

## 2019-02-24 RX ORDER — NAPROXEN 500 MG/1
500 TABLET ORAL
Status: COMPLETED | OUTPATIENT
Start: 2019-02-24 | End: 2019-02-24

## 2019-02-24 RX ORDER — NAPROXEN 500 MG/1
500 TABLET ORAL 2 TIMES DAILY PRN
Qty: 60 TABLET | Refills: 0 | Status: SHIPPED | OUTPATIENT
Start: 2019-02-24 | End: 2022-11-16 | Stop reason: ALTCHOICE

## 2019-02-24 RX ADMIN — NAPROXEN 500 MG: 500 TABLET ORAL at 06:02

## 2019-02-24 NOTE — DISCHARGE INSTRUCTIONS
Call your primary care doctor to make the first available appointment.     Keep all your medical appointments.     Take your regular medication as prescribed. Contact your primary care provider before running out of medication, or for any problems obtaining them.    Do not drive or operate heavy machinery while taking opioid or muscle relaxing medications. Examples include norco, percocet, xanax, valium, flexeril.     Overuse or long term use of pain and sedating medication may lead to addiction, dependence, organ failure, family and work problems, legal problems, accidental overdose and death.    If you do not have health insurance, you probably qualify for heavily discounted rates:  Call 1-766.376.2348 ([facility] hotline) or go to www.[employer].[place].gov    Your evaluation in the ED does not suggest any emergent or life threatening medical condition requiring admission or immediate intervention beyond that provided in the ED.   However, the signs of a serious problem sometimes take more time to appear.   RETURN TO THE ER if any of the following occur:    Weakness, dizziness, fainting, or loss of consciousness   Fever of 100.4ºF (38ºC) or higher  Any worse symptoms  Any new or concerning symptoms

## 2019-02-25 NOTE — ED PROVIDER NOTES
"Encounter Date: 2/24/2019       History     Chief Complaint   Patient presents with    thumb pain     Pt came to the ED tonight c.o. right thumb pain since january. Pt states I fell probably when I am drinking.      HPI   Patient presents with several weeks of persistent right thumb pain, new clicking.  He patient does not remember the specific traumatic injury, but suspects she had a fall while intoxicated.  She denies any focal deficits.  Progression is constant.  She has not taken any analgesics for this.  History provided by patient, medical records.  Review of patient's allergies indicates:  No Known Allergies  Past Medical History:   Diagnosis Date    ETOH abuse     Hyperlipemia     Hypertension     Psychiatric illness      Past Surgical History:   Procedure Laterality Date    BREAST SURGERY       History reviewed. No pertinent family history.  Social History     Tobacco Use    Smoking status: Current Some Day Smoker     Packs/day: 0.50     Types: Cigarettes    Smokeless tobacco: Never Used   Substance Use Topics    Alcohol use: Yes     Alcohol/week: 25.2 oz     Types: 42 Standard drinks or equivalent per week     Comment: heavy drinker    Drug use: No     Review of Systems  ROS: As per HPI and below:   General: No fever.   HENT: No facial pain.   Eyes: No eye pain.   Cardiovascular: No chest pain.   Respiratory: No dyspnea.   GI: No abdominal pain.   Skin: No rashes.  Neuro: No syncope.  No focal deficits.  Musculoskeletal: Notes extremity pain. No swelling.    All other systems negative.     Physical Exam     Initial Vitals [02/24/19 0523]   BP Pulse Resp Temp SpO2   (!) 138/92 71 16 98 °F (36.7 °C) 100 %      MAP       --         Physical Exam  BP (!) 142/89   Pulse 75   Temp 98.2 °F (36.8 °C) (Oral)   Resp 20   Ht 5' 6" (1.676 m)   Wt 93.4 kg (206 lb)   SpO2 100%   BMI 33.25 kg/m²   Nursing note and vitals reviewed.  Constitutional: AAOx3.   HENT:   Mouth/Throat: Oropharynx is clear and " moist.  Eyes: EOMI. PERRL  Neck: Neck supple.  Cardiovascular: Normal rate  Pulmonary/Chest: No respiratory distress.   Abdominal: No distension   Musculoskeletal:   Right upper extremity: Skin intact. 2+ pulses, cap refill <2sec. FROM at shoulder/elbow. Full wrist ROM. 5/5 Flexion and extension, finger flexion / extension, finger abduction / adduction, thumb opposition. No snuffbox tenderness. Tenderness at thumb DIP.  No excess laxity of MCP.  Occasional clicking on thumb flexion.  Light touch intact in median / radial / ulnar distributions. Compartments soft.     Neurological: GCS15. Alert and oriented to person, place, and time. No gross focal strength or light touch deficit.   Skin: Skin is warm and dry.   Psychiatric: Behavior is normal. Judgment normal. Pleasant.    ED Course   Procedures  Labs Reviewed - No data to display       Imaging Results          X-Ray Hand 3 view Right (Final result)  Result time 02/24/19 06:18:50    Final result by Julien Huff MD (02/24/19 06:18:50)                 Impression:      No radiographic evidence of acute osseous injury of the right hand      Electronically signed by: Julien Huff MD  Date:    02/24/2019  Time:    06:18             Narrative:    EXAMINATION:  XR HAND COMPLETE 3 VIEW RIGHT    CLINICAL HISTORY:  thumb pain;    TECHNIQUE:  PA, lateral, and oblique views of the right hand were performed.    COMPARISON:  None    FINDINGS:  There is no evidence of acute fracture or dislocation.  There are mild degenerative changes of the thumb MCP and IP joints.  Additional mild degenerative changes are noted of the remaining IP joints.  No radiopaque foreign body within the visualized soft tissues.                                                      Sun Feb 24, 2019   0639 Patient is a 58-year-old female who presents for evaluation of approximately 1 month right thumb pain which is worse with movement.  Pain is worse at the base of the thumb.  She denies any wrist  pain, other hand pain, other joint pain or swelling. She suspects she had an injury from a fall when intoxicated but does not recollect the specific event.   On exam, patient has focal thumb pain, no snuffbox tenderness, neurovascularly intact distally.  I independently reviewed and interpreted patient's films, notable for no acute process.   Plan is RICE therapy, PCP f/u, ortho f/u.   I discussed with patient and/or guardian/caretaker that this evaluation in the ED does not suggest any emergent or life threatening medical condition requiring admission or immediate intervention beyond what was provided in the ED. Regardless, an unremarkable evaluation in the ED does not preclude the development or presence of a serious or life threatening condition. As such, patient was instructed to return immediately for any worsening or change in current symptoms.     I had a detailed discussion with patient  and/or guardian/caretaker regarding findings, plan, return precautions, importance of medication adherence, need to follow-up with a PCP and specialist. All questions answered.     Note was created using voice recognition software. Note may have occasional typographical errors that may not have been identified and edited despite initial review prior to signing.       On exam patient has focal thumb pain,  [RC]      ED Course User Index  [RC] Antonio Mitchell MD     Clinical Impression:       ICD-10-CM ICD-9-CM   1. Pain of right thumb M79.644 729.5                                Antonio Mitchell MD  02/25/19 0637

## 2019-03-20 ENCOUNTER — HOSPITAL ENCOUNTER (EMERGENCY)
Facility: OTHER | Age: 59
Discharge: HOME | End: 2019-03-20
Attending: EMERGENCY MEDICINE
Payer: MEDICAID

## 2019-03-20 VITALS
HEIGHT: 66 IN | RESPIRATION RATE: 20 BRPM | OXYGEN SATURATION: 95 % | DIASTOLIC BLOOD PRESSURE: 76 MMHG | TEMPERATURE: 98 F | WEIGHT: 189 LBS | SYSTOLIC BLOOD PRESSURE: 140 MMHG | BODY MASS INDEX: 30.37 KG/M2 | HEART RATE: 84 BPM

## 2019-03-20 DIAGNOSIS — F10.921 ALCOHOL INTOXICATION WITH DELIRIUM: Primary | ICD-10-CM

## 2019-03-20 LAB — POCT GLUCOSE: 132 MG/DL (ref 70–110)

## 2019-03-20 PROCEDURE — 82962 GLUCOSE BLOOD TEST: CPT

## 2019-03-20 PROCEDURE — 99282 EMERGENCY DEPT VISIT SF MDM: CPT | Mod: 25

## 2019-03-20 NOTE — ED NOTES
Pt ambulates well w/out assistance and w/out incident. Pt is A & O x 3 w/out slurred speech, w/out nystagmus and w/out staggered gait.

## 2019-03-20 NOTE — ED PROVIDER NOTES
"Encounter Date: 3/20/2019    SCRIBE #1 NOTE: I, Romegenevieve Blanco, am scribing for, and in the presence of, Dr. Alfred.       History     Chief Complaint   Patient presents with    Alcohol Problem     "I am checking in due to my alcohol and my personal business with my family. I have an issue with alcohol and I love my family"      Time seen by provider: 1:46 AM    This is a 58 y.o. female, with history of hepatitis C, who presents to the ED with alcohol intoxication. She states she has been drinking tonight and she drinks when she feels like it. She denies any pain or cough. She states she is compliant with gabapentin and flexerill. She states she also takes fluid pills and naproxen for her thumb pain - she is not sure what other medication she takes. She states she has to go to the hospital on 3/26/19 for her hepatitis C. She admits to consumption of alcohol and use tobacco, but denies illicit drugs.      The history is provided by the patient.     Review of patient's allergies indicates:  No Known Allergies  Past Medical History:   Diagnosis Date    ETOH abuse     Hyperlipemia     Hypertension     Psychiatric illness      Past Surgical History:   Procedure Laterality Date    BREAST SURGERY       No family history on file.  Social History     Tobacco Use    Smoking status: Current Some Day Smoker     Packs/day: 0.50     Types: Cigarettes    Smokeless tobacco: Never Used   Substance Use Topics    Alcohol use: Yes     Alcohol/week: 25.2 oz     Types: 42 Standard drinks or equivalent per week     Comment: heavy drinker    Drug use: No     Review of Systems   Constitutional: Negative for fever.   HENT: Negative for sore throat.    Respiratory: Negative for cough.    Cardiovascular: Negative for chest pain.   Gastrointestinal: Negative for nausea.   Genitourinary: Negative for dysuria.   Musculoskeletal: Negative for back pain.   Skin: Negative for rash.   Neurological: Negative for weakness.   Hematological: Does " "not bruise/bleed easily.   Psychiatric/Behavioral:        Alcohol intoxication.       Physical Exam     Vitals:    03/20/19 0108 03/20/19 0200 03/20/19 0300 03/20/19 0309   BP: (!) 148/86   (!) 140/76   Pulse: (!) 113 98 100 84   Resp: 18   20   Temp: 98.2 °F (36.8 °C)   98.3 °F (36.8 °C)   TempSrc: Oral      SpO2: 96% 96% 95% 95%   Weight: 85.7 kg (189 lb)      Height: 5' 6" (1.676 m)        Physical Exam    Nursing note and vitals reviewed.  Constitutional: She appears well-developed and well-nourished. She is not diaphoretic. No distress.   Alcohol and tobacco odor.   HENT:   Head: Normocephalic and atraumatic.   Eyes: EOM are normal. No scleral icterus.   Injected conjunctivae.    Neck: Normal range of motion. Neck supple.   Cardiovascular: Normal rate, regular rhythm and normal heart sounds. Exam reveals no gallop and no friction rub.    No murmur heard.  Pulmonary/Chest: Breath sounds normal. No respiratory distress. She has no wheezes. She has no rhonchi. She has no rales.   Abdominal: Soft. Bowel sounds are normal. She exhibits no distension. There is no tenderness. There is no rebound and no guarding.   No obvious trauma.   Musculoskeletal: Normal range of motion. She exhibits no edema or tenderness.   Lymphadenopathy:     She has no cervical adenopathy.   Neurological: She is alert and oriented to person, place, and time.   Skin: Skin is warm and dry. No rash noted. No erythema. No pallor.   Psychiatric: She has a normal mood and affect. Her behavior is normal. Judgment and thought content normal. Her speech is slurred.         ED Course   Procedures  Labs Reviewed   POCT GLUCOSE - Abnormal; Notable for the following components:       Result Value    POCT Glucose 132 (*)     All other components within normal limits          Imaging Results    None          Medical Decision Making:   ED Management:  Emergent evaluation of 58-year-old female who presents with alcohol intoxication, no other complaints. She " had tachycardia at triage, but this resolved by time of my exam.  Physical exam reveals no trauma, consistent with alcohol intoxication.  She was not hypothermic nor hypoglycemic.  She was monitored in the ED and then discharged when clinically sober.  At time of discharge he had a steady gait and clear speech.            Scribe Attestation:   Scribe #1: I performed the above scribed service and the documentation accurately describes the services I performed. I attest to the accuracy of the note.    Attending Attestation:           Physician Attestation for Scribe:  Physician Attestation Statement for Scribe #1: I, Dr. Alfred, reviewed documentation, as scribed by Jo Blanco in my presence, and it is both accurate and complete.                    Clinical Impression:     1. Alcohol intoxication with delirium                                   Ijeoma Alfred MD  03/20/19 2110

## 2019-03-20 NOTE — ED NOTES
Pt c/o ETOH intoxication. Pt is A & O x 3, denies pain, SOB, fever, chills and N/V/D. Respirations are even and unlabored. Skin is warm and dry w/ pink mucosa. VS. No nystagmus observed and no slurred speech observed. GINO x 3mm. BBS- CTA. Abd- SNT. PSM x 4 exts. Pt is connected to the pulse ox per MD. Bed is locked and in the low position w/ the side rails up and locked for pt safety. Call bell @ the BS. Will continue to monitor closely.

## 2019-12-14 ENCOUNTER — HOSPITAL ENCOUNTER (EMERGENCY)
Facility: OTHER | Age: 59
Discharge: HOME | End: 2019-12-14
Attending: EMERGENCY MEDICINE
Payer: MEDICAID

## 2019-12-14 VITALS
TEMPERATURE: 98 F | BODY MASS INDEX: 34.96 KG/M2 | OXYGEN SATURATION: 100 % | HEART RATE: 79 BPM | SYSTOLIC BLOOD PRESSURE: 114 MMHG | RESPIRATION RATE: 18 BRPM | WEIGHT: 190 LBS | DIASTOLIC BLOOD PRESSURE: 79 MMHG | HEIGHT: 62 IN

## 2019-12-14 DIAGNOSIS — H65.92 MIDDLE EAR EFFUSION, LEFT: Primary | ICD-10-CM

## 2019-12-14 PROCEDURE — 99283 EMERGENCY DEPT VISIT LOW MDM: CPT

## 2019-12-14 RX ORDER — FLUTICASONE PROPIONATE 50 MCG
1 SPRAY, SUSPENSION (ML) NASAL 2 TIMES DAILY PRN
Qty: 15 G | Refills: 0 | Status: ON HOLD | OUTPATIENT
Start: 2019-12-14 | End: 2023-08-23 | Stop reason: HOSPADM

## 2019-12-14 NOTE — ED TRIAGE NOTES
"Patient presents to ER with c/o "I have a bug in my ear."  Patient states a bug went in her Left ear earlier today.  Patient reports some discomfort.  Patient denies taking anything for discomfort.  "

## 2019-12-14 NOTE — ED PROVIDER NOTES
"Encounter Date: 12/14/2019       History     Chief Complaint   Patient presents with    bug in ear     Patient is 59 year old female who presents with complaints of possible bug in left ear. She reports that she hear a fly buzzing in her ear and she swatted it away and she admits that she thought the insect flew into her ear. She reports that she could hear it "moving around in there". She reports no other symptoms. She has not taken any meds PTA. She is currently unaccompanied in the ER.         Review of patient's allergies indicates:  No Known Allergies  Past Medical History:   Diagnosis Date    ETOH abuse     Hyperlipemia     Hypertension     Psychiatric illness      Past Surgical History:   Procedure Laterality Date    BREAST SURGERY       History reviewed. No pertinent family history.  Social History     Tobacco Use    Smoking status: Current Some Day Smoker     Packs/day: 0.50     Types: Cigarettes    Smokeless tobacco: Never Used   Substance Use Topics    Alcohol use: Yes     Alcohol/week: 42.0 standard drinks     Types: 42 Standard drinks or equivalent per week     Comment: heavy drinker    Drug use: No     Review of Systems   Constitutional: Negative for fever.   HENT: Negative for sore throat.         Left ear concern for possible insect stuck in canal   Respiratory: Negative for shortness of breath.    Cardiovascular: Negative for chest pain.   Gastrointestinal: Negative for nausea.   Genitourinary: Negative for dysuria.   Musculoskeletal: Negative for back pain.   Skin: Negative for rash.   Neurological: Negative for weakness.   Hematological: Does not bruise/bleed easily.       Physical Exam     Initial Vitals [12/14/19 1738]   BP Pulse Resp Temp SpO2   114/79 79 18 98.3 °F (36.8 °C) 100 %      MAP       --         Physical Exam    Nursing note and vitals reviewed.  Constitutional: She appears well-developed and well-nourished. She is not diaphoretic. No distress.   Healthy appearing female " in NAD or apparent pain. She makes good eye contact, speaks in clear full sentences and ambulates with ease.   HENT:   Head: Normocephalic and atraumatic.   Left ear canal is clear and patent with middle ear effusion noted.  There is no purulence erythema or abnormal light reflex.  There is slight cerumen at the exit of the ear canal with no evidence of foreign body or insect.  No tragus or mastoid tenderness bilaterally.   Eyes: Conjunctivae and EOM are normal. Pupils are equal, round, and reactive to light. Right eye exhibits no discharge. Left eye exhibits no discharge. No scleral icterus.   Neck: Normal range of motion.   Cardiovascular: Normal rate.   Pulmonary/Chest: No respiratory distress.   Abdominal: Soft.   Musculoskeletal: Normal range of motion.   Lymphadenopathy:     She has no cervical adenopathy.   Neurological: She is alert and oriented to person, place, and time. She has normal strength. No cranial nerve deficit or sensory deficit. GCS score is 15. GCS eye subscore is 4. GCS verbal subscore is 5. GCS motor subscore is 6.   Skin: Skin is warm. Capillary refill takes less than 2 seconds. No rash and no abscess noted. No erythema.   Psychiatric: She has a normal mood and affect. Her behavior is normal. Judgment and thought content normal.         ED Course   Procedures  Labs Reviewed - No data to display       Imaging Results    None          Medical Decision Making:   ED Management:  Urgent evaluation a 59-year-old female who presents with complaints of ear fullness that she initially thought was from a retained insect however serous middle ear effusion was found on exam.  She is afebrile, nontoxic appearing, hemodynamically stable. Physical exam reveals middle ear effusion with no evidence of retained insect.  Patient will be prescribed Flonase for symptom improvement and is encouraged to follow up with primary care provider in the outpatient setting.  She verbalized understanding and is amenable to  plan.  She is stable for discharge.                                 Clinical Impression:       ICD-10-CM ICD-9-CM   1. Middle ear effusion, left H65.92 381.4                             Suzette Norman PA-C  12/14/19 1003

## 2020-01-09 NOTE — ED NOTES
at bedside. States he feels comfortable bringing pt home. Pt walked to bathroom with steady gait.    nonverbal cues (demo/gestures)/1 person assist/verbal cues

## 2020-07-20 LAB
CRC RECOMMENDATION EXT: NORMAL
CRC RECOMMENDATION EXT: NORMAL

## 2021-07-17 ENCOUNTER — HOSPITAL ENCOUNTER (EMERGENCY)
Facility: OTHER | Age: 61
Discharge: HOME | End: 2021-07-17
Attending: EMERGENCY MEDICINE
Payer: MEDICAID

## 2021-07-17 VITALS
SYSTOLIC BLOOD PRESSURE: 135 MMHG | TEMPERATURE: 98 F | HEART RATE: 63 BPM | DIASTOLIC BLOOD PRESSURE: 84 MMHG | RESPIRATION RATE: 18 BRPM | OXYGEN SATURATION: 98 %

## 2021-07-17 DIAGNOSIS — S40.011A CONTUSION OF RIGHT SHOULDER, INITIAL ENCOUNTER: Primary | ICD-10-CM
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
## 2021-07-17 LAB — HIV 1+2 AB+HIV1 P24 AG SERPL QL IA: NEGATIVE

## 2021-07-17 PROCEDURE — 87389 HIV-1 AG W/HIV-1&-2 AB AG IA: CPT | Performed by: EMERGENCY MEDICINE

## 2021-07-17 PROCEDURE — 63600175 PHARM REV CODE 636 W HCPCS: Performed by: EMERGENCY MEDICINE

## 2021-07-17 PROCEDURE — 99284 EMERGENCY DEPT VISIT MOD MDM: CPT | Mod: 25

## 2021-07-17 PROCEDURE — 25000003 PHARM REV CODE 250: Performed by: EMERGENCY MEDICINE

## 2021-07-17 PROCEDURE — 96372 THER/PROPH/DIAG INJ SC/IM: CPT

## 2021-07-17 RX ORDER — IBUPROFEN 600 MG/1
600 TABLET ORAL EVERY 6 HOURS PRN
Qty: 20 TABLET | Refills: 0 | Status: SHIPPED | OUTPATIENT
Start: 2021-07-17 | End: 2022-11-16 | Stop reason: ALTCHOICE

## 2021-07-17 RX ORDER — KETOROLAC TROMETHAMINE 30 MG/ML
10 INJECTION, SOLUTION INTRAMUSCULAR; INTRAVENOUS
Status: COMPLETED | OUTPATIENT
Start: 2021-07-17 | End: 2021-07-17

## 2021-07-17 RX ORDER — HYDROCODONE BITARTRATE AND ACETAMINOPHEN 5; 325 MG/1; MG/1
1 TABLET ORAL EVERY 4 HOURS PRN
Qty: 12 TABLET | Refills: 0 | Status: SHIPPED | OUTPATIENT
Start: 2021-07-17 | End: 2022-11-16 | Stop reason: ALTCHOICE

## 2021-07-17 RX ORDER — CYCLOBENZAPRINE HCL 10 MG
10 TABLET ORAL 3 TIMES DAILY PRN
Qty: 15 TABLET | Refills: 0 | Status: SHIPPED | OUTPATIENT
Start: 2021-07-17 | End: 2021-07-22

## 2021-07-17 RX ORDER — HYDROCODONE BITARTRATE AND ACETAMINOPHEN 5; 325 MG/1; MG/1
1 TABLET ORAL
Status: COMPLETED | OUTPATIENT
Start: 2021-07-17 | End: 2021-07-17

## 2021-07-17 RX ADMIN — KETOROLAC TROMETHAMINE 10 MG: 30 INJECTION, SOLUTION INTRAMUSCULAR; INTRAVENOUS at 09:07

## 2021-07-17 RX ADMIN — HYDROCODONE BITARTRATE AND ACETAMINOPHEN 1 TABLET: 5; 325 TABLET ORAL at 10:07

## 2022-02-02 ENCOUNTER — HOSPITAL ENCOUNTER (EMERGENCY)
Facility: OTHER | Age: 62
Discharge: HOME | End: 2022-02-03
Attending: EMERGENCY MEDICINE
Payer: MEDICAID

## 2022-02-02 VITALS
HEART RATE: 81 BPM | TEMPERATURE: 98 F | DIASTOLIC BLOOD PRESSURE: 89 MMHG | RESPIRATION RATE: 18 BRPM | OXYGEN SATURATION: 100 % | SYSTOLIC BLOOD PRESSURE: 122 MMHG

## 2022-02-02 DIAGNOSIS — F10.920 ACUTE ALCOHOLIC INTOXICATION WITHOUT COMPLICATION: Primary | ICD-10-CM

## 2022-02-02 PROCEDURE — 82962 GLUCOSE BLOOD TEST: CPT

## 2022-02-02 PROCEDURE — 99283 EMERGENCY DEPT VISIT LOW MDM: CPT | Mod: 25

## 2022-02-03 LAB — POCT GLUCOSE: 128 MG/DL (ref 70–110)

## 2022-02-03 NOTE — ED NOTES
Pt presents to ED via EMS. Pt admits to ETOH. Pt sts she drank 1 pint today after 3 years. Pt is oriented to name and , wearing gown, bed in the lowest position with call light within reach, placed on monitoring. Pt request something to eat, provided by tech from floor supply. Pt sts no further needs at this time.

## 2022-02-03 NOTE — ED PROVIDER NOTES
"Encounter Date: 2/2/2022    SCRIBE #1 NOTE: I, Mya Arambula, am scribing for, and in the presence of, Ijeoma Alfred MD.       History     Chief Complaint   Patient presents with    Alcohol Intoxication     Pt called EMS for intoxication     Marianne Gottlieb is a 61 y.o. female, with a PMHx of EtOH abuse, HTN, HLD, who presents to the ED following EtOH intoxication prior to arrival. Patient reports she was consuming EtOH tonight prior to arrival when she felt like she "can't breathe" or "get up the floor", prompting her to continue "laying on the floor" and call EMS. Patient claims she has been sober from EtOH for 34 years but relapsed tonight. No other exacerbating or alleviating factors. She admits to being compliant with her daily medications. Patient denies nausea, vomiting, or other associated symptoms.     The history is provided by the patient.     Review of patient's allergies indicates:  No Known Allergies  Past Medical History:   Diagnosis Date    ETOH abuse     Hyperlipemia     Hypertension     Psychiatric illness      Past Surgical History:   Procedure Laterality Date    BREAST SURGERY       No family history on file.  Social History     Tobacco Use    Smoking status: Current Some Day Smoker     Packs/day: 0.50     Types: Cigarettes    Smokeless tobacco: Never Used   Substance Use Topics    Alcohol use: Yes     Alcohol/week: 42.0 standard drinks     Types: 42 Standard drinks or equivalent per week     Comment: heavy drinker    Drug use: No     Review of Systems   Constitutional: Negative for chills and fever.   HENT: Negative for congestion and sore throat.    Eyes: Negative for visual disturbance.   Respiratory: Negative for cough and shortness of breath.    Cardiovascular: Negative for chest pain and palpitations.   Gastrointestinal: Negative for abdominal pain, diarrhea, nausea and vomiting.   Genitourinary: Negative for decreased urine volume, dysuria and vaginal discharge.   Musculoskeletal: " Negative for joint swelling, neck pain and neck stiffness.   Skin: Negative for rash and wound.   Neurological: Negative for weakness, numbness and headaches.   Psychiatric/Behavioral: Negative for confusion.       Physical Exam     Initial Vitals [02/02/22 2328]   BP Pulse Resp Temp SpO2   122/89 81 18 98.1 °F (36.7 °C) 100 %      MAP       --         Physical Exam    Nursing note and vitals reviewed.  Constitutional: No distress.   She has slurred speech.    HENT:   Head: Normocephalic and atraumatic.   Mouth/Throat: Oropharynx is clear and moist. No oropharyngeal exudate.   Eyes: Conjunctivae and EOM are normal. Pupils are equal, round, and reactive to light.   Neck: Neck supple.   Normal range of motion.  Cardiovascular: Normal rate and normal heart sounds.   No murmur heard.  Pulmonary/Chest: Breath sounds normal. No respiratory distress. She has no wheezes. She has no rhonchi. She has no rales.   Abdominal: Abdomen is soft. There is no abdominal tenderness. There is no rebound and no guarding.   Musculoskeletal:         General: No tenderness or edema.      Cervical back: Normal range of motion and neck supple.     Neurological: She is alert and oriented to person, place, and time. She has normal strength. GCS score is 15. GCS eye subscore is 4. GCS verbal subscore is 5. GCS motor subscore is 6.   Skin: Skin is warm and dry. No rash noted.   Psychiatric: She has a normal mood and affect. Thought content normal.         ED Course   Procedures  Labs Reviewed   POCT GLUCOSE - Abnormal; Notable for the following components:       Result Value    POCT Glucose 128 (*)     All other components within normal limits          Imaging Results    None          Medications - No data to display  Medical Decision Making:   History:   Old Medical Records: I decided to obtain old medical records.  Clinical Tests:   Lab Tests: Ordered and Reviewed  ED Management:  61-year-old female presents with complaint of alcohol  intoxication.  She states she was too drunk to get up off the floor, but denies a fall or any trauma.  Vital signs are benign, afebrile.  She is not hypothermic or hypoglycemic.  There is no evidence of trauma on exam, exam is consistent with alcohol intoxication.  Patient was monitored throughout ED course and discharged when clinically sober.  She was very happy and full of krish, and dancing and singing throughout ED course.          Scribe Attestation:   Scribe #1: I performed the above scribed service and the documentation accurately describes the services I performed. I attest to the accuracy of the note.               I, Ijeoma Alfred, personally performed the services described in this documentation. All medical record entries made by the scribe were at my direction and in my presence. I have reviewed the chart and agree that the record reflects my personal performance and is accurate and complete.    Clinical Impression:   Final diagnoses:  [F10.920] Acute alcoholic intoxication without complication (Primary)          ED Disposition Condition    Discharge Stable        ED Prescriptions     None        Follow-up Information     Follow up With Specialties Details Why Contact Info    Your regular doctor  Schedule an appointment as soon as possible for a visit       [facility] - Emergency Dept Emergency Medicine  As needed, If symptoms worsen [address]  [facility] [address]  [phone]           Ijeoma Alfred MD  02/03/22 7908

## 2022-02-19 ENCOUNTER — HOSPITAL ENCOUNTER (EMERGENCY)
Facility: OTHER | Age: 62
Discharge: HOME | End: 2022-02-20
Attending: EMERGENCY MEDICINE
Payer: MEDICAID

## 2022-02-19 DIAGNOSIS — F10.920 ALCOHOLIC INTOXICATION WITHOUT COMPLICATION: Primary | ICD-10-CM

## 2022-02-19 LAB — POCT GLUCOSE: 126 MG/DL (ref 70–110)

## 2022-02-19 PROCEDURE — 99283 EMERGENCY DEPT VISIT LOW MDM: CPT | Mod: 25

## 2022-02-19 PROCEDURE — 82962 GLUCOSE BLOOD TEST: CPT

## 2022-02-20 VITALS
HEART RATE: 94 BPM | WEIGHT: 160 LBS | DIASTOLIC BLOOD PRESSURE: 64 MMHG | SYSTOLIC BLOOD PRESSURE: 116 MMHG | OXYGEN SATURATION: 96 % | TEMPERATURE: 98 F | HEIGHT: 63 IN | BODY MASS INDEX: 28.35 KG/M2 | RESPIRATION RATE: 19 BRPM

## 2022-02-20 RX ORDER — ACETAMINOPHEN 500 MG
1000 TABLET ORAL
Status: DISCONTINUED | OUTPATIENT
Start: 2022-02-20 | End: 2022-02-20 | Stop reason: HOSPADM

## 2022-02-20 NOTE — ED PROVIDER NOTES
Encounter Date: 2/19/2022       History     Chief Complaint   Patient presents with    Alcohol Intoxication     Pt states wants help to stop drinking. Pt just drank a pint of EJ     61-year-old female with history of alcohol abuse presents via EMS intoxicated.  She denies any complaints at this time.  She did report to EMS that she wanted help stop drinking.  In the emergency department all she does is laugh and ask God for forgiveness for her sins.          Review of patient's allergies indicates:  No Known Allergies  Past Medical History:   Diagnosis Date    ETOH abuse     Hyperlipemia     Hypertension     Psychiatric illness      Past Surgical History:   Procedure Laterality Date    BREAST SURGERY       History reviewed. No pertinent family history.  Social History     Tobacco Use    Smoking status: Current Some Day Smoker     Packs/day: 0.50     Types: Cigarettes    Smokeless tobacco: Never Used   Substance Use Topics    Alcohol use: Yes     Alcohol/week: 42.0 standard drinks     Types: 42 Standard drinks or equivalent per week     Comment: heavy drinker    Drug use: No     Review of Systems   Unable to perform ROS: Mental status change       Physical Exam     Initial Vitals [02/19/22 2249]   BP Pulse Resp Temp SpO2   136/82 80 20 98.1 °F (36.7 °C) 99 %      MAP       --         Physical Exam    Vitals reviewed.  Constitutional: She appears well-developed and well-nourished. She is not diaphoretic. No distress.   Smell of alcohol, slurred speech   HENT:   Head: Normocephalic and atraumatic.   Right Ear: External ear normal.   Left Ear: External ear normal.   Nose: Nose normal.   Eyes: Conjunctivae and EOM are normal. Right eye exhibits no discharge. Left eye exhibits no discharge. No scleral icterus.   Neck: Neck supple. No tracheal deviation present. No JVD present.   Cardiovascular: Normal rate, regular rhythm and normal heart sounds.   Pulmonary/Chest: Breath sounds normal. No stridor. No  respiratory distress. She has no wheezes. She has no rhonchi. She has no rales.   Musculoskeletal:         General: Normal range of motion.      Cervical back: Neck supple.     Neurological: She is alert. She has normal strength. GCS score is 15. GCS eye subscore is 4. GCS verbal subscore is 5. GCS motor subscore is 6.   Oriented to person         ED Course   Procedures  Labs Reviewed   POCT GLUCOSE - Abnormal; Notable for the following components:       Result Value    POCT Glucose 126 (*)     All other components within normal limits          Imaging Results    None          Medications   acetaminophen tablet 1,000 mg (1,000 mg Oral Not Given 2/20/22 0330)        Additional MDM:   Comments: 61-year-old female presents intoxicated.  Will observed on a cardiac monitor until clinically sober.    6:19 AM  The patient is now awake and alert and requesting to be discharged.  She is clinically sober and appropriate for discharge at this time..                    Clinical Impression:   Final diagnoses:  [F10.920] Alcoholic intoxication without complication (Primary)          ED Disposition Condition    Discharge Stable        ED Prescriptions     None        Follow-up Information     Follow up With Specialties Details Why Contact Info    [facility] Emergency Dept Emergency Medicine  If symptoms worsen [address]  [facility] [address] [phone]           Mariana Joseph MD  02/20/22 5942

## 2022-03-04 ENCOUNTER — HOSPITAL ENCOUNTER (EMERGENCY)
Facility: OTHER | Age: 62
Discharge: HOME | End: 2022-03-04
Attending: EMERGENCY MEDICINE
Payer: MEDICAID

## 2022-03-04 VITALS
HEIGHT: 64 IN | OXYGEN SATURATION: 99 % | WEIGHT: 215 LBS | TEMPERATURE: 99 F | RESPIRATION RATE: 18 BRPM | BODY MASS INDEX: 36.7 KG/M2 | HEART RATE: 99 BPM | SYSTOLIC BLOOD PRESSURE: 158 MMHG | DIASTOLIC BLOOD PRESSURE: 105 MMHG

## 2022-03-04 DIAGNOSIS — F10.920 ALCOHOLIC INTOXICATION WITHOUT COMPLICATION: Primary | ICD-10-CM

## 2022-03-04 DIAGNOSIS — F10.10 ALCOHOL ABUSE: ICD-10-CM

## 2022-03-04 PROCEDURE — 99282 EMERGENCY DEPT VISIT SF MDM: CPT

## 2022-03-04 NOTE — ED TRIAGE NOTES
"Pt brought to the ED via EMS c/o alcohol intoxication. Pt reports drinking "many beers" tonight. Pt reports calling police tonight because she "needed more alcohol." Pt poor historian at this time  "

## 2022-03-04 NOTE — ED PROVIDER NOTES
"Encounter Date: 3/4/2022       History     Chief Complaint   Patient presents with    Alcohol Intoxication     Pt in triage uncontrollable laughter. Pt states "a dog renee, a cat ass. I love yall." Pt laughing loudly. NAD. Denies pain.       61-year-old female with history of alcohol abuse well known to this department secondary to multiple visits for alcohol intoxication presents intoxicated.  The patient denies any complaints.  She states that she is ready to get out of here so she can do it all over again.  She is also requesting food.        Review of patient's allergies indicates:  Not on File  No past medical history on file.  No past surgical history on file.  No family history on file.     Review of Systems   Respiratory: Negative for shortness of breath.    Cardiovascular: Negative for chest pain.   Gastrointestinal: Negative for abdominal pain, nausea and vomiting.   Neurological: Negative for headaches.   All other systems reviewed and are negative.      Physical Exam     Initial Vitals [03/04/22 0041]   BP Pulse Resp Temp SpO2   (!) 158/105 99 18 98.7 °F (37.1 °C) 99 %      MAP       --         Physical Exam    Nursing note and vitals reviewed.  Constitutional: She appears well-developed and well-nourished. She is not diaphoretic. No distress.   Smell of alcohol   HENT:   Head: Normocephalic and atraumatic.   Right Ear: External ear normal.   Left Ear: External ear normal.   Eyes: EOM are normal.   Conjunctiva injected bilaterally   Neck: Neck supple. No tracheal deviation present. No JVD present.   Normal range of motion.  Cardiovascular: Normal rate, regular rhythm and normal heart sounds. Exam reveals no friction rub.    No murmur heard.  Pulmonary/Chest: Breath sounds normal. No stridor. No respiratory distress. She has no wheezes. She has no rhonchi. She has no rales.   Musculoskeletal:         General: Normal range of motion.      Cervical back: Normal range of motion and neck supple.      " Comments: Ambulatory with a steady gait     Neurological: She is alert and oriented to person, place, and time. She has normal strength. GCS score is 15. GCS eye subscore is 4. GCS verbal subscore is 5. GCS motor subscore is 6.         ED Course   Procedures  Labs Reviewed - No data to display       Imaging Results    None          Medications - No data to display     Additional MDM:   Comments: 61-year-old female walked into the emergency department laughing.  She has clearly been drinking but is answering questions appropriately.  She denies any complaints and is currently asking to be discharged.  Vital signs within normal limits.  exam significant for smell of alcohol.  Patient will be discharged home at this time stable condition      1:01 AM  Per nursing staff, the patient was at home an called the police because she wanted more alcohol.  It was the police that brought her in.  .                    Clinical Impression:   Final diagnoses:  [F10.920] Alcoholic intoxication without complication (Primary)  [F10.10] Alcohol abuse          ED Disposition Condition    Discharge Stable        ED Prescriptions     None        Follow-up Information     Follow up With Specialties Details Why Contact Info    [facility] - Emergency Dept Emergency Medicine Go to  As needed [address]  [facility] [id number] [phone]           Mariana Joseph MD  03/04/22 0100       Mariana Joseph MD  03/04/22 0101

## 2022-04-21 ENCOUNTER — HOSPITAL ENCOUNTER (EMERGENCY)
Facility: OTHER | Age: 62
Discharge: HOME | End: 2022-04-22
Attending: EMERGENCY MEDICINE
Payer: MEDICAID

## 2022-04-21 VITALS
HEART RATE: 84 BPM | DIASTOLIC BLOOD PRESSURE: 74 MMHG | RESPIRATION RATE: 16 BRPM | SYSTOLIC BLOOD PRESSURE: 137 MMHG | HEIGHT: 64 IN | WEIGHT: 215 LBS | OXYGEN SATURATION: 98 % | BODY MASS INDEX: 36.7 KG/M2 | TEMPERATURE: 98 F

## 2022-04-21 DIAGNOSIS — F10.920 ACUTE ALCOHOLIC INTOXICATION WITHOUT COMPLICATION: Primary | ICD-10-CM

## 2022-04-21 PROCEDURE — 99281 EMR DPT VST MAYX REQ PHY/QHP: CPT

## 2022-04-22 NOTE — ED PROVIDER NOTES
Encounter Date: 4/21/2022    SCRIBE #1 NOTE: I, Paulo Saucedo, am scribing for, and in the presence of, Ijeoma Alfred MD.       History     Chief Complaint   Patient presents with    Alcohol Intoxication     Time seen by provider: 11:43 AM    This is a 61 y.o. female, with a PMHx of HTN, HLD and ETOH abuse, who presents to the ED with complaint of alcohol intoxication. Patient reports drinking alchohol at home today. She has no specific complaints, but wanted to be seen in the ED. She denies trauma. Patient is well-known at our facility and often comes in for similar complaints. This is the extent of the patient's complaints at this time.    The history is provided by the patient.     Review of patient's allergies indicates:  No Known Allergies  Past Medical History:   Diagnosis Date    ETOH abuse     Hyperlipemia     Hypertension     Psychiatric illness      Past Surgical History:   Procedure Laterality Date    BREAST SURGERY       No family history on file.  Social History     Tobacco Use    Smoking status: Current Some Day Smoker     Packs/day: 0.50     Types: Cigarettes    Smokeless tobacco: Never Used   Substance Use Topics    Alcohol use: Yes     Alcohol/week: 42.0 standard drinks     Types: 42 Standard drinks or equivalent per week     Comment: heavy drinker    Drug use: No     Review of Systems   Constitutional: Negative for chills and fever.   HENT: Negative for congestion and sore throat.    Eyes: Negative for visual disturbance.   Respiratory: Negative for cough and shortness of breath.    Cardiovascular: Negative for chest pain and palpitations.   Gastrointestinal: Negative for abdominal pain, diarrhea and vomiting.   Genitourinary: Negative for decreased urine volume, dysuria and vaginal discharge.   Musculoskeletal: Negative for joint swelling, neck pain and neck stiffness.   Skin: Negative for rash and wound.   Neurological: Negative for weakness, numbness and headaches.    Psychiatric/Behavioral: Negative for confusion.       Physical Exam     Initial Vitals [04/21/22 2318]   BP Pulse Resp Temp SpO2   137/74 84 16 98.1 °F (36.7 °C) 98 %      MAP       --         Physical Exam    Nursing note and vitals reviewed.  Constitutional: She appears well-developed and well-nourished. No distress.   HENT:   Head: Normocephalic and atraumatic.   Mouth/Throat: Oropharynx is clear and moist. No oropharyngeal exudate.   Eyes: Conjunctivae and EOM are normal. Pupils are equal, round, and reactive to light.   Neck: Neck supple.   Normal range of motion.  Cardiovascular: Normal rate and normal heart sounds.   No murmur heard.  Pulmonary/Chest: Breath sounds normal. No respiratory distress. She has no wheezes. She has no rhonchi. She has no rales.   Abdominal: Abdomen is soft. There is no abdominal tenderness. There is no rebound and no guarding.   Musculoskeletal:         General: No tenderness or edema.      Cervical back: Normal range of motion and neck supple.      Comments: Unsteady gait.      Neurological: She is alert and oriented to person, place, and time. She has normal strength. GCS score is 15. GCS eye subscore is 4. GCS verbal subscore is 5. GCS motor subscore is 6.   Skin: Skin is warm and dry. No rash noted.   Psychiatric: Thought content normal. Her affect is labile. Her speech is tangential and slurred. She is agitated.         ED Course   Procedures  Labs Reviewed - No data to display       Imaging Results    None          Medications - No data to display  Medical Decision Making:   History:   Old Medical Records: I decided to obtain old medical records.  ED Management:  Emergent evaluation of 61-year-old female who walked to the ER from home for evaluation, admits to alcohol intoxication.  Vital signs are benign, afebrile.  Physical exam is consistent with alcohol intoxication, slurred speech and unsteady gait.  There is no evidence of trauma.  Ultimately she was monitored in the  ER and then discharged when clinically sober.  At time of discharge she had clear speech and a steady gait.  She was escorted to her home by security.          Scribe Attestation:   Scribe #1: I performed the above scribed service and the documentation accurately describes the services I performed. I attest to the accuracy of the note.        ED Course as of 04/22/22 0202 Fri Apr 22, 2022 0042 Patient has clear speech and a steady gait.  She states she would like to go home. [AK]      ED Course User Index  [AK] Ijeoma Alfred MD          Physician Attestation for Scribe: I, Ijeoma Alfred, reviewed documentation as scribed in my presence, which is both accurate and complete.    Clinical Impression:   Final diagnoses:  [F10.920] Acute alcoholic intoxication without complication (Primary)          ED Disposition Condition    Discharge Stable        ED Prescriptions     None        Follow-up Information     Follow up With Specialties Details Why Contact Info    [facility] - Emergency Dept Emergency Medicine  As needed, If symptoms worsen [address]  [facility] [id number] [phone]           Ijeoma Alfred MD  04/22/22 0202

## 2022-05-07 ENCOUNTER — HOSPITAL ENCOUNTER (EMERGENCY)
Facility: HOSPITAL | Age: 62
Discharge: HOME | End: 2022-05-08
Attending: EMERGENCY MEDICINE
Payer: MEDICAID

## 2022-05-07 DIAGNOSIS — F14.10 COCAINE ABUSE: ICD-10-CM

## 2022-05-07 DIAGNOSIS — F10.929 ACUTE ALCOHOLIC INTOXICATION WITH COMPLICATION: Primary | ICD-10-CM

## 2022-05-07 DIAGNOSIS — F10.920 ALCOHOLIC INTOXICATION WITHOUT COMPLICATION: ICD-10-CM

## 2022-05-07 DIAGNOSIS — Z00.8 MEDICAL CLEARANCE FOR PSYCHIATRIC ADMISSION: ICD-10-CM

## 2022-05-07 PROCEDURE — 80053 COMPREHEN METABOLIC PANEL: CPT | Performed by: STUDENT IN AN ORGANIZED HEALTH CARE EDUCATION/TRAINING PROGRAM

## 2022-05-07 PROCEDURE — 99285 EMERGENCY DEPT VISIT HI MDM: CPT | Mod: CS,,, | Performed by: EMERGENCY MEDICINE

## 2022-05-07 PROCEDURE — 85025 COMPLETE CBC W/AUTO DIFF WBC: CPT | Performed by: STUDENT IN AN ORGANIZED HEALTH CARE EDUCATION/TRAINING PROGRAM

## 2022-05-07 PROCEDURE — U0002 COVID-19 LAB TEST NON-CDC: HCPCS | Performed by: STUDENT IN AN ORGANIZED HEALTH CARE EDUCATION/TRAINING PROGRAM

## 2022-05-07 PROCEDURE — 84443 ASSAY THYROID STIM HORMONE: CPT | Performed by: STUDENT IN AN ORGANIZED HEALTH CARE EDUCATION/TRAINING PROGRAM

## 2022-05-07 PROCEDURE — 96372 THER/PROPH/DIAG INJ SC/IM: CPT | Performed by: STUDENT IN AN ORGANIZED HEALTH CARE EDUCATION/TRAINING PROGRAM

## 2022-05-07 PROCEDURE — 82077 ASSAY SPEC XCP UR&BREATH IA: CPT | Performed by: STUDENT IN AN ORGANIZED HEALTH CARE EDUCATION/TRAINING PROGRAM

## 2022-05-07 PROCEDURE — 99285 EMERGENCY DEPT VISIT HI MDM: CPT

## 2022-05-07 PROCEDURE — 87389 HIV-1 AG W/HIV-1&-2 AB AG IA: CPT | Performed by: EMERGENCY MEDICINE

## 2022-05-07 PROCEDURE — 80143 DRUG ASSAY ACETAMINOPHEN: CPT | Performed by: STUDENT IN AN ORGANIZED HEALTH CARE EDUCATION/TRAINING PROGRAM

## 2022-05-07 PROCEDURE — 63600175 PHARM REV CODE 636 W HCPCS: Performed by: STUDENT IN AN ORGANIZED HEALTH CARE EDUCATION/TRAINING PROGRAM

## 2022-05-07 PROCEDURE — 99285 PR EMERGENCY DEPT VISIT,LEVEL V: ICD-10-PCS | Mod: CS,,, | Performed by: EMERGENCY MEDICINE

## 2022-05-07 RX ORDER — LORAZEPAM 2 MG/ML
2 INJECTION INTRAMUSCULAR
Status: COMPLETED | OUTPATIENT
Start: 2022-05-07 | End: 2022-05-07

## 2022-05-07 RX ORDER — HALOPERIDOL 5 MG/ML
5 INJECTION INTRAMUSCULAR
Status: COMPLETED | OUTPATIENT
Start: 2022-05-07 | End: 2022-05-07

## 2022-05-07 RX ADMIN — LORAZEPAM 2 MG: 2 INJECTION INTRAMUSCULAR; INTRAVENOUS at 11:05

## 2022-05-07 RX ADMIN — HALOPERIDOL LACTATE 5 MG: 5 INJECTION, SOLUTION INTRAMUSCULAR at 11:05

## 2022-05-08 VITALS
WEIGHT: 216.06 LBS | BODY MASS INDEX: 37.09 KG/M2 | TEMPERATURE: 96 F | OXYGEN SATURATION: 99 % | HEART RATE: 81 BPM | RESPIRATION RATE: 20 BRPM | SYSTOLIC BLOOD PRESSURE: 123 MMHG | DIASTOLIC BLOOD PRESSURE: 70 MMHG

## 2022-05-08 LAB
ALBUMIN SERPL BCP-MCNC: 4.3 G/DL (ref 3.5–5.2)
ALP SERPL-CCNC: 54 U/L (ref 55–135)
ALT SERPL W/O P-5'-P-CCNC: 27 U/L (ref 10–44)
AMPHET+METHAMPHET UR QL: NEGATIVE
ANION GAP SERPL CALC-SCNC: 15 MMOL/L (ref 8–16)
APAP SERPL-MCNC: <3 UG/ML (ref 10–20)
AST SERPL-CCNC: 24 U/L (ref 10–40)
BACTERIA #/AREA URNS AUTO: ABNORMAL /HPF
BARBITURATES UR QL SCN>200 NG/ML: NEGATIVE
BASOPHILS # BLD AUTO: 0.05 K/UL (ref 0–0.2)
BASOPHILS NFR BLD: 0.6 % (ref 0–1.9)
BENZODIAZ UR QL SCN>200 NG/ML: NEGATIVE
BILIRUB SERPL-MCNC: 0.3 MG/DL (ref 0.1–1)
BILIRUB UR QL STRIP: NEGATIVE
BUN SERPL-MCNC: 18 MG/DL (ref 8–23)
BZE UR QL SCN: ABNORMAL
CALCIUM SERPL-MCNC: 9.4 MG/DL (ref 8.7–10.5)
CANNABINOIDS UR QL SCN: NEGATIVE
CHLORIDE SERPL-SCNC: 106 MMOL/L (ref 95–110)
CLARITY UR REFRACT.AUTO: CLEAR
CO2 SERPL-SCNC: 19 MMOL/L (ref 23–29)
COLOR UR AUTO: ABNORMAL
CREAT SERPL-MCNC: 0.9 MG/DL (ref 0.5–1.4)
CREAT UR-MCNC: 46 MG/DL (ref 15–325)
CTP QC/QA: YES
DIFFERENTIAL METHOD: ABNORMAL
EOSINOPHIL # BLD AUTO: 0.4 K/UL (ref 0–0.5)
EOSINOPHIL NFR BLD: 4.8 % (ref 0–8)
ERYTHROCYTE [DISTWIDTH] IN BLOOD BY AUTOMATED COUNT: 13.7 % (ref 11.5–14.5)
EST. GFR  (AFRICAN AMERICAN): >60 ML/MIN/1.73 M^2
EST. GFR  (NON AFRICAN AMERICAN): >60 ML/MIN/1.73 M^2
ETHANOL SERPL-MCNC: 153 MG/DL
ETHANOL SERPL-MCNC: 277 MG/DL
ETHANOL SERPL-MCNC: 88 MG/DL
GLUCOSE SERPL-MCNC: 109 MG/DL (ref 70–110)
GLUCOSE UR QL STRIP: NEGATIVE
HCT VFR BLD AUTO: 44.2 % (ref 37–48.5)
HGB BLD-MCNC: 14.2 G/DL (ref 12–16)
HGB UR QL STRIP: NEGATIVE
HYALINE CASTS UR QL AUTO: 13 /LPF
IMM GRANULOCYTES # BLD AUTO: 0.05 K/UL (ref 0–0.04)
IMM GRANULOCYTES NFR BLD AUTO: 0.6 % (ref 0–0.5)
KETONES UR QL STRIP: NEGATIVE
LEUKOCYTE ESTERASE UR QL STRIP: ABNORMAL
LYMPHOCYTES # BLD AUTO: 3.9 K/UL (ref 1–4.8)
LYMPHOCYTES NFR BLD: 44.3 % (ref 18–48)
MCH RBC QN AUTO: 26.4 PG (ref 27–31)
MCHC RBC AUTO-ENTMCNC: 32.1 G/DL (ref 32–36)
MCV RBC AUTO: 82 FL (ref 82–98)
METHADONE UR QL SCN>300 NG/ML: NEGATIVE
MICROSCOPIC COMMENT: ABNORMAL
MONOCYTES # BLD AUTO: 0.6 K/UL (ref 0.3–1)
MONOCYTES NFR BLD: 6.8 % (ref 4–15)
NEUTROPHILS # BLD AUTO: 3.8 K/UL (ref 1.8–7.7)
NEUTROPHILS NFR BLD: 42.9 % (ref 38–73)
NITRITE UR QL STRIP: NEGATIVE
NRBC BLD-RTO: 0 /100 WBC
OPIATES UR QL SCN: NEGATIVE
PCP UR QL SCN>25 NG/ML: NEGATIVE
PH UR STRIP: 5 [PH] (ref 5–8)
PLATELET # BLD AUTO: 257 K/UL (ref 150–450)
PMV BLD AUTO: 9.7 FL (ref 9.2–12.9)
POTASSIUM SERPL-SCNC: 3.2 MMOL/L (ref 3.5–5.1)
PROT SERPL-MCNC: 7.9 G/DL (ref 6–8.4)
PROT UR QL STRIP: NEGATIVE
RBC # BLD AUTO: 5.37 M/UL (ref 4–5.4)
RBC #/AREA URNS AUTO: 1 /HPF (ref 0–4)
SARS-COV-2 RDRP RESP QL NAA+PROBE: NEGATIVE
SODIUM SERPL-SCNC: 140 MMOL/L (ref 136–145)
SP GR UR STRIP: 1 (ref 1–1.03)
SQUAMOUS #/AREA URNS AUTO: 0 /HPF
TOXICOLOGY INFORMATION: ABNORMAL
TSH SERPL DL<=0.005 MIU/L-ACNC: 1.39 UIU/ML (ref 0.4–4)
URN SPEC COLLECT METH UR: ABNORMAL
WBC # BLD AUTO: 8.84 K/UL (ref 3.9–12.7)
WBC #/AREA URNS AUTO: 1 /HPF (ref 0–5)

## 2022-05-08 PROCEDURE — 82077 ASSAY SPEC XCP UR&BREATH IA: CPT | Mod: 91 | Performed by: EMERGENCY MEDICINE

## 2022-05-08 PROCEDURE — 81001 URINALYSIS AUTO W/SCOPE: CPT | Performed by: STUDENT IN AN ORGANIZED HEALTH CARE EDUCATION/TRAINING PROGRAM

## 2022-05-08 PROCEDURE — 82077 ASSAY SPEC XCP UR&BREATH IA: CPT | Performed by: STUDENT IN AN ORGANIZED HEALTH CARE EDUCATION/TRAINING PROGRAM

## 2022-05-08 PROCEDURE — 80307 DRUG TEST PRSMV CHEM ANLYZR: CPT | Performed by: STUDENT IN AN ORGANIZED HEALTH CARE EDUCATION/TRAINING PROGRAM

## 2022-05-08 NOTE — ED NOTES
Pt remains in paper scrubs, resting comfortably with eyes closed.  Room free from hazardous items.  Dina Macario, remains at bedside with direct visual contact and q15min assessments being documented.  Pt in NAD and VSS at this time.  Pending medical clearance and psych placement.

## 2022-05-08 NOTE — ED NOTES
Pt provided blanket. She remains in paper scrubs.  Room free from hazardous items.  Dina Macario, remains at bedside with direct visual contact and q15min assessments being documented.  Pt in NAD and VSS at this time.  Pending medical clearance. Will continue to monitor

## 2022-05-08 NOTE — DISCHARGE INSTRUCTIONS
Please stop drinking alcohol return to the emergency department if you develop any nausea any vomiting, hallucinations, tremors, or any other concerns

## 2022-05-08 NOTE — ED NOTES
Pt ambulated approximately 25 meters to restroom then returned to the room. Pt was monitored while walking and a steady gait was noted.

## 2022-05-08 NOTE — ED NOTES
Pt remains in paper scrubs. Pt resting, eyes closed. NAD noted. Sitter at bedside maintaining 1:1 direct visual contact and charting q15 minute patient check. Respirations even and unlabored. Bed in low locked position. Side rails up x 2.

## 2022-05-08 NOTE — ED NOTES
Pt ambulated to bathroom with assistance. Unsteady gait noted. Fall risk band applied to patient.

## 2022-05-08 NOTE — ED NOTES
Pt reassessed and ethanol redrawn. Pt remains in paper scrubs.  Room free from hazardous items.  Dina Macario, remains at bedside with direct visual contact and q15min assessments being documented. Pt resting comfortably with eyes closed. In NAD and VSS at this time.  Pending medical clearance and psych placement.

## 2022-05-08 NOTE — ED NOTES
Pt belongings collected and put in locked closet.   1 phone  1 wallet with 1 visa card, $3.05  [employer]  1 pair of plaid pants  1 black shirt  2 Flip flops  1 lanyard with keys     Pepper spray confiscated by security.

## 2022-05-08 NOTE — ED NOTES
Pt remains in paper scrubs.  Room free from hazardous items.  Dina Macario, remains at bedside with direct visual contact and q15min assessments being documented.  Pt in NAD and VSS at this time.  Pending medical clearance and psych placement.

## 2022-05-08 NOTE — PROGRESS NOTES
ED Resident HAND-OFF NOTE:  6:15 AM 5/8/2022  Marianne Gottlieb is a 61 y.o. female who presented to the ED on 5/8/2022 and has been managed by Dr. Martinez and Dr. William, who reports patient was brought in by EMS as she was found in the middle of street with suspected alcohol intoxication. I assumed care of patient from off-going ED physician team at 6:15 AM pending repeat alcohol level and medical clearance.    On my evaluation, Marianne Gottlieb appears well, hemodynamically stable and in NAD. Thus far, Marianne Gottlieb has received:  Medications   haloperidol lactate injection 5 mg (5 mg Intramuscular Given 5/7/22 2330)   lorazepam injection 2 mg (2 mg Intramuscular Given 5/7/22 2330)       On my exam, I appreciate:  /70 (BP Location: Left arm)   Pulse 81   Temp 96.3 °F (35.7 °C) (Oral)   Resp 20   Wt 98 kg (216 lb 0.8 oz)   SpO2 99%   BMI 37.09 kg/m²     Patient's most recent ethanol level was 88 and upon reassesment patient is acting appropriately, responding to questions and following commands. Patient is able to ambulate without assistance. PEC is rescinded. I provided the patient with resources for her alcohol and drug use. Patient understands and agrees with the plan so I will discharge her.   ______________________  Idalia Brown MD  Emergency Medicine Resident  6:15 AM 5/8/2022      As the supervising MD I agree with the above assessment, ED course, plan and disposition. Patient was interviewed and examined by me.     Patient comfortable and calm for the rest of the ED stay since I assumed care. Patient apologetic about her behavior earlier, she does not remember being agitated. She reports she lives alone and feels she is able to take care of herself. Denies HI/SI, she is not interested in rehab. She has been cooperating with the nursing staff, ate and ambulated in the ED. She does not represent a threat to herself and others, no grave disability, PEC lifted and patient  discharged home. Return precautions discussed with the patient    Salma Burch

## 2022-05-08 NOTE — ED NOTES
Pt remains in paper scrubs.  Room free from hazardous items.  Sitter remains at bedside with direct visual contact and q15min assessments being documented.  Pt in NAD and VSS at this time. Bathroom needs addressed. Pending medical clearance and psych placement.

## 2022-05-08 NOTE — ED PROVIDER NOTES
Encounter Date: 5/7/2022       History     Chief Complaint   Patient presents with    Alcohol Intoxication     Pt arrives via EMS for alcohol intoxication       61-year-old female with PMH of alcohol abuse and hypertension presents with suspected alcohol intoxication.  Patient is brought in by EMS after being found in the middle of the street.  Patient is actively cursing at me and does not provide me with any history.  EMS states that they typically bring the patient to [facility] every other day but that there on [facility] this evening so they brought her here.  Patient is walking around the room at this time and refusing to sit in the bed.  Vital stable with EMS and they did not administer any medications.  No other history able to be obtained at this time.    The history is provided by the EMS personnel and the patient. The history is limited by the condition of the patient.     Review of patient's allergies indicates:  No Known Allergies  Past Medical History:   Diagnosis Date    ETOH abuse     Hyperlipemia     Hypertension     Psychiatric illness      Past Surgical History:   Procedure Laterality Date    BREAST SURGERY       No family history on file.  Social History     Tobacco Use    Smoking status: Current Some Day Smoker     Packs/day: 0.50     Types: Cigarettes    Smokeless tobacco: Never Used   Substance Use Topics    Alcohol use: Yes     Alcohol/week: 42.0 standard drinks     Types: 42 Standard drinks or equivalent per week     Comment: heavy drinker    Drug use: No     Review of Systems   Unable to perform ROS: Mental status change       Physical Exam     Initial Vitals [05/07/22 2322]   BP Pulse Resp Temp SpO2   (!) 150/80 90 18 98 °F (36.7 °C) 98 %      MAP       --         Physical Exam    Vitals reviewed.  Constitutional: She appears well-developed. She is not diaphoretic. No distress.   HENT:   Head: Normocephalic and atraumatic.   Eyes: Conjunctivae and EOM are normal.   Neck:   Normal  range of motion.  Cardiovascular: Normal heart sounds.   No murmur heard.  tachycardic   Pulmonary/Chest: Breath sounds normal. No stridor. No respiratory distress. She has no wheezes. She has no rhonchi. She has no rales.   Abdominal: Abdomen is soft. She exhibits no distension. There is no abdominal tenderness. There is no rebound and no guarding.   Musculoskeletal:         General: No tenderness or edema.      Cervical back: Normal range of motion.     Neurological: She is alert.   Moving all extremities. Walking but off balance   Skin: Skin is warm and dry.   Psychiatric:   Intoxicated, slurring words. Aggressive toward staff. Threatens to kill staff. Disoriented.          ED Course   Procedures  Labs Reviewed   CBC W/ AUTO DIFFERENTIAL - Abnormal; Notable for the following components:       Result Value    MCH 26.4 (*)     Immature Granulocytes 0.6 (*)     Immature Grans (Abs) 0.05 (*)     All other components within normal limits    Narrative:     Release to patient->Immediate   COMPREHENSIVE METABOLIC PANEL - Abnormal; Notable for the following components:    Potassium 3.2 (*)     CO2 19 (*)     Alkaline Phosphatase 54 (*)     All other components within normal limits    Narrative:     Release to patient->Immediate   URINALYSIS, REFLEX TO URINE CULTURE - Abnormal; Notable for the following components:    Leukocytes, UA Trace (*)     All other components within normal limits    Narrative:     Specimen Source->Urine   DRUG SCREEN PANEL, URINE EMERGENCY - Abnormal; Notable for the following components:    Cocaine (Metab.) Presumptive Positive (*)     All other components within normal limits    Narrative:     Specimen Source->Urine   ALCOHOL,MEDICAL (ETHANOL) - Abnormal; Notable for the following components:    Alcohol, Serum 277 (*)     All other components within normal limits    Narrative:     Release to patient->Immediate   ACETAMINOPHEN LEVEL - Abnormal; Notable for the following components:     Acetaminophen (Tylenol), Serum <3.0 (*)     All other components within normal limits    Narrative:     Release to patient->Immediate   URINALYSIS MICROSCOPIC - Abnormal; Notable for the following components:    Hyaline Casts, UA 13 (*)     All other components within normal limits    Narrative:     Specimen Source->Urine   ALCOHOL,MEDICAL (ETHANOL) - Abnormal; Notable for the following components:    Alcohol, Serum 153 (*)     All other components within normal limits   TSH    Narrative:     Release to patient->Immediate   HIV 1 / 2 ANTIBODY   SARS-COV-2 RDRP GENE    Narrative:     This test utilizes isothermal nucleic acid amplification   technology to detect the SARS-CoV-2 RdRp nucleic acid segment.   The analytical sensitivity (limit of detection) is 125 genome   equivalents/mL.   A POSITIVE result implies infection with the SARS-CoV-2 virus;   the patient is presumed to be contagious.     A NEGATIVE result means that SARS-CoV-2 nucleic acids are not   present above the limit of detection. A NEGATIVE result should be   treated as presumptive. It does not rule out the possibility of   COVID-19 and should not be the sole basis for treatment decisions.   If COVID-19 is strongly suspected based on clinical and exposure   history, re-testing using an alternate molecular assay should be   considered.   This test is only for use under the Food and Drug   Administration s Emergency Use Authorization (EUA).   Commercial kits are provided by [employer].   Performance characteristics of the EUA have been independently   verified by [facility] Department of   Pathology and Laboratory Medicine.   _________________________________________________________________   The authorized Fact Sheet for Healthcare Providers and the authorized Fact   Sheet for Patients of the ID NOW COVID-19 are available on the FDA   website:      https://www.fda.gov/media/168141/download  https://www.fda.gov/media/052691/download                  Imaging Results    None          Medications   haloperidol lactate injection 5 mg (5 mg Intramuscular Given 5/7/22 2330)   lorazepam injection 2 mg (2 mg Intramuscular Given 5/7/22 2330)     Medical Decision Making:   Initial Assessment:   61 female with suspected alcohol intoxication, aggressive toward staff, cursing and threatening staff. Pt PEC'd for violence and being gravely disabled. Will obtain labs.   Differential Diagnosis:   Alcohol intoxication, polysubstance abuse, cocaine use, acute psychosis  Clinical Tests:   Lab Tests: Ordered and Reviewed  ED Management:  See ED course             ED Course as of 05/08/22 0612   Sun May 08, 2022   0025 SARS-CoV-2 RNA, Amplification, Qual: Negative [BD]   0105 Cocaine (Metab.)(!): Presumptive Positive [GM]   0105 Alcohol, Serum(!): 277 [GM]   0117 Cocaine (Metab.)(!): Presumptive Positive [BD]   0117 TSH: 1.388 [BD]   0117 Urinalysis, Reflex to Urine Culture Urine, Clean Catch(!)  UA unremarkable without evidence of infection [BD]   0117 CBC auto differential(!)  CBC unremarkable without leukocytosis, significant anemia, or decreased platelets   [BD]   0117 Comprehensive metabolic panel(!)  CMP shows mild hypokalemia without impaired renal function or elevated LFTs. Overall insignificant CMP [BD]   0117 Alcohol, Serum(!): 277  Pt will need to be clinically sober prior to re-evaluation of PEC [BD]   0508 Pt still unable to ambulate independently. We will obtain repeat ethanol at this time.  [BD]   0611 Alcohol, Serum(!): 153  Pt cannot be medically cleared at this time. [facility]  [BD]      ED Course User Index  [BD] Thomas William MD  [GM] Dyana Martinez MD             Clinical Impression:   Final diagnoses:  [F10.929] Acute alcoholic intoxication with complication (Primary)  [F14.10] Cocaine abuse  [Z00.8] Medical clearance  for psychiatric admission                 Thomas William MD  Resident  05/08/22 0612       Thomas William MD  Resident  05/08/22 0612

## 2022-05-08 NOTE — ED NOTES
PEC policies and process explained to pt and pt verbalized understanding. Pt changed into paper scrubs and non-skid socks applied to feet. Pt's belongings reviewed in entirety with patient and are documented on personal belongings sheet. Personal belongings placed in labeled belongings bag and secured away from pt in locked closet. Pt valuables placed in safe. No other equipment or belongings are in pt environment. Pt rights reviewed with patient and pt verbalized understanding. PEC signed by physician and was scanned into pt chart. Original PEC remains with unit secretary. Pt provided with sandwich, crackers, and juice. Pt awaiting medical clearance from physician at this time. Dina Macario, is at bedside in SVC of patient charting Q15 minute safety checks per policy. Sitter belongings are not in pt environment. SR raised x2, bed locked and low.

## 2022-05-08 NOTE — ED NOTES
Lab work drawn and sent. Pt remains in paper scrubs.  Room free from hazardous items.  Dina Macario, remains at bedside with direct visual contact and q15min assessments being documented.  Pt in NAD and VSS at this time.  Pending medical clearance. Will continue to monitor

## 2022-05-08 NOTE — ED TRIAGE NOTES
Pt arrives via EMS for alcohol intoxication after being found lying on the sidewalk. Pt is irritable but cooperative in room

## 2022-05-08 NOTE — ED NOTES
Pt uncooperative with hospital protocols. Refusing to change clothes or hand over belongings. Verbally aggressive and threatening to kill tech and nurse. Pt unable to be redirected. MD and Security at bedside.

## 2022-05-10 LAB — HIV 1+2 AB+HIV1 P24 AG SERPL QL IA: NEGATIVE

## 2022-05-23 ENCOUNTER — HOSPITAL ENCOUNTER (EMERGENCY)
Facility: OTHER | Age: 62
Discharge: HOME | End: 2022-05-24
Attending: EMERGENCY MEDICINE
Payer: MEDICAID

## 2022-05-23 VITALS
RESPIRATION RATE: 15 BRPM | OXYGEN SATURATION: 95 % | DIASTOLIC BLOOD PRESSURE: 84 MMHG | TEMPERATURE: 98 F | SYSTOLIC BLOOD PRESSURE: 160 MMHG | HEART RATE: 93 BPM

## 2022-05-23 DIAGNOSIS — F10.10 ALCOHOL ABUSE: ICD-10-CM

## 2022-05-23 DIAGNOSIS — F10.920 ACUTE ALCOHOLIC INTOXICATION WITHOUT COMPLICATION: Primary | ICD-10-CM

## 2022-05-23 PROCEDURE — 99282 EMERGENCY DEPT VISIT SF MDM: CPT

## 2022-05-24 NOTE — ED NOTES
Pt presents secondary to alcohol intoxication. Pt admits to heavy alcohol use today. Pt denies any pain, n/v/d or any other symptoms at this time.

## 2022-05-24 NOTE — ED NOTES
Pt yelling and tearful requesting Jose to forgive her for drinking. Attempted to verbally redirect pt.

## 2022-05-24 NOTE — ED PROVIDER NOTES
"Encounter Date: 5/23/2022    SCRIBE #1 NOTE: I, Anamika Nieto, am scribing for, and in the presence of, Valdemar Barnes II, MD.       History     Chief Complaint   Patient presents with    Alcohol Intoxication     "I'm fucked up"     Time seen by provider: 11:29 PM    This is a 61 y.o. female with PMHx of HTN, HLD and alcohol abuse who presents due to alcohol intoxication. Patient states, "I'm fucked up." She admits to alcohol use this evening. She denies any drug use. She has no medical complaints at this time.    The history is provided by the patient.     Review of patient's allergies indicates:  No Known Allergies  Past Medical History:   Diagnosis Date    ETOH abuse     Hyperlipemia     Hypertension     Psychiatric illness      Past Surgical History:   Procedure Laterality Date    BREAST SURGERY       No family history on file.  Social History     Tobacco Use    Smoking status: Current Some Day Smoker     Packs/day: 0.50     Types: Cigarettes    Smokeless tobacco: Never Used   Substance Use Topics    Alcohol use: Yes     Alcohol/week: 42.0 standard drinks     Types: 42 Standard drinks or equivalent per week     Comment: heavy drinker    Drug use: No     Review of Systems   Constitutional: Negative for fever.   HENT: Negative for sore throat.    Respiratory: Negative for shortness of breath.    Cardiovascular: Negative for chest pain.   Gastrointestinal: Negative for nausea.   Genitourinary: Negative for dysuria.   Musculoskeletal: Negative for back pain.   Skin: Negative for rash.   Neurological: Negative for weakness.   Hematological: Does not bruise/bleed easily.       Physical Exam     Initial Vitals [05/23/22 2322]   BP Pulse Resp Temp SpO2   (!) 160/84 93 15 98.3 °F (36.8 °C) 95 %      MAP       --         Physical Exam    Nursing note and vitals reviewed.  Constitutional: She appears well-developed and well-nourished. She is not diaphoretic. No distress.   Disheveled. Odor of alcohol. Slurred " speech.   HENT:   Head: Normocephalic and atraumatic.   Eyes: EOM are normal.   Neck: Neck supple. No JVD present.   No meningismus.    Normal range of motion.  Cardiovascular: Normal rate, regular rhythm and normal heart sounds. Exam reveals no gallop and no friction rub.    No murmur heard.  Pulmonary/Chest: Breath sounds normal. No respiratory distress. She has no wheezes. She has no rhonchi. She has no rales.   Musculoskeletal:         General: No tenderness or edema. Normal range of motion.      Cervical back: Normal range of motion and neck supple.     Neurological: She is alert and oriented to person, place, and time.   No facial asymmetry or focal deficits. Not able to comply with full neuro exam.   Skin: Skin is warm and dry.         ED Course   Procedures  Labs Reviewed - No data to display       Imaging Results    None          Medications - No data to display  Medical Decision Making:   History:   Old Medical Records: I decided to obtain old medical records.  Patient with a history of alcohol abuse, frequent presentations for alcohol intoxication, presents today complaining that she has drunk too much and is intoxicated.  She has no physical complaints.  The patient is clinically intoxicated, very labile, foul mouthed, and requires frequent redirection however she has no trauma or focal deficits.  Vital signs are stable.  Patient will be observed until clinically sober.  Given list of substance abuse resources.  Care is turned over 1:00 a.m..          Scribe Attestation:   Scribe #1: I performed the above scribed service and the documentation accurately describes the services I performed. I attest to the accuracy of the note.              Physician Attestation for Scribe: I, TL, reviewed documentation as scribed in my presence, which is both accurate and complete.    Clinical Impression:   Final diagnoses:  [F10.920] Acute alcoholic intoxication without complication (Primary)  [F10.10] Alcohol abuse                  Valdemar Barnes II, MD  05/24/22 0106

## 2022-08-04 PROCEDURE — 99283 EMERGENCY DEPT VISIT LOW MDM: CPT

## 2022-08-04 PROCEDURE — 99284 EMERGENCY DEPT VISIT MOD MDM: CPT | Mod: ,,, | Performed by: EMERGENCY MEDICINE

## 2022-08-04 PROCEDURE — 99284 PR EMERGENCY DEPT VISIT,LEVEL IV: ICD-10-PCS | Mod: ,,, | Performed by: EMERGENCY MEDICINE

## 2022-08-04 RX ORDER — HALOPERIDOL 5 MG/ML
5 INJECTION INTRAMUSCULAR
Status: DISCONTINUED | OUTPATIENT
Start: 2022-08-04 | End: 2022-08-05 | Stop reason: HOSPADM

## 2022-08-05 ENCOUNTER — HOSPITAL ENCOUNTER (EMERGENCY)
Facility: HOSPITAL | Age: 62
Discharge: HOME | End: 2022-08-05
Attending: EMERGENCY MEDICINE
Payer: MEDICAID

## 2022-08-05 VITALS
TEMPERATURE: 99 F | RESPIRATION RATE: 16 BRPM | SYSTOLIC BLOOD PRESSURE: 115 MMHG | DIASTOLIC BLOOD PRESSURE: 68 MMHG | OXYGEN SATURATION: 97 % | HEART RATE: 82 BPM

## 2022-08-05 DIAGNOSIS — F10.920 ALCOHOLIC INTOXICATION WITHOUT COMPLICATION: Primary | ICD-10-CM

## 2022-08-05 NOTE — DISCHARGE INSTRUCTIONS
Return to the ED immediately for any new chest pain, shortness of breath, severe abdominal pain, abdominal pain that is constant, nausea/and vomiting that does not stop on its own, severe headache, new numbness, tingling, or weakness anywhere, fever greater than 101F or any additional concerns.

## 2022-08-05 NOTE — ED PROVIDER NOTES
"Encounter Date: 8/4/2022       History     Chief Complaint   Patient presents with    Alcohol Intoxication     Found down outside bystanders house. GCS 15 severely intoxicated. Pt denies complaints. Pt states "I just want some d*ck"     HPI patient is a 61-year-old female with history of hypertension, hyperlipidemia, alcohol use who was brought in by ambulance intoxicated.  Patient was reportedly found intoxicated outside of a neighbor's house.  On discussion with patient, he explains that she has been drinking alcohol and denies any trauma, no head pain, no neck pain, chest pain, abdominal pain, no upper nor lower extremity injury nor pain.      Review of patient's allergies indicates:  No Known Allergies  Past Medical History:   Diagnosis Date    ETOH abuse     Hyperlipemia     Hypertension     Psychiatric illness      Past Surgical History:   Procedure Laterality Date    BREAST SURGERY       No family history on file.  Social History     Tobacco Use    Smoking status: Current Some Day Smoker     Packs/day: 0.50     Types: Cigarettes    Smokeless tobacco: Never Used   Substance Use Topics    Alcohol use: Yes     Alcohol/week: 42.0 standard drinks     Types: 42 Standard drinks or equivalent per week     Comment: heavy drinker    Drug use: No     Review of Systems  10 point review of systems reviewed with patient otherwise negative.     Physical Exam     Initial Vitals   BP Pulse Resp Temp SpO2   08/04/22 2223 08/04/22 2223 08/04/22 2223 08/05/22 0210 08/04/22 2223   (!) 156/90 80 18 98.6 °F (37 °C) 100 %      MAP       --                Physical Exam  Physical Exam     Nursing note and vitals reviewed.  Constitutional: Patient appears well-developed and well-nourished. No distress.   HENT:   Head: Normocephalic and atraumatic.   Eyes: Conjunctivae and EOM are normal. Pupils are equal, round, and reactive to light.   Neck: Neck supple.   Normal range of motion.  Cardiovascular: Normal rate, regular rhythm, " normal heart sounds and intact distal pulses.   Pulmonary/Chest: Breath sounds normal.   Abdominal: Abdomen is soft. Patient exhibits no distension. There is no abdominal tenderness.   Musculoskeletal:      Cervical back: Normal range of motion and neck supple.   Neurological: Patient is awake, alert, intoxicated   Skin: Skin is warm and dry.  Psych: Intoxicated    ED Course   Procedures  Labs Reviewed   POCT GLUCOSE MONITORING CONTINUOUS          Imaging Results    None          Medications   haloperidol lactate injection 5 mg (0 mg Intramuscular Hold 8/4/22 2230)                       Sober re eval at 0545:  Patient is awake alert oriented, denies additional ingestions, denies any pain, denies any trauma.  The patient was able to ambulate without difficulty, tolerate p.o. no abdominal pain, no neck pain, no extremity pain or deformity and repeat examination.    MDM:  Patient's presentation is most consistent with acute alcohol intoxication complicated by altered mental status.  The patient has no evidence of trauma specifically no evidence of head trauma on initial examination did no complaints of head trauma nor neck pain of blood re-evaluation.  No evidence of thoracic or abdominal trauma on initial examination the patient has no complaints on repeat examination.  On re-evaluation, the patient is able to ambulate without difficulty, tolerating p.o..    Patient was discharged home with PMD f/u as needed with strict RTED precautions.          Clinical Impression:   Acute alcohol intoxication          Mariana Blackwood MD  08/06/22 7926

## 2022-08-05 NOTE — ED TRIAGE NOTES
"Marianne Mariana Gottlieb, a 61 y.o. female presents to the ED w/ complaint of     Triage note:  Chief Complaint   Patient presents with    Alcohol Intoxication     Found down outside bystanders house. GCS 15 severely intoxicated. Pt denies complaints. Pt states "I just want some d*ck"     Review of patient's allergies indicates:  No Known Allergies  Past Medical History:   Diagnosis Date    ETOH abuse     Hyperlipemia     Hypertension     Psychiatric illness          "

## 2022-08-22 ENCOUNTER — HOSPITAL ENCOUNTER (EMERGENCY)
Facility: OTHER | Age: 62
Discharge: HOME | End: 2022-08-22
Attending: EMERGENCY MEDICINE
Payer: MEDICAID

## 2022-08-22 VITALS
RESPIRATION RATE: 16 BRPM | TEMPERATURE: 98 F | HEART RATE: 77 BPM | SYSTOLIC BLOOD PRESSURE: 150 MMHG | WEIGHT: 180 LBS | OXYGEN SATURATION: 95 % | DIASTOLIC BLOOD PRESSURE: 100 MMHG | BODY MASS INDEX: 32.92 KG/M2

## 2022-08-22 DIAGNOSIS — F10.920 ALCOHOLIC INTOXICATION WITHOUT COMPLICATION: Primary | ICD-10-CM

## 2022-08-22 LAB — POCT GLUCOSE: 109 MG/DL (ref 70–110)

## 2022-08-22 PROCEDURE — 99283 EMERGENCY DEPT VISIT LOW MDM: CPT | Mod: 25

## 2022-08-22 PROCEDURE — 82962 GLUCOSE BLOOD TEST: CPT

## 2022-08-23 NOTE — ED NOTES
Patient ambulated with ease down hallway and back with no acute signs of distress noted. Per MD, patient is okay to be discharged.

## 2022-08-23 NOTE — ED PROVIDER NOTES
Encounter Date: 8/22/2022    SCRIBE #1 NOTE: I, Anamika Nieto, am scribing for, and in the presence of, Mariana Joseph MD.       History     Chief Complaint   Patient presents with    Alcohol Intoxication     Arrives via EMS for alcohol intox. Bystanders called after finding pt sleeping on the street 2 doors down from her residence.      Time seen by provider: 9:30 PM    This is a 61 y.o. female who presents asymptomatic with alcohol intoxication. Per EMS, bystanders activated EMS because the patient was found sleeping 2 doors down from her residence. Patient has no complaints at this time.    This is the extent of the patient's complaints at this time.    The history is provided by the patient.     Review of patient's allergies indicates:  No Known Allergies  Past Medical History:   Diagnosis Date    ETOH abuse     Hyperlipemia     Hypertension     Psychiatric illness      Past Surgical History:   Procedure Laterality Date    BREAST SURGERY       No family history on file.  Social History     Tobacco Use    Smoking status: Current Some Day Smoker     Packs/day: 0.50     Types: Cigarettes    Smokeless tobacco: Never Used   Substance Use Topics    Alcohol use: Yes     Alcohol/week: 42.0 standard drinks     Types: 42 Standard drinks or equivalent per week     Comment: heavy drinker    Drug use: No     Review of Systems   Constitutional: Negative for fever.   HENT: Negative for sore throat.    Respiratory: Negative for shortness of breath.    Cardiovascular: Negative for chest pain.   Gastrointestinal: Negative for nausea.   Genitourinary: Negative for dysuria.   Musculoskeletal: Negative for back pain.   Skin: Negative for rash.   Neurological: Negative for weakness.   Hematological: Does not bruise/bleed easily.       Physical Exam     Initial Vitals [08/22/22 2055]   BP Pulse Resp Temp SpO2   (!) 150/100 77 16 98 °F (36.7 °C) 95 %      MAP       --         Physical Exam    Nursing note and vitals  reviewed.  Constitutional: She appears well-developed and well-nourished. She is not diaphoretic. No distress.   HENT:   Head: Normocephalic and atraumatic.   Eyes: EOM are normal.   Neck: Neck supple.   Normal range of motion.  Cardiovascular: Normal rate, regular rhythm and normal heart sounds. Exam reveals no gallop and no friction rub.    No murmur heard.  Pulmonary/Chest: Breath sounds normal. No respiratory distress. She has no wheezes. She has no rhonchi. She has no rales.   Musculoskeletal:         General: No tenderness or edema. Normal range of motion.      Cervical back: Normal range of motion and neck supple.     Neurological: She is alert and oriented to person, place, and time. Gait normal. GCS score is 15. GCS eye subscore is 4. GCS verbal subscore is 5. GCS motor subscore is 6.   Skin: Skin is warm and dry.   Psychiatric: Her speech is slurred.         ED Course   Procedures  Labs Reviewed   POCT GLUCOSE   POCT GLUCOSE MONITORING CONTINUOUS          Imaging Results    None          Medications - No data to display  Medical Decision Making:   History:   Old Medical Records: I decided to obtain old medical records.  Initial Assessment:   61-year-old female with history of alcohol abuse presented intoxicated.  She denies any other complaint.  No evidence of trauma on exam.  Vital signs significant for an elevated blood pressure but otherwise within normal limits.  Clinical Tests:   Lab Tests: Ordered and Reviewed  ED Management:  Patient was observed on a cardiac monitor until clinically sober.  At the time of discharge patient was ambulatory with a steady gait.  She even danced down the hallway a little to prove that she was steady.  And she was discharged home in stable condition.          Scribe Attestation:   Scribe #1: I performed the above scribed service and the documentation accurately describes the services I performed. I attest to the accuracy of the note.              Physician Attestation  for Scribe: I, Mariana Joseph  , reviewed documentation as scribed in my presence, which is both accurate and complete.      Clinical Impression:   Final diagnoses:  [F10.920] Alcoholic intoxication without complication (Primary)          ED Disposition Condition    Discharge Stable        ED Prescriptions     None        Follow-up Information    None          Mariana Joseph MD  08/23/22 5052

## 2022-11-16 ENCOUNTER — OFFICE VISIT (OUTPATIENT)
Dept: INTERNAL MEDICINE | Facility: CLINIC | Age: 62
End: 2022-11-16
Payer: MEDICAID

## 2022-11-16 ENCOUNTER — LAB VISIT (OUTPATIENT)
Dept: LAB | Facility: OTHER | Age: 62
End: 2022-11-16
Attending: STUDENT IN AN ORGANIZED HEALTH CARE EDUCATION/TRAINING PROGRAM
Payer: MEDICAID

## 2022-11-16 VITALS
OXYGEN SATURATION: 99 % | DIASTOLIC BLOOD PRESSURE: 82 MMHG | BODY MASS INDEX: 33.26 KG/M2 | SYSTOLIC BLOOD PRESSURE: 140 MMHG | HEART RATE: 65 BPM | WEIGHT: 180.75 LBS | HEIGHT: 62 IN

## 2022-11-16 DIAGNOSIS — Z23 NEED FOR VACCINATION: ICD-10-CM

## 2022-11-16 DIAGNOSIS — Z00.00 HEALTH MAINTENANCE EXAMINATION: Primary | ICD-10-CM

## 2022-11-16 DIAGNOSIS — I10 PRIMARY HYPERTENSION: ICD-10-CM

## 2022-11-16 DIAGNOSIS — F17.210 NICOTINE DEPENDENCE, CIGARETTES, UNCOMPLICATED: ICD-10-CM

## 2022-11-16 DIAGNOSIS — Z00.00 HEALTH MAINTENANCE EXAMINATION: ICD-10-CM

## 2022-11-16 DIAGNOSIS — Z86.19 HISTORY OF HEPATITIS C: ICD-10-CM

## 2022-11-16 DIAGNOSIS — Z12.31 SCREENING MAMMOGRAM FOR BREAST CANCER: ICD-10-CM

## 2022-11-16 DIAGNOSIS — K74.60 HEPATIC CIRRHOSIS, UNSPECIFIED HEPATIC CIRRHOSIS TYPE, UNSPECIFIED WHETHER ASCITES PRESENT: ICD-10-CM

## 2022-11-16 DIAGNOSIS — E78.5 HYPERLIPIDEMIA, UNSPECIFIED HYPERLIPIDEMIA TYPE: ICD-10-CM

## 2022-11-16 DIAGNOSIS — F10.20 ALCOHOL USE DISORDER, SEVERE, DEPENDENCE: ICD-10-CM

## 2022-11-16 DIAGNOSIS — Z12.4 SCREENING FOR CERVICAL CANCER: ICD-10-CM

## 2022-11-16 DIAGNOSIS — Z13.1 SCREENING FOR DIABETES MELLITUS: ICD-10-CM

## 2022-11-16 LAB
ALBUMIN/CREAT UR: NORMAL UG/MG (ref 0–30)
CREAT UR-MCNC: 30.1 MG/DL (ref 15–325)
MICROALBUMIN UR DL<=1MG/L-MCNC: <5 UG/ML

## 2022-11-16 PROCEDURE — 3008F BODY MASS INDEX DOCD: CPT | Mod: CPTII,,, | Performed by: STUDENT IN AN ORGANIZED HEALTH CARE EDUCATION/TRAINING PROGRAM

## 2022-11-16 PROCEDURE — 1159F MED LIST DOCD IN RCRD: CPT | Mod: CPTII,,, | Performed by: STUDENT IN AN ORGANIZED HEALTH CARE EDUCATION/TRAINING PROGRAM

## 2022-11-16 PROCEDURE — 99999 PR PBB SHADOW E&M-EST. PATIENT-LVL V: CPT | Mod: PBBFAC,,, | Performed by: STUDENT IN AN ORGANIZED HEALTH CARE EDUCATION/TRAINING PROGRAM

## 2022-11-16 PROCEDURE — 1160F PR REVIEW ALL MEDS BY PRESCRIBER/CLIN PHARMACIST DOCUMENTED: ICD-10-PCS | Mod: CPTII,,, | Performed by: STUDENT IN AN ORGANIZED HEALTH CARE EDUCATION/TRAINING PROGRAM

## 2022-11-16 PROCEDURE — 3079F DIAST BP 80-89 MM HG: CPT | Mod: CPTII,,, | Performed by: STUDENT IN AN ORGANIZED HEALTH CARE EDUCATION/TRAINING PROGRAM

## 2022-11-16 PROCEDURE — 3008F PR BODY MASS INDEX (BMI) DOCUMENTED: ICD-10-PCS | Mod: CPTII,,, | Performed by: STUDENT IN AN ORGANIZED HEALTH CARE EDUCATION/TRAINING PROGRAM

## 2022-11-16 PROCEDURE — 1160F RVW MEDS BY RX/DR IN RCRD: CPT | Mod: CPTII,,, | Performed by: STUDENT IN AN ORGANIZED HEALTH CARE EDUCATION/TRAINING PROGRAM

## 2022-11-16 PROCEDURE — 99999 PR PBB SHADOW E&M-EST. PATIENT-LVL V: ICD-10-PCS | Mod: PBBFAC,,, | Performed by: STUDENT IN AN ORGANIZED HEALTH CARE EDUCATION/TRAINING PROGRAM

## 2022-11-16 PROCEDURE — 82043 UR ALBUMIN QUANTITATIVE: CPT | Performed by: STUDENT IN AN ORGANIZED HEALTH CARE EDUCATION/TRAINING PROGRAM

## 2022-11-16 PROCEDURE — 4010F ACE/ARB THERAPY RXD/TAKEN: CPT | Mod: CPTII,,, | Performed by: STUDENT IN AN ORGANIZED HEALTH CARE EDUCATION/TRAINING PROGRAM

## 2022-11-16 PROCEDURE — 99203 OFFICE O/P NEW LOW 30 MIN: CPT | Mod: 25,S$PBB,, | Performed by: STUDENT IN AN ORGANIZED HEALTH CARE EDUCATION/TRAINING PROGRAM

## 2022-11-16 PROCEDURE — 99386 PR PREVENTIVE VISIT,NEW,40-64: ICD-10-PCS | Mod: S$PBB,,, | Performed by: STUDENT IN AN ORGANIZED HEALTH CARE EDUCATION/TRAINING PROGRAM

## 2022-11-16 PROCEDURE — 1159F PR MEDICATION LIST DOCUMENTED IN MEDICAL RECORD: ICD-10-PCS | Mod: CPTII,,, | Performed by: STUDENT IN AN ORGANIZED HEALTH CARE EDUCATION/TRAINING PROGRAM

## 2022-11-16 PROCEDURE — 99386 PREV VISIT NEW AGE 40-64: CPT | Mod: S$PBB,,, | Performed by: STUDENT IN AN ORGANIZED HEALTH CARE EDUCATION/TRAINING PROGRAM

## 2022-11-16 PROCEDURE — 99215 OFFICE O/P EST HI 40 MIN: CPT | Mod: PBBFAC | Performed by: STUDENT IN AN ORGANIZED HEALTH CARE EDUCATION/TRAINING PROGRAM

## 2022-11-16 PROCEDURE — 3077F PR MOST RECENT SYSTOLIC BLOOD PRESSURE >= 140 MM HG: ICD-10-PCS | Mod: CPTII,,, | Performed by: STUDENT IN AN ORGANIZED HEALTH CARE EDUCATION/TRAINING PROGRAM

## 2022-11-16 PROCEDURE — 4010F PR ACE/ARB THEARPY RXD/TAKEN: ICD-10-PCS | Mod: CPTII,,, | Performed by: STUDENT IN AN ORGANIZED HEALTH CARE EDUCATION/TRAINING PROGRAM

## 2022-11-16 PROCEDURE — 3079F PR MOST RECENT DIASTOLIC BLOOD PRESSURE 80-89 MM HG: ICD-10-PCS | Mod: CPTII,,, | Performed by: STUDENT IN AN ORGANIZED HEALTH CARE EDUCATION/TRAINING PROGRAM

## 2022-11-16 PROCEDURE — 82570 ASSAY OF URINE CREATININE: CPT | Performed by: STUDENT IN AN ORGANIZED HEALTH CARE EDUCATION/TRAINING PROGRAM

## 2022-11-16 PROCEDURE — 3077F SYST BP >= 140 MM HG: CPT | Mod: CPTII,,, | Performed by: STUDENT IN AN ORGANIZED HEALTH CARE EDUCATION/TRAINING PROGRAM

## 2022-11-16 PROCEDURE — 99203 PR OFFICE/OUTPT VISIT, NEW, LEVL III, 30-44 MIN: ICD-10-PCS | Mod: 25,S$PBB,, | Performed by: STUDENT IN AN ORGANIZED HEALTH CARE EDUCATION/TRAINING PROGRAM

## 2022-11-16 RX ORDER — LOSARTAN POTASSIUM 100 MG/1
100 TABLET ORAL DAILY
Qty: 90 TABLET | Refills: 1 | Status: SHIPPED | OUTPATIENT
Start: 2022-11-16 | End: 2023-12-08 | Stop reason: SDUPTHER

## 2022-11-16 RX ORDER — NAPROXEN SODIUM 220 MG/1
81 TABLET, FILM COATED ORAL DAILY
COMMUNITY
Start: 2022-10-13 | End: 2023-12-08 | Stop reason: SDUPTHER

## 2022-11-16 RX ORDER — HYDROCHLOROTHIAZIDE 25 MG/1
25 TABLET ORAL DAILY
Qty: 90 TABLET | Refills: 1 | Status: SHIPPED | OUTPATIENT
Start: 2022-11-16 | End: 2023-12-08 | Stop reason: SDUPTHER

## 2022-11-16 RX ORDER — HYDROCHLOROTHIAZIDE 25 MG/1
25 TABLET ORAL DAILY
COMMUNITY
Start: 2022-11-08 | End: 2022-11-16 | Stop reason: SDUPTHER

## 2022-11-16 RX ORDER — LOSARTAN POTASSIUM 100 MG/1
1 TABLET ORAL DAILY
COMMUNITY
Start: 2022-02-08 | End: 2022-11-16 | Stop reason: SDUPTHER

## 2022-11-16 RX ORDER — PRAVASTATIN SODIUM 40 MG/1
40 TABLET ORAL DAILY
Qty: 90 TABLET | Refills: 1 | Status: SHIPPED | OUTPATIENT
Start: 2022-11-16 | End: 2023-08-03 | Stop reason: SDUPTHER

## 2022-11-16 RX ORDER — PRAVASTATIN SODIUM 40 MG/1
1 TABLET ORAL DAILY
COMMUNITY
Start: 2022-04-05 | End: 2022-11-16 | Stop reason: SDUPTHER

## 2022-11-16 NOTE — PROGRESS NOTES
"Subjective:       Patient ID: Marianne Gottlieb is a 62 y.o. female.    Chief Complaint: Health maintenance examination [Z00.00]    Patient is new to me, here to establish care.    Health maintenance -   Colonoscopy performed JULY2020, with Dr. Luz Maria Bradley at [facility]. Unsure screening interval.  Denies family history of colorectal cancer.   Mammogram last done >1 year at [facility]. Due for repeat.  Denies history of prior abnormal mammogram, lumpectomy right breast.  Denies family history of breast cancers.  Denies family history of ovarian cancers.  Last pap performed >3 years ago.   Denies history of prior abnormal pap smears.  Denies family history of osteoporosis.  Denies significant family history of cardiac disease.  UTD on COVID19 primary/booster, Tdap vaccinations.  Due for COVID19 bivalent, shingles, influenza vaccinations.  Started smoking at age 12, at most 1 PPD. Smoking about 0.5 PPD currently.  Denies drug use.  Never previously had low dose lung CT screening.  Completed HIV screening.    HTN -   Currently prescribed HCTZ and losartan   Patient endorses taking medication as directed.  Denies side effects or concerns while taking medication.  Denies headaches, vision changes, CP, palpitations, or other concerning symptoms.  Due for micro albumin creatinine ratio.   BP Readings from Last 3 Encounters:  08/22/22 : (!) 150/100  08/05/22 : 115/68  05/23/22 : (!) 160/84    HLD -   Endorses taking pravastatin as directed  Denies side effects or concerns while taking medication  Due for lipid recheck.    Prediabetes -   Due for HbA1c    History of hepatitis C -   Liver cirrhosis -   Endorses treated for hepatitis C a number of years back  Unsure name of medication   On chart review, HCV viral RNA negative in 2019  Per chart review liver u/s JAN2019 with:  "The liver is normal in size, measuring 17.1 cm sagittal right lobe, with coarsened and increased overall echogenicity and with a minimally irregular " "contour.  No focal intrahepatic masses are noted. The portal vein is patent with hepatopedal flow and flow of 20 cm/sec.  No ascites is noted."       Alcohol use disorder -   Endorses she drinks alcohol about 4 times weekly, one pint E&J herb and one beer per sitting.  Frequency of alcohol use varies depending on finances  Receives food once monthly at [facility]  Frequent falls when drinking   Has tried AA in the past, didn't like it, felt too sad  Never previously received inpatient treatment   Son passed in 1999 in [place]  Daughter and grandson live in [place], [place]? (possibly [place]), works in healthcare  Other daughter lives in [place]    Taking ASA 81 mg for primary prevention      Review of Systems   Constitutional:  Negative for appetite change, chills, fatigue, fever and unexpected weight change.   Respiratory:  Negative for cough and shortness of breath.    Cardiovascular:  Negative for chest pain, palpitations and leg swelling.   Gastrointestinal:  Negative for abdominal pain, constipation, diarrhea, nausea and vomiting.   Skin:  Negative for rash.   Neurological:  Negative for dizziness, syncope, weakness and headaches.       Current Outpatient Medications   Medication Instructions    aspirin 81 mg, Oral, Daily    BIOTIN ORAL 5,000 mcg, Oral, 2 hours after breakfast    fluticasone propionate (FLONASE) 50 mcg, Each Nostril, 2 times daily PRN    hydroCHLOROthiazide (HYDRODIURIL) 25 mg, Oral, Daily    losartan (COZAAR) 100 mg, Oral, Daily    pravastatin (PRAVACHOL) 40 mg, Oral, Daily     Objective:      Vitals:    11/16/22 0811   BP: (!) 140/82   Pulse: 65   SpO2: 99%   Weight: 82 kg (180 lb 12.4 oz)   Height: 5' 2" (1.575 m)     Body mass index is 33.06 kg/m².    Physical Exam  Vitals reviewed.   Constitutional:       General: She is not in acute distress.     Appearance: Normal appearance. She is not ill-appearing or diaphoretic.   HENT:      Head: Normocephalic and atraumatic.      Right Ear: " Tympanic membrane, ear canal and external ear normal. There is no impacted cerumen.      Left Ear: Tympanic membrane, ear canal and external ear normal. There is no impacted cerumen.      Nose: Nose normal. No rhinorrhea.      Mouth/Throat:      Mouth: Mucous membranes are moist.      Pharynx: Oropharynx is clear. No oropharyngeal exudate or posterior oropharyngeal erythema.   Eyes:      General: No scleral icterus.        Right eye: No discharge.         Left eye: No discharge.      Conjunctiva/sclera: Conjunctivae normal.   Neck:      Thyroid: No thyromegaly or thyroid tenderness.      Trachea: Trachea normal.   Cardiovascular:      Rate and Rhythm: Normal rate and regular rhythm.      Heart sounds: Normal heart sounds. No murmur heard.    No friction rub. No gallop.   Pulmonary:      Effort: Pulmonary effort is normal. No respiratory distress.      Breath sounds: Normal breath sounds. No stridor. No wheezing, rhonchi or rales.   Abdominal:      General: There is no distension.      Palpations: Abdomen is soft.      Tenderness: There is no abdominal tenderness. There is no guarding or rebound.   Musculoskeletal:         General: No swelling or deformity.      Cervical back: Neck supple.   Lymphadenopathy:      Head:      Right side of head: No submandibular or posterior auricular adenopathy.      Left side of head: No submandibular or posterior auricular adenopathy.      Cervical: No cervical adenopathy.      Right cervical: No superficial, deep or posterior cervical adenopathy.     Left cervical: No superficial, deep or posterior cervical adenopathy.      Upper Body:      Right upper body: No supraclavicular adenopathy.      Left upper body: No supraclavicular adenopathy.   Skin:     General: Skin is warm and dry.   Neurological:      General: No focal deficit present.      Mental Status: She is alert. Mental status is at baseline.      Gait: Gait normal.   Psychiatric:         Mood and Affect: Mood normal.          Behavior: Behavior normal.       Assessment:       1. Health maintenance examination    2. Primary hypertension    3. Hyperlipidemia, unspecified hyperlipidemia type    4. Alcohol use disorder, severe, dependence    5. Hepatic cirrhosis, unspecified hepatic cirrhosis type, unspecified whether ascites present    6. History of hepatitis C    7. Need for vaccination    8. Screening mammogram for breast cancer    9. Screening for cervical cancer    10. Screening for diabetes mellitus    11. Nicotine dependence, cigarettes, uncomplicated        Plan:       Primary hypertension  Continue current medications  RTC in 3 months for follow up  -     hydroCHLOROthiazide (HYDRODIURIL) 25 MG tablet; Take 1 tablet (25 mg total) by mouth once daily.  -     losartan (COZAAR) 100 MG tablet; Take 1 tablet (100 mg total) by mouth once daily.  -     CBC Auto Differential; Future  -     Comprehensive Metabolic Panel; Future  -     TSH; Future  -     Microalbumin/Creatinine Ratio, Urine; Future    Hyperlipidemia, unspecified hyperlipidemia type  Continue current medications  RTC in 3 months for follow up  -     pravastatin (PRAVACHOL) 40 MG tablet; Take 1 tablet (40 mg total) by mouth once daily.  -     Lipid Panel; Future    Alcohol use disorder, severe, dependence  Discussed risks with continued alcohol use  Recommend AA, inpatient treatment, or intensive outpatient counseling, patient declines at this time  States she would like to reduce on her own  Discussed risks for alcohol withdrawal and seizures with sudden cessation, recommend gradual decrease to prevent withdrawals  RTC in 3 months for follow up    Hepatic cirrhosis, unspecified hepatic cirrhosis type, unspecified whether ascites present  History of hepatitis C  -     HEPATITIS C RNA, QUANTITATIVE, PCR; Future  -     US Abdomen Limited_Liver; Future  -     Ambulatory referral/consult to Hepatology; Future    Health maintenance examination  Need for vaccination  Declines  vaccinations today    Screening mammogram for breast cancer  -     Mammo Digital Screening Bilat w/ Kunal; Future    Screening for cervical cancer  -     Ambulatory referral/consult to Obstetrics / Gynecology; Future    Screening for diabetes mellitus  -     Hemoglobin A1C; Future    Nicotine dependence, cigarettes, uncomplicated  -     CT Chest Lung Screening Low Dose; Future      Zainab Milian MD  11/16/2022

## 2022-11-19 ENCOUNTER — HOSPITAL ENCOUNTER (EMERGENCY)
Facility: OTHER | Age: 62
Discharge: HOME | End: 2022-11-19
Attending: EMERGENCY MEDICINE
Payer: MEDICAID

## 2022-11-19 VITALS
DIASTOLIC BLOOD PRESSURE: 59 MMHG | HEART RATE: 83 BPM | RESPIRATION RATE: 18 BRPM | TEMPERATURE: 98 F | SYSTOLIC BLOOD PRESSURE: 129 MMHG | OXYGEN SATURATION: 96 %

## 2022-11-19 DIAGNOSIS — F10.920 ALCOHOLIC INTOXICATION WITHOUT COMPLICATION: Primary | ICD-10-CM

## 2022-11-19 LAB — POCT GLUCOSE: 110 MG/DL (ref 70–110)

## 2022-11-19 PROCEDURE — 82962 GLUCOSE BLOOD TEST: CPT

## 2022-11-19 PROCEDURE — 99283 EMERGENCY DEPT VISIT LOW MDM: CPT

## 2022-11-19 NOTE — ED TRIAGE NOTES
Pt BIB EMS with altered mental status. Was at a bar drinking ETOH and may have consumed opiates. Was given narcan in field after found unresponsive.      LOC: The patient is awake and alert, and oriented to self and place. Pt is impulsive. Speech is slurred.      SKIN: The skin is warm and dry; color consistent with ethnicity, intact; no breakdown or bruising noted.      MUSCULOSKELETAL:  MAEAW; denies pain or weakness.      RESPIRATORY: Airway is open and patent. Respirations are non-labored with normal effort and rate.  Denies cough.      CARDIAC:  Normal rate; normotensive.  No peripheral edema noted. No complaints of chest pain at this time.      ABDOMEN: Pt denies abdominal pain, nausea, vomiting, diarrhea or constipation.     NEUROLOGIC: Follows commands;  Pt denies headache, lightheadedness or dizziness;

## 2022-11-19 NOTE — ED PROVIDER NOTES
Encounter Date: 11/19/2022    SCRIBE #1 NOTE: IMya, am scribing for, and in the presence of,  Maria Fernanda Javier MD.     History     Chief Complaint   Patient presents with    Drug Overdose     ETOH and opioids    Alcohol Intoxication     Time seen by provider: 12:35 AM.     63 yo F with PMHx of HTN, HLD, EtOH abuse, presents to the ED with altered mental status. Per EMS, she had been consuming EtOH tonight, then subsequently went to a bar, where she consumed more EtOH. EMS notes she had been consuming opioids tonight as well. She was then found unresponsive and EMS administered 4 mg Narcan with positive response, and brought to this facility for further evaluation. Hx is limited at this time secondary to AMS. This is the extent of the patient's history today in the Emergency Department.     The history is provided by the EMS personnel. The history is limited by the condition of the patient.   Review of patient's allergies indicates:  No Known Allergies  Past Medical History:   Diagnosis Date    ETOH abuse     Hyperlipemia     Hypertension     Psychiatric illness      Past Surgical History:   Procedure Laterality Date    BREAST LUMPECTOMY Right     benign    TUBAL LIGATION       Family History   Problem Relation Age of Onset    Lung cancer Mother      Social History     Tobacco Use    Smoking status: Some Days     Packs/day: 0.50     Types: Cigarettes    Smokeless tobacco: Never   Substance Use Topics    Alcohol use: Yes     Alcohol/week: 42.0 standard drinks     Types: 42 Standard drinks or equivalent per week     Comment: heavy drinker    Drug use: No     Review of Systems   Unable to perform ROS: Mental status change     Physical Exam     Initial Vitals   BP Pulse Resp Temp SpO2   11/19/22 0010 11/19/22 0010 11/19/22 0010 11/19/22 0102 11/19/22 0010   (!) 163/100 86 16 98 °F (36.7 °C) 98 %      MAP       --                Physical Exam    Nursing note and vitals reviewed.  Constitutional: She appears  well-developed and well-nourished.   HENT:   Head: Normocephalic and atraumatic.   Eyes: Conjunctivae are normal.   Neck: Neck supple.   Normal range of motion.  Pulmonary/Chest: No respiratory distress.   Musculoskeletal:         General: Normal range of motion.      Cervical back: Normal range of motion and neck supple.     Neurological: She is alert. She has normal strength.   Skin: Skin is warm and dry. Capillary refill takes less than 2 seconds.       ED Course   Procedures  Labs Reviewed   POCT GLUCOSE   POCT GLUCOSE MONITORING CONTINUOUS          Imaging Results    None          Medications - No data to display  Medical Decision Making:   History:   Old Medical Records: I decided to obtain old medical records.  Old Records Summarized: records from previous admission(s) and other records.  Initial Assessment:   12:35AM:  Patient is a 62-year-old female who presents to the emergency room after being found down at a bar.  Patient admits to drinking excessive alcohol today.  She appears well, nontoxic.  Will plan for cardiac monitoring, will continue to follow and reassess.  Clinical Tests:   Lab Tests: Ordered and Reviewed     5:39 AM:  Patient doing well, feeling better.  She is ready to go home.  She is able to ambulate with a steady gait.  I do not feel that further work up in the ED is indicated at this time.  I updated pt regarding results and I counseled pt regarding supportive care measures.  I have discussed with the pt ED return warnings and need for close PCP f/u.  Pt agreeable to plan and all questions answered.  I feel that pt is stable for discharge and management as an outpatient and no further intervention is needed at this time.  Pt is comfortable returning to the ED if needed.  Will DC home in stable condition.         Scribe Attestation:   Scribe #1: I performed the above scribed service and the documentation accurately describes the services I performed. I attest to the accuracy of the note.                  I, Maria Fernanda Javier, personally performed the services described in this documentation. All medical record entries made by the scribe were at my direction and in my presence. I have reviewed the chart and agree that the record reflects my personal performance and is accurate and complete.    Clinical Impression:   Final diagnoses:  [F10.920] Alcoholic intoxication without complication (Primary)      ED Disposition Condition    Discharge Stable          ED Prescriptions    None       Follow-up Information       Follow up With Specialties Details Why Contact Info    Zainab Milian MD Internal Medicine   [facility] [address] [address]  [phone]               Maria Fernanda Javier MD  11/19/22 9305

## 2022-11-25 ENCOUNTER — HOSPITAL ENCOUNTER (OUTPATIENT)
Dept: RADIOLOGY | Facility: OTHER | Age: 62
Discharge: HOME | End: 2022-11-25
Attending: STUDENT IN AN ORGANIZED HEALTH CARE EDUCATION/TRAINING PROGRAM
Payer: MEDICAID

## 2022-11-25 ENCOUNTER — TELEPHONE (OUTPATIENT)
Dept: INTERNAL MEDICINE | Facility: CLINIC | Age: 62
End: 2022-11-25
Payer: MEDICAID

## 2022-11-25 DIAGNOSIS — Z86.19 HISTORY OF HEPATITIS C: ICD-10-CM

## 2022-11-25 DIAGNOSIS — K74.60 HEPATIC CIRRHOSIS, UNSPECIFIED HEPATIC CIRRHOSIS TYPE, UNSPECIFIED WHETHER ASCITES PRESENT: ICD-10-CM

## 2022-11-25 DIAGNOSIS — F10.20 ALCOHOL USE DISORDER, SEVERE, DEPENDENCE: Primary | ICD-10-CM

## 2022-11-25 DIAGNOSIS — Z00.00 HEALTH MAINTENANCE EXAMINATION: ICD-10-CM

## 2022-11-25 DIAGNOSIS — I10 PRIMARY HYPERTENSION: ICD-10-CM

## 2022-11-25 PROCEDURE — 76705 US ABDOMEN LIMITED_LIVER: ICD-10-PCS | Mod: 26,,, | Performed by: RADIOLOGY

## 2022-11-25 PROCEDURE — 76705 ECHO EXAM OF ABDOMEN: CPT | Mod: TC

## 2022-11-25 PROCEDURE — 76705 ECHO EXAM OF ABDOMEN: CPT | Mod: 26,,, | Performed by: RADIOLOGY

## 2022-11-25 NOTE — TELEPHONE ENCOUNTER
MD RAMONA Borges Staff  Please let patient know her liver ultrasound shows some change to the liver that is concerning for possible cirrhosis/damage. I recommend she follow up as scheduled with the hepatologist in January to evaluate this further. Thank you!

## 2022-11-26 ENCOUNTER — HOSPITAL ENCOUNTER (EMERGENCY)
Facility: OTHER | Age: 62
Discharge: HOME | End: 2022-11-27
Attending: EMERGENCY MEDICINE
Payer: MEDICAID

## 2022-11-26 VITALS
HEIGHT: 67 IN | RESPIRATION RATE: 20 BRPM | DIASTOLIC BLOOD PRESSURE: 89 MMHG | WEIGHT: 200 LBS | OXYGEN SATURATION: 98 % | TEMPERATURE: 98 F | HEART RATE: 92 BPM | BODY MASS INDEX: 31.39 KG/M2 | SYSTOLIC BLOOD PRESSURE: 142 MMHG

## 2022-11-26 DIAGNOSIS — F10.920 ALCOHOLIC INTOXICATION WITHOUT COMPLICATION: Primary | ICD-10-CM

## 2022-11-26 PROCEDURE — 99282 EMERGENCY DEPT VISIT SF MDM: CPT

## 2022-11-27 NOTE — ED PROVIDER NOTES
"Encounter Date: 11/26/2022       History     Chief Complaint   Patient presents with    Alcohol Intoxication     Reports "Im intoxicated"     Time seen by provider: 9:35 PM    This is a 62 y.o. female with PMHx of HTN, HLD, and alcohol abuse who presents with complaint of alcohol intoxication tonight. She denies any fever or cough. No other symptoms. Patient has visited this ED frequently for intoxication and was last seen on 11/19. This is the extent of the patient's complaints at this time.        The history is provided by the patient and medical records.   Review of patient's allergies indicates:  No Known Allergies  Past Medical History:   Diagnosis Date    ETOH abuse     Hyperlipemia     Hypertension     Psychiatric illness      Past Surgical History:   Procedure Laterality Date    BREAST LUMPECTOMY Right     benign    TUBAL LIGATION       Family History   Problem Relation Age of Onset    Lung cancer Mother      Social History     Tobacco Use    Smoking status: Some Days     Packs/day: 0.50     Types: Cigarettes    Smokeless tobacco: Never   Substance Use Topics    Alcohol use: Yes     Alcohol/week: 42.0 standard drinks     Types: 42 Standard drinks or equivalent per week     Comment: heavy drinker    Drug use: No     Review of Systems   Constitutional:  Negative for fever.        Positive for alcohol intoxication.   HENT:  Negative for sore throat.    Respiratory:  Negative for cough and shortness of breath.    Cardiovascular:  Negative for chest pain.   Gastrointestinal:  Negative for nausea.   Genitourinary:  Negative for dysuria.   Musculoskeletal:  Negative for back pain.   Skin:  Negative for rash.   Neurological:  Negative for weakness.   Hematological:  Does not bruise/bleed easily.   All other systems reviewed and are negative.    Physical Exam     Initial Vitals [11/26/22 2120]   BP Pulse Resp Temp SpO2   (!) 141/89 85 18 97.5 °F (36.4 °C) 99 %      MAP       --         Physical Exam    Nursing note " and vitals reviewed.  Constitutional: She appears well-developed and well-nourished. No distress.   Intoxicated, smell of alcohol.   HENT:   Head: Normocephalic and atraumatic.   Eyes: No scleral icterus.   Pulmonary/Chest: No respiratory distress.     Neurological: She is alert.   Slurred speech.       ED Course   Procedures  Labs Reviewed - No data to display       Imaging Results    None          Medications - No data to display  Medical Decision Making:   History:   Old Medical Records: I decided to obtain old medical records.  Additional MDM:   Comments: 62-year-old female with history of alcohol abuse presents with complaint being intoxicated.  She denies any other complaints.  Exam significant for alcohol intoxication but no evidence of trauma.  She was observed in the emergency department for several hours until clinically sober.  Time of discharge she was ambulatory with a steady gait and remained asymptomatic..      Scribe Attestation:   Scribe #1: I performed the above scribed service and the documentation accurately describes the services I performed. I attest to the accuracy of the note.            Physician Attestation for Scribe: I, Mariana Joseph  , reviewed documentation as scribed in my presence, which is both accurate and complete.         Clinical Impression:   Final diagnoses:  [F10.920] Alcoholic intoxication without complication (Primary)      ED Disposition Condition    Discharge Stable          ED Prescriptions    None       Follow-up Information       Follow up With Specialties Details Why Contact Info    Zainab Milian MD Internal Medicine Schedule an appointment as soon as possible for a visit  As needed [address] [facility]  [address]  [facility] [address]  [phone]               Mariana Joseph MD  11/27/22 0631

## 2022-11-27 NOTE — FIRST PROVIDER EVALUATION
"Medical screening examination initiated.  I have conducted a focused provider triage encounter, findings are as follows:    Brief history of present illness:  Alcohol intoxication. No complaints on my interview.     Vitals:    11/26/22 2120   BP: (!) 141/89   BP Location: Left arm   Patient Position: Sitting   Pulse: 85   Resp: 18   Temp: 97.5 °F (36.4 °C)   TempSrc: Oral   SpO2: 99%   Weight: 90.7 kg (200 lb)   Height: 5' 7" (1.702 m)       Pertinent physical exam:  Obvious alcohol intoxication. Cursing at staff. Unsteady gait.    Brief workup plan:  Moved to [facility] for closer monitoring.     Preliminary workup initiated; this workup will be continued and followed by the physician or advanced practice provider that is assigned to the patient when roomed.  "

## 2022-11-29 ENCOUNTER — HOSPITAL ENCOUNTER (EMERGENCY)
Facility: OTHER | Age: 62
Discharge: HOME | End: 2022-11-30
Attending: EMERGENCY MEDICINE
Payer: MEDICAID

## 2022-11-29 DIAGNOSIS — F10.920 ALCOHOLIC INTOXICATION WITHOUT COMPLICATION: Primary | ICD-10-CM

## 2022-11-29 DIAGNOSIS — F10.10 ALCOHOL ABUSE: ICD-10-CM

## 2022-11-29 PROCEDURE — 99282 EMERGENCY DEPT VISIT SF MDM: CPT

## 2022-11-30 VITALS
SYSTOLIC BLOOD PRESSURE: 114 MMHG | HEART RATE: 99 BPM | TEMPERATURE: 98 F | OXYGEN SATURATION: 97 % | DIASTOLIC BLOOD PRESSURE: 58 MMHG | RESPIRATION RATE: 16 BRPM

## 2022-11-30 NOTE — ED PROVIDER NOTES
Encounter Date: 11/29/2022       History     Chief Complaint   Patient presents with    Alcohol Intoxication     62-year-old female with history of alcohol abuse presents intoxicated.  She denies any complaints at this time other than being drunk.  She denies any drug use.    Review of patient's allergies indicates:  No Known Allergies  Past Medical History:   Diagnosis Date    ETOH abuse     Hyperlipemia     Hypertension     Psychiatric illness      Past Surgical History:   Procedure Laterality Date    BREAST LUMPECTOMY Right     benign    TUBAL LIGATION       Family History   Problem Relation Age of Onset    Lung cancer Mother      Social History     Tobacco Use    Smoking status: Some Days     Packs/day: 0.50     Types: Cigarettes    Smokeless tobacco: Never   Substance Use Topics    Alcohol use: Yes     Alcohol/week: 42.0 standard drinks     Types: 42 Standard drinks or equivalent per week     Comment: heavy drinker    Drug use: No     Review of Systems   Unable to perform ROS: Mental status change     Physical Exam     Initial Vitals [11/29/22 2124]   BP Pulse Resp Temp SpO2   (!) 152/91 91 16 97 °F (36.1 °C) 97 %      MAP       --         Physical Exam    Nursing note and vitals reviewed.  Constitutional: She appears well-developed and well-nourished. She is not diaphoretic. No distress.   Smell of alcohol, slurred speech   HENT:   Head: Normocephalic and atraumatic.   Right Ear: External ear normal.   Left Ear: External ear normal.   Eyes: Conjunctivae and EOM are normal. Right eye exhibits no discharge. Left eye exhibits no discharge. No scleral icterus.   Neck: Neck supple. No tracheal deviation present.   Cardiovascular:  Normal rate, regular rhythm and normal heart sounds.     Exam reveals no friction rub.       No murmur heard.  Pulmonary/Chest: Breath sounds normal. No stridor. No respiratory distress. She has no wheezes. She has no rhonchi. She has no rales.   Musculoskeletal:         General: Normal  range of motion.      Cervical back: Neck supple.      Comments: Unsteady gait     Neurological: She is alert. She has normal strength. GCS score is 15. GCS eye subscore is 4. GCS verbal subscore is 5. GCS motor subscore is 6.   Oriented to person and place   Skin: Skin is warm.       ED Course   Procedures  Labs Reviewed - No data to display       Imaging Results    None          Medications - No data to display     Additional MDM:   Comments: 62-year-old female with history of alcohol abuse presents intoxicated without any other complaints.  Vital signs within normal limits.  Physical exam significant only for intoxication with no signs of trauma.    5:17 AM  The patient has been observed in the emergency department for 8 hours.  She is now clinically sober, ambulating with a steady gait and appropriate for discharge..                      Clinical Impression:   Final diagnoses:  [F10.920] Alcoholic intoxication without complication (Primary)  [F10.10] Alcohol abuse      ED Disposition Condition    Discharge Stable          ED Prescriptions    None       Follow-up Information       Follow up With Specialties Details Why Contact Info    Zainab Milian MD Internal Medicine   [address] [address]  [phone]               Mariana Joseph MD  11/30/22 0518

## 2022-11-30 NOTE — ED TRIAGE NOTES
Pt to ER with complaint of ETOH intoxication. Pt reports that she drank and needs a place to sober up. Pt in no acute distress at this time with chest noted to equal rise and fall. Pt alert.

## 2022-12-01 NOTE — TELEPHONE ENCOUNTER
Patient was unavailable please inform patient of message below. Routed to Dr. Milian for third attempt. Thanks.

## 2022-12-07 ENCOUNTER — HOSPITAL ENCOUNTER (EMERGENCY)
Facility: OTHER | Age: 62
Discharge: HOME | End: 2022-12-07
Attending: EMERGENCY MEDICINE
Payer: MEDICAID

## 2022-12-07 VITALS
HEART RATE: 93 BPM | RESPIRATION RATE: 16 BRPM | DIASTOLIC BLOOD PRESSURE: 85 MMHG | SYSTOLIC BLOOD PRESSURE: 172 MMHG | OXYGEN SATURATION: 99 % | TEMPERATURE: 98 F

## 2022-12-07 DIAGNOSIS — T73.0XXA HUNGRY, INITIAL ENCOUNTER: ICD-10-CM

## 2022-12-07 DIAGNOSIS — F10.920 ALCOHOLIC INTOXICATION WITHOUT COMPLICATION: Primary | ICD-10-CM

## 2022-12-07 PROCEDURE — 99283 EMERGENCY DEPT VISIT LOW MDM: CPT

## 2022-12-07 NOTE — DISCHARGE INSTRUCTIONS
Mrs. Gottlieb,    Thank you for letting me care for you today! It was nice meeting you, and I hope you feel better soon.   If you would like access to your chart and what was done today please utilize the [facility]   Please come back to [facility] for all of your future medical needs.    Our goal in the emergency department is to always give you outstanding care and exceptional service. You may receive a survey by mail or e-mail in the next week regarding your experience in our ED. We would greatly appreciate you completing and returning the survey. Your feedback provides us with a way to recognize our staff who give very good care and it helps us learn how to improve when your experience was below our aspiration of excellence.     Sincerely,    Francis Miguel MD  Board Certified Emergency Physician

## 2022-12-07 NOTE — Clinical Note
"Marianne Ruizfletcher Gottlieb was seen and treated in our emergency department on 12/7/2022.  She may return to work on 12/08/2022.       If you have any questions or concerns, please don't hesitate to call.      Francis Miguel MD"

## 2022-12-08 ENCOUNTER — PATIENT OUTREACH (OUTPATIENT)
Dept: EMERGENCY MEDICINE | Facility: OTHER | Age: 62
End: 2022-12-08

## 2022-12-08 NOTE — ED PROVIDER NOTES
Encounter Date: 12/7/2022       History     Chief Complaint   Patient presents with    Alcohol Intoxication     Pt activated EMS for ETOH intoxication; no other complaints at this time     62-year-old female with a history of alcoholism in the past who presents for evaluation of having drank alcohol today in being intoxicated.  She denies any complaints at this time, says she needs something need.  Nothing makes the symptoms any better, nothing makes it any worse.  She denies any desire to harm herself or anyone else at this time.    Review of patient's allergies indicates:  No Known Allergies  Past Medical History:   Diagnosis Date    ETOH abuse     Hyperlipemia     Hypertension     Psychiatric illness      Past Surgical History:   Procedure Laterality Date    BREAST LUMPECTOMY Right     benign    TUBAL LIGATION       Family History   Problem Relation Age of Onset    Lung cancer Mother      Social History     Tobacco Use    Smoking status: Some Days     Packs/day: 0.50     Types: Cigarettes    Smokeless tobacco: Never   Substance Use Topics    Alcohol use: Yes     Alcohol/week: 42.0 standard drinks     Types: 42 Standard drinks or equivalent per week     Comment: heavy drinker    Drug use: No     Review of Systems  Constitutional-no fever  HEENT-no congestion  Eyes-no redness  Respiratory-no shortness of breath  Cardio-no chest pain  GI-no abdominal pain  Endocrine-no cold intolerance  -no difficulty urinating  MSK-no myalgias  Skin-no rashes  Allergy-no environmental allergy  Neurologic-, no headache  Hematology-no swollen nodes  Behavioral-no confusion  Physical Exam     Initial Vitals   BP Pulse Resp Temp SpO2   12/07/22 1156 12/07/22 1156 12/07/22 1156 12/07/22 1428 12/07/22 1156   (!) 150/66 (!) 114 18 97.5 °F (36.4 °C) 99 %      MAP       --                Physical Exam  Constitutional:  Loud 62-year-old female in no obvious distress  Eyes: Conjunctivae normal.  ENT       Head: Normocephalic, atraumatic.        Nose: Normal external appearance        Mouth/Throat: no strigulous respirations   Hematological/Lymphatic/Immunilogical: no visible lymphadenopathy   Cardiovascular: Normal rate,   Respiratory: Normal respiratory effort.   Gastrointestinal: non distended   Musculoskeletal: Normal range of motion in all extremities. No obvious deformities or swelling.  Neurologic: Alert, oriented. Normal speech and language. No gross focal neurologic deficits are appreciated.  Skin: Skin is warm, dry. No rash noted.  Psychiatric: Mood and affect are normal.   ED Course   Procedures  Labs Reviewed - No data to display       Imaging Results    None          Medications - No data to display  Medical Decision Making:   History:   Old Medical Records: I decided to obtain old medical records.  Old Records Summarized: records from clinic visits and records from previous admission(s).  Differential Diagnosis:   Alcohol intoxication, polysubstance use, malingering  ED Management:  Contacted patient's daughter, her emergency contact her notes that this is an ongoing problem for patient.  Has no further concerns.  She is in [facility] and unable to come get her mother.  After a period of time in the emergency department she was able tolerate orals, was ambulatory without any assistance, appeared sober clinically.  Will plan for discharge at this time encouraged returning case of worsening symptoms.                        Clinical Impression:   Final diagnoses:  [F10.920] Alcoholic intoxication without complication (Primary)  [T73.0XXA] Hungry, initial encounter        ED Disposition Condition    Discharge Stable          ED Prescriptions    None       Follow-up Information       Follow up With Specialties Details Why Contact Info    Zainab Milian MD Internal Medicine Call today If symptoms worsen, For a follow up visit about today [address]  [address]  [facility] [address]  [phone]               Francis CANADA  MD Myriam  12/07/22 1947

## 2023-01-04 ENCOUNTER — PATIENT OUTREACH (OUTPATIENT)
Dept: ADMINISTRATIVE | Facility: OTHER | Age: 63
End: 2023-01-04
Payer: MEDICAID

## 2023-01-08 ENCOUNTER — HOSPITAL ENCOUNTER (EMERGENCY)
Facility: OTHER | Age: 63
Discharge: HOME | End: 2023-01-09
Attending: EMERGENCY MEDICINE
Payer: MEDICAID

## 2023-01-08 DIAGNOSIS — F10.920 ACUTE ALCOHOLIC INTOXICATION WITHOUT COMPLICATION: Primary | ICD-10-CM

## 2023-01-08 DIAGNOSIS — F10.10 ALCOHOL ABUSE: ICD-10-CM

## 2023-01-08 PROCEDURE — 96372 THER/PROPH/DIAG INJ SC/IM: CPT | Performed by: EMERGENCY MEDICINE

## 2023-01-08 PROCEDURE — 99284 EMERGENCY DEPT VISIT MOD MDM: CPT

## 2023-01-08 PROCEDURE — 63600175 PHARM REV CODE 636 W HCPCS: Performed by: EMERGENCY MEDICINE

## 2023-01-08 RX ORDER — LORAZEPAM 2 MG/ML
2 INJECTION INTRAMUSCULAR
Status: COMPLETED | OUTPATIENT
Start: 2023-01-08 | End: 2023-01-08

## 2023-01-08 RX ADMIN — LORAZEPAM 2 MG: 2 INJECTION INTRAMUSCULAR; INTRAVENOUS at 09:01

## 2023-01-09 VITALS
OXYGEN SATURATION: 98 % | TEMPERATURE: 98 F | RESPIRATION RATE: 18 BRPM | DIASTOLIC BLOOD PRESSURE: 84 MMHG | SYSTOLIC BLOOD PRESSURE: 136 MMHG | HEART RATE: 79 BPM

## 2023-01-09 RX ORDER — ONDANSETRON 2 MG/ML
4 INJECTION INTRAMUSCULAR; INTRAVENOUS
Status: DISCONTINUED | OUTPATIENT
Start: 2023-01-09 | End: 2023-01-09

## 2023-01-09 RX ORDER — ONDANSETRON 4 MG/1
4 TABLET, ORALLY DISINTEGRATING ORAL
Status: DISCONTINUED | OUTPATIENT
Start: 2023-01-09 | End: 2023-01-09 | Stop reason: HOSPADM

## 2023-01-09 NOTE — ED PROVIDER NOTES
"Encounter Date: 1/8/2023    SCRIBE #1 NOTE: I, Sai Owens, am scribing for, and in the presence of,  Valdemar Barnes II, MD.     History     Chief Complaint   Patient presents with    Alcohol Intoxication     Pt arrives via EMS after calling for "I'm intoxicated and I'm scared at my community." Denies any other complaints and pain at this time.     Time seen by provider: 8:45 PM    This is a 62 y.o. female who presents via EMS with alcohol intoxication. The patient states she felt uncomfortable and scared where she was staying. She denies any complaints. This is the extent of the patient's complaints at this time.    The history is provided by the patient and the EMS personnel.   Review of patient's allergies indicates:  No Known Allergies  Past Medical History:   Diagnosis Date    ETOH abuse     Hyperlipemia     Hypertension     Psychiatric illness      Past Surgical History:   Procedure Laterality Date    BREAST LUMPECTOMY Right     benign    TUBAL LIGATION       Family History   Problem Relation Age of Onset    Lung cancer Mother      Social History     Tobacco Use    Smoking status: Some Days     Packs/day: 0.50     Types: Cigarettes    Smokeless tobacco: Never   Substance Use Topics    Alcohol use: Yes     Alcohol/week: 42.0 standard drinks     Types: 42 Standard drinks or equivalent per week     Comment: heavy drinker    Drug use: No     Review of Systems   Constitutional:  Negative for activity change, appetite change, chills, diaphoresis and fever.   HENT:  Negative for congestion, sore throat and trouble swallowing.    Eyes:  Negative for photophobia and visual disturbance.   Respiratory:  Negative for cough, chest tightness and shortness of breath.    Cardiovascular:  Negative for chest pain.   Gastrointestinal:  Negative for abdominal pain, nausea and vomiting.   Endocrine: Negative for polydipsia and polyuria.   Genitourinary:  Negative for difficulty urinating and flank pain. "   Musculoskeletal:  Negative for back pain and neck pain.   Skin:  Negative for rash.   Neurological:  Negative for weakness and headaches.   Psychiatric/Behavioral:  Negative for confusion.      Physical Exam     Initial Vitals [01/08/23 2043]   BP Pulse Resp Temp SpO2   138/81 91 16 97.9 °F (36.6 °C) 100 %      MAP       --         Physical Exam    Nursing note and vitals reviewed.  Constitutional: She appears well-developed and well-nourished.   Disheveled. Odor of alcohol. No distress.    HENT:   Head: Normocephalic and atraumatic.   Eyes: Conjunctivae are normal.   Pupils 4 mm.    Neck: Neck supple.   No meningismus.    Cardiovascular:  Normal rate, regular rhythm and normal heart sounds.     Exam reveals no gallop and no friction rub.       No murmur heard.  Pulmonary/Chest: Breath sounds normal. No respiratory distress. She has no wheezes. She has no rhonchi. She has no rales.   Musculoskeletal:         General: No edema.      Cervical back: Neck supple.     Neurological: She is alert and oriented to person, place, and time.   Normal strength in all extremities. No gross deficits. Gait not assessed.    Skin: Skin is warm and dry.   Psychiatric: She has a normal mood and affect.       ED Course   Procedures  Labs Reviewed - No data to display       Imaging Results    None          Medications   LORazepam injection 2 mg (2 mg Intramuscular Given 1/8/23 2147)     Medical Decision Making:   History:   Old Medical Records: I decided to obtain old medical records.     Patient with a history of alcohol abuse, frequent visits to the emergency department for the same, presents by EMS due to alcohol intoxication.  No trauma or acute complaints.  On exam vital signs are stable, no focal deficits.  Patient was very labile frequently yelling cursing, and was continuing to be disruptive, tried to get out of the stretcher despite numerous attempts at redirection by security, nurses.  Therefore given Ativan for chemical  sedation.  Patient is currently resting comfortably after this and 2 sandwiches.  Care is turned over at midnight pending further observation and return to clinical sobriety       Scribe Attestation:   Scribe #1: I performed the above scribed service and the documentation accurately describes the services I performed. I attest to the accuracy of the note.          Physician Attestation for Scribe: I, TL, reviewed documentation as scribed in my presence, which is both accurate and complete.           Clinical Impression:   Final diagnoses:  [F10.920] Acute alcoholic intoxication without complication (Primary)  [F10.10] Alcohol abuse               Valdemar Barnes II, MD  01/08/23 3731

## 2023-01-09 NOTE — ED TRIAGE NOTES
Pt to ER with complaint of ETOH intoxication. Pt denies any complaints other than ETOH intoxication at this time. Pt reports that she needs a place to rest until she ran up because she doesn't want to fall while walking up the stairs at home. Pt is in no acute distress with chest noted to equal rise and fall. Pt AAOx4. Pt is loud and talkative with slurred speech. Will continue to monitor for changes in pt condition and new MD orders, This nurse provides patient with a sandwich and apple juice. Pt placed in mohamud bed next to the nurse's station.

## 2023-01-11 NOTE — PROGRESS NOTES
CHW - Initial Contact    This Community Health Worker spoke with patient's daughter/caregiver via telephone today.    Pt identified barriers of most importance are: patient's daughter/caregiver handles all her mother/patient affairs, and she also speaks on her mother behalf because of mother's condition. Patient's daughter/caregiver would like her mother enrolled, but she wants some time to think of what her mother needs most. This Community Health Worker will call her next week on an agreed date to complete the SDOH questionnaire and find available resources for the patient.   Referrals to community agencies completed with patient/caregiver consent outside of [facility] include: None at this time.  [facility]  - no: none at this time.  Support and Services: pt is supported by her daughter/caregiver.  Other information discussed the patient needs / wants help with: none at this time.   Follow up required: yes, follow up with patient's daughter/caregiver to complete the SDOH questionnaire and find resources.  Follow-up Outreach - Due: 1/19/2023

## 2023-01-14 ENCOUNTER — HOSPITAL ENCOUNTER (EMERGENCY)
Facility: OTHER | Age: 63
Discharge: HOME | End: 2023-01-14
Attending: EMERGENCY MEDICINE
Payer: MEDICAID

## 2023-01-14 VITALS
TEMPERATURE: 98 F | OXYGEN SATURATION: 100 % | HEART RATE: 80 BPM | DIASTOLIC BLOOD PRESSURE: 89 MMHG | SYSTOLIC BLOOD PRESSURE: 138 MMHG | RESPIRATION RATE: 16 BRPM

## 2023-01-14 DIAGNOSIS — F10.920 ALCOHOLIC INTOXICATION WITHOUT COMPLICATION: Primary | ICD-10-CM

## 2023-01-14 PROCEDURE — 99283 EMERGENCY DEPT VISIT LOW MDM: CPT

## 2023-01-15 NOTE — ED PROVIDER NOTES
Encounter Date: 1/14/2023    SCRIBE #1 NOTE: I, Awa Wise, am scribing for, and in the presence of,  Maria Fernanda Javier MD.     History     Chief Complaint   Patient presents with    Alcohol Intoxication     Pt brought in by EMS after drinking all day     Time seen by provider: 9:25 PM    Marianne Gottlieb is a 62 y.o. female, with a PMHx of HTN and EtOH abuse, who presents to the ED with EtOH intoxication. The patient reports she consumed EtOH prior to arrival.  She states that she is anxious and does not want to stay at home.  She denies any symptoms. This is the extent of the patient's complaints today in the Emergency Department.     The history is provided by the patient.   Review of patient's allergies indicates:  No Known Allergies  Past Medical History:   Diagnosis Date    ETOH abuse     Hyperlipemia     Hypertension     Psychiatric illness      Past Surgical History:   Procedure Laterality Date    BREAST LUMPECTOMY Right     benign    TUBAL LIGATION       Family History   Problem Relation Age of Onset    Lung cancer Mother      Social History     Tobacco Use    Smoking status: Some Days     Packs/day: 0.50     Types: Cigarettes    Smokeless tobacco: Never   Substance Use Topics    Alcohol use: Yes     Alcohol/week: 42.0 standard drinks     Types: 42 Standard drinks or equivalent per week     Comment: heavy drinker    Drug use: No     Review of Systems   Constitutional:  Negative for chills and fever.   HENT:  Negative for congestion and rhinorrhea.    Respiratory:  Negative for chest tightness and shortness of breath.    Cardiovascular:  Negative for chest pain and palpitations.   Gastrointestinal:  Negative for abdominal pain, diarrhea, nausea and vomiting.   Genitourinary:  Negative for dysuria and flank pain.   Musculoskeletal:  Negative for back pain and neck pain.   Skin:  Negative for color change and wound.   Neurological:  Negative for dizziness and headaches.     Physical Exam     Initial  Vitals [01/14/23 2057]   BP Pulse Resp Temp SpO2   (!) 142/99 87 16 98 °F (36.7 °C) 100 %      MAP       --         Physical Exam    Nursing note and vitals reviewed.  Constitutional: She appears well-developed and well-nourished.   Intoxicated.   HENT:   Head: Normocephalic and atraumatic.   Eyes: Conjunctivae are normal.   Pulmonary/Chest: No respiratory distress.   Musculoskeletal:         General: Normal range of motion.     Neurological: She is alert and oriented to person, place, and time.   Ambulatory with steady gait.   Skin: Skin is warm and dry. Capillary refill takes less than 2 seconds.       ED Course   Procedures  Labs Reviewed - No data to display       Imaging Results    None          Medications - No data to display  Medical Decision Making:   History:   Old Medical Records: I decided to obtain old medical records.  Old Records Summarized: other records and records from another hospital.  Initial Assessment:   9:25PM:  Patient is a 62-year-old female who presents to the emerge department with alcohol intoxication.  Patient has presented to this ED multiple times for similar symptoms.  She is protecting her airway, awake.  Will continue to follow and reassess.     10:32 PM:  Patient ready to go home.  She has been ambulating with no issues.  I do not feel that further work up in the ED is indicated at this time.  I counseled pt regarding supportive care measures.  I have discussed with the pt ED return warnings and need for close PCP f/u.  Pt agreeable to plan and all questions answered.  I feel that pt is stable for discharge and management as an outpatient and no further intervention is needed at this time.  Pt is comfortable returning to the ED if needed.  Will DC home in stable condition.         Scribe Attestation:   Scribe #1: I performed the above scribed service and the documentation accurately describes the services I performed. I attest to the accuracy of the note.            Physician  Attestation for Scribe: I, Maria Fernanda Javier, reviewed documentation as scribed in my presence, which is both accurate and complete.       Clinical Impression:   Final diagnoses:  [F10.920] Alcoholic intoxication without complication (Primary)        ED Disposition Condition    Discharge Stable          ED Prescriptions    None       Follow-up Information       Follow up With Specialties Details Why Contact Info    Zainab Milian MD Internal Medicine   [address] [address]  [phone]               Maria Fernanda Javier MD  01/14/23 1359

## 2023-01-17 ENCOUNTER — HOSPITAL ENCOUNTER (OUTPATIENT)
Dept: RADIOLOGY | Facility: OTHER | Age: 63
Discharge: HOME | End: 2023-01-17
Attending: STUDENT IN AN ORGANIZED HEALTH CARE EDUCATION/TRAINING PROGRAM
Payer: MEDICAID

## 2023-01-17 ENCOUNTER — TELEPHONE (OUTPATIENT)
Dept: INTERNAL MEDICINE | Facility: CLINIC | Age: 63
End: 2023-01-17
Payer: MEDICAID

## 2023-01-17 DIAGNOSIS — Z12.31 SCREENING MAMMOGRAM FOR BREAST CANCER: ICD-10-CM

## 2023-01-17 PROCEDURE — 77067 MAMMO DIGITAL SCREENING BILAT WITH TOMO: ICD-10-PCS | Mod: 26,,, | Performed by: RADIOLOGY

## 2023-01-17 PROCEDURE — 77063 MAMMO DIGITAL SCREENING BILAT WITH TOMO: ICD-10-PCS | Mod: 26,,, | Performed by: RADIOLOGY

## 2023-01-17 PROCEDURE — 77063 BREAST TOMOSYNTHESIS BI: CPT | Mod: 26,,, | Performed by: RADIOLOGY

## 2023-01-17 PROCEDURE — 77067 SCR MAMMO BI INCL CAD: CPT | Mod: TC

## 2023-01-17 PROCEDURE — 77067 SCR MAMMO BI INCL CAD: CPT | Mod: 26,,, | Performed by: RADIOLOGY

## 2023-01-17 NOTE — TELEPHONE ENCOUNTER
----- Message from Neema Sarkar sent at 1/17/2023  7:49 AM CST -----  Regarding: Patient call back  Who called: ELODIA VELIZ [[id number]]    What is the request in detail: Patient is requesting a call back. She states she would lie recommendations for a dentist. She would like to further discuss.   Please advise.    Can the clinic reply by [facility]? No    Best call back number: [phone]    Additional Information: N/A

## 2023-01-18 ENCOUNTER — OFFICE VISIT (OUTPATIENT)
Dept: HEPATOLOGY | Facility: CLINIC | Age: 63
End: 2023-01-18
Payer: MEDICAID

## 2023-01-18 ENCOUNTER — LAB VISIT (OUTPATIENT)
Dept: LAB | Facility: HOSPITAL | Age: 63
End: 2023-01-18
Attending: INTERNAL MEDICINE
Payer: MEDICAID

## 2023-01-18 VITALS
HEIGHT: 67 IN | BODY MASS INDEX: 28.16 KG/M2 | HEART RATE: 64 BPM | OXYGEN SATURATION: 98 % | TEMPERATURE: 99 F | SYSTOLIC BLOOD PRESSURE: 127 MMHG | RESPIRATION RATE: 19 BRPM | WEIGHT: 179.38 LBS | DIASTOLIC BLOOD PRESSURE: 73 MMHG

## 2023-01-18 DIAGNOSIS — Z86.19 HISTORY OF HEPATITIS C: ICD-10-CM

## 2023-01-18 DIAGNOSIS — F10.90 HEAVY ALCOHOL CONSUMPTION: ICD-10-CM

## 2023-01-18 DIAGNOSIS — F10.20 ALCOHOL USE DISORDER, SEVERE, DEPENDENCE: Primary | ICD-10-CM

## 2023-01-18 DIAGNOSIS — B19.20 HEPATITIS C VIRUS INFECTION WITHOUT HEPATIC COMA, UNSPECIFIED CHRONICITY: Primary | ICD-10-CM

## 2023-01-18 LAB
AFP SERPL-MCNC: 6.1 NG/ML (ref 0–8.4)
ALBUMIN SERPL BCP-MCNC: 4.3 G/DL (ref 3.5–5.2)
ALP SERPL-CCNC: 48 U/L (ref 55–135)
ALT SERPL W/O P-5'-P-CCNC: 16 U/L (ref 10–44)
ANION GAP SERPL CALC-SCNC: 11 MMOL/L (ref 8–16)
AST SERPL-CCNC: 15 U/L (ref 10–40)
BASOPHILS # BLD AUTO: 0.03 K/UL (ref 0–0.2)
BASOPHILS NFR BLD: 0.5 % (ref 0–1.9)
BILIRUB SERPL-MCNC: 0.4 MG/DL (ref 0.1–1)
BUN SERPL-MCNC: 16 MG/DL (ref 8–23)
CALCIUM SERPL-MCNC: 9.9 MG/DL (ref 8.7–10.5)
CHLORIDE SERPL-SCNC: 107 MMOL/L (ref 95–110)
CO2 SERPL-SCNC: 24 MMOL/L (ref 23–29)
CREAT SERPL-MCNC: 0.9 MG/DL (ref 0.5–1.4)
DIFFERENTIAL METHOD: ABNORMAL
EOSINOPHIL # BLD AUTO: 0.3 K/UL (ref 0–0.5)
EOSINOPHIL NFR BLD: 5.5 % (ref 0–8)
ERYTHROCYTE [DISTWIDTH] IN BLOOD BY AUTOMATED COUNT: 13.5 % (ref 11.5–14.5)
EST. GFR  (NO RACE VARIABLE): >60 ML/MIN/1.73 M^2
ETHANOL SERPL-MCNC: <10 MG/DL
FERRITIN SERPL-MCNC: 277 NG/ML (ref 20–300)
GLUCOSE SERPL-MCNC: 84 MG/DL (ref 70–110)
HAV IGG SER QL IA: REACTIVE
HBV CORE AB SERPL QL IA: NORMAL
HBV SURFACE AB SER-ACNC: <3 MIU/ML
HBV SURFACE AB SER-ACNC: NORMAL M[IU]/ML
HBV SURFACE AG SERPL QL IA: NORMAL
HCT VFR BLD AUTO: 45.3 % (ref 37–48.5)
HGB BLD-MCNC: 14 G/DL (ref 12–16)
HIV 1+2 AB+HIV1 P24 AG SERPL QL IA: NORMAL
IMM GRANULOCYTES # BLD AUTO: 0.01 K/UL (ref 0–0.04)
IMM GRANULOCYTES NFR BLD AUTO: 0.2 % (ref 0–0.5)
INR PPP: 1.1 (ref 0.8–1.2)
IRON SERPL-MCNC: 102 UG/DL (ref 30–160)
LYMPHOCYTES # BLD AUTO: 2.2 K/UL (ref 1–4.8)
LYMPHOCYTES NFR BLD: 39.4 % (ref 18–48)
MCH RBC QN AUTO: 27 PG (ref 27–31)
MCHC RBC AUTO-ENTMCNC: 30.9 G/DL (ref 32–36)
MCV RBC AUTO: 88 FL (ref 82–98)
MONOCYTES # BLD AUTO: 0.4 K/UL (ref 0.3–1)
MONOCYTES NFR BLD: 7.7 % (ref 4–15)
NEUTROPHILS # BLD AUTO: 2.6 K/UL (ref 1.8–7.7)
NEUTROPHILS NFR BLD: 46.7 % (ref 38–73)
NRBC BLD-RTO: 0 /100 WBC
PLATELET # BLD AUTO: 224 K/UL (ref 150–450)
PMV BLD AUTO: 11 FL (ref 9.2–12.9)
POTASSIUM SERPL-SCNC: 4.4 MMOL/L (ref 3.5–5.1)
PROT SERPL-MCNC: 8 G/DL (ref 6–8.4)
PROTHROMBIN TIME: 11.7 SEC (ref 9–12.5)
RBC # BLD AUTO: 5.18 M/UL (ref 4–5.4)
SATURATED IRON: 26 % (ref 20–50)
SODIUM SERPL-SCNC: 142 MMOL/L (ref 136–145)
TOTAL IRON BINDING CAPACITY: 391 UG/DL (ref 250–450)
TRANSFERRIN SERPL-MCNC: 264 MG/DL (ref 200–375)
WBC # BLD AUTO: 5.61 K/UL (ref 3.9–12.7)

## 2023-01-18 PROCEDURE — 3078F PR MOST RECENT DIASTOLIC BLOOD PRESSURE < 80 MM HG: ICD-10-PCS | Mod: CPTII,,, | Performed by: INTERNAL MEDICINE

## 2023-01-18 PROCEDURE — 86706 HEP B SURFACE ANTIBODY: CPT | Performed by: INTERNAL MEDICINE

## 2023-01-18 PROCEDURE — 1160F PR REVIEW ALL MEDS BY PRESCRIBER/CLIN PHARMACIST DOCUMENTED: ICD-10-PCS | Mod: CPTII,,, | Performed by: INTERNAL MEDICINE

## 2023-01-18 PROCEDURE — 80053 COMPREHEN METABOLIC PANEL: CPT | Performed by: INTERNAL MEDICINE

## 2023-01-18 PROCEDURE — 86704 HEP B CORE ANTIBODY TOTAL: CPT | Performed by: INTERNAL MEDICINE

## 2023-01-18 PROCEDURE — 99999 PR PBB SHADOW E&M-EST. PATIENT-LVL IV: ICD-10-PCS | Mod: PBBFAC,,, | Performed by: INTERNAL MEDICINE

## 2023-01-18 PROCEDURE — 82105 ALPHA-FETOPROTEIN SERUM: CPT | Performed by: INTERNAL MEDICINE

## 2023-01-18 PROCEDURE — 99214 OFFICE O/P EST MOD 30 MIN: CPT | Mod: PBBFAC,PN | Performed by: INTERNAL MEDICINE

## 2023-01-18 PROCEDURE — 86038 ANTINUCLEAR ANTIBODIES: CPT | Performed by: INTERNAL MEDICINE

## 2023-01-18 PROCEDURE — 1159F PR MEDICATION LIST DOCUMENTED IN MEDICAL RECORD: ICD-10-PCS | Mod: CPTII,,, | Performed by: INTERNAL MEDICINE

## 2023-01-18 PROCEDURE — 99999 PR PBB SHADOW E&M-EST. PATIENT-LVL IV: CPT | Mod: PBBFAC,,, | Performed by: INTERNAL MEDICINE

## 2023-01-18 PROCEDURE — 80321 ALCOHOLS BIOMARKERS 1OR 2: CPT | Performed by: INTERNAL MEDICINE

## 2023-01-18 PROCEDURE — 84466 ASSAY OF TRANSFERRIN: CPT | Performed by: INTERNAL MEDICINE

## 2023-01-18 PROCEDURE — 3074F SYST BP LT 130 MM HG: CPT | Mod: CPTII,,, | Performed by: INTERNAL MEDICINE

## 2023-01-18 PROCEDURE — 1160F RVW MEDS BY RX/DR IN RCRD: CPT | Mod: CPTII,,, | Performed by: INTERNAL MEDICINE

## 2023-01-18 PROCEDURE — 3008F PR BODY MASS INDEX (BMI) DOCUMENTED: ICD-10-PCS | Mod: CPTII,,, | Performed by: INTERNAL MEDICINE

## 2023-01-18 PROCEDURE — 99204 OFFICE O/P NEW MOD 45 MIN: CPT | Mod: S$PBB,,, | Performed by: INTERNAL MEDICINE

## 2023-01-18 PROCEDURE — 87522 HEPATITIS C REVRS TRNSCRPJ: CPT | Performed by: INTERNAL MEDICINE

## 2023-01-18 PROCEDURE — 86790 VIRUS ANTIBODY NOS: CPT | Performed by: INTERNAL MEDICINE

## 2023-01-18 PROCEDURE — 85610 PROTHROMBIN TIME: CPT | Performed by: INTERNAL MEDICINE

## 2023-01-18 PROCEDURE — 85025 COMPLETE CBC W/AUTO DIFF WBC: CPT | Performed by: INTERNAL MEDICINE

## 2023-01-18 PROCEDURE — 87340 HEPATITIS B SURFACE AG IA: CPT | Performed by: INTERNAL MEDICINE

## 2023-01-18 PROCEDURE — 3008F BODY MASS INDEX DOCD: CPT | Mod: CPTII,,, | Performed by: INTERNAL MEDICINE

## 2023-01-18 PROCEDURE — 3074F PR MOST RECENT SYSTOLIC BLOOD PRESSURE < 130 MM HG: ICD-10-PCS | Mod: CPTII,,, | Performed by: INTERNAL MEDICINE

## 2023-01-18 PROCEDURE — 3078F DIAST BP <80 MM HG: CPT | Mod: CPTII,,, | Performed by: INTERNAL MEDICINE

## 2023-01-18 PROCEDURE — 87389 HIV-1 AG W/HIV-1&-2 AB AG IA: CPT | Performed by: INTERNAL MEDICINE

## 2023-01-18 PROCEDURE — 99204 PR OFFICE/OUTPT VISIT, NEW, LEVL IV, 45-59 MIN: ICD-10-PCS | Mod: S$PBB,,, | Performed by: INTERNAL MEDICINE

## 2023-01-18 PROCEDURE — 82077 ASSAY SPEC XCP UR&BREATH IA: CPT | Performed by: INTERNAL MEDICINE

## 2023-01-18 PROCEDURE — 1159F MED LIST DOCD IN RCRD: CPT | Mod: CPTII,,, | Performed by: INTERNAL MEDICINE

## 2023-01-18 PROCEDURE — 36415 COLL VENOUS BLD VENIPUNCTURE: CPT | Mod: PN | Performed by: INTERNAL MEDICINE

## 2023-01-18 PROCEDURE — 80307 DRUG TEST PRSMV CHEM ANLYZR: CPT | Performed by: INTERNAL MEDICINE

## 2023-01-18 PROCEDURE — 82728 ASSAY OF FERRITIN: CPT | Performed by: INTERNAL MEDICINE

## 2023-01-18 NOTE — PROGRESS NOTES
HEPATOLOGY CONSULTATION    Referring Physician: Zainab Milian MD   Current Corresponding Physician: Zainab Milian MD     Reason for Consultation: Consultation for evaluation of Cirrhosis    History of Present Illness: Marianne Gottlieb is a 62 y.o. female who presents for evaluation of a hx of hepatitis C.    --tx for HCV 2 years ago with [facility]  --stopped drinking alcohol- stopped x 2 years - now drinking again (started drinking since age 12)    Abdo US 11/25/22: Liver: Mildly enlarged measuring 17.5 cm. Mildly coarsened liver parenchymal echotexture.  No focal hepatic lesions. Spleen normal size and no ascites    Labs 11/16/22: ALT 26, AST 19, Tbil 0.4, ALKP 50, plts 211    2020- elastography at [facility] [place]- no cirrhosis    The patient denies any symptoms of decompensated cirrhosis, including no ascites or edema, cognitive problems that would suggest hepatic encephalopathy, or GI bleeding from varices.     Chief Complaint   Patient presents with    Cirrhosis       Past Medical History:   Diagnosis Date    ETOH abuse     Hyperlipemia     Hypertension     Psychiatric illness      Outpatient Encounter Medications as of 1/18/2023   Medication Sig Dispense Refill    aspirin 81 MG Chew Take 81 mg by mouth once daily.      BIOTIN ORAL Take 5,000 mcg by mouth daily 2 hours after breakfast.      hydroCHLOROthiazide (HYDRODIURIL) 25 MG tablet Take 1 tablet (25 mg total) by mouth once daily. 90 tablet 1    losartan (COZAAR) 100 MG tablet Take 1 tablet (100 mg total) by mouth once daily. 90 tablet 1    pravastatin (PRAVACHOL) 40 MG tablet Take 1 tablet (40 mg total) by mouth once daily. 90 tablet 1    fluticasone propionate (FLONASE) 50 mcg/actuation nasal spray 1 spray (50 mcg total) by Each Nostril route 2 (two) times daily as needed. (Patient not taking: Reported on 1/18/2023) 15 g 0     No facility-administered encounter medications on file as of 1/18/2023.     Review of patient's allergies indicates:  No Known  Allergies  Family History   Problem Relation Age of Onset    Lung cancer Mother        Social History     Socioeconomic History    Marital status: Single   Tobacco Use    Smoking status: Some Days     Packs/day: 0.50     Types: Cigarettes    Smokeless tobacco: Never   Substance and Sexual Activity    Alcohol use: Yes     Alcohol/week: 42.0 standard drinks     Types: 42 Standard drinks or equivalent per week     Comment: heavy drinker    Drug use: No     Review of Systems   Constitutional: Negative.    HENT: Negative.     Eyes: Negative.    Respiratory: Negative.     Cardiovascular: Negative.    Gastrointestinal: Negative.    Genitourinary: Negative.    Musculoskeletal: Negative.    Skin: Negative.    Neurological: Negative.    Psychiatric/Behavioral: Negative.     Vitals:    01/18/23 1016   BP: 127/73   Pulse: 64   Resp: 19   Temp: 98.9 °F (37.2 °C)       Physical Exam  Vitals reviewed.   Constitutional:       Appearance: She is well-developed.   HENT:      Head: Normocephalic and atraumatic.   Eyes:      General: No scleral icterus.     Conjunctiva/sclera: Conjunctivae normal.      Pupils: Pupils are equal, round, and reactive to light.   Neck:      Thyroid: No thyromegaly.   Cardiovascular:      Rate and Rhythm: Normal rate and regular rhythm.      Heart sounds: Normal heart sounds.   Pulmonary:      Effort: Pulmonary effort is normal.      Breath sounds: Normal breath sounds. No rales.   Abdominal:      General: Bowel sounds are normal. There is no distension.      Palpations: Abdomen is soft. There is no mass.      Tenderness: There is no abdominal tenderness.   Musculoskeletal:         General: Normal range of motion.      Cervical back: Normal range of motion and neck supple.   Skin:     General: Skin is warm and dry.      Findings: No rash.   Neurological:      Mental Status: She is alert and oriented to person, place, and time.       Lab Results   Component Value Date     11/16/2022    BUN 13  11/16/2022    CREATININE 0.8 11/16/2022    CALCIUM 9.9 11/16/2022     11/16/2022    K 3.9 11/16/2022     11/16/2022    PROT 8.2 11/16/2022    CO2 30 (H) 11/16/2022    ANIONGAP 7 (L) 11/16/2022    WBC 6.04 11/16/2022    RBC 5.54 (H) 11/16/2022    HGB 15.2 11/16/2022    HCT 47.7 11/16/2022    MCV 86 11/16/2022    MCH 27.4 11/16/2022    MCHC 31.9 (L) 11/16/2022     Lab Results   Component Value Date    RDW 13.2 11/16/2022     11/16/2022    MPV 10.1 11/16/2022    GRAN 3.3 11/16/2022    GRAN 54.3 11/16/2022    LYMPH 2.0 11/16/2022    LYMPH 32.6 11/16/2022    MONO 0.5 11/16/2022    MONO 7.8 11/16/2022    EOSINOPHIL 4.5 11/16/2022    BASOPHIL 0.5 11/16/2022    EOS 0.3 11/16/2022    BASO 0.03 11/16/2022    BNP <10 05/17/2013    CHOL 215 (H) 11/16/2022    TRIG 82 11/16/2022    HDL 72 11/16/2022    CHOLHDL 33.5 11/16/2022    TOTALCHOLEST 3.0 11/16/2022    ALBUMIN 4.4 11/16/2022    AST 19 11/16/2022    ALT 26 11/16/2022    ALKPHOS 50 (L) 11/16/2022    MG 2.7 (H) 11/30/2012       Assessment and Plan:  Marianne Gottlieb is a 62 y.o. female with a history of chronic hepatitis C. Abdo US shows coarsened liver parenchyma but 2020 elastography did not suggest any significant fibrosis. I am recommending an HCV RNA to confirm he is cured of HCV. Will check for immunity to hepatitis A and B and if he is not immune, will recommend vaccination. To clarify stage of disease, recommend fibroscan.    Return 8 weeks.

## 2023-01-19 LAB
ANA SER QL IF: NORMAL
HCV RNA SERPL QL NAA+PROBE: NOT DETECTED
HCV RNA SERPL QL NAA+PROBE: NOT DETECTED
HCV RNA SPEC NAA+PROBE-ACNC: NOT DETECTED IU/ML
HCV RNA SPEC NAA+PROBE-ACNC: NOT DETECTED IU/ML

## 2023-01-19 NOTE — PROGRESS NOTES
ED Navigator unable to reach patient x's 3 attempts for ED Navigation services. Closing encounter.

## 2023-01-20 ENCOUNTER — PATIENT OUTREACH (OUTPATIENT)
Dept: ADMINISTRATIVE | Facility: OTHER | Age: 63
End: 2023-01-20
Payer: MEDICAID

## 2023-01-21 ENCOUNTER — TELEPHONE (OUTPATIENT)
Dept: HEPATOLOGY | Facility: CLINIC | Age: 63
End: 2023-01-21
Payer: MEDICAID

## 2023-01-21 LAB
AMPHETAMINES SERPL QL: NEGATIVE
BARBITURATES SERPL QL SCN: NEGATIVE
BENZODIAZ SERPL QL SCN: NEGATIVE
BZE SERPL QL: POSITIVE
CARBOXYTHC SERPL QL SCN: NEGATIVE
ETHANOL SERPL QL SCN: NEGATIVE
METHADONE SERPL QL SCN: NEGATIVE
OPIATES SERPL QL SCN: NEGATIVE
PCP SERPL QL SCN: NEGATIVE
PROPOXYPH SERPL QL: NEGATIVE

## 2023-01-21 NOTE — TELEPHONE ENCOUNTER
Call to pt no answer----- Message from Magdalena Schneider MD sent at 1/20/2023  9:07 PM CST -----  Hepatitis c is negative. You are immune to hepatitis A but not B. Consider vaccination. Pcp can do this.

## 2023-01-23 ENCOUNTER — TELEPHONE (OUTPATIENT)
Dept: HEPATOLOGY | Facility: CLINIC | Age: 63
End: 2023-01-23
Payer: MEDICAID

## 2023-01-23 NOTE — TELEPHONE ENCOUNTER
No answer, and no my chart, mailing info to pts home address on file----- Message from Sheba Hernández MA sent at 1/21/2023  8:02 AM CST -----    ----- Message -----  From: Magdalena Schneider MD  Sent: 1/20/2023   9:07 PM CST  To: [facility] Staff    Hepatitis c is negative. You are immune to hepatitis A but not B. Consider vaccination. Pcp can do this.

## 2023-01-24 ENCOUNTER — TELEPHONE (OUTPATIENT)
Dept: INTERNAL MEDICINE | Facility: CLINIC | Age: 63
End: 2023-01-24
Payer: MEDICAID

## 2023-01-24 LAB
CLINICAL BIOCHEMIST REVIEW: NORMAL
PLPETH BLD-MCNC: 165 NG/ML
POPETH BLD-MCNC: 216 NG/ML

## 2023-01-24 NOTE — TELEPHONE ENCOUNTER
----- Message from Zainab Milian MD sent at 1/20/2023  6:28 PM CST -----  Please let patient know her mammogram was normal, thank you!

## 2023-01-27 NOTE — PROGRESS NOTES
CHW - Case Closure    This Community Health Worker spoke to patient via telephone today.   Pt denied any needs at this time and agrees with episode closure at this time.  Provided patient with Community Health Worker's contact information and encouraged her to contact this Community Health Worker if additional needs arise.

## 2023-01-28 ENCOUNTER — HOSPITAL ENCOUNTER (EMERGENCY)
Facility: OTHER | Age: 63
Discharge: HOME | End: 2023-01-28
Attending: EMERGENCY MEDICINE
Payer: MEDICAID

## 2023-01-28 VITALS
RESPIRATION RATE: 16 BRPM | WEIGHT: 170 LBS | OXYGEN SATURATION: 98 % | HEIGHT: 62 IN | TEMPERATURE: 98 F | HEART RATE: 90 BPM | SYSTOLIC BLOOD PRESSURE: 151 MMHG | BODY MASS INDEX: 31.28 KG/M2 | DIASTOLIC BLOOD PRESSURE: 80 MMHG

## 2023-01-28 DIAGNOSIS — F10.920 ALCOHOLIC INTOXICATION WITHOUT COMPLICATION: Primary | ICD-10-CM

## 2023-01-28 PROCEDURE — 99283 EMERGENCY DEPT VISIT LOW MDM: CPT

## 2023-01-29 NOTE — ED PROVIDER NOTES
"     Source of History:  The patient in the paramedics    Chief complaint:  Alcohol Intoxication (Pt brought in by NO EMS stating that she has drank to much and needed to come to er. Pt has no other complaint.  Awake and alert)      HPI:  Marianne Gottlieb is a 62 y.o. female presenting with history of heavy alcoholism and abuse presents with alcohol intoxication.  She stated she had been drinking a lot of alcohol and when she gets too drunk she is unable to do other things at home so she called the ambulance.  When they picked her up she is awake and answering questions and brought to the emergency department for further evaluation management.  She is no other complaints at this time.    This is the extent to the patients complaints today here in the emergency department.    ROS:   See HPI    Review of patient's allergies indicates:  No Known Allergies    PMH:  As per HPI and below:  Past Medical History:   Diagnosis Date    ETOH abuse     Hyperlipemia     Hypertension     Psychiatric illness      Past Surgical History:   Procedure Laterality Date    BREAST LUMPECTOMY Right     benign    TUBAL LIGATION         Social History     Tobacco Use    Smoking status: Some Days     Packs/day: 0.50     Types: Cigarettes    Smokeless tobacco: Never   Substance Use Topics    Alcohol use: Yes     Alcohol/week: 42.0 standard drinks     Types: 42 Standard drinks or equivalent per week     Comment: heavy drinker    Drug use: No       Physical Exam:    BP (!) 151/80   Pulse 90   Temp 98.4 °F (36.9 °C) (Oral)   Resp 16   Ht 5' 2" (1.575 m)   Wt 77.1 kg (170 lb)   SpO2 98%   BMI 31.09 kg/m²   Nursing note and vital signs reviewed.  Constitutional: No acute distress.  Nontoxic slurred speech intoxicated  Eyes: No conjunctival injection. Extraocular muscles intact.  Nystagmus  Musculoskeletal: Good range of motion all joints.  No deformities.  Neck supple.  No meningismus. Neurovascularly intact.        Summary of Medical " Records:  01/14/2023, 01/08/2023 emergency department visits for alcohol intoxication.  Patient has a long history of emergency department visits in multiple facilities across the city for alcohol intoxication.    MDM/ Differential Dx:   62-year-old female history of alcohol abuse well known to our emergency department in alcohol intoxication.  I do not see any evidence of trauma.  Vital signs show no significant abnormality that would necessitate any acute intervention at this time.  Will observe until she ran and then plan for discharge.    ED Course as of 01/29/23 0013   Sat Jan 28, 2023   1935 Patient is ambulating on her own with no ataxia and at her baseline.  She is answering all questions appropriately.  She states she wants to go home.  She has medical decision-making capacity and is clinically sober.    No further workup is indicated in the emergency department today.  I updated pt regarding results and I counseled pt regarding supportive care measures.  Diagnosis and treatment plan explained to patient. I have answered all questions and the patient is satisfied with the plan of care. Patient discharged home in stable condition.  [SM]      ED Course User Index  [SM] Andrew Zapata DO               Diagnostic Impression:    1. Alcoholic intoxication without complication         ED Disposition Condition    Discharge Stable            ED Prescriptions    None       Follow-up Information       Follow up With Specialties Details Why Contact Info    Zainab Milian MD Internal Medicine Schedule an appointment as soon as possible for a visit   [address] [facility] [address]  [facility] [address]  [phone]               Andrew Zapata DO  01/29/23 0013

## 2023-01-31 ENCOUNTER — HOSPITAL ENCOUNTER (EMERGENCY)
Facility: OTHER | Age: 63
Discharge: HOME | End: 2023-01-31
Attending: EMERGENCY MEDICINE
Payer: MEDICAID

## 2023-01-31 VITALS
SYSTOLIC BLOOD PRESSURE: 165 MMHG | RESPIRATION RATE: 18 BRPM | TEMPERATURE: 99 F | WEIGHT: 180 LBS | DIASTOLIC BLOOD PRESSURE: 90 MMHG | BODY MASS INDEX: 32.92 KG/M2 | OXYGEN SATURATION: 99 % | HEART RATE: 90 BPM

## 2023-01-31 DIAGNOSIS — F10.920 ALCOHOLIC INTOXICATION WITHOUT COMPLICATION: Primary | ICD-10-CM

## 2023-01-31 DIAGNOSIS — T73.0XXA HUNGRY, INITIAL ENCOUNTER: ICD-10-CM

## 2023-01-31 PROCEDURE — 99282 EMERGENCY DEPT VISIT SF MDM: CPT

## 2023-02-01 NOTE — ED PROVIDER NOTES
"Encounter Date: 1/31/2023       History     Chief Complaint   Patient presents with    Alcohol Intoxication     Alcohol intoxication.  "I drank everything at HOME."         62-year-old female well known to this emergency department for recurrent presentations upon drinking alcohol at home.  She denies any other complaints at this time, says that she does want a sandwich.  She denies any injuries or other complaints at this time no desire to harm herself or anyone else.    Review of patient's allergies indicates:  No Known Allergies  Past Medical History:   Diagnosis Date    ETOH abuse     Hyperlipemia     Hypertension     Psychiatric illness      Past Surgical History:   Procedure Laterality Date    BREAST LUMPECTOMY Right     benign    TUBAL LIGATION       Family History   Problem Relation Age of Onset    Lung cancer Mother      Social History     Tobacco Use    Smoking status: Some Days     Packs/day: 0.50     Types: Cigarettes    Smokeless tobacco: Never   Substance Use Topics    Alcohol use: Yes     Alcohol/week: 42.0 standard drinks     Types: 42 Standard drinks or equivalent per week     Comment: heavy drinker    Drug use: No     Review of Systems  Constitutional-no fever  HEENT-no congestion  Eyes-no redness  Respiratory-no shortness of breath  Cardio-no chest pain  GI-no abdominal pain  Endocrine-no cold intolerance  -no difficulty urinating  MSK-no myalgias  Skin-no rashes  Allergy-no environmental allergy  Neurologic-, no headache  Hematology-no swollen nodes  Behavioral-no confusion  Physical Exam     Initial Vitals [01/31/23 0019]   BP Pulse Resp Temp SpO2   (!) 165/90 90 18 98.6 °F (37 °C) 99 %      MAP       --         Physical Exam  Constitutional: Well appearing, no distress.  Eyes: Conjunctivae normal.  ENT       Head: Normocephalic, atraumatic.       Nose: Normal external appearance        Mouth/Throat: no strigulous respirations   Hematological/Lymphatic/Immunilogical: no visible lymphadenopathy "   Cardiovascular: Normal rate,   Respiratory: Normal respiratory effort.   Gastrointestinal: non distended   Musculoskeletal: Normal range of motion in all extremities. No obvious deformities or swelling.  Neurologic: Alert, oriented. Normal speech and language. No gross focal neurologic deficits are appreciated.  Skin: Skin is warm, dry. No rash noted.  Psychiatric: Mood and affect are normal.   ED Course   Procedures  Labs Reviewed - No data to display       Imaging Results    None          Medications - No data to display  Medical Decision Making:   History:   Old Medical Records: I decided to obtain old medical records.  Old Records Summarized: records from clinic visits and records from previous admission(s).  Differential Diagnosis:   Alcohol intoxication, malingering, hunger  ED Management:  This is a woman who has a social determine of health related to substance use.    She is intoxicated at this time but ambulatory without any assistance tolerating orals resting comfortably.  After a period of observation in the emergency department she said that she was ready to go, given her previous history of ability to navigate home and returning case of worsening will plan for discharge at this time and returning case of worsening.                        Clinical Impression:   Final diagnoses:  [F10.920] Alcoholic intoxication without complication (Primary)  [T73.0XXA] Hungry, initial encounter        ED Disposition Condition    Discharge Stable          ED Prescriptions    None       Follow-up Information       Follow up With Specialties Details Why Contact Info    Zainab Milian MD Internal Medicine Call today If symptoms worsen, For a follow up visit about today [address] [facility] [address]  [facility] [address]  [phone]               Francis Miguel MD  01/31/23 5878

## 2023-02-05 PROBLEM — K74.60 HEPATIC CIRRHOSIS: Status: RESOLVED | Noted: 2022-11-16 | Resolved: 2023-02-05

## 2023-02-07 ENCOUNTER — HOSPITAL ENCOUNTER (EMERGENCY)
Facility: OTHER | Age: 63
Discharge: HOME | End: 2023-02-07
Attending: EMERGENCY MEDICINE
Payer: MEDICAID

## 2023-02-07 VITALS
HEART RATE: 68 BPM | DIASTOLIC BLOOD PRESSURE: 86 MMHG | RESPIRATION RATE: 20 BRPM | OXYGEN SATURATION: 99 % | SYSTOLIC BLOOD PRESSURE: 114 MMHG | TEMPERATURE: 98 F

## 2023-02-07 DIAGNOSIS — F10.920 ALCOHOLIC INTOXICATION WITHOUT COMPLICATION: Primary | ICD-10-CM

## 2023-02-07 PROCEDURE — 99283 EMERGENCY DEPT VISIT LOW MDM: CPT

## 2023-02-08 NOTE — ED PROVIDER NOTES
Encounter Date: 2/7/2023    SCRIBE #1 NOTE: I, Francis Tucker, am scribing for, and in the presence of,  Julien Neal MD.     History     Chief Complaint   Patient presents with    Alcohol Intoxication     Pt arrives via EMS for ETOH     Time seen by provider: 9:08 PM    This is a 62 y.o. female with PMHx of alcoholism and frequent ED visits who presents to the ED via EMS for alcohol intoxication. She has no complaints. This is the extent of the patient's complaints at this time.    The history is provided by the patient.   Review of patient's allergies indicates:  No Known Allergies  Past Medical History:   Diagnosis Date    ETOH abuse     Hyperlipemia     Hypertension     Unspecified cirrhosis of liver      Past Surgical History:   Procedure Laterality Date    BREAST LUMPECTOMY Right     benign    TUBAL LIGATION       Family History   Problem Relation Age of Onset    Lung cancer Mother      Social History     Tobacco Use    Smoking status: Some Days     Packs/day: 0.50     Types: Cigarettes    Smokeless tobacco: Never   Substance Use Topics    Alcohol use: Yes     Alcohol/week: 42.0 standard drinks     Types: 42 Standard drinks or equivalent per week     Comment: heavy drinker    Drug use: No     Review of Systems   Constitutional:  Negative for chills and fever.   HENT:  Negative for congestion and sore throat.    Eyes:  Negative for photophobia and redness.   Respiratory:  Negative for cough and shortness of breath.    Cardiovascular:  Negative for chest pain.   Gastrointestinal:  Negative for abdominal pain, nausea and vomiting.   Genitourinary:  Negative for dysuria.   Musculoskeletal:  Negative for back pain.   Skin:  Negative for rash.   Neurological:  Negative for weakness, light-headedness and headaches.   Psychiatric/Behavioral:  Negative for confusion.      Physical Exam     Initial Vitals [02/07/23 2021]   BP Pulse Resp Temp SpO2   114/86 68 20 97.9 °F (36.6 °C) 99 %      MAP       --          Physical Exam    Nursing note and vitals reviewed.  Constitutional: She appears well-developed and well-nourished. She is not diaphoretic. No distress.   Patient is awake, talking, and eating. Appears well.    HENT:   Head: Normocephalic and atraumatic.   Mouth/Throat: Oropharynx is clear and moist and mucous membranes are normal.   Eyes: Conjunctivae and EOM are normal. Pupils are equal, round, and reactive to light. No scleral icterus.   Neck: Neck supple.   Normal range of motion.  Cardiovascular:  Normal rate, regular rhythm, S1 normal, S2 normal and normal heart sounds.           No murmur heard.  Pulmonary/Chest: Breath sounds normal. No respiratory distress. She has no wheezes. She has no rhonchi. She has no rales.   Abdominal: Abdomen is soft. Bowel sounds are normal. There is no abdominal tenderness.   Musculoskeletal:         General: No tenderness or edema. Normal range of motion.      Cervical back: Normal range of motion and neck supple.     Lymphadenopathy:     She has no cervical adenopathy.   Neurological: She is alert and oriented to person, place, and time.   Skin: Skin is warm and dry. Capillary refill takes less than 2 seconds. No rash noted. No pallor.   Psychiatric: She has a normal mood and affect. Thought content normal.       ED Course   Procedures  Labs Reviewed - No data to display       Imaging Results    None          Medications - No data to display             Scribe Attestation:   Scribe #1: I performed the above scribed service and the documentation accurately describes the services I performed. I attest to the accuracy of the note.    Attending Attestation:             Attending ED Notes:   Emergent evaluation of a 62-year-old female known to this ER for alcohol abuse who presents to the ER with alcohol intoxication.  Patient is alert and oriented x4 with a GCS of 15.  No nystagmus.  No altered gait.  Patient is walking around the ED requesting food.  Patient is afebrile,  nontoxic appearing with stable vital signs.  Patient is neurovascularly intact without focal neurologic deficits.  After patient ate patient is requesting to go home.  The patient is extensively counseled on their diagnosis and treatment including all physical exam findings and to decrease her alcohol intake.  The patient is discharged good condition and directed follow-up with their PCP in the next 24-48 hours.      Physician Attestation for Scribe: I, Julien Neal, reviewed documentation as scribed in my presence, which is both accurate and complete.  ED Course as of 02/11/23 0738 Tue Feb 07, 2023 2123 Patient visualized walking to the bathroom with a steady gait, conversing with staff on the way. She is requesting to be dishcraged after being given food.  [TM]      ED Course User Index  [TM] Fracnis Tucekr                 Clinical Impression:   Final diagnoses:  [F10.920] Alcoholic intoxication without complication (Primary)        ED Disposition Condition    Discharge Good          ED Prescriptions    None       Follow-up Information       Follow up With Specialties Details Why Contact Info    Zainab Milian MD Internal Medicine In 2 days  [address] [facility] [address]  [facility] [address]  [phone]               Julien Neal MD  02/11/23 4092

## 2023-02-09 ENCOUNTER — PATIENT OUTREACH (OUTPATIENT)
Dept: ADMINISTRATIVE | Facility: HOSPITAL | Age: 63
End: 2023-02-09
Payer: MEDICAID

## 2023-02-10 ENCOUNTER — HOSPITAL ENCOUNTER (EMERGENCY)
Facility: OTHER | Age: 63
Discharge: HOME | End: 2023-02-10
Attending: EMERGENCY MEDICINE
Payer: MEDICAID

## 2023-02-10 VITALS
TEMPERATURE: 98 F | RESPIRATION RATE: 16 BRPM | BODY MASS INDEX: 29.99 KG/M2 | WEIGHT: 180 LBS | HEIGHT: 65 IN | SYSTOLIC BLOOD PRESSURE: 140 MMHG | DIASTOLIC BLOOD PRESSURE: 88 MMHG | HEART RATE: 75 BPM | OXYGEN SATURATION: 100 %

## 2023-02-10 DIAGNOSIS — F10.920 ACUTE ALCOHOLIC INTOXICATION WITHOUT COMPLICATION: Primary | ICD-10-CM

## 2023-02-10 DIAGNOSIS — F10.10 ALCOHOL ABUSE: ICD-10-CM

## 2023-02-10 PROCEDURE — 99282 EMERGENCY DEPT VISIT SF MDM: CPT

## 2023-02-10 NOTE — ED PROVIDER NOTES
"  Source of History:  Medical record, patient    Chief complaint:  Per triage note: "Alcohol Intoxication (Patient to ED with c/o alcohol intoxication)  "    HPI:    Patient presents with reported acute alcohol intoxication after drinking 2 bottles of scotch and a beer at home tonight. Pt's only complaint is "I'm an alcoholic." She denies any trauma.  She admits to daily drinking.    History is limited by patient condition    ROS:   As per HPI and below:         Review of patient's allergies indicates:  No Known Allergies    PMH:  As per HPI and below:  Past Medical History:   Diagnosis Date    ETOH abuse     Hyperlipemia     Hypertension     Unspecified cirrhosis of liver        Past Surgical History:   Procedure Laterality Date    BREAST LUMPECTOMY Right     benign    TUBAL LIGATION         Social History     Tobacco Use    Smoking status: Some Days     Packs/day: 0.50     Types: Cigarettes    Smokeless tobacco: Never   Substance Use Topics    Alcohol use: Yes     Alcohol/week: 42.0 standard drinks     Types: 42 Standard drinks or equivalent per week     Comment: heavy drinker    Drug use: No       Physical Exam:      Nursing note and vitals reviewed.  BP (!) 140/88 (BP Location: Right arm, Patient Position: Sitting)   Pulse 75   Temp 98.1 °F (36.7 °C) (Oral)   Resp 16   Ht 5' 5" (1.651 m)   Wt 81.6 kg (180 lb)   SpO2 100%   BMI 29.95 kg/m²     Constitutional: clinically intoxicated. No distress.  Eyes: EOMI. No discharge. Anicteric.  HENT:   Neck: Normal range of motion. Neck supple.  No midline spinal tenderness, step-offs, or deformities.  Cardiovascular: Normal rate. No murmur, no gallop and no friction rub heard.   Pulmonary/Chest: No respiratory distress. Effort normal. No wheezes, no rales, no rhonchi.   Abdominal: Bowel sounds normal. Soft. No distension and no mass. There is no tenderness.   Musculoskeletal: Normal range of motion. No midline spinal tenderness, step-offs, or " "deformities.  Neurological: GCS 14. Somnulent. No gross cranial nerve, light touch or strength deficit.   Skin: Skin is warm and dry.   EXT: 2+ radial pulses.   Psychiatric: Clinically intoxicated.     MDM:      ED Course as of 03/03/23 0643   Fri Feb 10, 2023   0129 Patient is a 62-year-old female with hypert hypertension, cirrhosis, chronic alcohol abuse, frequent presentations to the emergency department for acute alcohol intoxication who presents with altered mental status in the setting of admitted heavy alcohol use tonight.  Patient denies any complaints apart from "I'm fucked up," and "being an alcoholic." She is awake, alert, boisterous, with no signs of trauma.   I suspect acute alcohol intoxication.  Co- intoxicant is on the differential.  She has no signs of acute withdrawal.  I doubt acute traumatic injury. Plan is observation to sobriety. [RC]   0252 Patient awake, alert, fully oriented with steady gait.  She has no signs of acute intoxication or withdrawal.   --  I discussed with patient and/or guardian/caretaker that this evaluation in the ED does not suggest any emergent or life threatening medical condition requiring admission or further immediate intervention or diagnostics. Regardless, an unremarkable evaluation in the ED does not preclude the development or presence of a serious or life threatening condition. As such, patient was instructed to return for any worsening, new, changed, or concerning symptoms.     I had a detailed discussion with patient and/or guardian/caretaker regarding findings, plan, return precautions, importance of medication adherence, need to follow-up with a PCP. Rehab resources given. All questions answered.     Management decisions for this encounter made during COVID- public health emergency. Available resources, standards for appropriate emergency department evaluation, and admission vs. discharge standards have necessarily shifted and remain dynamic.     Note was " created using voice recognition software. It may have occasional typographical errors not identified and edited despite initial review prior to signing.   [RC]      ED Course User Index  [RC] Antonio Mitchell MD       I decided to obtain the patient's medical records. I reviewed patient's prior external notes / results: PCP note.     Medications - No data to display         ---  I, Holley Rosa, scribed for, and in the presence of, Dr. Mitchell. I performed the scribed service and the documentation accurately describes the services I performed. I attest to the accuracy of the note.     Physician Attestation for Scribe:   I, Antonio Mitchell MD, reviewed documentation as scribed in my presence, which is both accurate and complete.    Diagnostic Impression:    1. Acute alcoholic intoxication without complication    2. Alcohol abuse         ED Disposition Condition    Discharge Good          Future Appointments   Date Time Provider Department Center   3/30/2023 10:30 AM Magdalena Schneider MD [facility]      ED Prescriptions    None       Follow-up Information       Follow up With Specialties Details Why Contact Info    Zainab Milian MD Internal Medicine  For recheck with your primary care doctor [address] [facility] [address]  [address]  [facility] [address]  [phone]                 Antonio Mitchell MD  02/10/23 0643       Antonio Mitchell MD  03/03/23 0644

## 2023-02-10 NOTE — DISCHARGE INSTRUCTIONS
"Alcohol Intoxication  Alcohol intoxication occurs when you drink alcohol faster than your liver can remove it from your system. The following facts are important to remember:  It can take 10 minutes or more to start to feel the effects of a drink, so you can easily get more intoxicated than you intended.  One drink may be more than 1 serving of alcohol. Depending on the drink, it can be 2 to 4 servings.  It takes about an hour for your body to metabolize (clear) 1 serving. If you have more than 1 drink, it can take a couple of hours or more.  Many things affect how drinks will affect you, including whether youve eaten, how fast you drink, your size, how much you normally drink (or not), medicines you take, chronic diseases you have, and gender.  Signs and symptoms of alcohol poisoning  The following are signs and symptoms of alcohol poisoning:  Mild impairment  Reduced inhibitions  Slurred speech  Drowsiness  Decreased fine motor skills  Moderate impairment  Erratic behavior, aggression, depression  Impaired judgment  Confusion  Concentration difficulties  Coordination problems  Severe impairment  Vomiting  Seizures  Unconsciousness  Cold, clammy  Slow or irregular breathing  Hypothermia (low body temperature)  Coma  Health effects  Alcohol abuse causes health problems. Sometimes this can happen after only drinking a little." There is no set number of drinks or amount of alcohol that defines too much. The more you drink at one time, and the more frequently you drink determine both the short-term and long-term health effects. It affects all parts of your body and your health, including your:  Brain. Alcohol is a central nervous system depressant. It can damage parts of the brain that affect your balance, memory, thinking, and emotions. It can cause memory loss, blackouts, depression, agitation, sleep cycle changes, and seizures. These changes may or may not be reversible.  Heart and vascular system. Alcohol affects " multiple areas. It can damage heart muscle causing cardiomyopathy, which is a weakening and stretching of the heart muscle. This can lead to trouble breathing, an irregular heartbeat, atrial fibrillation, leg swelling, and heart failure. It makes the blood vessels stiffen causing hypertension (high blood pressure). All of these problems increase your risk of having heart attacks or strokes.  Liver. Alcohol causes fat to build up in the liver, affecting its normal function. This increases the risk for hepatitis, leading to abdominal pain, appetite loss, jaundice, bleeding problems, liver fibrosis, and cirrhosis. This in turn can affect your ability to fight off infections, and can cause diabetes. The liver changes prevent it from removing toxins in your blood that can cause encephalopathy. Signs of this are confusion, altered level of consciousness, personality changes, memory loss, seizures, coma, and death.  Pancreas. Alcohol can cause inflammation of the pancreas, or pancreatitis. This can cause pain in your abdomen, fever, and diabetes.  Immune system. Alcohol weakens your immune system in a number of ways. It suppresses your immune system making it harder to fight off infections and colds. You will also have a higher risk of certain infections like pneumonia and tuberculosis.  Cancer risk. Alcohol raises your risk of cancer of the mouth, esophagus, pharynx, larynx, liver, and breast.  Sexual function. Alcohol abuse can also lead to sexual problems.  Alcohol use during pregnancy may cause permanent damage to the growing baby.  Home care  The following guidelines will help you care for yourself at home:  Don't drink any more alcohol.  Don't drive until all effects of the alcohol have worn off.  Don't operate machinery that can cause injuries.  Get lots of rest over the next few days. Drink plenty of water and other non-alcoholic liquids. Try to eat regular meals.  If you have been drinking heavily on a daily  basis, you may go through alcohol withdrawal. The usual symptoms last 3 to 4 days and may include nervousness, shakiness, nausea, sweating, sleeplessness, and can even cause seizures and a serious withdrawal symptom called delirium tremens, or DTs. During this time, it is best that you stay with family or friends who can help and support you. You can also admit yourself to a residential detox program. If your symptoms are severe (seizures, severe shakiness, confusion), contact your doctor or call an ambulance for help (see below).   Follow-up care  If alcohol is a problem in your life, these are some organizations that can help you:  Alcoholics Anonymous offers support through a self-help fellowship. There are no dues or fees. See the Yellow Pages and call for time and place of meetings. Find AA online at www.aa.org.  [facility] offers support to families of alcohol users. Contact [phone], or online at www.al-anon.org.  National [place] on Alcoholism and Drug Dependence can be reached at 895-061-3112, or online at www.ncadd.org.  There are also inpatient and residential alcohol detox programs. Check the Internet or phonebook Yellow Pages under Drug Abuse and Treatment Centers.  Call 911  Call 911 if any of these occur:  Trouble breathing or slow irregular breathing  Chest pain  Sudden weakness on one side of your body or sudden trouble speaking  Heavy bleeding or vomiting blood  Very drowsy or trouble awakening  Fainting or loss of consciousness  Rapid heart rate  Seizure  When to seek medical advice  Call your healthcare provider right away if any of these occur:  Severe shakiness   Fever over 100.4º F (38.0º C)  Confusion or hallucinations (seeing, hearing, or feeling things that are not there)  Pain in your upper abdomen that gets worse  Repeated vomiting    For help with substance abuse problems:    If you do not have insurance contact [facility] at [phone].    You may also contact  Critical access hospital ([phone]) or [facility] ([phone]).    [facility] ([facility]) Crisis Services for problems with mental health (suicidal, overwhelmed, agitated, despressed, withdrawn, etc.), problems with drugs/alcohol, or developmental disabilities. Accepts uninsured and medicaid:   [phone] or [phone]   [facility] websites:   [email or web address].[email or web address]/services/crisis   www.[facility].org     [facility]:   Accepts Medicaid and uninsured [place] residents.   Accepts detox walk-ins weekdays between 7 a.m. and 3 p.m. Acceptance is based on bed availability.  [place] PHOTO ID REQUIRED.   Requirements: must be at least 22 years old.   [facility] also has residential substance treatment/rehab programs after intial detox is accomplished.   [phone]   www.[facility].org       [facility] (men, at least 18 years old) & [facility] (women, at least 18 years old):   NO initial Detox. They don't do initial detox nor medically assisted withdrawals. Clients can go to their choice of initial detox at [facility], [facility], [place], or wherever [facility] would advise.   Has long-term residential substance abuse treatment programs, where clients live and participate in intense rehabilitation.   [phone]   www.[facility]:   Accepts Medicaid, Medicare, , and many private insurances.  Call daily at 10:30 a.m. to see if a bed is available.   Usually has a waiting list, call for more info.  [phone] or [phone]   www.[facility]     [facility]:   [address], [place] [address]   [phone]   Accepts Medicaid ([employer] Car[employer], Healthy Blue, and Aetna) and private insurances. Does not accept Medicare.     [facility] ([place], [place]):  [phone]  Accepts Medicaid and many private insurances.   Call daily between 8:30 and 9 a.m. to see if a bed is available.   Provides transportation to and from  facility.    [facility] [address], [place] [address]   [phone]   Accepts Medicaid, Medicare, , and many private insurances.   Call for more information.     [facility] ([place], [place]):  [phone]  Accepts Medicaid, uninsured, and many private insurances.   Usually has a waiting list, call for more info.     [facility] ([place], [place]):  [phone]  Accepts certain Medicaid plans and many private insurances.  Usually has a waiting list, call for more info.    [facility] ([place], [place]):  [address] [place] [address]   [phone]    OUT-Patient resources:   [facility] (No age limits)   Offers intensive outpatient treatment, medically-assisted outpatient detox with suboxone, counseling, AA/12 Steps, etc  Accepts uninsured residents of [place], [place], or [facility]. Residents of other [facility] must contact the Human Services District in their [place] for options.   [facility] locations:   [place] [phone]   [place]: [phone]   [place]: [phone]           [place]:   Accepts insurances, cash, credit card or financing plan for payment  [phone]   In [place]: [phone]   In [place]: [phone]   ---- ----       ADDITIONAL Addiction & Mental Health RESOURCES:   Call 211 or the 24/7 [facility] Crisis Response Team at [phone].   Call for information on current local resources for addiction, suicide prevention, help on how to cope, obtain services, etc.       [facility] - Crisis Services (For [place] Residents needing care for mental health, addiction or developmental disabilities)   [phone]   www.hsa.org     SAMHSA - Substance Abuse & Mental Health Services Administration (Educational, not a Crisis resource)   www.samhsa.org   Various meds used for M.A.T. (medically assisted treatment) of opioid addiction:   http://www.samhsa.gov/medication-assisted-treatment         Other  Mental Health Clinics include:     [facility] [address] [facility]. [phone]     [facility] [address] [phone]     [facility] [address] [facility] [address] [facility] [phone]     [facility] [address] [phone]     Alcoholics Anonymous:  Go to www.[place].org for locations and times  Locations with multiple meetings per day:  [facility] - [address] ([facility])  [facility] - [address] ([facility])  [facility] - [address] ([facility])  [facility] - [address] ([place])    Anyone who is suicidal may receive immediate help by going to the ER, calling 911, going to Suicide.org or by calling 1-644-BFXAGUB. Suicide is preventable, and if you are feeling suicidal, you must get help.

## 2023-02-15 ENCOUNTER — OFFICE VISIT (OUTPATIENT)
Dept: OBSTETRICS AND GYNECOLOGY | Facility: CLINIC | Age: 63
End: 2023-02-15
Payer: MEDICAID

## 2023-02-15 VITALS
DIASTOLIC BLOOD PRESSURE: 80 MMHG | WEIGHT: 178.56 LBS | HEIGHT: 65 IN | BODY MASS INDEX: 29.75 KG/M2 | SYSTOLIC BLOOD PRESSURE: 132 MMHG

## 2023-02-15 DIAGNOSIS — Z12.4 SCREENING FOR CERVICAL CANCER: ICD-10-CM

## 2023-02-15 DIAGNOSIS — Z86.19 HISTORY OF HEPATITIS C: ICD-10-CM

## 2023-02-15 DIAGNOSIS — Z01.419 WELL WOMAN EXAM WITH ROUTINE GYNECOLOGICAL EXAM: Primary | ICD-10-CM

## 2023-02-15 DIAGNOSIS — F10.20 ALCOHOL USE DISORDER, SEVERE, DEPENDENCE: ICD-10-CM

## 2023-02-15 PROCEDURE — 99386 PREV VISIT NEW AGE 40-64: CPT | Mod: S$PBB,,, | Performed by: STUDENT IN AN ORGANIZED HEALTH CARE EDUCATION/TRAINING PROGRAM

## 2023-02-15 PROCEDURE — 87624 HPV HI-RISK TYP POOLED RSLT: CPT | Performed by: STUDENT IN AN ORGANIZED HEALTH CARE EDUCATION/TRAINING PROGRAM

## 2023-02-15 PROCEDURE — 3075F SYST BP GE 130 - 139MM HG: CPT | Mod: CPTII,,, | Performed by: STUDENT IN AN ORGANIZED HEALTH CARE EDUCATION/TRAINING PROGRAM

## 2023-02-15 PROCEDURE — 3079F PR MOST RECENT DIASTOLIC BLOOD PRESSURE 80-89 MM HG: ICD-10-PCS | Mod: CPTII,,, | Performed by: STUDENT IN AN ORGANIZED HEALTH CARE EDUCATION/TRAINING PROGRAM

## 2023-02-15 PROCEDURE — 99999 PR PBB SHADOW E&M-EST. PATIENT-LVL III: CPT | Mod: PBBFAC,,, | Performed by: STUDENT IN AN ORGANIZED HEALTH CARE EDUCATION/TRAINING PROGRAM

## 2023-02-15 PROCEDURE — 3008F BODY MASS INDEX DOCD: CPT | Mod: CPTII,,, | Performed by: STUDENT IN AN ORGANIZED HEALTH CARE EDUCATION/TRAINING PROGRAM

## 2023-02-15 PROCEDURE — 99213 OFFICE O/P EST LOW 20 MIN: CPT | Mod: PBBFAC | Performed by: STUDENT IN AN ORGANIZED HEALTH CARE EDUCATION/TRAINING PROGRAM

## 2023-02-15 PROCEDURE — 99386 PR PREVENTIVE VISIT,NEW,40-64: ICD-10-PCS | Mod: S$PBB,,, | Performed by: STUDENT IN AN ORGANIZED HEALTH CARE EDUCATION/TRAINING PROGRAM

## 2023-02-15 PROCEDURE — 3008F PR BODY MASS INDEX (BMI) DOCUMENTED: ICD-10-PCS | Mod: CPTII,,, | Performed by: STUDENT IN AN ORGANIZED HEALTH CARE EDUCATION/TRAINING PROGRAM

## 2023-02-15 PROCEDURE — 3079F DIAST BP 80-89 MM HG: CPT | Mod: CPTII,,, | Performed by: STUDENT IN AN ORGANIZED HEALTH CARE EDUCATION/TRAINING PROGRAM

## 2023-02-15 PROCEDURE — 99999 PR PBB SHADOW E&M-EST. PATIENT-LVL III: ICD-10-PCS | Mod: PBBFAC,,, | Performed by: STUDENT IN AN ORGANIZED HEALTH CARE EDUCATION/TRAINING PROGRAM

## 2023-02-15 PROCEDURE — 88175 CYTOPATH C/V AUTO FLUID REDO: CPT | Performed by: STUDENT IN AN ORGANIZED HEALTH CARE EDUCATION/TRAINING PROGRAM

## 2023-02-15 PROCEDURE — 1159F MED LIST DOCD IN RCRD: CPT | Mod: CPTII,,, | Performed by: STUDENT IN AN ORGANIZED HEALTH CARE EDUCATION/TRAINING PROGRAM

## 2023-02-15 PROCEDURE — 1159F PR MEDICATION LIST DOCUMENTED IN MEDICAL RECORD: ICD-10-PCS | Mod: CPTII,,, | Performed by: STUDENT IN AN ORGANIZED HEALTH CARE EDUCATION/TRAINING PROGRAM

## 2023-02-15 PROCEDURE — 3075F PR MOST RECENT SYSTOLIC BLOOD PRESS GE 130-139MM HG: ICD-10-PCS | Mod: CPTII,,, | Performed by: STUDENT IN AN ORGANIZED HEALTH CARE EDUCATION/TRAINING PROGRAM

## 2023-02-15 NOTE — PROGRESS NOTES
History & Physical  Gynecology      SUBJECTIVE:     Chief Complaint: No chief complaint on file.       History of Present Illness:  Annual exam. No complaints   Menstrual History: menopause since 50s. No PMB. No HRT  Obstetric Hx: , CS  Sexually Active: no  Family history: below  Social: Wears seatbelts. Exercises. Feels safe at home.   Last pap: many years, normal  Last mammo: 2023 normal  Colonoscopy: last year, normal  Flu: no  Covid vaccine yes  Vitamins: taking      Review of patient's allergies indicates:  No Known Allergies    Past Medical History:   Diagnosis Date    ETOH abuse     Hyperlipemia     Hypertension     Unspecified cirrhosis of liver      Past Surgical History:   Procedure Laterality Date    BREAST LUMPECTOMY Right     benign    TUBAL LIGATION       OB History    No obstetric history on file.       Family History   Problem Relation Age of Onset    Lung cancer Mother      Social History     Tobacco Use    Smoking status: Some Days     Packs/day: 0.50     Types: Cigarettes    Smokeless tobacco: Never   Substance Use Topics    Alcohol use: Yes     Alcohol/week: 42.0 standard drinks     Types: 42 Standard drinks or equivalent per week     Comment: heavy drinker    Drug use: No       Current Outpatient Medications   Medication Sig    aspirin 81 MG Chew Take 81 mg by mouth once daily.    BIOTIN ORAL Take 5,000 mcg by mouth daily 2 hours after breakfast.    fluticasone propionate (FLONASE) 50 mcg/actuation nasal spray 1 spray (50 mcg total) by Each Nostril route 2 (two) times daily as needed. (Patient not taking: Reported on 2023)    hydroCHLOROthiazide (HYDRODIURIL) 25 MG tablet Take 1 tablet (25 mg total) by mouth once daily.    losartan (COZAAR) 100 MG tablet Take 1 tablet (100 mg total) by mouth once daily.    pravastatin (PRAVACHOL) 40 MG tablet Take 1 tablet (40 mg total) by mouth once daily.     No current facility-administered medications for this visit.         Review of  Systems:  Review of Systems   Constitutional:  Negative for activity change, appetite change, fever and unexpected weight change.   Respiratory:  Negative for shortness of breath.    Cardiovascular:  Negative for chest pain.   Gastrointestinal:  Negative for abdominal pain, blood in stool, constipation, diarrhea, nausea and vomiting.   Endocrine: Negative for hot flashes.   Genitourinary:  Negative for dysuria, frequency, hematuria, hot flashes, pelvic pain, urgency, vaginal bleeding, vaginal discharge, vaginal pain, urinary incontinence, postmenopausal bleeding and vaginal dryness.   Integumentary:  Negative for breast mass.   Psychiatric/Behavioral:  Negative for sleep disturbance.    Breast: Negative for lump and mass     OBJECTIVE:     Physical Exam:  Physical Exam  Vitals reviewed.   Constitutional:       General: She is not in acute distress.     Appearance: She is well-developed. She is not diaphoretic.   HENT:      Head: Normocephalic and atraumatic.   Eyes:      Conjunctiva/sclera: Conjunctivae normal.   Cardiovascular:      Rate and Rhythm: Normal rate.   Pulmonary:      Effort: Pulmonary effort is normal.   Abdominal:      General: There is no distension.      Palpations: Abdomen is soft. There is no mass.      Tenderness: There is no abdominal tenderness. There is no guarding or rebound.   Genitourinary:     Labia:         Right: No rash, tenderness, lesion or injury.         Left: No rash, tenderness, lesion or injury.       Vagina: No signs of injury and foreign body. No vaginal discharge, erythema, tenderness or bleeding.      Cervix: No cervical motion tenderness, discharge or friability.      Uterus: Not deviated, not enlarged, not fixed and not tender.       Adnexa:         Right: No mass, tenderness or fullness.          Left: No mass, tenderness or fullness.     Musculoskeletal:         General: Normal range of motion.      Cervical back: Normal range of motion.   Skin:     General: Skin is warm  and dry.   Neurological:      Mental Status: She is alert.         ASSESSMENT:     No diagnosis found.       Plan:      WWE  - Postmenopausal. No issues.  - Vaccines utd  - Labs utd  - DEXA ordered  - Mammogram, Colonoscopy utd  - Pap and co test collected  - Vitamin D and Calcium recs given  - CBE normal. Physical exam normal. VSS     Counseling time: 15 minutes    Megan Keyes

## 2023-03-30 ENCOUNTER — HOSPITAL ENCOUNTER (EMERGENCY)
Facility: OTHER | Age: 63
Discharge: HOME | End: 2023-03-30
Attending: EMERGENCY MEDICINE
Payer: MEDICAID

## 2023-03-30 VITALS
SYSTOLIC BLOOD PRESSURE: 143 MMHG | OXYGEN SATURATION: 99 % | RESPIRATION RATE: 20 BRPM | DIASTOLIC BLOOD PRESSURE: 88 MMHG | HEART RATE: 104 BPM

## 2023-03-30 DIAGNOSIS — F10.920 ALCOHOLIC INTOXICATION WITHOUT COMPLICATION: Primary | ICD-10-CM

## 2023-03-30 PROCEDURE — 99283 EMERGENCY DEPT VISIT LOW MDM: CPT

## 2023-03-30 NOTE — ED NOTES
Pt wandering halls refusing to stay in stretcher, screaming demanding to be discharged. Pt ambulating with steady gait. MD aware would like pt to eat prior to discharge

## 2023-03-30 NOTE — ED NOTES
Pt offered turkey sandwich, refusing. Pt cursing at staff, demanding to be discharged. Pt ambulating with steady gait, MD states ok for pt to be discharged.

## 2023-03-30 NOTE — ED PROVIDER NOTES
Encounter Date: 3/30/2023    SCRIBE #1 NOTE: I, Adrian Riley, am scribing for, and in the presence of,  Maria Fernanda Javier MD. I have scribed the following portions of the note - Other sections scribed: HPI, ROS, PE.     History     Chief Complaint   Patient presents with    Alcohol Intoxication     Pt brought in by EMS intoxicated, yelling on stretcher     Time seen by provider: 3:25 PM    This is a 62 y.o. female who presents via EMS for evaluation of suspected alcohol intoxication. Patient arrives yelling in the hallway and at staff, stating she is hungry and would like to go home to eat the red beans she cooked today. She denies any complaints or symptoms at this time otherwise. This is the extent of the patient's complaints at this time.    The history is provided by the patient.   Review of patient's allergies indicates:  No Known Allergies  Past Medical History:   Diagnosis Date    ETOH abuse     Hyperlipemia     Hypertension     Unspecified cirrhosis of liver      Past Surgical History:   Procedure Laterality Date    BREAST LUMPECTOMY Right     benign    TUBAL LIGATION       Family History   Problem Relation Age of Onset    Lung cancer Mother      Social History     Tobacco Use    Smoking status: Some Days     Packs/day: 0.50     Types: Cigarettes    Smokeless tobacco: Never   Substance Use Topics    Alcohol use: Yes     Alcohol/week: 42.0 standard drinks     Types: 42 Standard drinks or equivalent per week     Comment: heavy drinker    Drug use: No     Review of Systems   Constitutional:  Negative for chills and fever.   HENT:  Negative for sore throat.    Respiratory:  Negative for shortness of breath.    Cardiovascular:  Negative for chest pain.   Gastrointestinal:  Negative for nausea.   Genitourinary:  Negative for dysuria.   Musculoskeletal:  Negative for back pain and neck pain.   Skin:  Negative for color change and rash.   Neurological:  Negative for dizziness, weakness and headaches.   Hematological:   Does not bruise/bleed easily.     Physical Exam     Initial Vitals [03/30/23 1442]   BP Pulse Resp Temp SpO2   (!) 143/88 104 20 -- 99 %      MAP       --         Physical Exam    Nursing note and vitals reviewed.  Constitutional: She appears well-developed and well-nourished.   Slightly agitated   HENT:   Head: Normocephalic and atraumatic.   Eyes: Conjunctivae are normal.   Pulmonary/Chest: No respiratory distress.   Musculoskeletal:         General: Normal range of motion.     Neurological: She is alert and oriented to person, place, and time.   Skin: Skin is warm and dry. Capillary refill takes less than 2 seconds.       ED Course   Procedures  Labs Reviewed - No data to display         Imaging Results    None          Medications - No data to display  Medical Decision Making:   History:   Old Medical Records: I decided to obtain old medical records.  Old Records Summarized: other records.  Initial Assessment:   3:25PM:  Patient is a 62-year-old female who presents to the emergency department with alcohol intoxication.  Patient has had multiple visits for similar presentations.  She is protecting her airway.  Will continue to monitor.  Will continue to follow and reassess.  Clinical Tests:   Lab Tests: Ordered and Reviewed        3:49PM:  Patient requesting to be discharged.  She is ambulatory with a steady gait.  I do not feel that further work up in the ED is indicated at this time.  I feel that pt is stable for discharge and management as an outpatient and no further intervention is needed at this time.  Pt is comfortable returning to the ED if needed.  Will DC home in stable condition.        Scribe Attestation:   Scribe #1: I performed the above scribed service and the documentation accurately describes the services I performed. I attest to the accuracy of the note.  Physician Attestation for Scribe: I, Maria Fernanda Javier, reviewed documentation as scribed in my presence, which is both accurate and complete.                      Clinical Impression:   Final diagnoses:  [F10.920] Alcoholic intoxication without complication (Primary)        ED Disposition Condition    Discharge Stable          ED Prescriptions    None       Follow-up Information       Follow up With Specialties Details Why Contact Info    Zainab Milian MD Internal Medicine   [address] [address]  [phone]               Maria Fernanda Javier MD  03/30/23 1950

## 2023-04-19 PROCEDURE — 99282 EMERGENCY DEPT VISIT SF MDM: CPT

## 2023-04-20 ENCOUNTER — HOSPITAL ENCOUNTER (EMERGENCY)
Facility: OTHER | Age: 63
Discharge: HOME | End: 2023-04-20
Attending: EMERGENCY MEDICINE
Payer: MEDICAID

## 2023-04-20 VITALS
OXYGEN SATURATION: 99 % | DIASTOLIC BLOOD PRESSURE: 85 MMHG | SYSTOLIC BLOOD PRESSURE: 147 MMHG | BODY MASS INDEX: 28.29 KG/M2 | WEIGHT: 170 LBS | RESPIRATION RATE: 16 BRPM | TEMPERATURE: 99 F | HEART RATE: 88 BPM

## 2023-04-20 DIAGNOSIS — F10.921 ACUTE ALCOHOLIC INTOXICATION WITH DELIRIUM: Primary | ICD-10-CM

## 2023-04-20 NOTE — ED PROVIDER NOTES
Encounter Date: 4/19/2023       History     Chief Complaint   Patient presents with    Alcohol Intoxication     ETOH throughout the day.  Caro any drug usage.     62 y.o. female presents to [facility] via EMS with alcohol intoxication.  Patient is a poor historian.  She has presented to this ER multiple times in the past for alcohol use.      Review of patient's allergies indicates:  No Known Allergies  Past Medical History:   Diagnosis Date    ETOH abuse     Hyperlipemia     Hypertension     Unspecified cirrhosis of liver      Past Surgical History:   Procedure Laterality Date    BREAST LUMPECTOMY Right     benign    TUBAL LIGATION       Family History   Problem Relation Age of Onset    Lung cancer Mother      Social History     Tobacco Use    Smoking status: Some Days     Packs/day: 0.50     Types: Cigarettes    Smokeless tobacco: Never   Substance Use Topics    Alcohol use: Yes     Alcohol/week: 42.0 standard drinks     Types: 42 Standard drinks or equivalent per week     Comment: heavy drinker    Drug use: No     Review of Systems   Unable to perform ROS: Mental status change (acute intoxication)   Respiratory:  Negative for shortness of breath.    Cardiovascular:  Negative for chest pain.   Gastrointestinal:  Negative for abdominal pain.     Physical Exam     Initial Vitals [04/19/23 2354]   BP Pulse Resp Temp SpO2   (!) 159/90 90 18 98.9 °F (37.2 °C) 97 %      MAP       --         Physical Exam    Nursing note and vitals reviewed.  Constitutional: She appears well-developed and well-nourished. She is not diaphoretic.   Lying on ED stretcher, rouses to voice, slurred speech, no acute distress, mildly disheveled.   HENT:   Head: Normocephalic and atraumatic.   Eyes: Conjunctivae are normal.   Neck: Neck supple.   Normal range of motion.  Cardiovascular:  Normal rate.           Pulmonary/Chest: No respiratory distress.   Abdominal: There is no abdominal tenderness.   Musculoskeletal:         General: No  tenderness.      Cervical back: Normal range of motion and neck supple.     Neurological:   Moving all extremities.   Skin: Skin is warm and dry.   Psychiatric: Her behavior is normal.   Sleeping, rouses to voice.       ED Course   Procedures  Labs Reviewed - No data to display       Imaging Results    None          Medications - No data to display  Medical Decision Making:   History:   I obtained history from: EMS provider.  Old Medical Records: I decided to obtain old medical records.  Old Records Summarized: records from previous admission(s).  Initial Assessment:   62 y.o. female with EtOH intoxication.  Differential Diagnosis:   Ddx includes toxidrome, intentional self-harm, occult trauma, other.  ED Management:  Exam reassuring.      Patient allowed to sleep overnight.  She drank juice and had a snack.  In the morning she was sufficiently ambulatory to be discharged without incident.                          Clinical Impression:   Final diagnoses:  [F10.921] Acute alcoholic intoxication with delirium (Primary)        ED Disposition Condition    Discharge Stable          ED Prescriptions    None       Follow-up Information    None          Elissa Calvo MD  04/20/23 0804

## 2023-04-20 NOTE — ED NOTES
"Pt presented to ed stating she drank "2 bottles" of alchohol tonight. Pt's only current complaint is being heavily intoxicated. Pt states she does not need anything but states "when I started smoking cigarettes said its time to come to the doctor". Pt states she does not typically smoke cigarettes.      Review of patient's allergies indicates:  No Known Allergies     Patient has verified the spelling of the name and  on armband.   APPEARANCE: Patient is alert and does not appear distressed.  SKIN: Skin is normal for race, warm, and dry. Normal skin turgor and mucous membranes moist.  CARDIAC: Normal rate   RESPIRATORY:Normal rate and effort.  Respirations are equal and unlabored.    GASTRO: Bowel sounds normal, abdomen is soft, no tenderness, and no abdominal distention.  MUSCLE: Full ROM. No bony tenderness or soft tissue tenderness. No obvious deformity.  PERIPHERAL VASCULAR: peripheral pulses present. Normal cap refill. No edema. Warm to touch.  NEURO:  No neurological abnormalities.   MENTAL STATUS: awake, alert and aware of environment. +etoh abuse  EYE: No overt deficits noted. No drainage. Sclera WNL  ENT: EARS: no obvious drainage. NOSE: no active bleeding.   : Voids without complication   "

## 2023-04-20 NOTE — ED NOTES
Pt assisted to bathroom. Pt continues to yell profanities, dancing and stating she is heavily intoxicated. Pt assisted back to bed and given warm blankets and food.

## 2023-04-20 NOTE — ED NOTES
Pt escorted to ED room with assistance. Pt yelling profanities and admitting to heavy alcohol use. Risk sitter at bedside for pt safety

## 2023-05-02 ENCOUNTER — HOSPITAL ENCOUNTER (EMERGENCY)
Facility: OTHER | Age: 63
Discharge: HOME | End: 2023-05-02
Attending: EMERGENCY MEDICINE
Payer: MEDICAID

## 2023-05-02 VITALS
OXYGEN SATURATION: 98 % | SYSTOLIC BLOOD PRESSURE: 146 MMHG | WEIGHT: 170 LBS | DIASTOLIC BLOOD PRESSURE: 86 MMHG | TEMPERATURE: 98 F | HEART RATE: 79 BPM | BODY MASS INDEX: 28.32 KG/M2 | HEIGHT: 65 IN | RESPIRATION RATE: 17 BRPM

## 2023-05-02 DIAGNOSIS — F10.929 ACUTE ALCOHOLIC INTOXICATION WITH COMPLICATION: Primary | ICD-10-CM

## 2023-05-02 PROCEDURE — 99283 EMERGENCY DEPT VISIT LOW MDM: CPT

## 2023-05-02 NOTE — ED PROVIDER NOTES
Encounter Date: 5/2/2023    SCRIBE #1 NOTE: I, Macrina Clark, am scribing for, and in the presence of,  Mita Herrera MD.     History     Chief Complaint   Patient presents with    Alcohol Intoxication     Here via NOEMS with c/o alcohol intoxication, EMS reports pt had unsteady gait and required assistance      Time seen by provider: 12:46 AM    This is a 62 y.o. female who presents via EMS for alcohol intoxication.  Patient does not provide much history, EMS supplies information.  Patient seen multiple times in our emergency department for alcohol intoxication.  Patient was reportedly ambulating with unsteady gait and behaving erratically.    The history is provided by the patient and the EMS personnel.   Review of patient's allergies indicates:  No Known Allergies  Past Medical History:   Diagnosis Date    ETOH abuse     Hyperlipemia     Hypertension     Unspecified cirrhosis of liver      Past Surgical History:   Procedure Laterality Date    BREAST LUMPECTOMY Right     benign    TUBAL LIGATION       Family History   Problem Relation Age of Onset    Lung cancer Mother      Social History     Tobacco Use    Smoking status: Some Days     Packs/day: 0.50     Types: Cigarettes    Smokeless tobacco: Never   Substance Use Topics    Alcohol use: Yes     Alcohol/week: 42.0 standard drinks     Types: 42 Standard drinks or equivalent per week     Comment: heavy drinker    Drug use: No     Review of Systems   Unable to perform ROS: Mental status change     Physical Exam     Initial Vitals [05/02/23 0007]   BP Pulse Resp Temp SpO2   (!) 158/90 86 18 98.2 °F (36.8 °C) 100 %      MAP       --         Physical Exam    Nursing note and vitals reviewed.  Constitutional: She appears well-developed. She is uncooperative. She is easily aroused.   HENT:   Head: Atraumatic.   Eyes: Conjunctivae and lids are normal.   Neck:   Normal range of motion.  Cardiovascular:  Normal rate.           Musculoskeletal:         General: Normal range  of motion.      Cervical back: Normal range of motion.      Comments: No obvious trauma.     Neurological: She is easily aroused.   Moving all extremities.   Skin: No rash noted.   Psychiatric: Her speech is slurred.       ED Course   Procedures  Labs Reviewed - No data to display       Imaging Results    None          Medications - No data to display  Medical Decision Making:   History:   Old Medical Records: I decided to obtain old medical records.  Initial Assessment:   Patient observed several hours with resolution in acute intoxication.  Patient ambulatory with steady gait prior to discharge home.        Scribe Attestation:   Scribe #1: I performed the above scribed service and the documentation accurately describes the services I performed. I attest to the accuracy of the note.  Physician Attestation for Scribe: I, Sharon, reviewed documentation as scribed in my presence, which is both accurate and complete.      ED Course as of 05/02/23 0541   Tue May 02, 2023   0020 Pt cursing and yelling incessantly w slurred speech [DM]   0046 63 yo well known to the ED for multiple visits in the setting of etoh use here intoxicated. Pt has no overt evidence of trauma. Will allow to metabolize and reassess. Currently w slurred inappropriate speech.  [DM]      ED Course User Index  [DM] Mita Herrera MD                 Clinical Impression:   Final diagnoses:  [F10.929] Acute alcoholic intoxication with complication (Primary)        ED Disposition Condition    Discharge Stable          ED Prescriptions    None       Follow-up Information       Follow up With Specialties Details Why Contact Info    [facility]  Schedule an appointment as soon as possible for a visit   [address] [facility] [address]  [phone]               Mita Herrera MD  05/02/23 0500

## 2023-05-25 ENCOUNTER — HOSPITAL ENCOUNTER (EMERGENCY)
Facility: OTHER | Age: 63
Discharge: HOME | End: 2023-05-25
Attending: EMERGENCY MEDICINE
Payer: MEDICAID

## 2023-05-25 VITALS
HEART RATE: 70 BPM | DIASTOLIC BLOOD PRESSURE: 76 MMHG | TEMPERATURE: 98 F | OXYGEN SATURATION: 100 % | RESPIRATION RATE: 16 BRPM | SYSTOLIC BLOOD PRESSURE: 122 MMHG

## 2023-05-25 DIAGNOSIS — F10.920 ACUTE ALCOHOLIC INTOXICATION WITHOUT COMPLICATION: Primary | ICD-10-CM

## 2023-05-25 DIAGNOSIS — F10.10 ALCOHOL ABUSE: ICD-10-CM

## 2023-05-25 PROCEDURE — 99283 EMERGENCY DEPT VISIT LOW MDM: CPT

## 2023-05-25 NOTE — ED NOTES
Pt resting comfortably and independently repositioned in stretcher with bed locked in lowest position for safety. NAD noted at this time. Respirations even and unlabored and visible chest rise noted.  Patient offered bathroom assistance and denies need at this time. Pt instructed to call if assistance is needed. Call light within reach. No needs at this time. Will continue to monitor.

## 2023-05-25 NOTE — ED NOTES
Pt resting comfortably and independently repositioned in stretcher with bed locked in lowest position for safety. NAD noted at this time. Respirations even and unlabored and visible chest rise noted.  Patient sleeping. Call light within reach. No needs at this time. Will continue to monitor.

## 2023-05-25 NOTE — DISCHARGE INSTRUCTIONS
For help with substance abuse problems:    If you do not have insurance contact [facility] at [phone].    You may also contact [facility] ([phone]) or [facility] ([phone]).    [facility] ([facility]) Crisis Services for problems with mental health (suicidal, overwhelmed, agitated, despressed, withdrawn, etc.), problems with drugs/alcohol, or developmental disabilities. Accepts uninsured and medicaid:   [phone] or [phone]   [facility] websites:   www.sdla.org/services/crisis   www.[email or web address]     [facility]:   Accepts Medicaid and uninsured [place] residents.   Accepts detox walk-ins weekdays between 7 a.m. and 3 p.m. Acceptance is based on bed availability.  [place] PHOTO ID REQUIRED.   Requirements: must be at least 22 years old.   [facility] also has residential substance treatment/rehab programs after intial detox is accomplished.   [phone]   www.[facility].[email or web address]       [facility] (men, at least 18 years old) & [facility] (women, at least 18 years old):   NO initial Detox. They don't do initial detox nor medically assisted withdrawals. Clients can go to their choice of initial detox at [facility], [facility], [place], or wherever [facility] would advise.   Has long-term residential substance abuse treatment programs, where clients live and participate in intense rehabilitation.   [phone]   www.[facility]:   Accepts Medicaid, Medicare, , and many private insurances.  Call daily at 10:30 a.m. to see if a bed is available.   Usually has a waiting list, call for more info.  [phone] or [phone]   www.[employer]     [facility]:   [address], [place] [address]   [phone]   Accepts Medicaid ([employer] Caritas, Healthy Blue, and Aetna) and private insurances. Does not accept Medicare.     [facility] ([place], [place]):  [phone]  Accepts  Medicaid and many private insurances.   Call daily between 8:30 and 9 a.m. to see if a bed is available.   Provides transportation to and from facility.    [facility] [address], [place] [address]   [phone]   Accepts Medicaid, Medicare, , and many private insurances.   Call for more information.     [facility] ([place], [place]):  [phone]  Accepts Medicaid, uninsured, and many private insurances.   Usually has a waiting list, call for more info.     [facility] ([place], [place]):  [phone]  Accepts certain Medicaid plans and many private insurances.  Usually has a waiting list, call for more info.    [facility] ([place], [place]):  [address] [place] [address] [phone]    OUT-Patient resources:   [facility] (No age limits)   Offers intensive outpatient treatment, medically-assisted outpatient detox with suboxone, counseling, AA/12 Steps, etc  Accepts uninsured residents of [place], [place], or [facility]. Residents of other [facility] must contact the Human Services District in their [place] for options.   [facility] locations:   [place] [phone]   [place]: [phone]   [place]: [phone]           [place]:   Accepts insurances, cash, credit card or financing plan for payment  [phone]   In [place]: [phone]   In [place]: [phone]   ---- ----       ADDITIONAL Addiction & Mental Health RESOURCES:   Call 211 or the 24/7 [facility] Crisis Response Team at [phone].   Call for information on current local resources for addiction, suicide prevention, help on how to cope, obtain services, etc.       [facility] - Crisis Services (For [place] Residents needing care for mental health, addiction or developmental disabilities)   [phone]   www.[facility].org     SAMHSA - Substance Abuse & Mental Health Services Administration (Educational, not a Crisis resource)   www.samhsa.org   Various  meds used for M.A.T. (medically assisted treatment) of opioid addiction:   http://www.samhsa.gov/medication-assisted-treatment         Other Mental Health Clinics include:     [facility] [address] [phone]     [facility] [address] [phone]     [facility] [address] [phone]     [facility] [address] [phone]     Alcoholics Anonymous:  Go to www.[place].org for locations and times  Locations with multiple meetings per day:  [facility] - [address] [address] ([facility])  [facility] - [address] [facility] [facility] [address] ([facility])  [facility] - [address] ([facility])  [facility] - [address] ([place])    Anyone who is suicidal may receive immediate help by going to the ER, calling 911, going to Suicide.org or by calling 5-044-BXBRLWG. Suicide is preventable, and if you are feeling suicidal, you must get help.

## 2023-05-25 NOTE — ED PROVIDER NOTES
"  Source of History:  Medical record, patient, EMS    Chief complaint:  Per triage note: "Alcohol Intoxication (Pt presents via no ems secondary to etoh use. Pt reported to ems she drank excessive amounts of E&J. Pt has no other complaints at this time. )  "    HPI:    Patient presents with suspected acute alcohol intoxication.  EMS reports that bystander activated EMS for the patient appearing clinically intoxicated.  Upon their arrival, patient admitted to alcohol use.  She was belligerent, became calm and sleepy en route.   History is limited by patient not answering any questions initially.     ROS:   See HPI for pertinent review of systems      Review of patient's allergies indicates:  No Known Allergies    PMH:  As per HPI and below:  Past Medical History:   Diagnosis Date    ETOH abuse     Hyperlipemia     Hypertension     Unspecified cirrhosis of liver        Past Surgical History:   Procedure Laterality Date    BREAST LUMPECTOMY Right     benign    TUBAL LIGATION         Social History     Tobacco Use    Smoking status: Some Days     Packs/day: 0.50     Types: Cigarettes    Smokeless tobacco: Never   Substance Use Topics    Alcohol use: Yes     Alcohol/week: 42.0 standard drinks     Types: 42 Standard drinks or equivalent per week     Comment: heavy drinker    Drug use: No       Physical Exam:      Nursing note and vitals reviewed.  /70   Pulse 74   Temp 98.4 °F (36.9 °C) (Oral)   Resp 16   SpO2 100%     Constitutional: clinically intoxicated. No distress.  Eyes: EOMI. No discharge. Anicteric.  HENT:   Neck: Normal range of motion. Neck supple.  No midline spinal tenderness, step-offs, or deformities.  Cardiovascular: Normal rate. No murmur, no gallop and no friction rub heard.   Pulmonary/Chest: No respiratory distress. Effort normal. No wheezes, no rales, no rhonchi.   Abdominal: Bowel sounds normal. Soft. No distension and no mass. There is no tenderness.   Musculoskeletal: Normal range of " motion.   Neurological: Somnulent. No gross cranial nerve, light touch or strength deficit.   Skin: Skin is warm and dry.   EXT: 2+ radial pulses.   Psychiatric: Clinically intoxicated.       Medical Decision Making / Independent Interpretations / External Records Reviewed:      ED Course as of 05/25/23 0621   Thu May 25, 2023   0421 Patient is a 62-year-old female with hypert hypertension, cirrhosis, chronic alcohol abuse, frequent presentations to the emergency department for acute alcohol intoxication who presents with altered mental status in the setting of admitted alcohol use tonight.  Here, patient is clinically intoxicated, somnolent.  I suspect acute alcohol intoxication.  Co- intoxication is also on the differential.  She has no signs of acute withdrawal.  I doubt acute traumatic injury.   I anticipate observation to sobriety. [RC]   0620 I discussed the patient history, findings, plan with Dr. Zapata, who assumes care.     [RC]      ED Course User Index  [RC] Antonio Mitchell MD         Medications - No data to display         No future appointments.     Diagnostic Impression:    1. Acute alcoholic intoxication without complication    2. Alcohol abuse                  Antonio Mitchell MD  05/25/23 0621

## 2023-05-25 NOTE — PROVIDER PROGRESS NOTES - EMERGENCY DEPT.
Encounter Date: 5/25/2023    ED Physician Progress Notes        Physician Note:   Patient is awake and alert.  Able to ambulate on her own and answering questions appropriately.  Medically stable and clinically sober and is discharged.

## 2023-05-27 ENCOUNTER — HOSPITAL ENCOUNTER (EMERGENCY)
Facility: OTHER | Age: 63
Discharge: HOME | End: 2023-05-27
Attending: EMERGENCY MEDICINE
Payer: MEDICAID

## 2023-05-27 VITALS
OXYGEN SATURATION: 99 % | SYSTOLIC BLOOD PRESSURE: 141 MMHG | RESPIRATION RATE: 18 BRPM | TEMPERATURE: 99 F | HEART RATE: 82 BPM | DIASTOLIC BLOOD PRESSURE: 72 MMHG

## 2023-05-27 DIAGNOSIS — F10.920 ACUTE ALCOHOLIC INTOXICATION WITHOUT COMPLICATION: Primary | ICD-10-CM

## 2023-05-27 PROCEDURE — 99282 EMERGENCY DEPT VISIT SF MDM: CPT

## 2023-05-28 NOTE — ED NOTES
"Marianne Gottlieb, an 62 y.o. female presents to the ED for alcohol intoxication. Pt is axo3 and unsteady on feet. Pt is in NAD.       Chief Complaint   Patient presents with    Alcohol Intoxication     Pt unable to tell me complaint, only that she "needs help". Pt clearly intoxicated and when asked how much she drank she states "a LOT!" and starts singing. During triage pt laid on floor and would not follow commands     Review of patient's allergies indicates:  No Known Allergies  Past Medical History:   Diagnosis Date    ETOH abuse     Hyperlipemia     Hypertension     Unspecified cirrhosis of liver      LOC: Patient name and date of birth verified.  The patient is awake, alert and aware of environment with an intoxicated affect, the patient is oriented x 3.  Pt in NAD.    APPEARANCE: Patient resting comfortably and in no acute distress, patient is clean and well groomed, patient's clothing is properly fastened.  SKIN: The skin is warm and dry, color consistent with ethnicity, patient has normal skin turgor and moist mucus membranes, skin intact, no breakdown or brusing noted.  MUSCULOSKELETAL: Patient moving all extremities well, no obvious swelling or deformities noted.  RESPIRATORY: Airway is open and patent, respirations are spontaneous, patient has a normal effort and rate, no accessory muscle use noted.  CARDIAC: Patient has a normal rate and rhythm, no periphreal edema noted, capillary refill < 3 seconds.  ABDOMEN: No distention noted. Bowel sounds present in all four quadrants.   NEUROLOGIC: Eyes open spontaneously, behavior appropriate to situation, follows commands, facial expression symmetrical, bilateral hand grasp equal and even, purposeful motor response noted, normal sensation in all extremities when touched.   "

## 2023-05-28 NOTE — ED PROVIDER NOTES
"Encounter Date: 5/27/2023    SCRIBE #1 NOTE: I, Foreign Barnes, am scribing for, and in the presence of,  Jonatan Canchola MD. I have scribed the following portions of the note - Other sections scribed: HPI, ROS, PE.     History     Chief Complaint   Patient presents with    Alcohol Intoxication     Pt unable to tell me complaint, only that she "needs help". Pt clearly intoxicated and when asked how much she drank she states "a LOT!" and starts singing. During triage pt laid on floor and would not follow commands     Time seen by provider: 7:59 PM    This is a 62 y.o. female with a PMHx of HTN, Alcohol abuse with frequent ED visits in the past presents to the ED intoxicated and states that she did not want to drink at home anymore. She denies any acute trauma or complaints. She states that she drinks herb and smokes cigarettes. Denies any drug use. No other complaints.    The history is provided by the patient and medical records. No  was used.   Review of patient's allergies indicates:  No Known Allergies  Past Medical History:   Diagnosis Date    ETOH abuse     Hyperlipemia     Hypertension     Unspecified cirrhosis of liver      Past Surgical History:   Procedure Laterality Date    BREAST LUMPECTOMY Right     benign    TUBAL LIGATION       Family History   Problem Relation Age of Onset    Lung cancer Mother      Social History     Tobacco Use    Smoking status: Some Days     Packs/day: 0.50     Types: Cigarettes    Smokeless tobacco: Never   Substance Use Topics    Alcohol use: Yes     Alcohol/week: 42.0 standard drinks     Types: 42 Standard drinks or equivalent per week     Comment: heavy drinker    Drug use: No     Review of Systems   Constitutional:         Alcohol intoxication.     Physical Exam     Initial Vitals [05/27/23 1924]   BP Pulse Resp Temp SpO2   134/78 85 16 98.9 °F (37.2 °C) 99 %      MAP       --         Physical Exam    Constitutional: She is not diaphoretic. No " distress.   Intoxicated and disheveled appearance.    HENT:   Head: Normocephalic and atraumatic.   Eyes: Conjunctivae are normal.   Neck: Neck supple.   Cardiovascular:  Normal rate, regular rhythm, S1 normal, S2 normal, normal heart sounds and intact distal pulses.           No murmur heard.  Pulmonary/Chest: Breath sounds normal. No respiratory distress. She has no wheezes. She has no rhonchi. She has no rales.   Abdominal: Abdomen is soft. There is no abdominal tenderness. There is no rebound and no guarding.   Musculoskeletal:         General: No edema.      Cervical back: Neck supple.     Neurological: She is alert and oriented to person, place, and time.   Skin: Skin is warm and dry.       ED Course   Procedures  Labs Reviewed - No data to display       Imaging Results    None          Medications - No data to display  Medical Decision Making:   Initial Assessment:       62-year-old female with history of alcohol abuse, HTN presents to the ED for evaluation of alcohol intoxication.  Patient admits to drinking herb all day, came to the ED for help because she did not want to drink anymore. She denies any falls or injuries, no recent illness or other complaints.  Patient is well known to this ED due to multiple previous presentations for alcohol intoxication, usually uncomplicated, no history of withdrawal.  Patient is significantly intoxicated on arrival, occasionally agitated but mostly pleasant and redirectable.  No sign of head trauma or other injuries, no other exam findings or symptoms to warrant emergent workup such as labs or imaging.  Will monitor for clinical sobriety.    After 3 hours ED observation patient now ambulatory with no instability, requesting discharge.  She is at her baseline mental status, no new complaints or indication for emergent workup, discharged with outpatient resources for alcohol cessation and return precautions.        Scribe Attestation:   Scribe #1: I performed the above  scribed service and the documentation accurately describes the services I performed. I attest to the accuracy of the note.            I, Dr. Jonatan Canchola, personally performed the services described in this documentation. All medical record entries made by the scribe were at my direction and in my presence.  I have reviewed the chart and agree that the record reflects my personal performance and is accurate and complete. Jonatan Canchola MD.          Clinical Impression:   Final diagnoses:  [F10.920] Acute alcoholic intoxication without complication (Primary)        ED Disposition Condition    Discharge Stable          ED Prescriptions    None       Follow-up Information       Follow up With Specialties Details Why Contact Info    [facility] - Emergency Dept Emergency Medicine Go to  As needed [address] [facility] [id number] [phone]             Jonatan Canchola MD  05/28/23 0041

## 2023-06-02 ENCOUNTER — HOSPITAL ENCOUNTER (EMERGENCY)
Facility: OTHER | Age: 63
Discharge: HOME | End: 2023-06-03
Attending: EMERGENCY MEDICINE
Payer: MEDICAID

## 2023-06-02 DIAGNOSIS — F19.94 SUBSTANCE INDUCED MOOD DISORDER: ICD-10-CM

## 2023-06-02 DIAGNOSIS — F10.929 ALCOHOLIC INTOXICATION WITH COMPLICATION: Primary | ICD-10-CM

## 2023-06-02 LAB
BASOPHILS # BLD AUTO: 0.03 K/UL (ref 0–0.2)
BASOPHILS NFR BLD: 0.4 % (ref 0–1.9)
DIFFERENTIAL METHOD: NORMAL
EOSINOPHIL # BLD AUTO: 0.3 K/UL (ref 0–0.5)
EOSINOPHIL NFR BLD: 4.4 % (ref 0–8)
ERYTHROCYTE [DISTWIDTH] IN BLOOD BY AUTOMATED COUNT: 13.2 % (ref 11.5–14.5)
HCT VFR BLD AUTO: 43.6 % (ref 37–48.5)
HGB BLD-MCNC: 14.1 G/DL (ref 12–16)
IMM GRANULOCYTES # BLD AUTO: 0.02 K/UL (ref 0–0.04)
IMM GRANULOCYTES NFR BLD AUTO: 0.3 % (ref 0–0.5)
LYMPHOCYTES # BLD AUTO: 3.4 K/UL (ref 1–4.8)
LYMPHOCYTES NFR BLD: 43.3 % (ref 18–48)
MCH RBC QN AUTO: 27.3 PG (ref 27–31)
MCHC RBC AUTO-ENTMCNC: 32.3 G/DL (ref 32–36)
MCV RBC AUTO: 84 FL (ref 82–98)
MONOCYTES # BLD AUTO: 0.5 K/UL (ref 0.3–1)
MONOCYTES NFR BLD: 5.8 % (ref 4–15)
NEUTROPHILS # BLD AUTO: 3.6 K/UL (ref 1.8–7.7)
NEUTROPHILS NFR BLD: 45.8 % (ref 38–73)
NRBC BLD-RTO: 0 /100 WBC
PLATELET # BLD AUTO: 234 K/UL (ref 150–450)
PMV BLD AUTO: 9.7 FL (ref 9.2–12.9)
RBC # BLD AUTO: 5.17 M/UL (ref 4–5.4)
WBC # BLD AUTO: 7.76 K/UL (ref 3.9–12.7)

## 2023-06-02 PROCEDURE — 80053 COMPREHEN METABOLIC PANEL: CPT | Performed by: EMERGENCY MEDICINE

## 2023-06-02 PROCEDURE — 99285 EMERGENCY DEPT VISIT HI MDM: CPT

## 2023-06-02 PROCEDURE — 82140 ASSAY OF AMMONIA: CPT | Performed by: EMERGENCY MEDICINE

## 2023-06-02 PROCEDURE — 82077 ASSAY SPEC XCP UR&BREATH IA: CPT | Performed by: EMERGENCY MEDICINE

## 2023-06-02 PROCEDURE — 84443 ASSAY THYROID STIM HORMONE: CPT | Performed by: EMERGENCY MEDICINE

## 2023-06-02 PROCEDURE — 80143 DRUG ASSAY ACETAMINOPHEN: CPT | Performed by: EMERGENCY MEDICINE

## 2023-06-02 PROCEDURE — 85025 COMPLETE CBC W/AUTO DIFF WBC: CPT | Performed by: EMERGENCY MEDICINE

## 2023-06-03 VITALS
BODY MASS INDEX: 27.49 KG/M2 | HEIGHT: 65 IN | OXYGEN SATURATION: 98 % | WEIGHT: 165 LBS | RESPIRATION RATE: 20 BRPM | HEART RATE: 80 BPM | TEMPERATURE: 97 F | SYSTOLIC BLOOD PRESSURE: 168 MMHG | DIASTOLIC BLOOD PRESSURE: 89 MMHG

## 2023-06-03 LAB
ALBUMIN SERPL BCP-MCNC: 4.2 G/DL (ref 3.5–5.2)
ALP SERPL-CCNC: 53 U/L (ref 55–135)
ALT SERPL W/O P-5'-P-CCNC: 18 U/L (ref 10–44)
AMMONIA PLAS-SCNC: 42 UMOL/L (ref 10–50)
AMPHET+METHAMPHET UR QL: NEGATIVE
ANION GAP SERPL CALC-SCNC: 12 MMOL/L (ref 8–16)
APAP SERPL-MCNC: <3 UG/ML (ref 10–20)
AST SERPL-CCNC: 20 U/L (ref 10–40)
BARBITURATES UR QL SCN>200 NG/ML: NEGATIVE
BENZODIAZ UR QL SCN>200 NG/ML: NEGATIVE
BILIRUB SERPL-MCNC: 0.3 MG/DL (ref 0.1–1)
BILIRUB UR QL STRIP: NEGATIVE
BUN SERPL-MCNC: 11 MG/DL (ref 8–23)
BZE UR QL SCN: ABNORMAL
CALCIUM SERPL-MCNC: 9.9 MG/DL (ref 8.7–10.5)
CANNABINOIDS UR QL SCN: NEGATIVE
CHLORIDE SERPL-SCNC: 105 MMOL/L (ref 95–110)
CLARITY UR: CLEAR
CO2 SERPL-SCNC: 26 MMOL/L (ref 23–29)
COLOR UR: YELLOW
CREAT SERPL-MCNC: 0.8 MG/DL (ref 0.5–1.4)
CREAT UR-MCNC: 103.7 MG/DL (ref 15–325)
EST. GFR  (NO RACE VARIABLE): >60 ML/MIN/1.73 M^2
ETHANOL SERPL-MCNC: 168 MG/DL
ETHANOL SERPL-MCNC: <10 MG/DL
GLUCOSE SERPL-MCNC: 105 MG/DL (ref 70–110)
GLUCOSE UR QL STRIP: NEGATIVE
HGB UR QL STRIP: NEGATIVE
KETONES UR QL STRIP: NEGATIVE
LEUKOCYTE ESTERASE UR QL STRIP: NEGATIVE
METHADONE UR QL SCN>300 NG/ML: NEGATIVE
NITRITE UR QL STRIP: NEGATIVE
OPIATES UR QL SCN: NEGATIVE
PCP UR QL SCN>25 NG/ML: NEGATIVE
PH UR STRIP: 5 [PH] (ref 5–8)
POTASSIUM SERPL-SCNC: 3.2 MMOL/L (ref 3.5–5.1)
PROT SERPL-MCNC: 7.6 G/DL (ref 6–8.4)
PROT UR QL STRIP: NEGATIVE
SODIUM SERPL-SCNC: 143 MMOL/L (ref 136–145)
SP GR UR STRIP: 1.01 (ref 1–1.03)
TOXICOLOGY INFORMATION: ABNORMAL
TSH SERPL DL<=0.005 MIU/L-ACNC: 1.26 UIU/ML (ref 0.4–4)
URN SPEC COLLECT METH UR: NORMAL
UROBILINOGEN UR STRIP-ACNC: NEGATIVE EU/DL

## 2023-06-03 PROCEDURE — 25000003 PHARM REV CODE 250: Performed by: EMERGENCY MEDICINE

## 2023-06-03 PROCEDURE — 82077 ASSAY SPEC XCP UR&BREATH IA: CPT | Performed by: EMERGENCY MEDICINE

## 2023-06-03 PROCEDURE — G0426 PR INPT TELEHEALTH CONSULT 50M: ICD-10-PCS | Mod: 95,AF,, | Performed by: PSYCHIATRY & NEUROLOGY

## 2023-06-03 PROCEDURE — 81003 URINALYSIS AUTO W/O SCOPE: CPT | Performed by: EMERGENCY MEDICINE

## 2023-06-03 PROCEDURE — G0426 INPT/ED TELECONSULT50: HCPCS | Mod: 95,AF,, | Performed by: PSYCHIATRY & NEUROLOGY

## 2023-06-03 PROCEDURE — 80307 DRUG TEST PRSMV CHEM ANLYZR: CPT | Performed by: EMERGENCY MEDICINE

## 2023-06-03 RX ORDER — NALTREXONE HYDROCHLORIDE 50 MG/1
25 TABLET, FILM COATED ORAL DAILY
Qty: 10 TABLET | Refills: 0 | Status: SHIPPED | OUTPATIENT
Start: 2023-06-03 | End: 2023-06-23

## 2023-06-03 RX ORDER — HYDROXYZINE PAMOATE 25 MG/1
25 CAPSULE ORAL
Status: COMPLETED | OUTPATIENT
Start: 2023-06-03 | End: 2023-06-03

## 2023-06-03 RX ADMIN — HYDROXYZINE PAMOATE 25 MG: 25 CAPSULE ORAL at 12:06

## 2023-06-03 NOTE — ED TRIAGE NOTES
"Patient to ED stating, "I just wanted to come in and see yall tonight." Pt admits to alcohol use this evening. Pt clearly intoxicated upon arrival, no acute signs of distress noted.  "

## 2023-06-03 NOTE — CONSULTS
"[facility]  Psychiatry  Telepsychiatry Consult Note    Please see previous notes:    Patient agreeable to consultation via telepsychiatry.    Tele-Consultation from Psychiatry started: 6/3/2023 at 7:52am  The chief complaint leading to psychiatric consultation is: SI  This consultation was requested by Dr Kilgore, the Emergency Department attending physician.  The location of the consulting psychiatrist is  [place] .  The patient location is  [facility] EMERGENCY DEPARTMENT   The patient arrived at the ED at: [facility]  Also present with the patient at the time of the consultation: nobody    Patient Identification:   Marianne Gottlieb is a 62 y.o. female.    Patient information was obtained from patient and past medical records.  Patient presented  to the Emergency Department     Consults  Teleconsult Time Documentation  Subjective:     History of Present Illness:  61yo F with hx of AUD presents after fall, BOYD was 168 and UDS + cocaine.     Per ED note- "Marianne Gottlieb is a 62 y.o. female presenting with complaints of loss of consciousness that happened yesterday. Patient is a known alcoholic. She reports she was at [employer] when she fell, but does not report hitting her head. She has associated weakness and nausea. She mentions also having no appetite from the nausea. She later came to the ED at around 9pm and slipped and fell out of her chair, but a  notes she did not hit her head or any part of her body, but was lying on her side and was responsive. History is limited by the condition of rhe patient.      This is the extent to the patients complaints today here in the emergency department."    On interview, patient reports "I had too much to drink.. I was at [employer] and I fell out." When I asked her about SI she reported, "that has never happened before.. I am doing better now.. It was the alcohol. " Patient and I went on to discuss her drinking at length, she indicated that she does drink " "nearly every day of the week to the point where she normally passes out. Reports she has tried detox and rehab and AA in the past to no avail. When I asked her about anticraving drugs, she indicated she has never tried any including naltrexone.. Patient reports the SI resolved in the ED and she had not been feeling any sort of SI or depression prior to consuming substances yesterday. She reports she actually feels "pretty good". Denied HI. No psychotic sx noted. No anxiety. No manic sx noted. No aggression or agitation. I reviewed recent labs including albumin, bilirubin, plt, ammonia, liver enzymes, and hepatology notes.   This is the extent of patients complaints at this time  12 pt ros was negative aside from sx noted above        Psychiatric History:   Previous Psychiatric Hospitalizations: No   Previous Medication Trials: No   Previous Suicide Attempts: no   History of Violence: no  History of Depression: no  History of Aleta: no  History of Auditory/Visual Hallucination no  History of Delusions: no  Outpatient psychiatrist (current & past): No    Substance Abuse History:  Tobacco:yes  Alcohol yes " a lot"  Illicit Substances:yes - "occasional" crack/cocaine. Denied opioid use.  Detox/Rehab: Yes    Legal History: Past charges/incarcerations: No     Family Psychiatric History: reviewed with patient and in MAR    Soc Hx:  Domiciled, on disability, good social support. Denied access to firearms.    Psychiatric Mental Status Exam:  Arousal: alert  Sensorium/Orientation: oriented to grossly intact  Behavior/Cooperation: normal, cooperative   Speech: normal tone, normal rate, normal pitch, normal volume  Language: grossly intact  Mood: " good "   Affect: appropriate  Thought Process: normal and logical  Thought Content:   Auditory hallucinations: NO  Visual hallucinations: NO  Paranoia: NO  Delusions:  NO  Suicidal ideation: NO  Homicidal ideation: NO  Attention/Concentration:  intact  Memory:    Recent:  " Intact   Remote: Intact     Fund of Knowledge: Aware of current events   Abstract reasoning: similarities were abstract  Insight: intact  Judgment: behavior is adequate to circumstances      Past Medical History:   Past Medical History:   Diagnosis Date    ETOH abuse     Hyperlipemia     Hypertension     Unspecified cirrhosis of liver       Laboratory Data:   Labs Reviewed   COMPREHENSIVE METABOLIC PANEL - Abnormal; Notable for the following components:       Result Value    Potassium 3.2 (*)     Alkaline Phosphatase 53 (*)     All other components within normal limits   ALCOHOL,MEDICAL (ETHANOL) - Abnormal; Notable for the following components:    Alcohol, Serum 168 (*)     All other components within normal limits   DRUG SCREEN PANEL, URINE EMERGENCY - Abnormal; Notable for the following components:    Cocaine (Metab.) Presumptive Positive (*)     All other components within normal limits    Narrative:     Specimen Source->Urine   ACETAMINOPHEN LEVEL - Abnormal; Notable for the following components:    Acetaminophen (Tylenol), Serum <3.0 (*)     All other components within normal limits   CBC W/ AUTO DIFFERENTIAL   AMMONIA   TSH   URINALYSIS, REFLEX TO URINE CULTURE    Narrative:     Specimen Source->Urine   ACETAMINOPHEN LEVEL   TSH   ALCOHOL,MEDICAL (ETHANOL)         Allergies:   Review of patient's allergies indicates:  No Known Allergies    Medications in ER:   Medications   hydrOXYzine pamoate capsule 25 mg (25 mg Oral Given 6/3/23 0022)       Medications at home: reviewed with patient and in MAR    No new subjective & objective note has been filed under this hospital service since the last note was generated.      Assessment - Diagnosis - Goals:     Diagnosis/Impression:   Alcohol use disorder  Substance induced mood disorder resolved      Modified SAD PERSONS Scale     Suicide Risk Assessment (if applicable)-  A: Access to lethal means ? N  Risk Factors:  S: Male sex ? 0  A: Age 15-25 or 59+ years ?1  D:  Depression or hopelessness ? 0  P: Previous suicidal attempts or psychiatric care ?0  E: Excessive ethanol or drug use ? 1  R: Rational thinking loss (psychotic or organic illness) ?0  S: Single,  or  ? 1  O: Organized or serious attempt ? 0   N: No social support ? 0  S: Stated future intent (determined to repeat or ambivalent) ? 0  Protective Factors:  C: Contracts for safety ? yes  H: Hopeful for the future ? yes  O: Oriented to the future ? yes   I:  Intent- denies ?yes has protective factor  R: Reasons not to attempt (namely, impact on loved ones and Mandaeism) ?family    Rec:   At this time based on data that was available to me, patient does not appear to be a PEC candidate. Patient does not appear to continue to have SI/HI or is gravely disabled. It appears to have been substance inducted (alcohol and cocaine) and as patient has sobered up, it has resolved. Patient  does not want to pursue voluntary psychiatric hospitalization at this time which was offered as part of informed consent.  Patient contract for safety and agreed to return to the ED should she develop any SI or HI or CAH.        Safety planning with patient.    Start naltrexone 25mg po daily prn alcohol alcohol consumption. Please prescribe #10 50mg tabs. I started her on lower dose given hx of liver pathology. Given ongoing etoh use, I believe benefits of NTX outweigh risks at this time. I believe NTX is worth trying compared to other anticraving drugs given superior efficacy. Patient will need CMP done one week after starting. She agreed to follow up with PCP for this. Please also refer patient to area addiction and mental health resources.     Informed consent provided. Patient conveyed understanding of risks including worsened psychiatric sx, hepatic, hypersensitivity, SJS. and wanted to proceed with tx plan. As part of IC also discussed other interventions for AUD including AA, detox, rehab, counseling, other anticraving  medications as well.    Time with patient, coordinating care: 52min      More than 50% of the time was spent counseling/coordinating care    Consulting clinician was informed of the encounter and consult note.    Consultation ended: 6/3/2023 at 9:00am    Earl Gottlieb MD  Psychiatry  [facility]

## 2023-06-03 NOTE — ED NOTES
"As MD was walking by patient, patient stating, "I just want to go home and kill myself, I'm having suicidal thoughts." Tech at bedside to act as sitter.  "

## 2023-06-03 NOTE — ED PROVIDER NOTES
SCRIBE #1 NOTE: I, Jayden Rayo, am scribing for, and in the presence of,  Alicia Kilgore MD. I have scribed the following portions of the note - Other sections scribed: HPI, ROS, PE.       Source of History:  Patient    Chief complaint:  Alcohol Intoxication (Pt is presenting to the ed with etoh intoxication )      HPI:  Marianne Gottlieb is a 62 y.o. female presenting with complaints of loss of consciousness that happened yesterday. Patient is a known alcoholic. She reports she was at [employer] when she fell, but does not report hitting her head. She has associated weakness and nausea. She mentions also having no appetite from the nausea. She later came to the ED at around 9pm and slipped and fell out of her chair, but a  notes she did not hit her head or any part of her body, but was lying on her side and was responsive. History is limited by the condition of rhe patient.     This is the extent to the patients complaints today here in the emergency department.    ROS: As per HPI and below:  Review of Systems   Constitutional: No fever.   HENT: No sore throat.    Eyes: No visual disturbance.   Respiratory: No cough. No shortness of breath.    Cardiovascular: No chest pain.   Gastrointestinal: No abdominal pain.  +nausea.  No vomiting.  Genitourinary: No flank pain.  No abnormal urination.  Musculoskeletal: No back pain.   Skin: No rash   Neurological: No weakness.  No headache.     Review of patient's allergies indicates:  No Known Allergies    PMH:  As per HPI and below:  Past Medical History:   Diagnosis Date    ETOH abuse     Hyperlipemia     Hypertension     Unspecified cirrhosis of liver      Past Surgical History:   Procedure Laterality Date    BREAST LUMPECTOMY Right     benign    TUBAL LIGATION         Social History     Tobacco Use    Smoking status: Some Days     Packs/day: 0.50     Types: Cigarettes    Smokeless tobacco: Never   Substance Use Topics    Alcohol use: Yes     Alcohol/week:  "42.0 standard drinks     Types: 42 Standard drinks or equivalent per week     Comment: heavy drinker    Drug use: No       Physical Exam:    BP (!) 168/89   Pulse 80   Temp 97 °F (36.1 °C) (Oral)   Resp 20   Ht 5' 5" (1.651 m)   Wt 74.8 kg (165 lb)   SpO2 98%   BMI 27.46 kg/m²   Nursing note and vital signs reviewed.    Appearance: Disheveled appearance.  Neck: No deformity. No trauma to the head.  Chest/ Respiratory: Clear to auscultation bilaterally.  Good air movement.  No wheezes.  No rhonchi. No rales. No accessory muscle use.  Cardiovascular: Regular rate and rhythm.  No murmurs. No gallops. No rubs.  Musculoskeletal: No extremity deformities.   Mental Status: Slurred speech. Speaks in full sentences. Protecting airways.      I decided to obtain the patient's medical records.  Summary of Medical Records:  Frequent recent visits for alcohol intoxication      Labs:  Results for orders placed or performed during the hospital encounter of 06/02/23   CBC auto differential   Result Value Ref Range    WBC 7.76 3.90 - 12.70 K/uL    RBC 5.17 4.00 - 5.40 M/uL    Hemoglobin 14.1 12.0 - 16.0 g/dL    Hematocrit 43.6 37.0 - 48.5 %    MCV 84 82 - 98 fL    MCH 27.3 27.0 - 31.0 pg    MCHC 32.3 32.0 - 36.0 g/dL    RDW 13.2 11.5 - 14.5 %    Platelets 234 150 - 450 K/uL    MPV 9.7 9.2 - 12.9 fL    Immature Granulocytes 0.3 0.0 - 0.5 %    Gran # (ANC) 3.6 1.8 - 7.7 K/uL    Immature Grans (Abs) 0.02 0.00 - 0.04 K/uL    Lymph # 3.4 1.0 - 4.8 K/uL    Mono # 0.5 0.3 - 1.0 K/uL    Eos # 0.3 0.0 - 0.5 K/uL    Baso # 0.03 0.00 - 0.20 K/uL    nRBC 0 0 /100 WBC    Gran % 45.8 38.0 - 73.0 %    Lymph % 43.3 18.0 - 48.0 %    Mono % 5.8 4.0 - 15.0 %    Eosinophil % 4.4 0.0 - 8.0 %    Basophil % 0.4 0.0 - 1.9 %    Differential Method Automated    Comprehensive metabolic panel   Result Value Ref Range    Sodium 143 136 - 145 mmol/L    Potassium 3.2 (L) 3.5 - 5.1 mmol/L    Chloride 105 95 - 110 mmol/L    CO2 26 23 - 29 mmol/L    Glucose " 105 70 - 110 mg/dL    BUN 11 8 - 23 mg/dL    Creatinine 0.8 0.5 - 1.4 mg/dL    Calcium 9.9 8.7 - 10.5 mg/dL    Total Protein 7.6 6.0 - 8.4 g/dL    Albumin 4.2 3.5 - 5.2 g/dL    Total Bilirubin 0.3 0.1 - 1.0 mg/dL    Alkaline Phosphatase 53 (L) 55 - 135 U/L    AST 20 10 - 40 U/L    ALT 18 10 - 44 U/L    Anion Gap 12 8 - 16 mmol/L    eGFR >60 >60 mL/min/1.73 m^2   Ethanol   Result Value Ref Range    Alcohol, Serum 168 (H) <10 mg/dL   Ammonia   Result Value Ref Range    Ammonia 42 10 - 50 umol/L   Drug screen panel, emergency   Result Value Ref Range    Benzodiazepines Negative Negatiive    Methadone metabolites Negative Negative    Cocaine (Metab.) Presumptive Positive (A) Negative    Opiate Scrn, Ur Negative Negative    Barbiturate Screen, Ur Negative Negative    Amphetamine Screen, Ur Negative Negative    THC Negative Negative    Phencyclidine Negative Negative    Creatinine, Urine 103.7 15.0 - 325.0 mg/dL    Toxicology Information SEE COMMENT    Urinalysis, Reflex to Urine Culture Urine, Clean Catch    Specimen: Urine   Result Value Ref Range    Specimen UA Urine, Clean Catch     Color, UA Yellow Yellow, Straw, Domonique    Appearance, UA Clear Clear    pH, UA 5.0 5.0 - 8.0    Specific Gravity, UA 1.015 1.005 - 1.030    Protein, UA Negative Negative    Glucose, UA Negative Negative    Ketones, UA Negative Negative    Bilirubin (UA) Negative Negative    Occult Blood UA Negative Negative    Nitrite, UA Negative Negative    Urobilinogen, UA Negative <2.0 EU/dL    Leukocytes, UA Negative Negative   TSH   Result Value Ref Range    TSH 1.257 0.400 - 4.000 uIU/mL   Acetaminophen Level   Result Value Ref Range    Acetaminophen (Tylenol), Serum <3.0 (L) 10.0 - 20.0 ug/mL   Ethanol   Result Value Ref Range    Alcohol, Serum <10 <10 mg/dL         Initial Impression  62 y.o. female with appearance of intoxication. Patient seen several times in a week for similar symptoms. Plan for basic labs, alcohol, ammonia and monitoring for  sobriety.    Differential Dx:  Differential Diagnosis includes, but is not limited to:  Alcohol intoxication, drug overdose, medication toxicity, head trauma, metabolic derangement, medication noncompliance, decompensated psychiatric disease, substance abuse/withdrawal, infectious cause, hypoglycemia     MDM:        ED Course as of 06/07/23 1353   Sat Jun 03, 2023   0004 12:04  Patient now reporting she's going to go home, take a bunch of pills, and kill herself. [RR]   0817 Awaiting Tele-Psych [MA]   0833 Tele psychiatry visit completed.  Case discussed with Dr. Gottlieb, psychiatrist.  Believes that the patient does not meet PEC at this time.  Recommends patient be started on naltrexone 50 mg half a tablet p.o. q.day 1-2 hours before drinking.  Discussed this with patient and she is very interested in trying.  Patient understands that she must follow up with her PCP to get follow-up labs.  Patient is adamant that she has no suicidal ideations at this time.  She has homicidal ideations and denies any auditory or visual hallucinations.  Insert discharge [MA]      ED Course User Index  [MA] Rambo Vasquez MD  [RR] Jayden Rayo                  Scribe Attestation:   Scribe #1: I performed the above scribed service and the documentation accurately describes the services I performed. I attest to the accuracy of the note.  Physician Attestation for Scribe: I, Alicia Kilgore MD, reviewed documentation as scribed in my presence, which is both accurate and complete.     Diagnostic Impression:    1. Alcoholic intoxication with complication    2. Substance induced mood disorder         ED Disposition Condition    Discharge Stable            ED Prescriptions       Medication Sig Dispense Start Date End Date Auth. Provider    naltrexone (DEPADE) 50 mg tablet Take 25 mg by mouth once daily. 1 to 2 hours prior to drinking alcohol for 20 days 10 tablet 6/3/2023 6/23/2023 Rambo Vasquez MD          Follow-up Information       Follow up  With Specialties Details Why Contact Info    [facility] [employer] [facility]  Schedule an appointment as soon as possible for a visit  For follow-up and re-evaluation of alcohol use Located in: [employer] [place] [employer]  Address: [address] [place] [address]  Phone: [phone]    [facility] - Emergency Dept Emergency Medicine  As needed, for any new or worsening symptoms [address] [facility] [address] [phone]    Zainab Milian MD Internal Medicine Schedule an appointment as soon as possible for a visit in 3 days For follow-up and re-evaluation, to get blood work drawn in 1 week [address] [facility]  [address]  [facility] [address] [phone]                Alicia Kilgore MD  06/07/23 3939

## 2023-06-18 ENCOUNTER — HOSPITAL ENCOUNTER (EMERGENCY)
Facility: OTHER | Age: 63
Discharge: HOME | End: 2023-06-19
Attending: EMERGENCY MEDICINE
Payer: MEDICAID

## 2023-06-18 VITALS
OXYGEN SATURATION: 97 % | HEART RATE: 95 BPM | SYSTOLIC BLOOD PRESSURE: 131 MMHG | RESPIRATION RATE: 16 BRPM | BODY MASS INDEX: 28.32 KG/M2 | HEIGHT: 65 IN | WEIGHT: 170 LBS | TEMPERATURE: 98 F | DIASTOLIC BLOOD PRESSURE: 83 MMHG

## 2023-06-18 DIAGNOSIS — F10.929 ACUTE ALCOHOLIC INTOXICATION WITH COMPLICATION: Primary | ICD-10-CM

## 2023-06-18 PROCEDURE — 99282 EMERGENCY DEPT VISIT SF MDM: CPT

## 2023-06-19 NOTE — ED PROVIDER NOTES
"Encounter Date: 6/18/2023       History     Chief Complaint   Patient presents with    Alcohol Intoxication     Patient to ED for " not having a father figure " . + ETOH     62 y.o. female presents to [facility] via EMS with alcohol intoxication.  Patient is a poor historian.  She has presented to this ER multiple times in the past for alcohol use.  Patient is wishing multiple people a happy father's day and also states she is upset that she does not have a father figure.      Review of patient's allergies indicates:  No Known Allergies  Past Medical History:   Diagnosis Date    ETOH abuse     Hyperlipemia     Hypertension     Unspecified cirrhosis of liver      Past Surgical History:   Procedure Laterality Date    BREAST LUMPECTOMY Right     benign    TUBAL LIGATION       Family History   Problem Relation Age of Onset    Lung cancer Mother      Social History     Tobacco Use    Smoking status: Some Days     Packs/day: 0.50     Types: Cigarettes    Smokeless tobacco: Never   Substance Use Topics    Alcohol use: Yes     Alcohol/week: 42.0 standard drinks     Types: 42 Standard drinks or equivalent per week     Comment: heavy drinker    Drug use: No     Review of Systems   Unable to perform ROS: Mental status change (intoxicated)     Physical Exam     Initial Vitals [06/18/23 2255]   BP Pulse Resp Temp SpO2   131/83 95 16 98 °F (36.7 °C) 97 %      MAP       --         Physical Exam    Nursing note and vitals reviewed.  Constitutional: She appears well-developed and well-nourished. She is not diaphoretic.   Slurred speech, disheveled.  Later fell asleep but roused to voice.  Nontoxic-appearing.   HENT:   Head: Normocephalic and atraumatic.   Eyes: Conjunctivae are normal.   Neck: Neck supple.   Cardiovascular:  Normal rate.           Pulmonary/Chest: Breath sounds normal. No respiratory distress.   Abdominal: Abdomen is soft. There is no abdominal tenderness.   Musculoskeletal:         General: Normal range of " motion.      Cervical back: Neck supple.     Neurological:   Moving all extremities.   Skin: Skin is warm and dry.   Psychiatric:   Slurred speech, meandering speech       ED Course   Procedures  Labs Reviewed - No data to display       Imaging Results    None          Medications - No data to display  Medical Decision Making:   History:   I obtained history from: EMS provider.  Old Medical Records: I decided to obtain old medical records.  Old Records Summarized: records from previous admission(s).  Initial Assessment:   62 y.o. female with slurred speech, reported EtOH intoxication.  Differential Diagnosis:   Ddx includes intoxication by alcohol, drug use, occult trauma, other.  ED Management:  Patient allowed to sleep.  She ate a sandwich and requested discharge.  D/c'ed, ambulatory without assistance.                          Clinical Impression:   Final diagnoses:  [F10.929] Acute alcoholic intoxication with complication (Primary)        ED Disposition Condition    Discharge Stable          ED Prescriptions    None       Follow-up Information    None          Elissa Calvo MD  06/19/23 0101

## 2023-06-24 ENCOUNTER — HOSPITAL ENCOUNTER (EMERGENCY)
Facility: OTHER | Age: 63
Discharge: HOME | End: 2023-06-24
Attending: EMERGENCY MEDICINE
Payer: MEDICAID

## 2023-06-24 VITALS
TEMPERATURE: 98 F | DIASTOLIC BLOOD PRESSURE: 75 MMHG | SYSTOLIC BLOOD PRESSURE: 139 MMHG | HEART RATE: 88 BPM | RESPIRATION RATE: 17 BRPM | OXYGEN SATURATION: 95 %

## 2023-06-24 DIAGNOSIS — F10.920 ACUTE ALCOHOLIC INTOXICATION WITHOUT COMPLICATION: Primary | ICD-10-CM

## 2023-06-24 DIAGNOSIS — F10.10 ALCOHOL ABUSE: ICD-10-CM

## 2023-06-24 DIAGNOSIS — S80.212A ABRASION, KNEE, LEFT, INITIAL ENCOUNTER: ICD-10-CM

## 2023-06-24 PROCEDURE — 99283 EMERGENCY DEPT VISIT LOW MDM: CPT

## 2023-06-24 PROCEDURE — 25000003 PHARM REV CODE 250: Performed by: EMERGENCY MEDICINE

## 2023-06-24 RX ORDER — DIPHENHYDRAMINE HCL 25 MG
50 CAPSULE ORAL
Status: COMPLETED | OUTPATIENT
Start: 2023-06-24 | End: 2023-06-24

## 2023-06-24 RX ADMIN — DIPHENHYDRAMINE HYDROCHLORIDE 50 MG: 25 CAPSULE ORAL at 09:06
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
## 2023-06-28 ENCOUNTER — TELEPHONE (OUTPATIENT)
Dept: INTERNAL MEDICINE | Facility: CLINIC | Age: 63
End: 2023-06-28
Payer: MEDICAID

## 2023-06-28 NOTE — TELEPHONE ENCOUNTER
LM stating that there is no openings at [facility] at this time. LM stating that the pt schedule an appointment with the [facility] clinic to be seen for the cough or can go to [facility] today to be [facility].

## 2023-06-28 NOTE — TELEPHONE ENCOUNTER
----- Message from Rachel Sewell sent at 6/28/2023  7:49 AM CDT -----  Regarding: Same Day Appt Request  Name of Who is Calling: ELODIA VELIZ [[id number]]           What is the request in detail: Patient is requesting a call back to schedule a same day appointment for a dry cough.  Please assist.           Can the clinic reply by [facility]: No           What Number to [facility] Back [facility]: [phone]

## 2023-08-03 DIAGNOSIS — E78.5 HYPERLIPIDEMIA, UNSPECIFIED HYPERLIPIDEMIA TYPE: ICD-10-CM

## 2023-08-03 RX ORDER — PRAVASTATIN SODIUM 40 MG/1
40 TABLET ORAL DAILY
Qty: 90 TABLET | Refills: 1 | Status: ON HOLD | OUTPATIENT
Start: 2023-08-03 | End: 2023-08-23 | Stop reason: HOSPADM

## 2023-08-03 NOTE — TELEPHONE ENCOUNTER
----- Message from Rachel Sewell sent at 8/3/2023  9:14 AM CDT -----  Regarding: Refill Request  Can the clinic reply in [facility]:NO          Please refill the medication(s) listed below. Please call the patient when the prescription(s) is ready for  at this phone number  [phone]           Medication #1  pravastatin (PRAVACHOL) 40 MG tablet             Medication #2               Preferred Pharmacy:  [id number] - [place] [address] AT [facility] [address]

## 2023-08-15 ENCOUNTER — HOSPITAL ENCOUNTER (EMERGENCY)
Facility: OTHER | Age: 63
Discharge: PSYCHIATRIC OR SUBSTANCE USE FACILITY | End: 2023-08-15
Attending: EMERGENCY MEDICINE
Payer: MEDICAID

## 2023-08-15 VITALS
WEIGHT: 200 LBS | OXYGEN SATURATION: 97 % | RESPIRATION RATE: 17 BRPM | BODY MASS INDEX: 33.28 KG/M2 | HEART RATE: 83 BPM | TEMPERATURE: 98 F | DIASTOLIC BLOOD PRESSURE: 85 MMHG | SYSTOLIC BLOOD PRESSURE: 167 MMHG

## 2023-08-15 DIAGNOSIS — Z00.8 MEDICAL CLEARANCE FOR PSYCHIATRIC ADMISSION: ICD-10-CM

## 2023-08-15 DIAGNOSIS — F10.920 ALCOHOLIC INTOXICATION WITHOUT COMPLICATION: Primary | ICD-10-CM

## 2023-08-15 DIAGNOSIS — T50.902A INTENTIONAL OVERDOSE, INITIAL ENCOUNTER: ICD-10-CM

## 2023-08-15 LAB
ALBUMIN SERPL BCP-MCNC: 3.8 G/DL (ref 3.5–5.2)
ALP SERPL-CCNC: 70 U/L (ref 55–135)
ALT SERPL W/O P-5'-P-CCNC: 18 U/L (ref 10–44)
AMPHET+METHAMPHET UR QL: NEGATIVE
ANION GAP SERPL CALC-SCNC: 15 MMOL/L (ref 8–16)
APAP SERPL-MCNC: <3 UG/ML (ref 10–20)
AST SERPL-CCNC: 21 U/L (ref 10–40)
BASOPHILS # BLD AUTO: 0.04 K/UL (ref 0–0.2)
BASOPHILS NFR BLD: 0.5 % (ref 0–1.9)
BENZODIAZ UR QL SCN>200 NG/ML: NEGATIVE
BILIRUB SERPL-MCNC: 0.2 MG/DL (ref 0.1–1)
BILIRUB UR QL STRIP: NEGATIVE
BUN SERPL-MCNC: 14 MG/DL (ref 8–23)
BZE UR QL SCN: ABNORMAL
CALCIUM SERPL-MCNC: 9.2 MG/DL (ref 8.7–10.5)
CANNABINOIDS UR QL SCN: NEGATIVE
CHLORIDE SERPL-SCNC: 106 MMOL/L (ref 95–110)
CLARITY UR: CLEAR
CO2 SERPL-SCNC: 19 MMOL/L (ref 23–29)
COLOR UR: COLORLESS
CREAT SERPL-MCNC: 0.7 MG/DL (ref 0.5–1.4)
CREAT UR-MCNC: 12 MG/DL (ref 15–325)
DIFFERENTIAL METHOD: ABNORMAL
EOSINOPHIL # BLD AUTO: 0.4 K/UL (ref 0–0.5)
EOSINOPHIL NFR BLD: 4.4 % (ref 0–8)
ERYTHROCYTE [DISTWIDTH] IN BLOOD BY AUTOMATED COUNT: 13.3 % (ref 11.5–14.5)
EST. GFR  (NO RACE VARIABLE): >60 ML/MIN/1.73 M^2
ETHANOL SERPL-MCNC: 157 MG/DL
ETHANOL SERPL-MCNC: 90 MG/DL
GLUCOSE SERPL-MCNC: 123 MG/DL (ref 70–110)
GLUCOSE UR QL STRIP: NEGATIVE
HCT VFR BLD AUTO: 42.4 % (ref 37–48.5)
HGB BLD-MCNC: 13.5 G/DL (ref 12–16)
HGB UR QL STRIP: NEGATIVE
IMM GRANULOCYTES # BLD AUTO: 0.02 K/UL (ref 0–0.04)
IMM GRANULOCYTES NFR BLD AUTO: 0.2 % (ref 0–0.5)
KETONES UR QL STRIP: NEGATIVE
LEUKOCYTE ESTERASE UR QL STRIP: NEGATIVE
LYMPHOCYTES # BLD AUTO: 2.6 K/UL (ref 1–4.8)
LYMPHOCYTES NFR BLD: 32.1 % (ref 18–48)
MCH RBC QN AUTO: 26.9 PG (ref 27–31)
MCHC RBC AUTO-ENTMCNC: 31.8 G/DL (ref 32–36)
MCV RBC AUTO: 85 FL (ref 82–98)
METHADONE UR QL SCN>300 NG/ML: NEGATIVE
MONOCYTES # BLD AUTO: 0.5 K/UL (ref 0.3–1)
MONOCYTES NFR BLD: 6.3 % (ref 4–15)
NEUTROPHILS # BLD AUTO: 4.6 K/UL (ref 1.8–7.7)
NEUTROPHILS NFR BLD: 56.5 % (ref 38–73)
NITRITE UR QL STRIP: NEGATIVE
NRBC BLD-RTO: 0 /100 WBC
OPIATES UR QL SCN: NEGATIVE
PCP UR QL SCN>25 NG/ML: NEGATIVE
PH UR STRIP: 6 [PH] (ref 5–8)
PLATELET # BLD AUTO: 249 K/UL (ref 150–450)
PMV BLD AUTO: 9.7 FL (ref 9.2–12.9)
POTASSIUM SERPL-SCNC: 3.4 MMOL/L (ref 3.5–5.1)
PROT SERPL-MCNC: 7.9 G/DL (ref 6–8.4)
PROT UR QL STRIP: NEGATIVE
RBC # BLD AUTO: 5.02 M/UL (ref 4–5.4)
SALICYLATES SERPL-MCNC: <5 MG/DL (ref 15–30)
SODIUM SERPL-SCNC: 140 MMOL/L (ref 136–145)
SP GR UR STRIP: <1.005 (ref 1–1.03)
TOXICOLOGY INFORMATION: ABNORMAL
TSH SERPL DL<=0.005 MIU/L-ACNC: 0.97 UIU/ML (ref 0.4–4)
URN SPEC COLLECT METH UR: ABNORMAL
UROBILINOGEN UR STRIP-ACNC: NEGATIVE EU/DL
WBC # BLD AUTO: 8.16 K/UL (ref 3.9–12.7)

## 2023-08-15 PROCEDURE — 80143 DRUG ASSAY ACETAMINOPHEN: CPT | Performed by: EMERGENCY MEDICINE

## 2023-08-15 PROCEDURE — 99205 OFFICE O/P NEW HI 60 MIN: CPT | Mod: 95,,, | Performed by: PSYCHIATRY & NEUROLOGY

## 2023-08-15 PROCEDURE — 96375 TX/PRO/DX INJ NEW DRUG ADDON: CPT

## 2023-08-15 PROCEDURE — 85025 COMPLETE CBC W/AUTO DIFF WBC: CPT | Performed by: EMERGENCY MEDICINE

## 2023-08-15 PROCEDURE — 84443 ASSAY THYROID STIM HORMONE: CPT | Performed by: EMERGENCY MEDICINE

## 2023-08-15 PROCEDURE — 93005 ELECTROCARDIOGRAM TRACING: CPT

## 2023-08-15 PROCEDURE — 36415 COLL VENOUS BLD VENIPUNCTURE: CPT | Performed by: EMERGENCY MEDICINE

## 2023-08-15 PROCEDURE — 63600175 PHARM REV CODE 636 W HCPCS: Performed by: EMERGENCY MEDICINE

## 2023-08-15 PROCEDURE — 80053 COMPREHEN METABOLIC PANEL: CPT | Performed by: EMERGENCY MEDICINE

## 2023-08-15 PROCEDURE — 80307 DRUG TEST PRSMV CHEM ANLYZR: CPT | Performed by: EMERGENCY MEDICINE

## 2023-08-15 PROCEDURE — 82077 ASSAY SPEC XCP UR&BREATH IA: CPT | Performed by: EMERGENCY MEDICINE

## 2023-08-15 PROCEDURE — 81003 URINALYSIS AUTO W/O SCOPE: CPT | Mod: 59 | Performed by: EMERGENCY MEDICINE

## 2023-08-15 PROCEDURE — 93010 EKG 12-LEAD: ICD-10-PCS | Mod: ,,, | Performed by: INTERNAL MEDICINE

## 2023-08-15 PROCEDURE — 93010 ELECTROCARDIOGRAM REPORT: CPT | Mod: ,,, | Performed by: INTERNAL MEDICINE

## 2023-08-15 PROCEDURE — 25000003 PHARM REV CODE 250: Performed by: EMERGENCY MEDICINE

## 2023-08-15 PROCEDURE — 96374 THER/PROPH/DIAG INJ IV PUSH: CPT

## 2023-08-15 PROCEDURE — 99205 PR OFFICE/OUTPT VISIT, NEW, LEVL V, 60-74 MIN: ICD-10-PCS | Mod: 95,,, | Performed by: PSYCHIATRY & NEUROLOGY

## 2023-08-15 PROCEDURE — 99285 EMERGENCY DEPT VISIT HI MDM: CPT | Mod: 25

## 2023-08-15 PROCEDURE — 80179 DRUG ASSAY SALICYLATE: CPT | Performed by: EMERGENCY MEDICINE

## 2023-08-15 RX ORDER — FAMOTIDINE 10 MG/ML
20 INJECTION INTRAVENOUS
Status: COMPLETED | OUTPATIENT
Start: 2023-08-15 | End: 2023-08-15

## 2023-08-15 RX ORDER — LORAZEPAM 2 MG/ML
1 INJECTION INTRAMUSCULAR
Status: COMPLETED | OUTPATIENT
Start: 2023-08-15 | End: 2023-08-15

## 2023-08-15 RX ORDER — ONDANSETRON 2 MG/ML
4 INJECTION INTRAMUSCULAR; INTRAVENOUS
Status: COMPLETED | OUTPATIENT
Start: 2023-08-15 | End: 2023-08-15

## 2023-08-15 RX ORDER — MAG HYDROX/ALUMINUM HYD/SIMETH 200-200-20
30 SUSPENSION, ORAL (FINAL DOSE FORM) ORAL
Status: DISCONTINUED | OUTPATIENT
Start: 2023-08-15 | End: 2023-08-15 | Stop reason: HOSPADM

## 2023-08-15 RX ORDER — MAG HYDROX/ALUMINUM HYD/SIMETH 200-200-20
5 SUSPENSION, ORAL (FINAL DOSE FORM) ORAL
Status: DISCONTINUED | OUTPATIENT
Start: 2023-08-15 | End: 2023-08-15

## 2023-08-15 RX ORDER — LOSARTAN POTASSIUM 50 MG/1
100 TABLET ORAL
Status: COMPLETED | OUTPATIENT
Start: 2023-08-15 | End: 2023-08-15

## 2023-08-15 RX ORDER — HYDROCHLOROTHIAZIDE 25 MG/1
25 TABLET ORAL
Status: COMPLETED | OUTPATIENT
Start: 2023-08-15 | End: 2023-08-15

## 2023-08-15 RX ADMIN — POISON ADSORBENT 50 G: 50 SUSPENSION ORAL at 01:08

## 2023-08-15 RX ADMIN — ONDANSETRON 4 MG: 2 INJECTION INTRAMUSCULAR; INTRAVENOUS at 05:08

## 2023-08-15 RX ADMIN — ALUMINUM HYDROXIDE, MAGNESIUM HYDROXIDE, AND SIMETHICONE 30 ML: 200; 200; 20 SUSPENSION ORAL at 07:08

## 2023-08-15 RX ADMIN — FAMOTIDINE 20 MG: 10 INJECTION, SOLUTION INTRAVENOUS at 05:08

## 2023-08-15 RX ADMIN — LORAZEPAM 1 MG: 2 INJECTION INTRAMUSCULAR; INTRAVENOUS at 05:08

## 2023-08-15 RX ADMIN — HYDROCHLOROTHIAZIDE 25 MG: 25 TABLET ORAL at 07:08

## 2023-08-15 RX ADMIN — LOSARTAN POTASSIUM 100 MG: 50 TABLET, FILM COATED ORAL at 07:08

## 2023-08-15 NOTE — CONSULTS
"[facility]  Psychiatry  Telepsychiatry Consult Note    Please see previous notes:    Patient agreeable to consultation via telepsychiatry.    Tele-Consultation from Psychiatry started: 8/15/2023 at 1:17 PM  The chief complaint leading to psychiatric consultation is: SI  This consultation was requested by Dr Zapata, the Emergency Department attending physician.  The location of the consulting psychiatrist is [place].  The patient location is  [facility] EMERGENCY DEPARTMENT   The patient arrived at the ED at: 0125    Also present with the patient at the time of the consultation: none    Patient Identification:   Marianne Gottlieb is a 62 y.o. female.    Patient information was obtained from patient, past medical records, and ER records.    Inpatient consult to Telemedicine - Psychiatry  Consult performed by: Swetha Warner MD  Consult ordered by: Andrew Zapata, DO        Teleconsult Time Documentation  Subjective:     History of Present Illness:  Per ED MD: "  Chief Complaint   Patient presents with    Drug Overdose       States she took a bottle of Ibuprofen tonight, states she tried to kill herself      Marianne Gottlieb is a 62 y.o. female, with a PMHx of Etoh abuse, HTN, who presents to the ED s/p drug overdose. Pt reports she took a bottle of Ibuprofen to attempt suicide this evening. Per daughter, also reports EtOH consumption but is unsure of other drug use. Per daughter, reports she is "acting up" after the pt found out her daughter was diagnosed with cancer. Pt reports "I feel like I'm dying." This is the extent of the patient's complaints at this time...62-year-old female with history of alcohol abuse presents intoxicated.  According to the daughter who is at the bedside, the patient admitted to intense seeing on ibuprofen after learning of the news that daughter was diagnosed with cancer.  She reportedly took a bottle of ibuprofen less than an hour before arrival.  The patient " "denies any other medication ingestion or drug use tonight.  On initial exam she is very tearful and stated she felt like she was going to die but did not report any specific symptoms.     Will obtain blood work for medical clearance and give charcoal.  Poison control contacted and recommends supportive care.     UA positive for cocaine.  Alcohol greater than 150.  Salicylates and Tylenol not detected.  CMP without significant abnormalities.       5:49 AM  Patient brenda asking for her family.  When she finally calmed down she denied any pain and just stated her stomach was bubbling.  Will give Pepcid as well as Ativan and continue to monitor for 6 hours (7:30am), and if she remains symptom free, she will be medically cleared for psychiatric evaluation."     Pt is a 63 yo female with PMH as above and past psychiatric hx Alcohol use disorder, Substance induced mood disorder, anxiety, MDD and paranoid personality disorder per chart who presents to ED for suicide attempt. Chart reviewed, received activated charcoal, received Ativan 0559, ETOH elevated, UDS+cocaine; seen by telepsychiatry 6/3/23 for SI and discharged with rx for naltrexone. On exam, pt sleeping but awakens easily. Says she has no appetite 2/2 medication received. Asked about overdose, says daughter's dx "got me feeling real bad, I feel so sad...That what made me want to kill myself...Really wanted to kill myself." Does not answer when asked how she feels about still being alive. Says son  , "I'm tired, my children are good children, I'm tired...I cannot be having them leaving me." Unsure of type of daughter's cancer or prognosis. Does not answer when asked if was feeling depressed prior to hearing news. Asked about follow up after seeing telepsychiatry, says "Yeah I went to try to get the medication I couldn't pay for it so I stopped talking with him." Asked if she has any memory issues says "I got memory problems of my son...That what got " "me." No HI/AVH.    Modified SAD PERSONS Scale    Suicide Risk Assessment (if applicable)-  A: Access to lethal means ? N  Risk Factors:  S: Male sex ? 1   A: Age 15-25 or 59+ years ? 1   D: Depression or hopelessness ? 2   P: Previous suicidal attempts or psychiatric care ? 1   E: Excessive ethanol or drug use ? 1   R: Rational thinking loss (psychotic or organic illness) ? 2   S: Single,  or  ? 1   O: Organized or serious attempt ? 2   N: No social support ? 1   S: Stated future intent (determined to repeat or ambivalent) ? 2    This score is then mapped onto a risk assessment scale as follows:  0-5: May be safe to discharge (depending upon circumstances)  6-8: Probably requires psychiatric consultation  >8: Probably requires hospital admission    TOTAL-9    Per chart review with updates where applicable:  Psychiatric History:   Previous Psychiatric Hospitalizations: Yes  Previous Medication Trials: Yes in ED on prior encounters has received Haldol, Ativan, Zyprexa  Previous Suicide Attempts: no   History of Violence: no  History of Depression: yes  History of [place]: no  History of Auditory/Visual Hallucination no  History of Delusions: no  Outpatient psychiatrist (current & past): No     Substance Abuse History:  Tobacco:yes  Alcohol yes " a lot"  Illicit Substances:yes - "occasional" crack/cocaine. Denied opioid use.  Detox/Rehab: Yes     Legal History: Past charges/incarcerations: No     Family Psychiatric History: denied    Social History:  Developmental/Childhood:denied special ed  *Education:11th grade  Employment Status/Finances:Disabled   Relationship Status/Sexual Orientation: not   Children: yes; son passed in 1999 in [place]  Housing Status: Home alone   history:  NO  Access to gun: NO  Episcopalian:Shinto  Recreational activities:Time with family    Psychiatric Mental Status Exam:  Arousal: drowsy  Sensorium/Orientation: oriented to person, place, situation, month of year; stated " "year 2003  Behavior/Cooperation: cooperative   Speech: normal rate, normal volume, spontaneous, slurred at times  Language: grossly intact  Mood: " Really wanted to kill myself "   Affect:  tearful  Thought Process: tangential  Thought Content:   Auditory hallucinations: NO  Visual hallucinations: NO  Paranoia: NO  Delusions:  NO  Suicidal ideation: YES     Homicidal ideation: NO  Attention/Concentration:  unable to spell "HOUSE" backwards  Memory:    Recent:  Decreased   Remote: Intact  Fund of Knowledge: Aware of current events   Abstract reasoning: similarities were abstract  Insight: limited awareness of illness  Judgment: limited      Past Medical History:   Past Medical History:   Diagnosis Date    ETOH abuse     Hyperlipemia     Hypertension     Unspecified cirrhosis of liver       Laboratory Data:   Labs Reviewed   CBC W/ AUTO DIFFERENTIAL - Abnormal; Notable for the following components:       Result Value    MCH 26.9 (*)     MCHC 31.8 (*)     All other components within normal limits   COMPREHENSIVE METABOLIC PANEL - Abnormal; Notable for the following components:    Potassium 3.4 (*)     CO2 19 (*)     Glucose 123 (*)     All other components within normal limits   URINALYSIS, REFLEX TO URINE CULTURE - Abnormal; Notable for the following components:    Color, UA Colorless (*)     Specific Gravity, UA <1.005 (*)     All other components within normal limits    Narrative:     Specimen Source->Urine   DRUG SCREEN PANEL, URINE EMERGENCY - Abnormal; Notable for the following components:    Cocaine (Metab.) Presumptive Positive (*)     Creatinine, Urine 12.0 (*)     All other components within normal limits    Narrative:     Specimen Source->Urine   ALCOHOL,MEDICAL (ETHANOL) - Abnormal; Notable for the following components:    Alcohol, Serum 157 (*)     All other components within normal limits   ACETAMINOPHEN LEVEL - Abnormal; Notable for the following components:    Acetaminophen (Tylenol), Serum <3.0 (*)     " All other components within normal limits   SALICYLATE LEVEL - Abnormal; Notable for the following components:    Salicylate Lvl <5.0 (*)     All other components within normal limits   ALCOHOL,MEDICAL (ETHANOL) - Abnormal; Notable for the following components:    Alcohol, Serum 90 (*)     All other components within normal limits   TSH       Neurological History:  Seizures: No  Head trauma: Yes    Allergies:   Review of patient's allergies indicates:  No Known Allergies    Medications in ER:   Medications   activated charcoal-sorbitoL 25 gram/120 mL suspension 50 g (50 g Oral Given 8/15/23 0151)   famotidine (PF) injection 20 mg (20 mg Intravenous Given 8/15/23 0522)   ondansetron injection 4 mg (4 mg Intravenous Given 8/15/23 0523)   LORazepam injection 1 mg (1 mg Intravenous Given 8/15/23 0526)       Medications at home: denied    Assessment - Diagnosis - Goals:     IMPRESSION:   Unspecified depressive d/o (h/o MDD)  Alcohol use d/o  Alcohol intoxication--resolving    Prolonged QTc    RECOMMENDATIONS:     DISPOSITION: Once medically cleared;   Seek Involuntary Inpatient Psychiatric admission for stabilization of acute psychiatric symptoms and until a safe disposition plan is enacted. The pt was informed that the pt will be transferred to an Inpt unit per ED placement team.     PSYCHIATRIC MEDICATIONS  Scheduled-defer to inpatient psychiatry   PRN-avoid antipsychotics 2/2 prolonged Qtc, recommend Ativan 2mg PO/IM q 6 for agitation. For alcohol WD Ativan 2 mg PO/IM PRN SBP>160, DBP>105 and HR >105 (must meet 2 criteria, not anxiety)    LEGAL  Continue PEC because pt is in imminent danger of hurting self. Please provide with 1:1 sitter.     OTHER  Obtain collateral  Recommend EKG to check Qtc if not already done      Total time including chart review, time with patient, obtaining collateral info[if necessary/possible]: 40        More than 50% of the time was spent counseling/coordinating care    Consulting clinician  was informed of the encounter and consult note.    Consultation ended: 8/15/2023 at 1:55 PM      Swetha Warner MD   Psychiatry  [facility]

## 2023-08-15 NOTE — ED NOTES
Report given by RAJESH Broderick. Pt AAOx4, respirations even and unlabored,  Manuelito at bedside documenting Q15 min checks.

## 2023-08-15 NOTE — ED PROVIDER NOTES
"Encounter Date: 8/15/2023    SCRIBE #1 NOTE: I, Yulia Arambula, am scribing for, and in the presence of,  Mariana Joseph MD.       History     Chief Complaint   Patient presents with    Drug Overdose     States she took a bottle of Ibuprofen tonight, states she tried to kill herself     Marianne Gottlieb is a 62 y.o. female, with a PMHx of Etoh abuse, HTN, who presents to the ED s/p drug overdose. Pt reports she took a bottle of Ibuprofen to attempt suicide this evening. Per daughter, also reports EtOH consumption but is unsure of other drug use. Per daughter, reports she is "acting up" after the pt found out her daughter was diagnosed with cancer. Pt reports "I feel like I'm dying." This is the extent of the patient's complaints at this time.    The history is provided by the patient.     Review of patient's allergies indicates:  No Known Allergies  Past Medical History:   Diagnosis Date    ETOH abuse     Hyperlipemia     Hypertension     Unspecified cirrhosis of liver      Past Surgical History:   Procedure Laterality Date    BREAST LUMPECTOMY Right     benign    TUBAL LIGATION       Family History   Problem Relation Age of Onset    Lung cancer Mother      Social History     Tobacco Use    Smoking status: Some Days     Current packs/day: 0.50     Types: Cigarettes    Smokeless tobacco: Never   Substance Use Topics    Alcohol use: Yes     Alcohol/week: 42.0 standard drinks of alcohol     Types: 42 Standard drinks or equivalent per week     Comment: heavy drinker    Drug use: No     Review of Systems   Constitutional:  Negative for chills and fever.   HENT:  Negative for congestion and sore throat.    Eyes:  Negative for visual disturbance.   Respiratory:  Negative for cough and shortness of breath.    Cardiovascular:  Negative for chest pain and palpitations.   Gastrointestinal:  Negative for abdominal pain, diarrhea and vomiting.   Genitourinary:  Negative for decreased urine volume, dysuria and vaginal discharge. "   Musculoskeletal:  Negative for joint swelling, neck pain and neck stiffness.   Skin:  Negative for rash and wound.   Neurological:  Negative for weakness, numbness and headaches.   Psychiatric/Behavioral:  Positive for suicidal ideas. Negative for confusion.        Physical Exam     Initial Vitals [08/15/23 0131]   BP Pulse Resp Temp SpO2   (!) 176/84 86 13 97.9 °F (36.6 °C) 95 %      MAP       --         Physical Exam    Vitals reviewed.  Constitutional: She appears well-developed and well-nourished. She is not diaphoretic. She does not have a sickly appearance. No distress.   Smells of EtOH.   HENT:   Head: Normocephalic and atraumatic.   Right Ear: External ear normal.   Left Ear: External ear normal.   Nose: Nose normal.   Eyes: Conjunctivae, EOM and lids are normal. Right eye exhibits no discharge. Left eye exhibits no discharge. Right conjunctiva is not injected. Right conjunctiva has no hemorrhage. Left conjunctiva is not injected. Left conjunctiva has no hemorrhage. No scleral icterus.   Pupils equal and reactive.   Neck: Phonation normal. No stridor present. No tracheal deviation present.   Normal range of motion.  Cardiovascular:  Normal rate, regular rhythm and normal heart sounds.     Exam reveals no friction rub.       No murmur heard.  Pulses:       Radial pulses are 2+ on the right side and 2+ on the left side.        Dorsalis pedis pulses are 2+ on the right side and 2+ on the left side.   Pulmonary/Chest: Breath sounds normal. No respiratory distress. She has no wheezes. She has no rhonchi. She has no rales.   Abdominal: Abdomen is soft. Bowel sounds are normal. She exhibits no distension. There is no abdominal tenderness. There is no rebound and no guarding.   Musculoskeletal:      Cervical back: Normal range of motion.     Neurological: She is alert. She has normal strength. GCS eye subscore is 4. GCS verbal subscore is 5. GCS motor subscore is 6.   GCS 15.  Alert and oriented to person and  place   Skin: Skin is warm.   Psychiatric: She has a normal mood and affect. Her speech is normal and behavior is normal. Judgment and thought content normal. Cognition and memory are normal.         ED Course   Procedures  Labs Reviewed   CBC W/ AUTO DIFFERENTIAL - Abnormal; Notable for the following components:       Result Value    MCH 26.9 (*)     MCHC 31.8 (*)     All other components within normal limits   COMPREHENSIVE METABOLIC PANEL - Abnormal; Notable for the following components:    Potassium 3.4 (*)     CO2 19 (*)     Glucose 123 (*)     All other components within normal limits   URINALYSIS, REFLEX TO URINE CULTURE - Abnormal; Notable for the following components:    Color, UA Colorless (*)     Specific Gravity, UA <1.005 (*)     All other components within normal limits    Narrative:     Specimen Source->Urine   DRUG SCREEN PANEL, URINE EMERGENCY - Abnormal; Notable for the following components:    Cocaine (Metab.) Presumptive Positive (*)     Creatinine, Urine 12.0 (*)     All other components within normal limits    Narrative:     Specimen Source->Urine   ALCOHOL,MEDICAL (ETHANOL) - Abnormal; Notable for the following components:    Alcohol, Serum 157 (*)     All other components within normal limits   ACETAMINOPHEN LEVEL - Abnormal; Notable for the following components:    Acetaminophen (Tylenol), Serum <3.0 (*)     All other components within normal limits   SALICYLATE LEVEL - Abnormal; Notable for the following components:    Salicylate Lvl <5.0 (*)     All other components within normal limits   ALCOHOL,MEDICAL (ETHANOL) - Abnormal; Notable for the following components:    Alcohol, Serum 90 (*)     All other components within normal limits   TSH     EKG Readings: (Independently Interpreted)   Normal sinus rhythm. Rate of 84. No ST or T wave changes.     ECG Results              EKG 12-lead (Final result)  Result time 08/15/23 09:39:24      Final result by Interface, Lab In [facility] (08/15/23  09:39:24)                   Narrative:    Test Reason : Z00.8,    Vent. Rate : 084 BPM     Atrial Rate : 084 BPM     P-R Int : 186 ms          QRS Dur : 088 ms      QT Int : 488 ms       P-R-T Axes : 065 -25 030 degrees     QTc Int : 576 ms    Normal sinus rhythm  Minimal voltage criteria for LVH, may be normal variant  Cannot rule out Anterior infarct ,age undetermined  Abnormal ECG    Confirmed by Osmin Darby MD ([id number]) on 8/15/2023 9:39:15 AM    Referred By: [email or web address]   SELF           Confirmed By:Osmin Darby MD                                  Imaging Results    None          Medications   aluminum-magnesium hydroxide-simethicone 200-200-20 mg/5 mL suspension 30 mL (30 mLs Oral Given 8/15/23 1923)   activated charcoal-sorbitoL 25 gram/120 mL suspension 50 g (50 g Oral Given 8/15/23 0151)   famotidine (PF) injection 20 mg (20 mg Intravenous Given 8/15/23 0522)   ondansetron injection 4 mg (4 mg Intravenous Given 8/15/23 0523)   LORazepam injection 1 mg (1 mg Intravenous Given 8/15/23 0526)   losartan tablet 100 mg (100 mg Oral Given 8/15/23 1936)   hydroCHLOROthiazide tablet 25 mg (25 mg Oral Given 8/15/23 1936)     Medical Decision Making:   History:   Old Medical Records: I decided to obtain old medical records.  Independently Interpreted Test(s):   I have ordered and independently interpreted EKG Reading(s) - see prior notes  Clinical Tests:   Lab Tests: Ordered and Reviewed  Medical Tests: Ordered and Reviewed    Additional MDM:   Comments: 62-year-old female with history of alcohol abuse presents intoxicated.  According to the daughter who is at the bedside, the patient admitted to intense seeing on ibuprofen after learning of the news that daughter was diagnosed with cancer.  She reportedly took a bottle of ibuprofen less than an hour before arrival.  The patient denies any other medication ingestion or drug use tonight.  On initial exam she is very tearful and stated she felt like she was going to die  but did not report any specific symptoms.    Will obtain blood work for medical clearance and give charcoal.  Poison control contacted and recommends supportive care.    UA positive for cocaine.  Alcohol greater than 150.  Salicylates and Tylenol not detected.  CMP without significant abnormalities.      5:49 AM  Patient brenda asking for her family.  When she finally calmed down she denied any pain and just stated her stomach was bubbling.  Will give Pepcid as well as Ativan and continue to monitor for 6 hours (7:30am), and if she remains symptom free, she will be medically cleared for psychiatric evaluation..        Scribe Attestation:   Scribe #1: I performed the above scribed service and the documentation accurately describes the services I performed. I attest to the accuracy of the note.      I, Mariana Joseph  , personally performed the services described in this documentation. All medical record entries made by the scribe were at my direction and in my presence. I have reviewed the chart and agree that the record reflects my personal performance and is accurate and complete.      ED Course as of 08/15/23 2113   Tue Aug 15, 2023   0807 Alcohol, Serum(!): 90 [SM]   1000 On re-evaluation patient states she has upset stomach from taking ibuprofen.  When asked if she still is suicidal she denies any symptoms at this time.  Her alcohol level is less than the required for inpatient services.  As she is now not intoxicated and no longer suicidal I do suspect possible acute stress reaction.  Will have telemedicine psychiatry consult with the patient to see if any change in management i.e. rescinding the pec is indicated. [SM]      ED Course User Index  [SM] Andrew Zapata,        Medically cleared for psychiatry placement: 8/15/2023  1:52 PM         Clinical Impression:   Final diagnoses:  [Z00.8] Medical clearance for psychiatric admission  [F10.920] Alcoholic intoxication without complication  (Primary)  [T50.902A] Intentional overdose, initial encounter        ED Disposition Condition    Transfer to Psych Facility Stable          ED Prescriptions    None       Follow-up Information    None          Mariana Joseph MD  08/15/23 0551       Mariana Joseph MD  08/15/23 0022

## 2023-08-15 NOTE — ED NOTES
Patient belongings: dentures, dress, shoes, bandana, bottle of losartan rx. All belongings given to security

## 2023-08-15 NOTE — ED NOTES
Spoke with poison control. Poison control notified of patient condition and last set of VS. Per poison control: case is closed but call back is pt status changes.

## 2023-08-15 NOTE — PROVIDER PROGRESS NOTES - EMERGENCY DEPT.
Encounter Date: 8/15/2023    ED Physician Progress Notes        Physician Note:   I was asked to follow-up on the patient after no longer intoxicated.  Briefly 62-year-old female history of alcoholism found out her daughter had cancer and overdosed on ibuprofen while intoxicated not wanting to live.  After I re-evaluated the patient after no longer intoxicated she denies any active suicidal ideations.  Had tele psych consult with the patient who recommended continue with the pec.  I have reviewed the patient's blood work in the patient is medically stable for psychiatric evaluation.    Impression:   Suicidal ideations   Alcohol intoxication

## 2023-08-15 NOTE — ED NOTES
Pt resting quietly on stretcher; remains calm and cooperative. Security remains at bedside in direct visual contact, charting per protocol every 15 minutes. No equipment or belongings are in the patients room.  Pt denies needs or complaints at this time.  Bed locked in lowest position; side rails up and locked x 2.  Pt instructed to alert security or nurse for assistance and before attempting to get out of bed; verbalizes understanding.  Will continue to monitor pt.

## 2023-08-15 NOTE — ED NOTES
Pt cleaned and changed with security at bedside. Pt encouraged to ambulate to bathroom with assistance of security.

## 2023-08-15 NOTE — ED TRIAGE NOTES
Marianne Gottlieb, an 62 y.o. female presents to the ED after ingesting unknown amount of ibuprofen. Patient states she just found out her daughter was diagnosed with cancer. Patient drowsy but following commands. Poison control notified. Patient on bedside monitor, security at bedside.       Chief Complaint   Patient presents with    Drug Overdose     States she took a bottle of Ibuprofen tonight, states she tried to kill herself     Review of patient's allergies indicates:  No Known Allergies  Past Medical History:   Diagnosis Date    ETOH abuse     Hyperlipemia     Hypertension     Unspecified cirrhosis of liver

## 2023-08-16 PROBLEM — F19.10 SUBSTANCE ABUSE: Status: ACTIVE | Noted: 2023-08-16

## 2023-08-16 PROBLEM — E87.6 HYPOKALEMIA: Status: ACTIVE | Noted: 2023-08-16

## 2023-08-16 PROBLEM — Z86.73 HISTORY OF CARDIOEMBOLIC CEREBROVASCULAR ACCIDENT (CVA): Status: ACTIVE | Noted: 2023-08-16

## 2023-08-16 NOTE — ED NOTES
Pt c/o pain, pain addressed with medication, requesting ibuprofen, unable to admin ibuprofen due to reason for being here. Pt lting in bed, c/o being hungry, pt given sandwich and juice

## 2023-10-18 ENCOUNTER — HOSPITAL ENCOUNTER (EMERGENCY)
Facility: OTHER | Age: 63
Discharge: HOME | End: 2023-10-18
Attending: EMERGENCY MEDICINE
Payer: MEDICAID

## 2023-10-18 VITALS
HEIGHT: 63 IN | BODY MASS INDEX: 32.93 KG/M2 | DIASTOLIC BLOOD PRESSURE: 90 MMHG | WEIGHT: 185.88 LBS | HEART RATE: 66 BPM | TEMPERATURE: 98 F | RESPIRATION RATE: 18 BRPM | OXYGEN SATURATION: 99 % | SYSTOLIC BLOOD PRESSURE: 178 MMHG

## 2023-10-18 DIAGNOSIS — M79.674 TOE PAIN, RIGHT: Primary | ICD-10-CM

## 2023-10-18 DIAGNOSIS — S92.534A CLOSED NONDISPLACED FRACTURE OF DISTAL PHALANX OF LESSER TOE OF RIGHT FOOT, INITIAL ENCOUNTER: ICD-10-CM

## 2023-10-18 PROCEDURE — 25000003 PHARM REV CODE 250: Performed by: EMERGENCY MEDICINE

## 2023-10-18 PROCEDURE — 99283 EMERGENCY DEPT VISIT LOW MDM: CPT

## 2023-10-18 RX ORDER — IBUPROFEN 600 MG/1
600 TABLET ORAL EVERY 8 HOURS PRN
Qty: 20 TABLET | Refills: 0 | Status: SHIPPED | OUTPATIENT
Start: 2023-10-18 | End: 2023-12-08 | Stop reason: SDUPTHER

## 2023-10-18 RX ORDER — IBUPROFEN 600 MG/1
600 TABLET ORAL
Status: COMPLETED | OUTPATIENT
Start: 2023-10-18 | End: 2023-10-18

## 2023-10-18 RX ADMIN — IBUPROFEN 600 MG: 600 TABLET, FILM COATED ORAL at 09:10

## 2023-10-18 NOTE — ED TRIAGE NOTES
Pt c/o right 5th toe pain and swelling for 2 weeks after hitting it on a door. Toe is swollen. Pt in no acute distress.

## 2023-10-18 NOTE — ED PROVIDER NOTES
Encounter Date: 10/18/2023    SCRIBE #1 NOTE: I, Janiya Musa, am scribing for, and in the presence of,  Jonatan Canchola MD. I have scribed the following portions of the note - Other sections scribed: HPI, ROS, PE.       History     Chief Complaint   Patient presents with    Toe Injury     Pt c/o pain to R foot 5th toe since she hit it against a door about 2 weeks ago. Toe is red and swollen. Pt ambulating with steady gait.     Time seen by provider: 9:10 AM    This is a 63 y.o. female with a PMHx of HTN, as well as, alcohol and drug abuse who presents with complaint of a toe injury. Patient reports that she hit her 5th toe on her right foot 2 weeks ago. She notes it hurt immediately after hitting it but it did not swell up until a few days later. She has not been taking any pain medication since then. This is the extent of the patient's complaints at this time.           Review of patient's allergies indicates:  No Known Allergies  Past Medical History:   Diagnosis Date    ETOH abuse     Hyperlipemia     Hypertension     Unspecified cirrhosis of liver      Past Surgical History:   Procedure Laterality Date    BREAST LUMPECTOMY Right     benign    TUBAL LIGATION       Family History   Problem Relation Age of Onset    Lung cancer Mother      Social History     Tobacco Use    Smoking status: Some Days     Current packs/day: 0.50     Types: Cigarettes    Smokeless tobacco: Never   Substance Use Topics    Alcohol use: Yes     Alcohol/week: 42.0 standard drinks of alcohol     Types: 42 Standard drinks or equivalent per week     Comment: heavy drinker    Drug use: No     Review of Systems   Constitutional:  Negative for fever.   HENT:  Negative for congestion.    Eyes:  Negative for redness.   Respiratory:  Negative for shortness of breath.    Cardiovascular:  Negative for chest pain.   Gastrointestinal:  Negative for abdominal pain.   Genitourinary:  Negative for dysuria.   Musculoskeletal:  Positive for myalgias  (toe pain).   Skin:  Negative for rash.   Neurological:  Negative for headaches.   Psychiatric/Behavioral:  Negative for confusion.        Physical Exam     Initial Vitals [10/18/23 0757]   BP Pulse Resp Temp SpO2   (!) 183/92 67 19 98.5 °F (36.9 °C) 97 %      MAP       --         Physical Exam    Constitutional: She appears well-developed and well-nourished. She is not diaphoretic. No distress.   HENT:   Head: Normocephalic and atraumatic.   Eyes: Conjunctivae are normal.   Neck: Neck supple.   Cardiovascular:  Normal rate, regular rhythm, S1 normal, S2 normal, normal heart sounds and intact distal pulses.           No murmur heard.  Pulmonary/Chest: Breath sounds normal. No respiratory distress. She has no wheezes. She has no rhonchi. She has no rales.   Musculoskeletal:      Cervical back: Neck supple.      Comments: Mild soft tissue edema and tenderness to medial right 5th toe.      Neurological: She is alert and oriented to person, place, and time.   Skin: Skin is warm and dry.   Psychiatric: She has a normal mood and affect.         ED Course   Procedures  Labs Reviewed - No data to display       Imaging Results              X-Ray Toe 2 or More Views Right (Final result)  Result time 10/18/23 10:03:39      Final result by Bebe Jacobo MD (10/18/23 10:03:39)                   Impression:      Nondisplaced fracture of the distal phalanx of the 5th toe      Electronically signed by: Bebe Jacobo MD  Date:    10/18/2023  Time:    10:03               Narrative:    EXAMINATION:  XR TOE 2 OR MORE VIEWS RIGHT    CLINICAL HISTORY:  5th toe pain;    TECHNIQUE:  Three views of the right toes were performed    COMPARISON:  None    FINDINGS:  There is a nondisplaced fracture of the distal phalanx of the 5th toe.  The remainder of the visualized osseous structures and soft tissues appear normal.  No advanced degenerative change.  No soft tissue foreign body.                                       Medications    ibuprofen tablet 600 mg (600 mg Oral Given 10/18/23 0904)     Medical Decision Making      63-year-old female with history of alcohol abuse, hypertension presents for evaluation of persistent right 5th toe pain after she stubbed it 2 weeks ago.  She still been walking on it, reports pain to the inside of her toe, no other injuries or complaints at this time.  Patient is well known to me due to frequent previous visits for alcohol intoxication, does not appear intoxicated at this time, no other concerning physical exam findings or indication for further emergent workup.  She does have mild right 5th toe soft tissue edema and medial tenderness, no deformity, normal right foot DP pulse with no sign of arterial insufficiency or ulcers.  X-ray checked and does show non-displaced 5th distal phalanx fracture per my independent interpretation.  Her 5th toe was buddy-taped and she was given a hard sole shoe, advised on further supportive care including NSAIDs p.r.n. and podiatry follow-up as needed.      Amount and/or Complexity of Data Reviewed  External Data Reviewed: notes.  Radiology: ordered and independent interpretation performed. Decision-making details documented in ED Course.    Risk  Prescription drug management.            Scribe Attestation:   Scribe #1: I performed the above scribed service and the documentation accurately describes the services I performed. I attest to the accuracy of the note.              I, Dr. Jonatan Canchola, personally performed the services described in this documentation. All medical record entries made by the scribe were at my direction and in my presence.  I have reviewed the chart and agree that the record reflects my personal performance and is accurate and complete. Jonatan Canchola MD.            Clinical Impression:   Final diagnoses:  [M79.674] Toe pain, right (Primary)  [S92.813P] Closed nondisplaced fracture of distal phalanx of lesser toe of right foot, initial  encounter        ED Disposition Condition    Discharge Stable          ED Prescriptions       Medication Sig Dispense Start Date End Date Auth. Provider    ibuprofen (ADVIL,MOTRIN) 600 MG tablet Take 1 tablet (600 mg total) by mouth every 8 (eight) hours as needed for Pain. 20 tablet 10/18/2023 -- Jonatan Canchola MD          Follow-up Information       Follow up With Specialties Details Why Contact Info    [facility] Schedule an appointment as soon as possible for a visit in 1 week  [address] [place] [id number] [phone]             Jonatan Canchola MD  10/18/23 3122

## 2023-11-03 ENCOUNTER — HOSPITAL ENCOUNTER (EMERGENCY)
Facility: OTHER | Age: 63
Discharge: HOME | End: 2023-11-04
Attending: EMERGENCY MEDICINE
Payer: MEDICARE

## 2023-11-03 DIAGNOSIS — F10.929 ALCOHOLIC INTOXICATION WITH COMPLICATION: Primary | ICD-10-CM

## 2023-11-03 PROCEDURE — 25000003 PHARM REV CODE 250: Performed by: EMERGENCY MEDICINE

## 2023-11-03 PROCEDURE — 99284 EMERGENCY DEPT VISIT MOD MDM: CPT

## 2023-11-03 PROCEDURE — 63600175 PHARM REV CODE 636 W HCPCS: Performed by: EMERGENCY MEDICINE

## 2023-11-03 PROCEDURE — 96372 THER/PROPH/DIAG INJ SC/IM: CPT | Performed by: EMERGENCY MEDICINE

## 2023-11-03 RX ORDER — DROPERIDOL 2.5 MG/ML
1.25 INJECTION, SOLUTION INTRAMUSCULAR; INTRAVENOUS ONCE
Status: COMPLETED | OUTPATIENT
Start: 2023-11-03 | End: 2023-11-03

## 2023-11-03 RX ORDER — LORAZEPAM 2 MG/ML
2 INJECTION INTRAMUSCULAR
Status: COMPLETED | OUTPATIENT
Start: 2023-11-03 | End: 2023-11-03

## 2023-11-03 RX ORDER — ACETAMINOPHEN 500 MG
1000 TABLET ORAL
Status: COMPLETED | OUTPATIENT
Start: 2023-11-03 | End: 2023-11-03

## 2023-11-03 RX ADMIN — LORAZEPAM 2 MG: 2 INJECTION INTRAMUSCULAR; INTRAVENOUS at 06:11

## 2023-11-03 RX ADMIN — DROPERIDOL 1.25 MG: 2.5 INJECTION, SOLUTION INTRAMUSCULAR; INTRAVENOUS at 06:11

## 2023-11-03 RX ADMIN — ACETAMINOPHEN 1000 MG: 500 TABLET ORAL at 10:11

## 2023-11-03 NOTE — ED NOTES
Pt intoxicated. Screaming at staff. Had to redirect pt back to bed. No respiratory distress noted
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
## 2023-11-04 VITALS
TEMPERATURE: 99 F | RESPIRATION RATE: 16 BRPM | SYSTOLIC BLOOD PRESSURE: 148 MMHG | OXYGEN SATURATION: 97 % | DIASTOLIC BLOOD PRESSURE: 78 MMHG | HEART RATE: 90 BPM

## 2023-11-04 RX ORDER — ACETAMINOPHEN 500 MG
500 TABLET ORAL EVERY 6 HOURS PRN
Qty: 12 TABLET | Refills: 0 | Status: SHIPPED | OUTPATIENT
Start: 2023-11-04

## 2023-11-07 ENCOUNTER — TELEPHONE (OUTPATIENT)
Dept: INTERNAL MEDICINE | Facility: CLINIC | Age: 63
End: 2023-11-07
Payer: MEDICARE

## 2023-11-07 NOTE — TELEPHONE ENCOUNTER
Spoke with pt. Pt states she thinks new rx is too low (20mg) and wants to go back onto old rx (40mg).  Please advise.

## 2023-11-07 NOTE — TELEPHONE ENCOUNTER
----- Message from Mya Mcclellan sent at 11/7/2023  8:22 AM CST -----  Contact: ELODIA VELIZ [[id number]]  Type: Call Back      Who called: ELODIA VELIZ [[id number]]      What is the request in detail: Patient is requesting a call back. Pt states that her medication was changed to atorvastatin (LIPITOR) 20 MG tablet, she would like to know if she can be placed back on the pravastatin (PRAVACHOL) 40 MG tabletPlease advise.     Can the clinic reply by [facility]?       Would the patient rather a call back or a response via [facility]? Call back       Best call back number: [phone]      Additional Information: [id number] - [place], [place] - [address] AT [facility] [address]

## 2023-11-28 ENCOUNTER — HOSPITAL ENCOUNTER (EMERGENCY)
Facility: OTHER | Age: 63
Discharge: HOME | End: 2023-11-29
Attending: EMERGENCY MEDICINE
Payer: MEDICARE

## 2023-11-28 DIAGNOSIS — F10.920 ACUTE ALCOHOLIC INTOXICATION WITHOUT COMPLICATION: Primary | ICD-10-CM

## 2023-11-28 PROCEDURE — 99283 EMERGENCY DEPT VISIT LOW MDM: CPT

## 2023-11-28 PROCEDURE — 25000003 PHARM REV CODE 250: Performed by: EMERGENCY MEDICINE

## 2023-11-28 RX ORDER — ACETAMINOPHEN 325 MG/1
650 TABLET ORAL
Status: COMPLETED | OUTPATIENT
Start: 2023-11-28 | End: 2023-11-28

## 2023-11-28 RX ADMIN — ACETAMINOPHEN 650 MG: 325 TABLET, FILM COATED ORAL at 10:11

## 2023-11-29 VITALS
HEART RATE: 82 BPM | SYSTOLIC BLOOD PRESSURE: 128 MMHG | BODY MASS INDEX: 32.78 KG/M2 | TEMPERATURE: 99 F | HEIGHT: 63 IN | DIASTOLIC BLOOD PRESSURE: 82 MMHG | WEIGHT: 185 LBS | RESPIRATION RATE: 16 BRPM | OXYGEN SATURATION: 98 %

## 2023-11-29 VITALS
HEART RATE: 85 BPM | TEMPERATURE: 98 F | SYSTOLIC BLOOD PRESSURE: 172 MMHG | DIASTOLIC BLOOD PRESSURE: 83 MMHG | RESPIRATION RATE: 20 BRPM | HEIGHT: 63 IN | WEIGHT: 158 LBS | BODY MASS INDEX: 28 KG/M2 | OXYGEN SATURATION: 98 %
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
## 2023-11-29 DIAGNOSIS — S99.921A INJURY OF RIGHT FOOT, INITIAL ENCOUNTER: Primary | ICD-10-CM

## 2023-11-29 DIAGNOSIS — F10.10 ALCOHOL ABUSE: ICD-10-CM

## 2023-11-29 DIAGNOSIS — R22.41 LOCALIZED SWELLING OF RIGHT FOOT: ICD-10-CM

## 2023-11-29 PROCEDURE — 99283 EMERGENCY DEPT VISIT LOW MDM: CPT

## 2023-11-29 PROCEDURE — 25000003 PHARM REV CODE 250

## 2023-11-29 RX ORDER — ACETAMINOPHEN 500 MG
500 TABLET ORAL EVERY 6 HOURS PRN
Qty: 30 TABLET | Refills: 0 | Status: SHIPPED | OUTPATIENT
Start: 2023-11-29 | End: 2023-12-29

## 2023-11-29 RX ORDER — ACETAMINOPHEN 500 MG
1000 TABLET ORAL
Status: COMPLETED | OUTPATIENT
Start: 2023-11-29 | End: 2023-11-29

## 2023-11-29 RX ADMIN — ACETAMINOPHEN 1000 MG: 500 TABLET ORAL at 08:11

## 2023-11-29 NOTE — ED PROVIDER NOTES
Encounter Date: 11/28/2023       History     Chief Complaint   Patient presents with    Alcohol Intoxication     Here via [facility] with c/o alcohol intoxication from home      63-year-old female with history of alcohol abuse, HTN well known to this ED due to frequent visits for alcohol intoxication, brought by EMS due to alcohol intoxication.  EMS was reportedly called from her home, patient admits to drinking too much today but denies any other complaints.  No other collateral information or history available.  She denies any recent falls or current pain, no nausea or blood in stool.      Review of patient's allergies indicates:  No Known Allergies  Past Medical History:   Diagnosis Date    ETOH abuse     Hyperlipemia     Hypertension     Unspecified cirrhosis of liver      Past Surgical History:   Procedure Laterality Date    BREAST LUMPECTOMY Right     benign    TUBAL LIGATION       Family History   Problem Relation Age of Onset    Lung cancer Mother      Social History     Tobacco Use    Smoking status: Some Days     Current packs/day: 0.50     Types: Cigarettes    Smokeless tobacco: Never   Substance Use Topics    Alcohol use: Yes     Alcohol/week: 42.0 standard drinks of alcohol     Types: 42 Standard drinks or equivalent per week     Comment: heavy drinker    Drug use: No     Review of Systems   Unable to perform ROS: Mental status change       Physical Exam     Initial Vitals [11/28/23 1912]   BP Pulse Resp Temp SpO2   (!) 147/85 87 16 98.5 °F (36.9 °C) 98 %      MAP       --         Physical Exam    Constitutional: She is not diaphoretic. No distress.   Intoxicated   HENT:   Head: Normocephalic and atraumatic.   No sign head injury   Eyes: Conjunctivae are normal.   Neck: Neck supple.   Cardiovascular:  Normal rate, regular rhythm, S1 normal, S2 normal, normal heart sounds and intact distal pulses.           No murmur heard.  Pulmonary/Chest: Breath sounds normal. No respiratory distress. She has no  wheezes. She has no rhonchi. She has no rales.   Abdominal: Abdomen is soft. There is no abdominal tenderness.   No ascites There is no rebound and no guarding.   Musculoskeletal:         General: No edema.      Cervical back: Neck supple.     Neurological: She is alert and oriented to person, place, and time.   Skin: Skin is warm and dry.         ED Course   Procedures  Labs Reviewed - No data to display       Imaging Results    None          Medications - No data to display  Medical Decision Making      63-year-old female with history of alcohol abuse, HTN well known to this ED due to frequent visits for alcohol intoxication, brought by EMS due to alcohol intoxication.  Patient denies any current complaints, on arrival has normal vitals, is pleasantly intoxicated but no sign of trauma or injury or other concerning findings.  Will observe for sobriety, no indication for labs or emergent imaging at this time.    9pm:  Patient was briefly agitated but did not required sedation, then was singing and now sleeping comfortably.  Patient will be signed out to overnight MD, can be discharged when ambulatory without instability and clinically sober.      Risk  OTC drugs.               ED Course as of 11/29/23 1210   Tue Nov 28, 2023   2116 Pt signed out pending clinical sobriety and anticipated discharge home.  [HM]   Wed Nov 29, 2023   0223 Patient is awake and alert, states they ready for discharge home.  They have clear speech and ambulate steadily- no clinical intoxication at this time.  I am comfortable for discharge home.  Care plan addressed with patient and all those present. All questions answered.  Strict return precautions discussed.  Patient was instructed on the correct follow-up time and route.  They voiced verbal understanding and agreement  with the plan and were deemed stable for discharge.    [HM]      ED Course User Index  [HM] Luis F Ansari MD                             Clinical Impression:  Final  diagnoses:  [F10.920] Acute alcoholic intoxication without complication (Primary)          ED Disposition Condition    Discharge Stable          ED Prescriptions    None       Follow-up Information       Follow up With Specialties Details Why Contact Info    [facility] - Emergency Dept Emergency Medicine Go to  As needed [address] [facility] [address]  [phone]             Jonatan Canchola MD  11/29/23 1210

## 2023-12-04 DIAGNOSIS — E78.5 HYPERLIPIDEMIA, UNSPECIFIED HYPERLIPIDEMIA TYPE: ICD-10-CM

## 2023-12-04 RX ORDER — PRAVASTATIN SODIUM 40 MG/1
40 TABLET ORAL DAILY
Qty: 90 TABLET | Refills: 1 | OUTPATIENT
Start: 2023-12-04 | End: 2024-06-01

## 2023-12-04 NOTE — TELEPHONE ENCOUNTER
----- Message from Rachelle Kimble sent at 12/4/2023  8:09 AM CST -----  Regarding: Medication  Refill  Request                  Reply in [facility]: NO       Please refill the medication listed below. Please call the patient  number     [phone] (M)       Medication       pravastatin (PRAVACHOL) 40 MG tablet       Preferred Pharmacy:   [facility] #[id number] [address] AT [facility] [address]   Phone: [phone]  Fax: [phone]

## 2023-12-04 NOTE — TELEPHONE ENCOUNTER
----- Message from Rachelle Kimble sent at 12/4/2023  8:09 AM CST -----  Regarding: Medication  Refill  Request                  Reply in [facility]: NO       Please refill the medication listed below. Please call the patient  number     [phone] (M)       Medication       pravastatin (PRAVACHOL) 40 MG tablet       Preferred Pharmacy:   [facility] #[id number] [address] AT [facility] [address]   Phone: [phone]  Fax: [phone]

## 2023-12-04 NOTE — TELEPHONE ENCOUNTER
----- Message from Shayy Palma sent at 12/4/2023  7:37 AM CST -----  Regarding: refill  Name of caller:odin       What is the requesting detail: pt  is requesting a refill for pravastatin (PRAVACHOL) 40 MG         [facility][id number] - [place] [address] AT [facility] [address]          Can the clinic reply by [facility]:       What number to call back: [phone]

## 2023-12-05 NOTE — TELEPHONE ENCOUNTER
----- Message from Rachelle Kimble sent at 12/5/2023  8:01 AM CST -----  Regarding: Medication  Request              Reply in [facility]: NO      Please refill the medication listed below. Please call the patient    [phone] (M)      Medication      pravastatin (PRAVACHOL) 40 MG tablet       Preferred Pharmacy:  [facility] #[id number] [address] [place] [facility] [address]   Phone: [phone]  Fax: [phone]

## 2023-12-06 ENCOUNTER — TELEPHONE (OUTPATIENT)
Dept: INTERNAL MEDICINE | Facility: CLINIC | Age: 63
End: 2023-12-06
Payer: MEDICARE

## 2023-12-06 NOTE — TELEPHONE ENCOUNTER
----- Message from Joon Ahn sent at 12/6/2023  8:44 AM CST -----  Name of Who is Calling:ELODIA VELIZ [[id number]]                   What is the request in detail: PT needs to schedule an appt to continue with her meds please assist                   Can the clinic reply by [facility]: No                   What Number to Call Back if not in [facility]:[phone]

## 2023-12-08 ENCOUNTER — OFFICE VISIT (OUTPATIENT)
Dept: INTERNAL MEDICINE | Facility: CLINIC | Age: 63
End: 2023-12-08
Payer: MEDICARE

## 2023-12-08 VITALS
BODY MASS INDEX: 32.61 KG/M2 | DIASTOLIC BLOOD PRESSURE: 77 MMHG | SYSTOLIC BLOOD PRESSURE: 157 MMHG | OXYGEN SATURATION: 98 % | HEART RATE: 80 BPM | WEIGHT: 184.06 LBS

## 2023-12-08 DIAGNOSIS — Z86.73 HISTORY OF CARDIOEMBOLIC CEREBROVASCULAR ACCIDENT (CVA): ICD-10-CM

## 2023-12-08 DIAGNOSIS — I10 PRIMARY HYPERTENSION: ICD-10-CM

## 2023-12-08 DIAGNOSIS — M79.602 LEFT ARM PAIN: ICD-10-CM

## 2023-12-08 DIAGNOSIS — E78.5 HYPERLIPIDEMIA, UNSPECIFIED HYPERLIPIDEMIA TYPE: Primary | ICD-10-CM

## 2023-12-08 PROCEDURE — 99999 PR PBB SHADOW E&M-EST. PATIENT-LVL III: ICD-10-PCS | Mod: PBBFAC,,,

## 2023-12-08 PROCEDURE — 99214 PR OFFICE/OUTPT VISIT, EST, LEVL IV, 30-39 MIN: ICD-10-PCS | Mod: S$PBB,,,

## 2023-12-08 PROCEDURE — 99999 PR PBB SHADOW E&M-EST. PATIENT-LVL III: CPT | Mod: PBBFAC,,,

## 2023-12-08 PROCEDURE — 99213 OFFICE O/P EST LOW 20 MIN: CPT | Mod: PBBFAC

## 2023-12-08 PROCEDURE — 99214 OFFICE O/P EST MOD 30 MIN: CPT | Mod: S$PBB,,,

## 2023-12-08 RX ORDER — NAPROXEN SODIUM 220 MG/1
81 TABLET, FILM COATED ORAL DAILY
Qty: 30 TABLET | Refills: 5 | Status: SHIPPED | OUTPATIENT
Start: 2023-12-08 | End: 2024-02-02 | Stop reason: SDUPTHER

## 2023-12-08 RX ORDER — HYDROCHLOROTHIAZIDE 25 MG/1
25 TABLET ORAL DAILY
Qty: 90 TABLET | Refills: 1 | Status: SHIPPED | OUTPATIENT
Start: 2023-12-08 | End: 2024-02-02 | Stop reason: SDUPTHER

## 2023-12-08 RX ORDER — LOSARTAN POTASSIUM 100 MG/1
100 TABLET ORAL DAILY
Qty: 90 TABLET | Refills: 1 | Status: SHIPPED | OUTPATIENT
Start: 2023-12-08 | End: 2024-02-02 | Stop reason: SDUPTHER

## 2023-12-08 RX ORDER — IBUPROFEN 600 MG/1
600 TABLET ORAL EVERY 8 HOURS PRN
Qty: 20 TABLET | Refills: 0 | OUTPATIENT
Start: 2023-12-08 | End: 2024-01-18

## 2023-12-08 RX ORDER — PRAVASTATIN SODIUM 40 MG/1
40 TABLET ORAL DAILY
Qty: 90 TABLET | Refills: 3 | Status: SHIPPED | OUTPATIENT
Start: 2023-12-08 | End: 2024-02-02 | Stop reason: SDUPTHER

## 2023-12-08 NOTE — PROGRESS NOTES
Subjective     Patient ID: Marianne Gottlieb is a 63 y.o. female.    Chief Complaint: Hyperlipidemia    Pt seen in clinic today for medication refills. She was recently in an in-patient psych facility, but has since returned home. Pt states she is not taking the lithium or the Seroquel. She states that she was changed from pravastatin to atorvastatin while in the facility, and she would like to be changed back. Educated pt on the increased effectiveness of the atorvastatin over pravastatin, but pt was insistent. Pt also states that she is taking the HCTZ, losartan. Pt also requested narcotic pain medication for chronic pain. Explained that I can not manage chronic pain marino her and she will need to f/u with her physician to have that discussion.     Hyperlipidemia  Pertinent negatives include no chest pain or shortness of breath.     Review of Systems   Constitutional:  Negative for activity change, fatigue and fever.   HENT: Negative.     Eyes: Negative.    Respiratory:  Negative for cough, chest tightness and shortness of breath.    Cardiovascular:  Negative for chest pain, palpitations and leg swelling.   Gastrointestinal:  Negative for abdominal distention and change in bowel habit.   Endocrine: Negative for polydipsia, polyphagia and polyuria.   Musculoskeletal:  Positive for arthralgias.   Integumentary:  Negative.   Neurological:  Negative for facial asymmetry, speech difficulty and weakness.   Psychiatric/Behavioral: Negative.            Objective     Physical Exam  Vitals reviewed.   Constitutional:       Appearance: She is well-developed.   HENT:      Head: Normocephalic and atraumatic.   Eyes:      Conjunctiva/sclera: Conjunctivae normal.   Cardiovascular:      Rate and Rhythm: Normal rate.   Pulmonary:      Effort: Pulmonary effort is normal. No respiratory distress.   Skin:     General: Skin is warm and dry.      Findings: No rash.   Neurological:      Mental Status: She is alert and oriented to  person, place, and time.      Coordination: Coordination normal.   Psychiatric:         Behavior: Behavior normal.            Assessment and Plan     1. Hyperlipidemia, unspecified hyperlipidemia type  -     pravastatin (PRAVACHOL) 40 MG tablet; Take 1 tablet (40 mg total) by mouth once daily.  Dispense: 90 tablet; Refill: 3    2. Primary hypertension  -     losartan (COZAAR) 100 MG tablet; Take 1 tablet (100 mg total) by mouth once daily.  Dispense: 90 tablet; Refill: 1  -     hydroCHLOROthiazide (HYDRODIURIL) 25 MG tablet; Take 1 tablet (25 mg total) by mouth once daily.  Dispense: 90 tablet; Refill: 1    3. Left arm pain  -     ibuprofen (ADVIL,MOTRIN) 600 MG tablet; Take 1 tablet (600 mg total) by mouth every 8 (eight) hours as needed for Pain.  Dispense: 20 tablet; Refill: 0    4. History of cardioembolic cerebrovascular accident (CVA)  -     aspirin 81 MG Chew; Take 1 tablet (81 mg total) by mouth once daily.  Dispense: 30 tablet; Refill: 5                 No follow-ups on file.

## 2023-12-29 ENCOUNTER — HOSPITAL ENCOUNTER (EMERGENCY)
Facility: OTHER | Age: 63
Discharge: HOME | End: 2023-12-29
Attending: EMERGENCY MEDICINE
Payer: MEDICARE

## 2023-12-29 VITALS
TEMPERATURE: 98 F | HEART RATE: 71 BPM | DIASTOLIC BLOOD PRESSURE: 81 MMHG | RESPIRATION RATE: 18 BRPM | SYSTOLIC BLOOD PRESSURE: 176 MMHG | HEIGHT: 63 IN | BODY MASS INDEX: 28 KG/M2 | OXYGEN SATURATION: 99 % | WEIGHT: 158 LBS

## 2023-12-29 DIAGNOSIS — S80.12XA CONTUSION OF LEFT LEG, INITIAL ENCOUNTER: Primary | ICD-10-CM

## 2023-12-29 DIAGNOSIS — I10 HYPERTENSION, UNSPECIFIED TYPE: ICD-10-CM
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
## 2023-12-29 DIAGNOSIS — M79.601 RIGHT ARM PAIN: ICD-10-CM

## 2023-12-29 PROCEDURE — 99284 EMERGENCY DEPT VISIT MOD MDM: CPT

## 2023-12-29 PROCEDURE — 25000003 PHARM REV CODE 250

## 2023-12-29 RX ORDER — IBUPROFEN 600 MG/1
600 TABLET ORAL EVERY 6 HOURS PRN
Qty: 20 TABLET | Refills: 0 | Status: SHIPPED | OUTPATIENT
Start: 2023-12-29

## 2023-12-29 RX ORDER — ACETAMINOPHEN 500 MG
1000 TABLET ORAL
Status: COMPLETED | OUTPATIENT
Start: 2023-12-29 | End: 2023-12-29

## 2023-12-29 RX ORDER — LIDOCAINE 50 MG/G
1 PATCH TOPICAL DAILY
Qty: 10 PATCH | Refills: 0 | Status: SHIPPED | OUTPATIENT
Start: 2023-12-29

## 2023-12-29 RX ADMIN — ACETAMINOPHEN 1000 MG: 500 TABLET ORAL at 07:12

## 2023-12-29 NOTE — ED TRIAGE NOTES
"Pt arrived with c/o bruise to lateral L leg (close to hip) since last week.  Pt states she doesn't recalling falling or injuring it in anyway.  Pt states, "I have a bad L leg.  It was hurting, so I put a heating pad on it.  I noticed the swelling and bruising afterwards."  Pt states she is ambulatory.  Pt answering questions appropriately, speaking in complete sentences, respirations even and unlabored.  Aao x 4.    "

## 2023-12-29 NOTE — FIRST PROVIDER EVALUATION
" Emergency Department TeleTriage Encounter Note      CHIEF COMPLAINT    Chief Complaint   Patient presents with    Leg Pain     Pt reporting L leg pain x 1 week. Denies injury. Pt stating "girl I'm just scared and need this thing checked." Large bruise noted to pt's L thigh.        VITAL SIGNS   Initial Vitals [12/29/23 1624]   BP Pulse Resp Temp SpO2   (!) 190/84 80 19 97.7 °F (36.5 °C) 96 %      MAP       --            ALLERGIES    Review of patient's allergies indicates:  No Known Allergies    PROVIDER TRIAGE NOTE  Patient presents with pain, swelling, and bruise to left leg. No known injury.       ORDERS  Labs Reviewed - No data to display    ED Orders (720h ago, onward)      None              Virtual Visit Note: The provider triage portion of this emergency department evaluation and documentation was performed via [employer], a HIPAA-compliant telemedicine application, in concert with a tele-presenter in the room. A face to face patient evaluation with one of my colleagues will occur once the patient is placed in an emergency department room.      DISCLAIMER: This note was prepared with [employer] voice recognition transcription software. Garbled syntax, mangled pronouns, and other bizarre constructions may be attributed to that software system.    "

## 2023-12-30 NOTE — DISCHARGE INSTRUCTIONS
You were seen in the ER for evaluation of leg pain.  Your x-rays showed no fracture or dislocation.  You do have a bruise to your left leg.  You should put ice on this to help the swelling and pain for 20 minutes at a time.  I am prescribing you ibuprofen that you may take as needed for pain.  I am also prescribing you Lidoderm patches that you may place to the areas that are hurting.  Please follow up with your PCP for further evaluation of your pain.  I have also referred you to physical therapy for further evaluation of your arm pain.  Your blood pressure was also elevated today, please follow up with your PCP for recheck.  Continue taking your blood pressure medications as prescribed.  Return to the ER for any new or worsening symptoms.

## 2023-12-31 NOTE — ED PROVIDER NOTES
"  Source of History:  Patient    Chief complaint:  Leg Pain (Pt reporting L leg pain x 1 week. Denies injury. Pt stating "girl I'm just scared and need this thing checked." Large bruise noted to pt's L thigh. )      HPI:  Marianne Gottlieb is a 63 y.o. female presenting with  left leg pain x1 week.  Patient states that she began noticing pain to her left leg 1 week ago.  She does not remember any history of falling or injuring the leg.  She states that she felt some pain to the leg, so she placed a heating pad on the leg.  States that when she removed with a heating pad, she noticed a large bruise to the leg.  Reports that she is able to ambulate on the leg.  Denies any numbness or tingling.  She is also reporting left shoulder pain which she states has been present x1 month.  Denies any injury or trauma to the shoulder.  Reports the shoulder pain is worse with motion of the shoulder.  She denies any fever, chills, chest pain, shortness breath, nausea, vomiting.  Is requesting x-rays to make sure that nothing is broken.    This is the extent to the patients complaints today here in the emergency department.    ROS: As per HPI and below:  Constitutional: No fever.  No chills.  Eyes: No visual changes.   ENT: No sore throat. No ear pain.  Urinary: No abnormal urination.  MSK:  Positive for left leg pain and left shoulder pain.  Integument: No rashes or lesions.    Review of patient's allergies indicates:  No Known Allergies    PMH:  As per HPI and below:  Past Medical History:   Diagnosis Date    ETOH abuse     Hyperlipemia     Hypertension     Unspecified cirrhosis of liver      Past Surgical History:   Procedure Laterality Date    BREAST LUMPECTOMY Right     benign    TUBAL LIGATION         Social History     Tobacco Use    Smoking status: Some Days     Current packs/day: 0.50     Types: Cigarettes    Smokeless tobacco: Never   Substance Use Topics    Alcohol use: Yes     Alcohol/week: 42.0 standard drinks of " "alcohol     Types: 42 Standard drinks or equivalent per week     Comment: heavy drinker    Drug use: No       Physical Exam:    BP (!) 176/81   Pulse 71   Temp 97.7 °F (36.5 °C) (Oral)   Resp 18   Ht 5' 3" (1.6 m)   Wt 71.7 kg (158 lb)   SpO2 99%   BMI 27.99 kg/m²   Nursing note and vital signs reviewed.  Constitutional: No acute distress.  Eyes: No conjunctival injection.  Extraocular muscles are intact.  ENT: Normal phonation.  Musculoskeletal:    Left leg: Left lateral upper leg noted to have a large contusion present with associated swelling.  Isolated tenderness to palpation at area of bruising.  No tenderness to palpation of the left hip, knee.  Active range of motion of the hip and knee intact.  Patient ambulatory on left leg.  Distal extremity neurovascularly intact.  Left shoulder:  Patient with tenderness to palpation along the trapezius muscle at the left shoulder and mild tenderness at the biceps.  Pain is elicited with active and passive shoulder flexion and abduction.  While it is painful, patient is able to flex and abduct the shoulder actively.  No tenderness to palpation at the shoulder joint itself.  No tenderness left elbow, forearm, hand.  Distal extremity neurovascularly intact.  Skin: No rashes seen.  Good turgor.  No abrasions.  No ecchymoses.  Psych: Appropriate, conversant.        I decided to obtain the patient's medical records.      Imaging Results              X-Ray Femur Ap/Lat Left (Final result)  Result time 12/29/23 21:26:30      Final result by Carson Horton MD (12/29/23 21:26:30)                   Impression:      No acute displaced fracture seen.      Electronically signed by: Carson Horton MD  Date:    12/29/2023  Time:    21:26               Narrative:    EXAMINATION:  XR FEMUR 2 VIEW LEFT    CLINICAL HISTORY:  Unspecified fall, initial encounter    TECHNIQUE:  AP and lateral views of the left femur were performed.    COMPARISON:  None}    FINDINGS:  No evidence of " acute displaced fracture, dislocation, or osseous destructive process.  Minor degenerative changes are seen of the left hip and knee.                                       X-Ray Shoulder Trauma 3 view Left (Final result)  Result time 12/29/23 21:23:51      Final result by Carson Horton MD (12/29/23 21:23:51)                   Impression:      No acute osseous abnormality identified.      Electronically signed by: Carson Horton MD  Date:    12/29/2023  Time:    21:23               Narrative:    EXAMINATION:  XR SHOULDER TRAUMA 3 VIEW LEFT    CLINICAL HISTORY:  shoulder pain;    TECHNIQUE:  Three views of the left shoulder were performed.    COMPARISON  11/04/2023.    FINDINGS:  No evidence of acute displaced fracture, dislocation, or osseous destructive process.                                      MDM:    63 y.o. female with past medical history significant for alcohol abuse, hypertension, frequent ER visits presenting for evaluation of left leg pain x1 week.  Patient denies any falls or injuries, however does have a history of alcohol abuse and frequent falls.  Large contusion noted to the left lateral leg as noted above.  Also reporting left shoulder pain that has been present for the past month, no change today.  Given uncertain history of falls with history of alcohol abuse, plan for x-ray of the left femur and left shoulder.  Patient has received x-ray of the left shoulder approximately 2 months ago that did not reveal any acute fracture or dislocation.    Differential Diagnosis includes, but is not limited to:  Fracture, dislocation, compartment syndrome, nerve injury/palsy, vascular injury, DVT, rhabdomyolysis, hemarthrosis, septic joint, cellulitis, bursitis, muscle strain, ligament tear/sprain, laceration, foreign body, abrasion, soft tissue contusion, osteoarthritis.    No exam findings to indicate infectious etiology of symptoms.  No leg swelling to indicate DVT.  No evidence of nerve or vascular  injury on exam.  X-ray of the left femur with no acute fracture, dislocation, does note minor degenerative changes of left hip and knee.  X-ray right shoulder with no acute fracture dislocation identified.  Left leg pain likely secondary to large contusion at the left lateral thigh.  Patient was advised on ice, rest, discharge with ibuprofen for pain.  Left shoulder pain seems to be more chronic in nature, given this we will refer patient physical therapy for further evaluation.  Blood pressure was elevated at ER visit today, patient reports compliance with medication and denies any symptoms of end-organ damage.  Advised patient follow up with her PCP for blood pressure recheck.  Patient verbalized understanding of discharge plan and was in agreement.  No indication further emergent workup today.  I discussed this case with my attending provider, Dr. Miguel, who was in agreement plan.    ED Course as of 12/31/23 0123   Fri Dec 29, 2023   2127 X-ray left shoulder with no acute process noted, no evidence of fracture, dislocation. [SW]   2130 X-ray left femur with no evidence of fracture, dislocation, some degenerative changes noted to left hip and knee. [SW]      ED Course User Index  [SW] Shazia Jordan PA-C               Diagnostic Impression:    1. Contusion of left leg, initial encounter    2. Fall    3. Right arm pain    4. Hypertension, unspecified type         ED Disposition Condition    Discharge Stable            ED Prescriptions       Medication Sig Dispense Start Date End Date Auth. Provider    ibuprofen (ADVIL,MOTRIN) 600 MG tablet Take 1 tablet (600 mg total) by mouth every 6 (six) hours as needed for Pain. 20 tablet 12/29/2023 -- Shazia Jordan PA-C    LIDOcaine (LIDODERM) 5 % Place 1 patch onto the skin once daily. Remove & Discard patch within 12 hours or as directed by MD 10 patch 12/29/2023 -- Shazia Jordan PA-C          Follow-up Information       Follow up With Specialties Details Why  Contact Info Additional Information    Zainab Milian MD Internal Medicine Schedule an appointment as soon as possible for a visit in 1 week  [address] [facility]  [address]  [facility] [address]  [phone]       [facility] Emergency Dept Emergency Medicine Go to  If symptoms worsen [address] [facility] [id number] [phone]     [facility] Schedule an appointment as soon as possible for a visit in 1 week for physical therapy evaluation of your shoulder pain [address], [address]  [facility] [id number] [phone] [place] [address]            Please pardon typos or dictation errors, as this note was transcribed using [employer] fluency direct dictation software.      Shazia Jordan PA-C  12/31/23 0134

## 2024-01-18 ENCOUNTER — HOSPITAL ENCOUNTER (EMERGENCY)
Facility: OTHER | Age: 64
Discharge: HOME | End: 2024-01-18
Attending: EMERGENCY MEDICINE
Payer: MEDICARE

## 2024-01-18 ENCOUNTER — HOSPITAL ENCOUNTER (EMERGENCY)
Facility: OTHER | Age: 64
Discharge: HOME | End: 2024-01-19
Attending: INTERNAL MEDICINE
Payer: MEDICAID

## 2024-01-18 VITALS
RESPIRATION RATE: 19 BRPM | HEIGHT: 63 IN | DIASTOLIC BLOOD PRESSURE: 80 MMHG | OXYGEN SATURATION: 97 % | HEIGHT: 63 IN | HEART RATE: 73 BPM | SYSTOLIC BLOOD PRESSURE: 144 MMHG | RESPIRATION RATE: 18 BRPM | WEIGHT: 158 LBS | TEMPERATURE: 98 F | BODY MASS INDEX: 28 KG/M2 | HEART RATE: 86 BPM | OXYGEN SATURATION: 96 % | BODY MASS INDEX: 28 KG/M2 | WEIGHT: 158 LBS | TEMPERATURE: 98 F | SYSTOLIC BLOOD PRESSURE: 176 MMHG | DIASTOLIC BLOOD PRESSURE: 84 MMHG

## 2024-01-18 DIAGNOSIS — R10.30 LOWER ABDOMINAL PAIN: Primary | ICD-10-CM

## 2024-01-18 DIAGNOSIS — F10.920 ALCOHOLIC INTOXICATION WITHOUT COMPLICATION: Primary | ICD-10-CM

## 2024-01-18 DIAGNOSIS — K57.92 ACUTE DIVERTICULITIS: ICD-10-CM

## 2024-01-18 LAB
ALBUMIN SERPL BCP-MCNC: 4 G/DL (ref 3.5–5.2)
ALP SERPL-CCNC: 54 U/L (ref 55–135)
ALT SERPL W/O P-5'-P-CCNC: 13 U/L (ref 10–44)
ANION GAP SERPL CALC-SCNC: 14 MMOL/L (ref 8–16)
AST SERPL-CCNC: 23 U/L (ref 10–40)
BASOPHILS # BLD AUTO: 0.04 K/UL (ref 0–0.2)
BASOPHILS NFR BLD: 0.4 % (ref 0–1.9)
BILIRUB SERPL-MCNC: 0.8 MG/DL (ref 0.1–1)
BILIRUB UR QL STRIP: NEGATIVE
BUN SERPL-MCNC: 13 MG/DL (ref 8–23)
CALCIUM SERPL-MCNC: 9.7 MG/DL (ref 8.7–10.5)
CHLORIDE SERPL-SCNC: 100 MMOL/L (ref 95–110)
CLARITY UR: CLEAR
CO2 SERPL-SCNC: 22 MMOL/L (ref 23–29)
COLOR UR: YELLOW
CREAT SERPL-MCNC: 0.6 MG/DL (ref 0.5–1.4)
CREAT SERPL-MCNC: 0.8 MG/DL (ref 0.5–1.4)
DIFFERENTIAL METHOD BLD: ABNORMAL
EOSINOPHIL # BLD AUTO: 0.2 K/UL (ref 0–0.5)
EOSINOPHIL NFR BLD: 2.4 % (ref 0–8)
ERYTHROCYTE [DISTWIDTH] IN BLOOD BY AUTOMATED COUNT: 14.1 % (ref 11.5–14.5)
EST. GFR  (NO RACE VARIABLE): >60 ML/MIN/1.73 M^2
GLUCOSE SERPL-MCNC: 121 MG/DL (ref 70–110)
GLUCOSE UR QL STRIP: NEGATIVE
HCT VFR BLD AUTO: 45.8 % (ref 37–48.5)
HGB BLD-MCNC: 14.9 G/DL (ref 12–16)
HGB UR QL STRIP: NEGATIVE
IMM GRANULOCYTES # BLD AUTO: 0.05 K/UL (ref 0–0.04)
IMM GRANULOCYTES NFR BLD AUTO: 0.5 % (ref 0–0.5)
KETONES UR QL STRIP: NEGATIVE
LEUKOCYTE ESTERASE UR QL STRIP: NEGATIVE
LIPASE SERPL-CCNC: 27 U/L (ref 4–60)
LYMPHOCYTES # BLD AUTO: 1.5 K/UL (ref 1–4.8)
LYMPHOCYTES NFR BLD: 15.2 % (ref 18–48)
MCH RBC QN AUTO: 27.2 PG (ref 27–31)
MCHC RBC AUTO-ENTMCNC: 32.5 G/DL (ref 32–36)
MCV RBC AUTO: 84 FL (ref 82–98)
MONOCYTES # BLD AUTO: 0.7 K/UL (ref 0.3–1)
MONOCYTES NFR BLD: 6.9 % (ref 4–15)
NEUTROPHILS # BLD AUTO: 7.3 K/UL (ref 1.8–7.7)
NEUTROPHILS NFR BLD: 74.6 % (ref 38–73)
NITRITE UR QL STRIP: NEGATIVE
NRBC BLD-RTO: 0 /100 WBC
PH UR STRIP: 6 [PH] (ref 5–8)
PLATELET # BLD AUTO: 227 K/UL (ref 150–450)
PMV BLD AUTO: 9.6 FL (ref 9.2–12.9)
POTASSIUM SERPL-SCNC: 4.4 MMOL/L (ref 3.5–5.1)
PROT SERPL-MCNC: 8.4 G/DL (ref 6–8.4)
PROT UR QL STRIP: ABNORMAL
RBC # BLD AUTO: 5.48 M/UL (ref 4–5.4)
SAMPLE: NORMAL
SODIUM SERPL-SCNC: 136 MMOL/L (ref 136–145)
SP GR UR STRIP: 1.02 (ref 1–1.03)
URN SPEC COLLECT METH UR: ABNORMAL
UROBILINOGEN UR STRIP-ACNC: NEGATIVE EU/DL
WBC # BLD AUTO: 9.84 K/UL (ref 3.9–12.7)

## 2024-01-18 PROCEDURE — 99285 EMERGENCY DEPT VISIT HI MDM: CPT | Mod: 25

## 2024-01-18 PROCEDURE — 80053 COMPREHEN METABOLIC PANEL: CPT | Performed by: EMERGENCY MEDICINE

## 2024-01-18 PROCEDURE — 83690 ASSAY OF LIPASE: CPT | Performed by: EMERGENCY MEDICINE

## 2024-01-18 PROCEDURE — 25000003 PHARM REV CODE 250: Performed by: EMERGENCY MEDICINE

## 2024-01-18 PROCEDURE — 85025 COMPLETE CBC W/AUTO DIFF WBC: CPT | Performed by: EMERGENCY MEDICINE

## 2024-01-18 PROCEDURE — 99281 EMR DPT VST MAYX REQ PHY/QHP: CPT | Mod: 25,27

## 2024-01-18 PROCEDURE — 63600175 PHARM REV CODE 636 W HCPCS: Performed by: EMERGENCY MEDICINE

## 2024-01-18 PROCEDURE — 81003 URINALYSIS AUTO W/O SCOPE: CPT | Performed by: EMERGENCY MEDICINE

## 2024-01-18 PROCEDURE — 25500020 PHARM REV CODE 255: Performed by: EMERGENCY MEDICINE

## 2024-01-18 PROCEDURE — 96361 HYDRATE IV INFUSION ADD-ON: CPT

## 2024-01-18 PROCEDURE — 96374 THER/PROPH/DIAG INJ IV PUSH: CPT

## 2024-01-18 RX ORDER — AMOXICILLIN AND CLAVULANATE POTASSIUM 875; 125 MG/1; MG/1
1 TABLET, FILM COATED ORAL 2 TIMES DAILY
Qty: 13 TABLET | Refills: 0 | Status: SHIPPED | OUTPATIENT
Start: 2024-01-18 | End: 2024-05-20

## 2024-01-18 RX ORDER — KETOROLAC TROMETHAMINE 30 MG/ML
15 INJECTION, SOLUTION INTRAMUSCULAR; INTRAVENOUS
Status: COMPLETED | OUTPATIENT
Start: 2024-01-18 | End: 2024-01-18

## 2024-01-18 RX ORDER — AMOXICILLIN AND CLAVULANATE POTASSIUM 875; 125 MG/1; MG/1
1 TABLET, FILM COATED ORAL
Status: COMPLETED | OUTPATIENT
Start: 2024-01-18 | End: 2024-01-18

## 2024-01-18 RX ADMIN — IOHEXOL 100 ML: 350 INJECTION, SOLUTION INTRAVENOUS at 08:01

## 2024-01-18 RX ADMIN — SODIUM CHLORIDE 1000 ML: 9 INJECTION, SOLUTION INTRAVENOUS at 07:01

## 2024-01-18 RX ADMIN — KETOROLAC TROMETHAMINE 15 MG: 30 INJECTION, SOLUTION INTRAMUSCULAR; INTRAVENOUS at 10:01

## 2024-01-18 RX ADMIN — AMOXICILLIN AND CLAVULANATE POTASSIUM 1 TABLET: 875; 125 TABLET, FILM COATED ORAL at 10:01

## 2024-01-18 NOTE — ED TRIAGE NOTES
63 YOF presents to ED with c/o pelvic pain 10/10 that began yesterday. -n/v/d. Denies any other complaints. A&Ox4. Denies any other complaints.     LOC: The patient is awake, alert and aware of environment with an appropriate affect, the patient is oriented x 3.  APPEARANCE: Patient resting comfortably and in no acute distress, patient is clean and well groomed, patient's clothing is properly fastened.  SKIN: The skin is warm and dry, patient has normal skin turgor and moist mucus membranes, skin intact, no breakdown or brusing noted.  MUSKULOSKELETAL: Patient moving all extremities well, no obvious swelling or deformities noted.  RESPIRATORY: Airway is open and patent, respirations are spontaneous, patient has a normal effort and rate.  CARDIAC: No peripheral edema.  ABDOMEN: Soft and no tenderness to palpation, no distention noted.     ED workup in progress. VSS. Safety measures in place; side rails up x2. Call light within pt reach. Will continue to monitor.     Name band;

## 2024-01-18 NOTE — ED PROVIDER NOTES
Encounter Date: 1/18/2024       History     Chief Complaint   Patient presents with    Pelvic Pain     C/o pelvic pain 10/10 that began yesterday. -n/v/d. Denies any other complaints. VSS     63-year-old female with history of HTN, alcohol abuse presents for evaluation of lower abdominal pain that started yesterday.  She denies any clear precipitant or recent falls/trauma, has not had similar episodes of this pain in the past.  It has been constant since onset yesterday, worse with any palpation of the area.  She did have a BM yesterday with no improvement, no change with eating.  She denies any urinary symptoms such as dysuria/frequency, no fevers or other new complaints.  Patient did not drink any alcohol yesterday or today, but does drink regularly.  No blood in stool, chest pain, difficulty breathing      Review of patient's allergies indicates:  No Known Allergies  Past Medical History:   Diagnosis Date    ETOH abuse     Hyperlipemia     Hypertension     Unspecified cirrhosis of liver      Past Surgical History:   Procedure Laterality Date    BREAST LUMPECTOMY Right     benign    TUBAL LIGATION       Family History   Problem Relation Age of Onset    Lung cancer Mother      Social History     Tobacco Use    Smoking status: Some Days     Current packs/day: 0.50     Types: Cigarettes    Smokeless tobacco: Never   Substance Use Topics    Alcohol use: Yes     Alcohol/week: 42.0 standard drinks of alcohol     Types: 42 Standard drinks or equivalent per week     Comment: heavy drinker    Drug use: No     Review of Systems   Constitutional:  Negative for fever.   HENT:  Negative for congestion.    Eyes:  Negative for redness.   Respiratory:  Negative for shortness of breath.    Cardiovascular:  Negative for chest pain.   Gastrointestinal:  Positive for abdominal pain.   Genitourinary:  Negative for dysuria.   Skin:  Negative for rash.   Neurological:  Negative for headaches.   Psychiatric/Behavioral:  Negative for  confusion.        Physical Exam     Initial Vitals [01/18/24 0632]   BP Pulse Resp Temp SpO2   132/76 87 18 98.1 °F (36.7 °C) 96 %      MAP       --         Physical Exam    Constitutional: She appears well-developed and well-nourished. She is not diaphoretic. No distress.   HENT:   Head: Normocephalic and atraumatic.   Eyes: Conjunctivae are normal.   Neck: Neck supple.   Cardiovascular:  Normal rate, regular rhythm, S1 normal, S2 normal, normal heart sounds and intact distal pulses.           No murmur heard.  Pulmonary/Chest: Breath sounds normal. No respiratory distress. She has no wheezes. She has no rhonchi. She has no rales.   Abdominal: There is abdominal tenderness.   Focal suprapubic tenderness, no acute abdomen or upper abdominal tenderness.  No ascites There is no rebound and no guarding.   Musculoskeletal:         General: No edema.      Cervical back: Neck supple.     Neurological: She is alert and oriented to person, place, and time.   Skin: Skin is warm and dry.   Psychiatric: She has a normal mood and affect.         ED Course   Procedures  Labs Reviewed   URINALYSIS, REFLEX TO URINE CULTURE - Abnormal; Notable for the following components:       Result Value    Protein, UA Trace (*)     All other components within normal limits    Narrative:     Specimen Source->Urine   COMPREHENSIVE METABOLIC PANEL - Abnormal; Notable for the following components:    CO2 22 (*)     Glucose 121 (*)     Alkaline Phosphatase 54 (*)     All other components within normal limits   CBC W/ AUTO DIFFERENTIAL - Abnormal; Notable for the following components:    RBC 5.48 (*)     Immature Grans (Abs) 0.05 (*)     Gran % 74.6 (*)     Lymph % 15.2 (*)     All other components within normal limits   LIPASE   ISTAT CREATININE          Imaging Results              CT Abdomen Pelvis With IV Contrast NO Oral Contrast (Final result)  Result time 01/18/24 09:14:49      Final result by Mp Saucedo MD (01/18/24 09:14:49)                    Impression:      Findings consistent with sigmoid colon diverticulitis.  No discrete abscess is identified.  There is a trace amount of free fluid within the pelvis.    Ground-glass opacities within both lung bases.  While some of this may be related to atelectasis, an infectious/inflammatory process cannot be excluded.      Electronically signed by: Mp Saucedo MD  Date:    01/18/2024  Time:    09:14               Narrative:    EXAMINATION:  CT ABDOMEN PELVIS WITH IV CONTRAST    CLINICAL HISTORY:  LLQ abdominal pain;    TECHNIQUE:  Low dose axial images, sagittal and coronal reformations were obtained from the lung bases to the pubic symphysis following the IV administration of 100 mL of Omnipaque 350.  Oral contrast was not given.    COMPARISON:  None.    FINDINGS:  There are ground-glass opacities identified within the lower lobes bilaterally.  While some of this may be related to atelectasis, an infectious/inflammatory process cannot be excluded.  There is no evidence for pleural effusions.  Bony structures appear intact without evidence for acute fracture or bone destruction.  There is grade 1 anterolisthesis of L4 on L5    The liver is upper normal in size.  The liver is homogeneous in density with no focal liver lesions identified.  Gallbladder is present and appears unremarkable.  There is no evidence for intrahepatic or extrahepatic biliary dilatation.  The portal venous system appears patent.  The spleen, stomach, and pancreas appear unremarkable.  The adrenal glands are not enlarged.  The kidneys are normal in overall size.  There are subcentimeter renal hypodensities present bilaterally which are too small to adequately characterize, likely small cysts.  There is no evidence for hydronephrosis.  The kidneys are noted to concentrate contrast symmetrically.  The abdominal aorta tapers normally without aneurysmal dilatation.  No para-aortic lymphadenopathy is identified.  The appendix is  present and appears unremarkable.  No dilated loops of bowel are evident.  There is colonic diverticulosis present and there is bowel wall thickening and stranding of the pericolonic fat within the midline pelvis strongly suggestive of sigmoid diverticulitis.  No discrete abscess is identified.  There is a trace amount of free fluid within the pelvis.  The uterus is present.  No abnormal adnexal masses are identified.  There is no evidence for pelvic or inguinal lymphadenopathy.                                       Medications   sodium chloride 0.9% bolus 1,000 mL 1,000 mL (0 mLs Intravenous Stopped 1/18/24 0957)   iohexoL (OMNIPAQUE 350) injection 100 mL (100 mLs Intravenous Given 1/18/24 0846)   amoxicillin-clavulanate 875-125mg per tablet 1 tablet (1 tablet Oral Given 1/18/24 1026)   ketorolac injection 15 mg (15 mg Intravenous Given 1/18/24 1027)     Medical Decision Making      63-year-old female with history of HTN, alcohol abuse presents for evaluation of lower abdominal pain that started yesterday.  She denies any clear precipitant or similar previous episodes, no other associated symptoms, no urinary symptoms or improvement with BM yesterday.  She locates pain to suprapubic area with focal tenderness on exam, but no acute abdomen or other concerning findings such as ascites or rebound.  Patient has extensive alcohol abuse history with multiple ED visits for this, but did not drink yesterday or today, does not appear intoxicated currently.  Differential includes diverticulitis, UTI, appendicitis. Given alcohol history, would consider pancreatitis, though atypical presentation for this.       Initial labs with normal WBC, no anemia, no CMP abnormalities including surprisingly normal LFTs.  CT abdomen does confirm acute sigmoid colon diverticulitis but with no complications per radiology read, no evidence for abscess or perforation.  Patient did not require any pain medications while monitored in ED, remained  afebrile and well appearing, resting comfortably.  I feel she is stable for outpatient treatment of uncomplicated acute diverticulitis, per most recent recommendations will initially treat with supportive care and pain control, patient advised on Tylenol or NSAIDs as well as continued alcohol cessation.  Will also Rx Augmentin if she has no improvement with conservative management, and she also understands return to the ED for any worsening pain or fevers that would suggest complication.  She is not currently intoxicated, has full understanding of treatment and discharge plan and return precautions.      Amount and/or Complexity of Data Reviewed  Labs: ordered.  Radiology: ordered.    Risk  Prescription drug management.                                      Clinical Impression:  Final diagnoses:  [R10.30] Lower abdominal pain (Primary)  [K57.92] Acute diverticulitis          ED Disposition Condition    Discharge Stable          ED Prescriptions       Medication Sig Dispense Start Date End Date Auth. Provider    amoxicillin-clavulanate 875-125mg (AUGMENTIN) 875-125 mg per tablet Take 1 tablet by mouth 2 (two) times daily. 13 tablet 1/18/2024 -- Jonatan Canchola MD          Follow-up Information       Follow up With Specialties Details Why Contact Info    [facility] - Emergency Dept Emergency Medicine Go to  If symptoms worsen [address] [facility] [id number] [phone]             Jonatan Canchola MD  01/18/24 4929

## 2024-01-19 NOTE — ED NOTES
""I was on the floor just now doing the 'erky jerk' ".  When asked what that was, pt states "That's when you drink all day and lay your bitch ass down. That's what I was doing on that floor, I didn't want y'all pick me up. If you can't jerk, you lay your bitch ass down. I just wanted to lay on that floor and pass out".  "

## 2024-01-19 NOTE — ED NOTES
"Pt telling nursing staff to Google what an zoeyy jonathank is.     Noted to be herb mixed with pills.    Pt states "that right, take an anxiety pill will some herb and lay your ass down".  "

## 2024-01-19 NOTE — ED NOTES
"Security called this RN to  to assess pt. Pt got to check in window and laid herself down on the ground, refusing to get up. Yelling "I'm fucked up". Pt  assisted into wheelchair and triage initiated.  "

## 2024-01-19 NOTE — ED PROVIDER NOTES
"     Source of History:  Patient    Chief complaint:  Alcohol Intoxication (Pt walked to ED with c/c of "intoxication". "I'm fucked up". Well known to ED for same. )      HPI:  Marianne Gottlieb is a 63 y.o. female with past medical history of hypertension and alcohol abuse presenting with alcohol intoxication.  Patient is well-known to this emergency department.  Patient has no physical complaints at this time but states that she is "fucked up" and wanted to be evaluated for the "erky jerky" which is explained as a jerky like movements that happened right before you go to sleep.  Patient states that she drank 2 bottles of liquor and wants to sleep here.    This is the extent to the patients complaints today here in the emergency department.    ROS: As per HPI   Review of patient's allergies indicates:  No Known Allergies    PMH:  As per HPI and below:  Past Medical History:   Diagnosis Date    ETOH abuse     Hyperlipemia     Hypertension     Unspecified cirrhosis of liver      Past Surgical History:   Procedure Laterality Date    BREAST LUMPECTOMY Right     benign    TUBAL LIGATION         Social History     Tobacco Use    Smoking status: Some Days     Current packs/day: 0.50     Types: Cigarettes    Smokeless tobacco: Never   Substance Use Topics    Alcohol use: Yes     Alcohol/week: 42.0 standard drinks of alcohol     Types: 42 Standard drinks or equivalent per week     Comment: heavy drinker    Drug use: No       Physical Exam:    BP (!) 144/84   Pulse 86   Temp 97.7 °F (36.5 °C) (Oral)   Resp 18   Ht 5' 3" (1.6 m)   Wt 71.7 kg (158 lb)   SpO2 96%   BMI 27.99 kg/m²   Nursing note and vital signs reviewed.  Appearance: No acute distress.  Eyes: No conjunctival injection.  ENT: Oropharynx clear.    Chest/ Respiratory: Clear to auscultation bilaterally.  Good air movement.  No wheezes.  No rhonchi. No rales. No accessory muscle use.  Cardiovascular: Regular rate and rhythm.  No murmurs. No gallops. No " rubs.  Abdomen: Soft.  Not distended.  Nontender.  No guarding.  No rebound. Non-peritoneal.  Musculoskeletal: Good range of motion all joints.  No deformities.  Neck supple.  No meningismus.  Skin: No rashes seen.  Good turgor.  No abrasions.  No ecchymoses.  Neurologic: Motor intact.  Sensation intact.  Cerebellar intact.  Cranial nerves intact.  Mental Status:  Slightly slurred speech but comprehensible, alert to person, inappropriately singing and screaming.  Ambulated to bathroom without assistance.    Labs that have been ordered have been independently reviewed and interpreted by myself.        Labs Reviewed - No data to display    Imaging Results    None         Initial Impression/ Differential Dx:  Urgent evaluation of 63 y.o. female presenting with alcohol intoxication. Patient is afebrile, not toxic appearing and hemodynamically stable.  Patient is disruptive and inappropriate.  She has slightly slurred but comprehensible speech.  I do not feel like labs are indicated at this time.  Patient is being significantly disruptive but in no distress. Will feed patient and then ambulate patient.  If able to ambulate unassisted, will discharge.      MDM:    Patient ambulated unassisted to the bathroom and to the trash can.  Patient is singing, inappropriately but coherently interacting with staff. [place] to person and place and with the insight that she is intoxicated. Patient is not clinically intoxicated and appropriate for discharge. Patient educated on signs and symptoms to monitor for and when to return to ED. Patient verbalized understanding agrees with treatment plan. All questions and concerns addressed.      ED Course as of 01/23/24 0044   Fri Jan 19, 2024   0026 I have reviewed the case with my ALIREZA and agree with the history, review of systems, physical exam, assessment, and plan of care as documented.    Luis F Ansari M.D.   []      ED Course User Index  [HM] Luis F Ansari MD                    Diagnostic Impression:    1. Alcoholic intoxication without complication         ED Disposition Condition    Discharge Good            ED Prescriptions    None       Follow-up Information       Follow up With Specialties Details Why Contact Info    Zainab Milian MD Internal Medicine Schedule an appointment as soon as possible for a visit   [address] [facility] [address]  [facility] [address]  [phone]               aMrie Saldaña NP  01/18/24 0907       Marie Saldaña NP  01/23/24 0044

## 2024-02-02 ENCOUNTER — OFFICE VISIT (OUTPATIENT)
Dept: INTERNAL MEDICINE | Facility: CLINIC | Age: 64
End: 2024-02-02
Payer: MEDICARE

## 2024-02-02 ENCOUNTER — LAB VISIT (OUTPATIENT)
Dept: LAB | Facility: OTHER | Age: 64
End: 2024-02-02
Payer: MEDICARE

## 2024-02-02 VITALS
HEIGHT: 63 IN | SYSTOLIC BLOOD PRESSURE: 138 MMHG | HEART RATE: 80 BPM | BODY MASS INDEX: 33.36 KG/M2 | WEIGHT: 188.25 LBS | OXYGEN SATURATION: 97 % | DIASTOLIC BLOOD PRESSURE: 80 MMHG

## 2024-02-02 DIAGNOSIS — B37.31 VAGINAL YEAST INFECTION: ICD-10-CM

## 2024-02-02 DIAGNOSIS — Z00.00 WELLNESS EXAMINATION: ICD-10-CM

## 2024-02-02 DIAGNOSIS — I10 PRIMARY HYPERTENSION: ICD-10-CM

## 2024-02-02 DIAGNOSIS — Z00.00 WELLNESS EXAMINATION: Primary | ICD-10-CM

## 2024-02-02 DIAGNOSIS — Z86.73 HISTORY OF CARDIOEMBOLIC CEREBROVASCULAR ACCIDENT (CVA): ICD-10-CM

## 2024-02-02 DIAGNOSIS — E78.5 HYPERLIPIDEMIA, UNSPECIFIED HYPERLIPIDEMIA TYPE: ICD-10-CM

## 2024-02-02 LAB
ALBUMIN SERPL BCP-MCNC: 4.4 G/DL (ref 3.5–5.2)
ALP SERPL-CCNC: 50 U/L (ref 55–135)
ALT SERPL W/O P-5'-P-CCNC: 16 U/L (ref 10–44)
ANION GAP SERPL CALC-SCNC: 12 MMOL/L (ref 8–16)
AST SERPL-CCNC: 15 U/L (ref 10–40)
BASOPHILS # BLD AUTO: 0.03 K/UL (ref 0–0.2)
BASOPHILS NFR BLD: 0.5 % (ref 0–1.9)
BILIRUB SERPL-MCNC: 0.5 MG/DL (ref 0.1–1)
BUN SERPL-MCNC: 15 MG/DL (ref 8–23)
CALCIUM SERPL-MCNC: 9.6 MG/DL (ref 8.7–10.5)
CHLORIDE SERPL-SCNC: 102 MMOL/L (ref 95–110)
CHOLEST SERPL-MCNC: 180 MG/DL (ref 120–199)
CHOLEST/HDLC SERPL: 2.9 {RATIO} (ref 2–5)
CO2 SERPL-SCNC: 26 MMOL/L (ref 23–29)
CREAT SERPL-MCNC: 0.8 MG/DL (ref 0.5–1.4)
DIFFERENTIAL METHOD BLD: ABNORMAL
EOSINOPHIL # BLD AUTO: 0.4 K/UL (ref 0–0.5)
EOSINOPHIL NFR BLD: 7.3 % (ref 0–8)
ERYTHROCYTE [DISTWIDTH] IN BLOOD BY AUTOMATED COUNT: 14 % (ref 11.5–14.5)
EST. GFR  (NO RACE VARIABLE): >60 ML/MIN/1.73 M^2
ESTIMATED AVG GLUCOSE: 128 MG/DL (ref 68–131)
GLUCOSE SERPL-MCNC: 108 MG/DL (ref 70–110)
HBA1C MFR BLD: 6.1 % (ref 4–5.6)
HCT VFR BLD AUTO: 45.7 % (ref 37–48.5)
HDLC SERPL-MCNC: 63 MG/DL (ref 40–75)
HDLC SERPL: 35 % (ref 20–50)
HGB BLD-MCNC: 14.4 G/DL (ref 12–16)
IMM GRANULOCYTES # BLD AUTO: 0.03 K/UL (ref 0–0.04)
IMM GRANULOCYTES NFR BLD AUTO: 0.5 % (ref 0–0.5)
LDLC SERPL CALC-MCNC: 100.4 MG/DL (ref 63–159)
LYMPHOCYTES # BLD AUTO: 2 K/UL (ref 1–4.8)
LYMPHOCYTES NFR BLD: 33.2 % (ref 18–48)
MCH RBC QN AUTO: 26.7 PG (ref 27–31)
MCHC RBC AUTO-ENTMCNC: 31.5 G/DL (ref 32–36)
MCV RBC AUTO: 85 FL (ref 82–98)
MONOCYTES # BLD AUTO: 0.5 K/UL (ref 0.3–1)
MONOCYTES NFR BLD: 8 % (ref 4–15)
NEUTROPHILS # BLD AUTO: 3 K/UL (ref 1.8–7.7)
NEUTROPHILS NFR BLD: 50.5 % (ref 38–73)
NONHDLC SERPL-MCNC: 117 MG/DL
NRBC BLD-RTO: 0 /100 WBC
PLATELET # BLD AUTO: 292 K/UL (ref 150–450)
PMV BLD AUTO: 9.8 FL (ref 9.2–12.9)
POTASSIUM SERPL-SCNC: 3.7 MMOL/L (ref 3.5–5.1)
PROT SERPL-MCNC: 8 G/DL (ref 6–8.4)
RBC # BLD AUTO: 5.39 M/UL (ref 4–5.4)
SODIUM SERPL-SCNC: 140 MMOL/L (ref 136–145)
TRIGL SERPL-MCNC: 83 MG/DL (ref 30–150)
WBC # BLD AUTO: 5.9 K/UL (ref 3.9–12.7)

## 2024-02-02 PROCEDURE — 36415 COLL VENOUS BLD VENIPUNCTURE: CPT

## 2024-02-02 PROCEDURE — 80061 LIPID PANEL: CPT

## 2024-02-02 PROCEDURE — 99213 OFFICE O/P EST LOW 20 MIN: CPT | Mod: PBBFAC

## 2024-02-02 PROCEDURE — 83036 HEMOGLOBIN GLYCOSYLATED A1C: CPT

## 2024-02-02 PROCEDURE — 99999 PR PBB SHADOW E&M-EST. PATIENT-LVL III: CPT | Mod: PBBFAC,,,

## 2024-02-02 PROCEDURE — 80053 COMPREHEN METABOLIC PANEL: CPT

## 2024-02-02 PROCEDURE — 99214 OFFICE O/P EST MOD 30 MIN: CPT | Mod: S$PBB,,,

## 2024-02-02 PROCEDURE — 85025 COMPLETE CBC W/AUTO DIFF WBC: CPT

## 2024-02-02 RX ORDER — PRAVASTATIN SODIUM 40 MG/1
40 TABLET ORAL DAILY
Qty: 90 TABLET | Refills: 3 | Status: SHIPPED | OUTPATIENT
Start: 2024-02-02 | End: 2024-05-20 | Stop reason: SDUPTHER

## 2024-02-02 RX ORDER — LOSARTAN POTASSIUM 100 MG/1
100 TABLET ORAL DAILY
Qty: 90 TABLET | Refills: 1 | Status: SHIPPED | OUTPATIENT
Start: 2024-02-02 | End: 2024-05-20 | Stop reason: SDUPTHER

## 2024-02-02 RX ORDER — NAPROXEN SODIUM 220 MG/1
81 TABLET, FILM COATED ORAL DAILY
Qty: 30 TABLET | Refills: 5 | Status: SHIPPED | OUTPATIENT
Start: 2024-02-02 | End: 2024-05-20 | Stop reason: SDUPTHER

## 2024-02-02 RX ORDER — HYDROCHLOROTHIAZIDE 25 MG/1
25 TABLET ORAL DAILY
Qty: 90 TABLET | Refills: 1 | Status: SHIPPED | OUTPATIENT
Start: 2024-02-02 | End: 2024-05-20 | Stop reason: SDUPTHER

## 2024-02-02 RX ORDER — FLUCONAZOLE 150 MG/1
150 TABLET ORAL DAILY
Qty: 1 TABLET | Refills: 0 | Status: SHIPPED | OUTPATIENT
Start: 2024-02-02 | End: 2024-02-03

## 2024-02-02 NOTE — PROGRESS NOTES
Subjective     Patient ID: Marianne Gottlieb is a 63 y.o. female.    Chief Complaint: Rash (Rash on top of vagina ( itching))    Pt seen in clinic today for wellness visit. Reports she has had some vaginal itching for the past week. Denies painful urination, discharge.      Review of Systems   Constitutional:  Negative for activity change, fatigue and fever.   HENT: Negative.     Eyes: Negative.    Respiratory:  Negative for cough, chest tightness and shortness of breath.    Cardiovascular:  Negative for chest pain, palpitations and leg swelling.   Gastrointestinal:  Negative for abdominal distention and change in bowel habit.   Endocrine: Negative for polydipsia, polyphagia and polyuria.   Genitourinary:  Positive for vaginal pain.   Musculoskeletal: Negative.    Integumentary:  Negative.   Neurological:  Negative for facial asymmetry, speech difficulty and weakness.   Psychiatric/Behavioral: Negative.            Objective     Physical Exam  Vitals reviewed. Exam conducted with a chaperone present.   Constitutional:       General: She is not in acute distress.     Appearance: She is well-developed. She is not diaphoretic.   HENT:      Head: Normocephalic and atraumatic.      Nose: Nose normal.   Eyes:      General:         Right eye: No discharge.         Left eye: No discharge.      Conjunctiva/sclera: Conjunctivae normal.      Pupils: Pupils are equal, round, and reactive to light.   Neck:      Thyroid: No thyromegaly.   Cardiovascular:      Rate and Rhythm: Normal rate and regular rhythm.      Heart sounds: No murmur heard.  Pulmonary:      Effort: Pulmonary effort is normal. No respiratory distress.      Breath sounds: Normal breath sounds.   Abdominal:      General: Abdomen is flat. There is no distension.      Palpations: Abdomen is soft.   Genitourinary:     Exam position: Supine.      Labia:         Right: Rash and tenderness present.         Left: Rash and tenderness present.       Comments: DENISE Evans  present as chaperone for vaginal exam.  Musculoskeletal:      Cervical back: Neck supple.   Skin:     General: Skin is warm and dry.   Neurological:      Mental Status: She is alert and oriented to person, place, and time.   Psychiatric:         Behavior: Behavior normal.            Assessment and Plan     1. Wellness examination  -     Hemoglobin A1C; Future; Expected date: 02/02/2024  -     CBC Auto Differential; Future; Expected date: 02/02/2024  -     Comprehensive Metabolic Panel; Future; Expected date: 02/02/2024  -     Lipid Panel; Future; Expected date: 02/02/2024    2. Vaginal yeast infection  -     fluconazole (DIFLUCAN) 150 MG Tab; Take 1 tablet (150 mg total) by mouth once daily. for 1 day  Dispense: 1 tablet; Refill: 0    3. History of cardioembolic cerebrovascular accident (CVA)  -     aspirin 81 MG Chew; Take 1 tablet (81 mg total) by mouth once daily.  Dispense: 30 tablet; Refill: 5    4. Primary hypertension  -     hydroCHLOROthiazide (HYDRODIURIL) 25 MG tablet; Take 1 tablet (25 mg total) by mouth once daily.  Dispense: 90 tablet; Refill: 1  -     losartan (COZAAR) 100 MG tablet; Take 1 tablet (100 mg total) by mouth once daily.  Dispense: 90 tablet; Refill: 1    5. Hyperlipidemia, unspecified hyperlipidemia type  -     pravastatin (PRAVACHOL) 40 MG tablet; Take 1 tablet (40 mg total) by mouth once daily.  Dispense: 90 tablet; Refill: 3                 No follow-ups on file.

## 2024-02-08 ENCOUNTER — TELEPHONE (OUTPATIENT)
Dept: INTERNAL MEDICINE | Facility: CLINIC | Age: 64
End: 2024-02-08
Payer: MEDICARE

## 2024-02-08 NOTE — TELEPHONE ENCOUNTER
Called Ms. Bull. The listed message from Mr. Mario was given to her.         - Message from Suhail Mario NP sent at 2/8/2024  8:55 AM CST -----  Please contact the patient and let them know that their results were fine. Thank you.

## 2024-02-08 NOTE — TELEPHONE ENCOUNTER
----- Message from Suhail Mario NP sent at 2/8/2024  8:55 AM CST -----  Please contact the patient and let them know that their results were fine. Thank you.

## 2024-03-06 ENCOUNTER — HOSPITAL ENCOUNTER (EMERGENCY)
Facility: OTHER | Age: 64
Discharge: HOME | End: 2024-03-07
Attending: INTERNAL MEDICINE
Payer: MEDICARE

## 2024-03-06 VITALS
RESPIRATION RATE: 19 BRPM | SYSTOLIC BLOOD PRESSURE: 184 MMHG | HEART RATE: 92 BPM | TEMPERATURE: 98 F | OXYGEN SATURATION: 99 % | DIASTOLIC BLOOD PRESSURE: 86 MMHG

## 2024-03-06 DIAGNOSIS — F10.929 ALCOHOLIC INTOXICATION WITH COMPLICATION: Primary | ICD-10-CM

## 2024-03-06 DIAGNOSIS — I10 HYPERTENSION, UNSPECIFIED TYPE: ICD-10-CM

## 2024-03-06 PROCEDURE — 99283 EMERGENCY DEPT VISIT LOW MDM: CPT

## 2024-03-07 PROCEDURE — 25000003 PHARM REV CODE 250: Performed by: INTERNAL MEDICINE

## 2024-03-07 RX ORDER — IBUPROFEN 600 MG/1
600 TABLET ORAL
Status: COMPLETED | OUTPATIENT
Start: 2024-03-07 | End: 2024-03-07

## 2024-03-07 RX ADMIN — IBUPROFEN 600 MG: 600 TABLET, FILM COATED ORAL at 12:03

## 2024-03-07 NOTE — ED PROVIDER NOTES
"Encounter date: 10:41 PM 03/07/2024    Source of History   Alcohol intoxication    Chief Complaint   Pt presents with:   Alcohol Intoxication ("I'm a little tipsy". Denies complaints. )      History Of Present Illness   Marianne Gottlieb is a 63 y.o. female with history of alcohol use disorder, hypertension, hyperlipidemia who presents to the ED with chief complaint of alcohol intoxication.  Patient states that she has been drinking alcohol.  She has not sure how much she drank tonight.  She states that she has no complaints including chest pain shortness of breath.  When patient was asked if she would like a cardiopulmonary exam she proceeded to provide many expletives and derogatory terms to myself stating that she did want to be touched.     This is the extent to the patients complaints today here in the emergency department.  Past History   Review of patient's allergies indicates:  No Known Allergies    No current facility-administered medications on file prior to encounter.     Current Outpatient Medications on File Prior to Encounter   Medication Sig Dispense Refill    acetaminophen (TYLENOL) 500 MG tablet Take 1 tablet (500 mg total) by mouth every 6 (six) hours as needed for Pain. 12 tablet 0    amoxicillin-clavulanate 875-125mg (AUGMENTIN) 875-125 mg per tablet Take 1 tablet by mouth 2 (two) times daily. (Patient not taking: Reported on 2/2/2024) 13 tablet 0    aspirin 81 MG Chew Take 1 tablet (81 mg total) by mouth once daily. 30 tablet 5    hydroCHLOROthiazide (HYDRODIURIL) 25 MG tablet Take 1 tablet (25 mg total) by mouth once daily. 90 tablet 1    ibuprofen (ADVIL,MOTRIN) 600 MG tablet Take 1 tablet (600 mg total) by mouth every 6 (six) hours as needed for Pain. (Patient not taking: Reported on 2/2/2024) 20 tablet 0    LIDOcaine (LIDODERM) 5 % Place 1 patch onto the skin once daily. Remove & Discard patch within 12 hours or as directed by MD (Patient not taking: Reported on 2/2/2024) 10 patch 0    " lithium (LITHOBID) 300 MG CR tablet Take 2 tablets (600 mg total) by mouth every evening. (Patient not taking: Reported on 2/2/2024) 60 tablet 0    losartan (COZAAR) 100 MG tablet Take 1 tablet (100 mg total) by mouth once daily. 90 tablet 1    naltrexone microspheres (VIVITROL) 380 mg SSRR kit Inject 1 each (380 mg total) into the muscle every 28 days. (Patient not taking: Reported on 2/2/2024) 1 each 0    pravastatin (PRAVACHOL) 40 MG tablet Take 1 tablet (40 mg total) by mouth once daily. 90 tablet 3    QUEtiapine (SEROQUEL) 50 MG tablet Take 1 tablet (50 mg total) by mouth every evening. (Patient not taking: Reported on 2/2/2024) 30 tablet 0       As per HPI and below:  Past Medical History:   Diagnosis Date    ETOH abuse     Hyperlipemia     Hypertension     Unspecified cirrhosis of liver      Past Surgical History:   Procedure Laterality Date    BREAST LUMPECTOMY Right     benign    TUBAL LIGATION         Social History     Socioeconomic History    Marital status: Single   Tobacco Use    Smoking status: Some Days     Current packs/day: 0.50     Types: Cigarettes    Smokeless tobacco: Never   Substance and Sexual Activity    Alcohol use: Yes     Alcohol/week: 42.0 standard drinks of alcohol     Types: 42 Standard drinks or equivalent per week     Comment: heavy drinker    Drug use: No     Social Determinants of Health     Financial Resource Strain: High Risk (1/27/2023)    Overall Financial Resource Strain (CARDIA)     Difficulty of Paying Living Expenses: Hard   Food Insecurity: No Food Insecurity (1/27/2023)    Hunger Vital Sign     Worried About Running Out of Food in the Last Year: Never true     Ran Out of Food in the Last Year: Never true   Transportation Needs: No Transportation Needs (1/27/2023)    PRAPARE - Transportation     Lack of Transportation (Medical): No     Lack of Transportation (Non-Medical): No   Physical Activity: Insufficiently Active (1/27/2023)    Exercise Vital Sign     Days of  Exercise per Week: 3 days     Minutes of Exercise per Session: 30 min   Stress: No Stress Concern Present (1/27/2023)    [employer] [place] [place] [employer] - Occupational Stress Questionnaire     Feeling of Stress : Not at all   Social Connections: Moderately Isolated (1/27/2023)    Social Connection and Isolation Panel [NHANES]     Frequency of Communication with Friends and Family: Three times a week     Frequency of Social Gatherings with Friends and Family: Three times a week     Attends Hinduism Services: 1 to 4 times per year     Active Member of Clubs or Organizations: No     Attends Club or Organization Meetings: Never     Marital Status: Never    Housing Stability: Unknown (1/27/2023)    Housing Stability Vital Sign     Unable to Pay for Housing in the Last Year: No     Unstable Housing in the Last Year: No       Family History   Problem Relation Age of Onset    Lung cancer Mother        Physical Exam     Vitals:    03/06/24 2233   BP: (!) 184/86   Pulse: 92   Resp: 19   Temp: 97.9 °F (36.6 °C)   TempSrc: Oral   SpO2: 99%     Physical Exam:   Nursing note and vitals reviewed.  Appearance:  Chronically ill, well-appearing female in no acute respiratory distress.  Making purposeful movements.  Speaking and yelling in full sentences.  Skin: No noted obvious rashes seen.  Good turgor.  No abrasions.  No ecchymoses.  Eyes: No conjunctival injection. EOMI, PERRL.  Head: NC/AT  Chest:  Clear even respirations, No increased work of breathing, bilateral chest rise.  Cardiovascular: Regular rate and rhythm.  Normal equal bilateral radial pulses.  Abdomen:  Patient declined  Musculoskeletal: No obvious deformities.  Patient declined   Neurologic: Moves all extremities.  Normal sensation.  No facial droop.  Slurred speech.    Mental Status:  Alert and oriented x person, place, time.        Results and Medications    Procedures    Labs Reviewed - No data to display    Imaging Results    None              Medications   ibuprofen tablet 600 mg (600 mg Oral Given 3/7/24 0050)       MDM, Impression and Plan   Previous Records:   I decided to obtain old medical records which is listed here or in ED course:  Chart review patient has been seen in the ED for multiple occurrences for alcohol intoxication    Clinical Tests:   Lab Tests: Ordered and Reviewed  Radiological Study: Ordered and Reviewed  Medical Tests: Ordered and Reviewed    Differential diagnosis:  -alcohol intoxication  -unlikely ICH based 3   -unlikely electrolyte abnormalities    Initial Assessment & ED Management:    Urgent evaluation of 63 y.o. female with History as above who presents the ED with chief complaint of alcohol intoxication.  This patient regularly comes to the ED for alcohol intoxication.  On arrival to the ED she is noted to be afebrile, hemodynamically stable in no acute respiratory distress.  She has no active complaints.  She was verbally aggressive and hostile thus a full exam was not completed at this time.  Will continue to reassess for clinical sobriety.  Will continue frequent reassessment.     She noted some mild chronic leg pain that resolved with motrin. Patient is awake and alert, states they ready for discharge home. They have a ride.  They have clear speech and ambulate steadily- no clinical intoxication at this time.  I am comfortable for discharge home.  Care plan addressed with patient and all those present. All questions answered.  Strict return precautions discussed.  Patient was instructed on the correct follow-up time and route.  They voiced verbal understanding and agreement  with the plan and were deemed stable for discharge.     Medical Decision Making  Risk  Prescription drug management.           Please see ED course for discussion of workup and dispo if not listed above.          Final diagnoses:  [F10.929] Alcoholic intoxication with complication (Primary)  [I10] Hypertension, unspecified type        ED  Disposition Condition    Discharge           ED Prescriptions    None       Follow-up Information       Follow up With Specialties Details Why Contact Info    Zainab Milian MD Internal Medicine In 2 days  [address] [facility]  [address]  [facility] [address]  [phone]      [facility] [place], operated [facility] - Emergency Dept Emergency Medicine  If symptoms worsen [address] [address]  [phone]            ED Course as of 03/07/24 0321   Thu Mar 07, 2024   0028 Pt given food and drink. Po challenged. Assisted to the rest room. Pending clinical soberity with anticipated discharge home [HM]   0030 Pt without complaints and declined analgesics [HM]      ED Course User Index  [HM] Luis F Ansari MD Heyward Mack, MD Mack, Heyward B, MD  03/07/24 0321

## 2024-03-07 NOTE — DISCHARGE INSTRUCTIONS
Diagnosis:   1. Alcoholic intoxication with complication    2. Hypertension, unspecified type        Tests you had showed:   Labs Reviewed - No data to display   No orders to display       Treatments you received were:   Medications - No data to display    Home Care Instructions:  - Medications: Continue taking your home medications as prescribed    Follow-Up Plan:  - Follow-up with: Primary care doctor within 1-2  days  - Additional testing and/or evaluation will be directed by your primary doctor    Return to the Emergency Department for symptoms including but not limited to: worsening symptoms, severe back pain, shortness of breath or chest pain, vomiting with inability to hold down fluids, blood in vomit or poop, fevers greater than 100.4°F, passing out/fainting/unconsciousness, or other concerning symptoms.     No future appointments.    Your blood pressure was elevated in the emergency department.  You must follow-up with your primary care doctor for adjustment of your medicine.

## 2024-04-04 ENCOUNTER — HOSPITAL ENCOUNTER (EMERGENCY)
Facility: OTHER | Age: 64
Discharge: HOME | End: 2024-04-05
Attending: EMERGENCY MEDICINE
Payer: MEDICARE

## 2024-04-04 DIAGNOSIS — F10.920 ACUTE ALCOHOLIC INTOXICATION WITHOUT COMPLICATION: Primary | ICD-10-CM

## 2024-04-04 DIAGNOSIS — F10.10 ALCOHOL ABUSE: ICD-10-CM

## 2024-04-04 PROCEDURE — 99281 EMR DPT VST MAYX REQ PHY/QHP: CPT

## 2024-04-05 VITALS
BODY MASS INDEX: 33.35 KG/M2 | OXYGEN SATURATION: 97 % | HEIGHT: 63 IN | SYSTOLIC BLOOD PRESSURE: 132 MMHG | HEART RATE: 80 BPM | RESPIRATION RATE: 17 BRPM | DIASTOLIC BLOOD PRESSURE: 70 MMHG | TEMPERATURE: 98 F

## 2024-04-05 NOTE — ED PROVIDER NOTES
Encounter Date: 4/4/2024    SCRIBE #1 NOTE: I, Sumi España, am scribing for, and in the presence of,  Valdemar Barnes II, MD. I have scribed the following portions of the note - Other sections scribed: HPI, ROS, and physical exam.       History     Chief Complaint   Patient presents with    Alcohol Intoxication     Pt here from her house POV, pt walked into ER, pt known to the ER for alcohol intoxication      Time seen by provider: 9:27 PM    This is a 63 y.o. female with history of alcohol abuse, hypertension, and hyperlipidemia who presents to the ED with alcohol intoxication. Patient walked to the ED from her home. She is yelling and crying about her son dying in 1999. When asked a question patient does not answer and continues to yell and cry.    This is the extent of the patient's complaints at this time.    The history is provided by the patient. History limited by: alcohol intoxication.     Review of patient's allergies indicates:  No Known Allergies  Past Medical History:   Diagnosis Date    ETOH abuse     Hyperlipemia     Hypertension     Unspecified cirrhosis of liver      Past Surgical History:   Procedure Laterality Date    BREAST LUMPECTOMY Right     benign    TUBAL LIGATION       Family History   Problem Relation Age of Onset    Lung cancer Mother      Social History     Tobacco Use    Smoking status: Some Days     Current packs/day: 0.50     Types: Cigarettes    Smokeless tobacco: Never   Substance Use Topics    Alcohol use: Yes     Alcohol/week: 42.0 standard drinks of alcohol     Types: 42 Standard drinks or equivalent per week     Comment: heavy drinker    Drug use: No     Review of Systems  See HPI.    Physical Exam     Initial Vitals [04/04/24 2031]   BP Pulse Resp Temp SpO2   (!) 132/102 102 18 98.8 °F (37.1 °C) 97 %      MAP       --         Physical Exam    Nursing note and vitals reviewed.  Constitutional: She appears well-developed and well-nourished.   Patient is emotional labile. She  is yelling and crying. Slurred speech with smell of EtOH.   HENT:   Head: Normocephalic and atraumatic.   Eyes: Conjunctivae are normal.   Neck: Neck supple.   Musculoskeletal:         General: No edema.      Cervical back: Neck supple.     Neurological: She is alert and oriented to person, place, and time.   Skin: Skin is warm and dry.   Psychiatric: She has a normal mood and affect.         ED Course   Procedures  Labs Reviewed - No data to display       Imaging Results    None          Medications - No data to display  Medical Decision Making    Patient presents due to intoxication.  Patient well known to myself in the department from similar prior episodes.  Patient has stable vital signs.  No signs of acute trauma nor acute neurologic deficits.  Exam is consistent with alcohol intoxication.  Initially very labile, yelling, crying but redirectable.  Patient thereafter fell asleep and has been resting comfortably.  After many hours of observation the patient is now back to baseline, and is stable for discharge to home.  Again encouraged alcohol cessation and has been given list of alcohol OB resources          Scribe Attestation:   Scribe #1: I performed the above scribed service and the documentation accurately describes the services I performed. I attest to the accuracy of the note.                         Physician Attestation for Scribe: I, TL, reviewed documentation as scribed in my presence, which is both accurate and complete.      Clinical Impression:  Final diagnoses:  [F10.920] Acute alcoholic intoxication without complication (Primary)  [F10.10] Alcohol abuse                 Valdemar Barnes II, MD  04/05/24 0971

## 2024-04-05 NOTE — ED NOTES
Pt removed monitoring devices and refuses to have them placed back on. Pt is yelling in room. Sitter with pt. Pt not in distress, in bed with x2 siderails.

## 2024-04-05 NOTE — ED TRIAGE NOTES
"Marianne Gottlieb, a 63 y.o. female presents to the ED with complaint of intoxication. Pt denies any complaints at assessments, states she is "just chilling". Pt loud and repeating how clean her house is. Sitter at bedside.    Pt resting comfortably. Connected to BP cuff and pulse oximeter. ED workup in progress. Call light within reach. Safety measures in place. Denies further needs. Plan of care ongoing.      Chief Complaint   Patient presents with    Alcohol Intoxication     Pt here from her house POV, pt walked into ER, pt known to the ER for alcohol intoxication      Review of patient's allergies indicates:  No Known Allergies  Past Medical History:   Diagnosis Date    ETOH abuse     Hyperlipemia     Hypertension     Unspecified cirrhosis of liver      Past Surgical History:   Procedure Laterality Date    BREAST LUMPECTOMY Right     benign    TUBAL LIGATION         "

## 2024-04-05 NOTE — ED NOTES
Pt agitated, removed monitoring devices, stating she wants to walk home. Told patient she is still intoxicated and we are still watching her for her own safety. Provided patient with sandwich and juice, reconnected to monitoring devices. Sitter at bedside.

## 2024-04-06 ENCOUNTER — HOSPITAL ENCOUNTER (EMERGENCY)
Facility: OTHER | Age: 64
Discharge: HOME | End: 2024-04-06
Attending: EMERGENCY MEDICINE
Payer: MEDICARE

## 2024-04-06 VITALS
TEMPERATURE: 98 F | OXYGEN SATURATION: 98 % | BODY MASS INDEX: 35.43 KG/M2 | HEART RATE: 88 BPM | WEIGHT: 200 LBS | SYSTOLIC BLOOD PRESSURE: 148 MMHG | RESPIRATION RATE: 18 BRPM | DIASTOLIC BLOOD PRESSURE: 90 MMHG

## 2024-04-06 DIAGNOSIS — F10.10 ALCOHOL ABUSE: ICD-10-CM

## 2024-04-06 DIAGNOSIS — F10.920 ACUTE ALCOHOLIC INTOXICATION WITHOUT COMPLICATION: Primary | ICD-10-CM

## 2024-04-06 PROCEDURE — 99281 EMR DPT VST MAYX REQ PHY/QHP: CPT

## 2024-04-06 NOTE — ED TRIAGE NOTES
"Marianne Gottlieb, an 63 y.o. female presents to the ED with alcohol intoxication. States "I messed up> denies any pain.       Chief Complaint   Patient presents with    Alcohol Intoxication     Pt with rambling speech. Admits to drinking " a bottle today" Denies any pain and states she came to the ED because she is not stupid and she has been drinking today     Review of patient's allergies indicates:  No Known Allergies  Past Medical History:   Diagnosis Date    ETOH abuse     Hyperlipemia     Hypertension     Unspecified cirrhosis of liver        "

## 2024-04-07 NOTE — ED NOTES
Received  pt sitting up on end of bed, pt is yelling, pt is cussing, pt is talking with security. Pt is very known to this ER for alcohol intoxication. Pt lives nearby hospital. Pt states she is ready to go home and eat the food she cooks, informed pt she has to na in ER for a little while longer.  MD aware.   Awaiting for pt to show signs of sober

## 2024-04-07 NOTE — ED PROVIDER NOTES
"Source of History:  Patient     Chief complaint:  Alcohol Intoxication (Pt with rambling speech. Admits to drinking " a bottle today" Denies any pain and states she came to the ED because she is not stupid and she has been drinking today)      HPI:  Marianne Gottlieb is a 63 y.o. female presenting to the emergency department reporting alcohol intoxication.  She is well known to this ED for similar complaints.  Admits to "drinking a bottle today"  Patient denies any other physical complaints including chest pain shortness of breath or fevers she denies any nausea vomiting or diarrhea.  Patient became hostile when attempting physical exam.  Became aggressive with staff and screaming, but redirectable.      This is the extent to the patients complaints today here in the emergency department.    PMH:  As per HPI and below:  Past Medical History:   Diagnosis Date    ETOH abuse     Hyperlipemia     Hypertension     Unspecified cirrhosis of liver      Past Surgical History:   Procedure Laterality Date    BREAST LUMPECTOMY Right     benign    TUBAL LIGATION         Social History     Tobacco Use    Smoking status: Some Days     Current packs/day: 0.50     Types: Cigarettes    Smokeless tobacco: Never   Substance Use Topics    Alcohol use: Yes     Alcohol/week: 42.0 standard drinks of alcohol     Types: 42 Standard drinks or equivalent per week     Comment: heavy drinker    Drug use: No       Review of patient's allergies indicates:  No Known Allergies    Physical Exam:    BP (!) 148/90   Pulse 88   Temp 98.2 °F (36.8 °C) (Oral)   Resp 18   Wt 90.7 kg (200 lb)   SpO2 98%   BMI 35.43 kg/m²   Vitals:    04/06/24 1821 04/06/24 1950   BP: (!) 182/108 (!) 148/90   Pulse: (!) 117 88   Resp: 18 18   Temp: 98.2 °F (36.8 °C)    TempSrc: Oral    SpO2: 98% 98%   Weight: 90.7 kg (200 lb)        Nursing note and vital signs reviewed.  Appearance:  Chronically ill-appearing female.  Eyes: No conjunctival injection.  Extraocular " muscles are intact.  ENT:  Mucous membranes are pink and moist.  Chest/ Respiratory: Clear to auscultation bilaterally.  Good air movement.  No wheezes.  No rhonchi. No rales. No accessory muscle use.  Cardiovascular: Regular rate and rhythm.  No murmurs. No gallops. No rubs.  Abdomen: Soft.  Nontender  Musculoskeletal: Neck supple.  No meningismus.  Neuro: alert     Initial MDM:  Urgent evaluation of a 63-year-old female with a history of alcoholism.  Tachycardic on arrival however patient is screaming and being disruptive during examination.  Patient rambling.  Chart review reveals multiple previous visits for similar complaint.  At this time I do not believe any labs are indicated.  Patient will be monitored until she was able to ambulate unassisted.  Plan to feed the patient and monitor.  Patient was monitored for 2-1/2 hours, she was able to ambulate up and down the hallway without assistance.  She was cooperative with the physical exam.  She will be discharged home with alcohol use disorder packets and discussed close follow up with the primary care provider.  She was agreeable with this plan    Labs Reviewed - No data to display    No orders to display       MDM:    Medical Decision Making           Diagnostic Impression:    1. Acute alcoholic intoxication without complication    2. Alcohol abuse         ED Disposition Condition    Discharge Stable            ED Prescriptions    None       Follow-up Information       Follow up With Specialties Details Why Contact Info    Zainab Milian MD Internal Medicine Schedule an appointment as soon as possible for a visit in 3 days  [address] [facility]  [address]  [facility] [address]  [phone]      [facility] Emergency Dept Emergency Medicine Go to  If symptoms worsen [address] [facility] [address]  [phone]               Vivian Alcaraz, [facility]  04/06/24 3583

## 2024-04-09 ENCOUNTER — HOSPITAL ENCOUNTER (EMERGENCY)
Facility: OTHER | Age: 64
Discharge: HOME | End: 2024-04-09
Attending: EMERGENCY MEDICINE
Payer: MEDICARE

## 2024-04-09 VITALS
HEART RATE: 95 BPM | RESPIRATION RATE: 20 BRPM | SYSTOLIC BLOOD PRESSURE: 175 MMHG | OXYGEN SATURATION: 96 % | DIASTOLIC BLOOD PRESSURE: 123 MMHG

## 2024-04-09 DIAGNOSIS — F10.920 ACUTE ALCOHOLIC INTOXICATION WITHOUT COMPLICATION: Primary | ICD-10-CM

## 2024-04-09 PROCEDURE — 99281 EMR DPT VST MAYX REQ PHY/QHP: CPT

## 2024-04-09 NOTE — ED TRIAGE NOTES
Pt presents to ED for alcohol intoxication. Denies SOB, chest pain or any other complaints at this time. Aaox3, NAD noted

## 2024-04-09 NOTE — ED PROVIDER NOTES
Encounter Date: 4/9/2024       History     Chief Complaint   Patient presents with    Alcohol Intoxication     +ETOH      63-year-old female with history of alcohol abuse, HTN presents for evaluation of alcohol intoxication.  Patient is well known to this ED and comes here frequently for alcohol intoxication.  She admits to drinking all day today, states that she came here just to say hello.  She denies any falls or trauma, no recent illness or other complaints.  Denies any drug use.      Review of patient's allergies indicates:  No Known Allergies  Past Medical History:   Diagnosis Date    ETOH abuse     Hyperlipemia     Hypertension     Unspecified cirrhosis of liver      Past Surgical History:   Procedure Laterality Date    BREAST LUMPECTOMY Right     benign    TUBAL LIGATION       Family History   Problem Relation Age of Onset    Lung cancer Mother      Social History     Tobacco Use    Smoking status: Some Days     Current packs/day: 0.50     Types: Cigarettes    Smokeless tobacco: Never   Substance Use Topics    Alcohol use: Yes     Alcohol/week: 42.0 standard drinks of alcohol     Types: 42 Standard drinks or equivalent per week     Comment: heavy drinker    Drug use: No     Review of Systems   Constitutional:  Negative for fever.   HENT:  Negative for congestion.    Eyes:  Negative for redness.   Respiratory:  Negative for shortness of breath.    Cardiovascular:  Negative for chest pain.   Gastrointestinal:  Negative for abdominal pain.   Genitourinary:  Negative for dysuria.   Skin:  Negative for rash.   Neurological:  Negative for headaches.   Psychiatric/Behavioral:  Negative for confusion.        Physical Exam     Initial Vitals [04/09/24 1745]   BP Pulse Resp Temp SpO2   (!) 175/123 95 20 -- 96 %      MAP       --         Physical Exam    Constitutional: She is not diaphoretic. No distress.   Intoxicated   HENT:   Head: Normocephalic and atraumatic.   Eyes: Conjunctivae are normal.   Neck: Neck supple.    Cardiovascular:  Normal rate, regular rhythm, S1 normal, S2 normal, normal heart sounds and intact distal pulses.           No murmur heard.  Pulmonary/Chest: Breath sounds normal. No respiratory distress. She has no wheezes. She has no rhonchi. She has no rales.   Abdominal: She exhibits no distension.   Musculoskeletal:         General: No edema.      Cervical back: Neck supple.     Neurological: She is alert and oriented to person, place, and time.   Skin: Skin is warm and dry.         ED Course   Procedures  Labs Reviewed - No data to display       Imaging Results    None          Medications - No data to display  Medical Decision Making      63-year-old female with history of alcohol abuse, HTN presents for evaluation of alcohol intoxication.  She admits to drinking all day, is frequent visitor to our ED with alcohol intoxication including twice already in the past 5 days.  I know patient very well from previous visits, is otherwise at her baseline and denies any other drug use or current complaints, no recent falls or head trauma, no recent illness.  On exam patient with elevated blood pressure but otherwise normal vitals, able to ambulate with no instability out of her bed, no other concerning exam findings or signs of any trauma.  Differential diagnosis includes alcohol intoxication, drug intoxication.    After brief period of observation patient now requesting discharge, which is reasonable since she is at her baseline and able to ambulate without instability.  She was advised on alcohol cessation and return precautions.                                        Clinical Impression:  Final diagnoses:  [F10.920] Acute alcoholic intoxication without complication (Primary)          ED Disposition Condition    Discharge Stable          ED Prescriptions    None       Follow-up Information       Follow up With Specialties Details Why Contact Info    [facility] - Emergency Dept Emergency Medicine Go to  If symptoms  worsen [address]  [facility] [address]  [phone]             Jonatan Canchola MD  04/10/24 0122

## 2024-04-16 ENCOUNTER — HOSPITAL ENCOUNTER (EMERGENCY)
Facility: OTHER | Age: 64
Discharge: HOME | End: 2024-04-16
Attending: EMERGENCY MEDICINE
Payer: MEDICARE

## 2024-04-16 VITALS
OXYGEN SATURATION: 99 % | SYSTOLIC BLOOD PRESSURE: 152 MMHG | HEART RATE: 100 BPM | WEIGHT: 180 LBS | DIASTOLIC BLOOD PRESSURE: 85 MMHG | BODY MASS INDEX: 29.99 KG/M2 | HEIGHT: 65 IN | RESPIRATION RATE: 18 BRPM | TEMPERATURE: 98 F

## 2024-04-16 DIAGNOSIS — F10.10 ALCOHOL ABUSE: ICD-10-CM

## 2024-04-16 DIAGNOSIS — F10.20 ALCOHOLISM: Primary | ICD-10-CM

## 2024-04-16 PROCEDURE — 99281 EMR DPT VST MAYX REQ PHY/QHP: CPT

## 2024-04-16 NOTE — FIRST PROVIDER EVALUATION
"Medical screening examination initiated.  I have conducted a focused provider triage encounter, findings are as follows:    Brief history of present illness:  +alcohol intoxication, patient walked here from her house.,  "I'm drunk"    Well known to this hospital    Vitals:    04/16/24 1652   BP: (!) 152/85   BP Location: Left arm   Patient Position: Sitting   Pulse: 100   Resp: 18   Temp: 98 °F (36.7 °C)   TempSrc: Oral   SpO2: 99%   Weight: 81.6 kg (180 lb)   Height: 5' 5" (1.651 m)       Pertinent physical exam:  well appearing, stable ambulation    Brief workup plan:  eval once roomed    Preliminary workup initiated; this workup will be continued and followed by the physician or advanced practice provider that is assigned to the patient when roomed.  "

## 2024-04-17 NOTE — ED PROVIDER NOTES
"Source of History:  Patient    Chief complaint:  Alcohol Intoxication (Pt states "I'm drunk" )      HPI:  Marianne Gottlieb is a 63 y.o. female presenting no complaint.  Patient states "I'm drunk"   patient walked here from her house.  Denies any complaints.      Well known to this facility for ETOH intoxication.      This is the extent to the patients complaints today here in the emergency department.    PMH:  As per HPI and below:  Past Medical History:   Diagnosis Date    ETOH abuse     Hyperlipemia     Hypertension     Unspecified cirrhosis of liver      Past Surgical History:   Procedure Laterality Date    BREAST LUMPECTOMY Right     benign    TUBAL LIGATION         Social History     Tobacco Use    Smoking status: Some Days     Current packs/day: 0.50     Types: Cigarettes    Smokeless tobacco: Never   Substance Use Topics    Alcohol use: Yes     Alcohol/week: 42.0 standard drinks of alcohol     Types: 42 Standard drinks or equivalent per week     Comment: heavy drinker    Drug use: No     Review of patient's allergies indicates:  No Known Allergies    ROS: As per HPI and below:  General: No fever, No chills.  ENT: No Cough/congestion   Head:  No headache.    Chest:  No shortness of breath.  Cardiovascular: No chest pain.  Integument: No rashes or lesions.    Physical Exam:    BP (!) 152/85 (BP Location: Left arm, Patient Position: Sitting)   Pulse 100   Temp 98 °F (36.7 °C) (Oral)   Resp 18   Ht 5' 5" (1.651 m)   Wt 81.6 kg (180 lb)   SpO2 99%   BMI 29.95 kg/m²   Vitals:    04/16/24 1652   BP: (!) 152/85   Pulse: 100   Resp: 18   Temp: 98 °F (36.7 °C)   TempSrc: Oral   SpO2: 99%   Weight: 81.6 kg (180 lb)   Height: 5' 5" (1.651 m)       Nursing note and vital signs reviewed.  Appearance: No acute distress.  Well-appearing, nontoxic.    ENT: MM are pink and moist.     Chest/ Respiratory:  Clear to auscultation bilaterally.  Good air movement.  No wheezes.  No rhonchi. No rales. No accessory muscle " use.  Cardiovascular:  Regular rate and rhythm.  No murmurs. No gallops. No rubs.  Musculoskeletal: Neck supple.  No meningismus.  Neuro: alert, answering questions appropriately.      Labs that have been ordered have been independently reviewed and interpreted by myself.  Labs Reviewed - No data to display    No orders to display         Initial Impression/ Differential Dx:  Differential Diagnosis includes, but is not limited to:  Etoh abuse, alcohol use disorder, intoxication      MDM:    63 y.o. female with no complaints presented to the emergency department stating I am drunk.  She denied any complaints during assessment.  Patient is well known to this facility for alcohol use disorder.  She ambulated here from her house few blocks away.  Patient dancing in the hallway, steady gait.  Patient requested to be discharged shortly after arrival, she was answering questions appropriately, cooperative with a steady gait I believe she was safe for discharge home.         Diagnostic Impression:    1. Alcoholism    2. Alcohol abuse         ED Disposition Condition    Discharge Stable            ED Prescriptions    None       Follow-up Information       Follow up With Specialties Details Why Contact Info    Zainab Milian MD Internal Medicine Schedule an appointment as soon as possible for a visit in 3 days  [address] [facility]  [address]  [facility] [address]  [phone]      [facility] Emergency Dept Emergency Medicine Go to  If symptoms worsen [address] [facility] [address]  [phone]                 Vivian Alcaraz, [facility]  04/16/24 1927

## 2024-04-26 ENCOUNTER — HOSPITAL ENCOUNTER (EMERGENCY)
Facility: OTHER | Age: 64
Discharge: HOME | End: 2024-04-26
Payer: MEDICARE

## 2024-04-26 PROCEDURE — 99900041 HC LEFT WITHOUT BEING SEEN- EMERGENCY

## 2024-04-28 ENCOUNTER — HOSPITAL ENCOUNTER (EMERGENCY)
Facility: OTHER | Age: 64
Discharge: HOME | End: 2024-04-28
Attending: EMERGENCY MEDICINE
Payer: MEDICARE

## 2024-04-28 VITALS
TEMPERATURE: 98 F | BODY MASS INDEX: 29.99 KG/M2 | HEIGHT: 65 IN | RESPIRATION RATE: 18 BRPM | SYSTOLIC BLOOD PRESSURE: 174 MMHG | DIASTOLIC BLOOD PRESSURE: 88 MMHG | OXYGEN SATURATION: 99 % | HEART RATE: 84 BPM | WEIGHT: 180 LBS

## 2024-04-28 DIAGNOSIS — F10.920 ALCOHOLIC INTOXICATION WITHOUT COMPLICATION: Primary | ICD-10-CM

## 2024-04-28 DIAGNOSIS — I10 HYPERTENSION, UNSPECIFIED TYPE: ICD-10-CM

## 2024-04-28 DIAGNOSIS — Z76.5 MALINGERING: ICD-10-CM

## 2024-04-28 PROCEDURE — 99281 EMR DPT VST MAYX REQ PHY/QHP: CPT

## 2024-04-28 NOTE — ED PROVIDER NOTES
Encounter Date: 4/28/2024       History     Chief Complaint   Patient presents with    Alcohol Intoxication     Pt states she drank some EJ and she saw a clown so she came in to be checked.      This is a 63-year-old female well acquainted with this emergency facility presenting for evaluation of having been intoxicated and having her daughter say that she was tired of her to come to the hospital.  While attempting to come to the hospital she noted that she saw a clown outside which scared her, she notes that it was a person dressed as a clown she does not think it was an actual clown but somebody in a costume.  Otherwise she has no complaints at this time.      Review of patient's allergies indicates:  No Known Allergies  Past Medical History:   Diagnosis Date    ETOH abuse     Hyperlipemia     Hypertension     Unspecified cirrhosis of liver      Past Surgical History:   Procedure Laterality Date    BREAST LUMPECTOMY Right     benign    TUBAL LIGATION       Family History   Problem Relation Name Age of Onset    Lung cancer Mother       Social History     Tobacco Use    Smoking status: Some Days     Current packs/day: 0.50     Types: Cigarettes    Smokeless tobacco: Never   Substance Use Topics    Alcohol use: Yes     Alcohol/week: 42.0 standard drinks of alcohol     Types: 42 Standard drinks or equivalent per week     Comment: heavy drinker    Drug use: No     Review of Systems  Constitutional-no fever  HEENT-no congestion  Eyes-no redness  Respiratory-no shortness of breath  Cardio-no chest pain  GI-no abdominal pain  Endocrine-no cold intolerance  -no difficulty urinating  MSK-no myalgias  Skin-no rashes  Allergy-no environmental allergy  Neurologic-, no headache  Hematology-no swollen nodes  Behavioral-no confusion  Physical Exam     Initial Vitals [04/28/24 1551]   BP Pulse Resp Temp SpO2   (!) 174/88 84 18 98.2 °F (36.8 °C) 99 %      MAP       --         Physical Exam  Constitutional:  Intoxicated appearing  63-year-old female in no obvious distress  Eyes: Conjunctivae normal.  ENT       Head: Normocephalic, atraumatic.       Nose: Normal external appearance        Mouth/Throat: no strigulous respirations   Hematological/Lymphatic/Immunilogical: no visible lymphadenopathy   Cardiovascular: Normal rate,   Respiratory: Normal respiratory effort.   Gastrointestinal: non distended   Musculoskeletal: Normal range of motion in all extremities. No obvious deformities or swelling.  Neurologic: Alert, oriented. Normal speech and language. No gross focal neurologic deficits are appreciated.  Skin: Skin is warm, dry. No rash noted.  Psychiatric: Mood and affect are normal.   ED Course   Procedures  Labs Reviewed - No data to display       Imaging Results    None          Medications - No data to display  Medical Decision Making  Differential diagnosis-hypertension, alcohol intoxication, worried well    Ambulatory, tolerating orals, no specific complaints at this time.  Moderate elevation of blood pressure encouraged to continued blood pressure regimen and recheck as well as avoid alcohol moving forward.    Problems Addressed:  Alcoholic intoxication without complication: chronic illness or injury  Hypertension, unspecified type: chronic illness or injury  Malingering: chronic illness or injury    Amount and/or Complexity of Data Reviewed  External Data Reviewed: labs and notes.     Details: Multiple previous presentations for alcohol intoxication    Risk  OTC drugs.  Prescription drug management.  Diagnosis or treatment significantly limited by social determinants of health.  Risk Details: Alcoholism complicates This patient's care as social determinants of health                                      Clinical Impression:  Final diagnoses:  [F10.920] Alcoholic intoxication without complication (Primary)  [Z76.5] Malingering  [I10] Hypertension, unspecified type          ED Disposition Condition    Discharge Stable          ED  Prescriptions    None       Follow-up Information    None          Francis Miguel MD  04/28/24 1694

## 2024-05-20 ENCOUNTER — OFFICE VISIT (OUTPATIENT)
Dept: INTERNAL MEDICINE | Facility: CLINIC | Age: 64
End: 2024-05-20
Payer: MEDICARE

## 2024-05-20 ENCOUNTER — PATIENT OUTREACH (OUTPATIENT)
Dept: ADMINISTRATIVE | Facility: HOSPITAL | Age: 64
End: 2024-05-20
Payer: MEDICARE

## 2024-05-20 ENCOUNTER — LAB VISIT (OUTPATIENT)
Dept: LAB | Facility: OTHER | Age: 64
End: 2024-05-20
Attending: STUDENT IN AN ORGANIZED HEALTH CARE EDUCATION/TRAINING PROGRAM
Payer: MEDICARE

## 2024-05-20 VITALS
WEIGHT: 183.44 LBS | DIASTOLIC BLOOD PRESSURE: 80 MMHG | SYSTOLIC BLOOD PRESSURE: 130 MMHG | OXYGEN SATURATION: 98 % | BODY MASS INDEX: 30.52 KG/M2 | HEART RATE: 80 BPM

## 2024-05-20 DIAGNOSIS — E63.9 NUTRITIONAL DEFICIENCY: ICD-10-CM

## 2024-05-20 DIAGNOSIS — Z00.00 HEALTH MAINTENANCE EXAMINATION: Primary | ICD-10-CM

## 2024-05-20 DIAGNOSIS — Z78.0 ASYMPTOMATIC MENOPAUSAL STATE: ICD-10-CM

## 2024-05-20 DIAGNOSIS — R74.8 ABNORMAL LEVELS OF OTHER SERUM ENZYMES: ICD-10-CM

## 2024-05-20 DIAGNOSIS — F19.94 SUBSTANCE INDUCED MOOD DISORDER: ICD-10-CM

## 2024-05-20 DIAGNOSIS — F10.20 ALCOHOL USE DISORDER, SEVERE, DEPENDENCE: ICD-10-CM

## 2024-05-20 DIAGNOSIS — K70.30 ALCOHOLIC CIRRHOSIS OF LIVER WITHOUT ASCITES: ICD-10-CM

## 2024-05-20 DIAGNOSIS — M79.605 LEFT LEG PAIN: ICD-10-CM

## 2024-05-20 DIAGNOSIS — Z86.19 HISTORY OF HEPATITIS C: ICD-10-CM

## 2024-05-20 DIAGNOSIS — E78.2 MIXED HYPERLIPIDEMIA: ICD-10-CM

## 2024-05-20 DIAGNOSIS — Z12.4 SCREENING FOR CERVICAL CANCER: ICD-10-CM

## 2024-05-20 DIAGNOSIS — I10 PRIMARY HYPERTENSION: ICD-10-CM

## 2024-05-20 DIAGNOSIS — R73.03 PREDIABETES: ICD-10-CM

## 2024-05-20 DIAGNOSIS — Z11.4 SCREENING FOR HIV WITHOUT PRESENCE OF RISK FACTORS: ICD-10-CM

## 2024-05-20 DIAGNOSIS — Z00.00 HEALTH MAINTENANCE EXAMINATION: ICD-10-CM

## 2024-05-20 DIAGNOSIS — E55.9 VITAMIN D DEFICIENCY: ICD-10-CM

## 2024-05-20 DIAGNOSIS — Z12.31 SCREENING MAMMOGRAM FOR BREAST CANCER: ICD-10-CM

## 2024-05-20 DIAGNOSIS — Z23 NEED FOR VACCINATION: ICD-10-CM

## 2024-05-20 DIAGNOSIS — F17.210 NICOTINE DEPENDENCE, CIGARETTES, UNCOMPLICATED: ICD-10-CM

## 2024-05-20 LAB
25(OH)D3+25(OH)D2 SERPL-MCNC: 27 NG/ML (ref 30–96)
AFP SERPL-MCNC: 5.9 NG/ML (ref 0–8.4)
ALBUMIN SERPL BCP-MCNC: 4.4 G/DL (ref 3.5–5.2)
ALP SERPL-CCNC: 50 U/L (ref 55–135)
ALT SERPL W/O P-5'-P-CCNC: 21 U/L (ref 10–44)
ANION GAP SERPL CALC-SCNC: 14 MMOL/L (ref 8–16)
AST SERPL-CCNC: 18 U/L (ref 10–40)
BASOPHILS # BLD AUTO: 0.03 K/UL (ref 0–0.2)
BASOPHILS NFR BLD: 0.6 % (ref 0–1.9)
BILIRUB SERPL-MCNC: 0.5 MG/DL (ref 0.1–1)
BUN SERPL-MCNC: 16 MG/DL (ref 8–23)
CALCIUM SERPL-MCNC: 10 MG/DL (ref 8.7–10.5)
CHLORIDE SERPL-SCNC: 104 MMOL/L (ref 95–110)
CO2 SERPL-SCNC: 23 MMOL/L (ref 23–29)
CREAT SERPL-MCNC: 0.8 MG/DL (ref 0.5–1.4)
DIFFERENTIAL METHOD BLD: ABNORMAL
EOSINOPHIL # BLD AUTO: 0.4 K/UL (ref 0–0.5)
EOSINOPHIL NFR BLD: 7.9 % (ref 0–8)
ERYTHROCYTE [DISTWIDTH] IN BLOOD BY AUTOMATED COUNT: 13.4 % (ref 11.5–14.5)
EST. GFR  (NO RACE VARIABLE): >60 ML/MIN/1.73 M^2
ESTIMATED AVG GLUCOSE: 128 MG/DL (ref 68–131)
FOLATE SERPL-MCNC: 13.8 NG/ML (ref 4–24)
GLUCOSE SERPL-MCNC: 97 MG/DL (ref 70–110)
HBA1C MFR BLD: 6.1 % (ref 4–5.6)
HCT VFR BLD AUTO: 48.8 % (ref 37–48.5)
HGB BLD-MCNC: 15.3 G/DL (ref 12–16)
HIV 1+2 AB+HIV1 P24 AG SERPL QL IA: NEGATIVE
IMM GRANULOCYTES # BLD AUTO: 0.02 K/UL (ref 0–0.04)
IMM GRANULOCYTES NFR BLD AUTO: 0.4 % (ref 0–0.5)
LYMPHOCYTES # BLD AUTO: 2.1 K/UL (ref 1–4.8)
LYMPHOCYTES NFR BLD: 38.9 % (ref 18–48)
MCH RBC QN AUTO: 26.9 PG (ref 27–31)
MCHC RBC AUTO-ENTMCNC: 31.4 G/DL (ref 32–36)
MCV RBC AUTO: 86 FL (ref 82–98)
MONOCYTES # BLD AUTO: 0.4 K/UL (ref 0.3–1)
MONOCYTES NFR BLD: 7.4 % (ref 4–15)
NEUTROPHILS # BLD AUTO: 2.4 K/UL (ref 1.8–7.7)
NEUTROPHILS NFR BLD: 44.8 % (ref 38–73)
NRBC BLD-RTO: 0 /100 WBC
PLATELET # BLD AUTO: 209 K/UL (ref 150–450)
PMV BLD AUTO: 10.7 FL (ref 9.2–12.9)
POTASSIUM SERPL-SCNC: 3.7 MMOL/L (ref 3.5–5.1)
PROT SERPL-MCNC: 8.4 G/DL (ref 6–8.4)
RBC # BLD AUTO: 5.68 M/UL (ref 4–5.4)
SODIUM SERPL-SCNC: 141 MMOL/L (ref 136–145)
TSH SERPL DL<=0.005 MIU/L-ACNC: 0.64 UIU/ML (ref 0.4–4)
VIT B12 SERPL-MCNC: 490 PG/ML (ref 210–950)
WBC # BLD AUTO: 5.29 K/UL (ref 3.9–12.7)

## 2024-05-20 PROCEDURE — 82105 ALPHA-FETOPROTEIN SERUM: CPT | Performed by: STUDENT IN AN ORGANIZED HEALTH CARE EDUCATION/TRAINING PROGRAM

## 2024-05-20 PROCEDURE — 1159F MED LIST DOCD IN RCRD: CPT | Mod: CPTII,S$GLB,, | Performed by: STUDENT IN AN ORGANIZED HEALTH CARE EDUCATION/TRAINING PROGRAM

## 2024-05-20 PROCEDURE — 99215 OFFICE O/P EST HI 40 MIN: CPT | Mod: S$GLB,,, | Performed by: STUDENT IN AN ORGANIZED HEALTH CARE EDUCATION/TRAINING PROGRAM

## 2024-05-20 PROCEDURE — 85025 COMPLETE CBC W/AUTO DIFF WBC: CPT | Performed by: STUDENT IN AN ORGANIZED HEALTH CARE EDUCATION/TRAINING PROGRAM

## 2024-05-20 PROCEDURE — 84207 ASSAY OF VITAMIN B-6: CPT | Performed by: STUDENT IN AN ORGANIZED HEALTH CARE EDUCATION/TRAINING PROGRAM

## 2024-05-20 PROCEDURE — 80053 COMPREHEN METABOLIC PANEL: CPT | Performed by: STUDENT IN AN ORGANIZED HEALTH CARE EDUCATION/TRAINING PROGRAM

## 2024-05-20 PROCEDURE — 3008F BODY MASS INDEX DOCD: CPT | Mod: CPTII,S$GLB,, | Performed by: STUDENT IN AN ORGANIZED HEALTH CARE EDUCATION/TRAINING PROGRAM

## 2024-05-20 PROCEDURE — 4010F ACE/ARB THERAPY RXD/TAKEN: CPT | Mod: CPTII,S$GLB,, | Performed by: STUDENT IN AN ORGANIZED HEALTH CARE EDUCATION/TRAINING PROGRAM

## 2024-05-20 PROCEDURE — 87389 HIV-1 AG W/HIV-1&-2 AB AG IA: CPT | Performed by: STUDENT IN AN ORGANIZED HEALTH CARE EDUCATION/TRAINING PROGRAM

## 2024-05-20 PROCEDURE — 87522 HEPATITIS C REVRS TRNSCRPJ: CPT | Performed by: STUDENT IN AN ORGANIZED HEALTH CARE EDUCATION/TRAINING PROGRAM

## 2024-05-20 PROCEDURE — 1101F PT FALLS ASSESS-DOCD LE1/YR: CPT | Mod: CPTII,S$GLB,, | Performed by: STUDENT IN AN ORGANIZED HEALTH CARE EDUCATION/TRAINING PROGRAM

## 2024-05-20 PROCEDURE — 82607 VITAMIN B-12: CPT | Performed by: STUDENT IN AN ORGANIZED HEALTH CARE EDUCATION/TRAINING PROGRAM

## 2024-05-20 PROCEDURE — G2211 COMPLEX E/M VISIT ADD ON: HCPCS | Mod: S$GLB,,, | Performed by: STUDENT IN AN ORGANIZED HEALTH CARE EDUCATION/TRAINING PROGRAM

## 2024-05-20 PROCEDURE — 99999 PR PBB SHADOW E&M-EST. PATIENT-LVL V: CPT | Mod: PBBFAC,,, | Performed by: STUDENT IN AN ORGANIZED HEALTH CARE EDUCATION/TRAINING PROGRAM

## 2024-05-20 PROCEDURE — 36415 COLL VENOUS BLD VENIPUNCTURE: CPT | Performed by: STUDENT IN AN ORGANIZED HEALTH CARE EDUCATION/TRAINING PROGRAM

## 2024-05-20 PROCEDURE — 84443 ASSAY THYROID STIM HORMONE: CPT | Performed by: STUDENT IN AN ORGANIZED HEALTH CARE EDUCATION/TRAINING PROGRAM

## 2024-05-20 PROCEDURE — 83036 HEMOGLOBIN GLYCOSYLATED A1C: CPT | Performed by: STUDENT IN AN ORGANIZED HEALTH CARE EDUCATION/TRAINING PROGRAM

## 2024-05-20 PROCEDURE — 3075F SYST BP GE 130 - 139MM HG: CPT | Mod: CPTII,S$GLB,, | Performed by: STUDENT IN AN ORGANIZED HEALTH CARE EDUCATION/TRAINING PROGRAM

## 2024-05-20 PROCEDURE — 3288F FALL RISK ASSESSMENT DOCD: CPT | Mod: CPTII,S$GLB,, | Performed by: STUDENT IN AN ORGANIZED HEALTH CARE EDUCATION/TRAINING PROGRAM

## 2024-05-20 PROCEDURE — 82306 VITAMIN D 25 HYDROXY: CPT | Performed by: STUDENT IN AN ORGANIZED HEALTH CARE EDUCATION/TRAINING PROGRAM

## 2024-05-20 PROCEDURE — 82746 ASSAY OF FOLIC ACID SERUM: CPT | Performed by: STUDENT IN AN ORGANIZED HEALTH CARE EDUCATION/TRAINING PROGRAM

## 2024-05-20 PROCEDURE — 3044F HG A1C LEVEL LT 7.0%: CPT | Mod: CPTII,S$GLB,, | Performed by: STUDENT IN AN ORGANIZED HEALTH CARE EDUCATION/TRAINING PROGRAM

## 2024-05-20 PROCEDURE — 3079F DIAST BP 80-89 MM HG: CPT | Mod: CPTII,S$GLB,, | Performed by: STUDENT IN AN ORGANIZED HEALTH CARE EDUCATION/TRAINING PROGRAM

## 2024-05-20 PROCEDURE — 84425 ASSAY OF VITAMIN B-1: CPT | Performed by: STUDENT IN AN ORGANIZED HEALTH CARE EDUCATION/TRAINING PROGRAM

## 2024-05-20 RX ORDER — PRAVASTATIN SODIUM 40 MG/1
40 TABLET ORAL DAILY
Qty: 90 TABLET | Refills: 3 | Status: SHIPPED | OUTPATIENT
Start: 2024-05-20

## 2024-05-20 RX ORDER — NAPROXEN SODIUM 220 MG/1
81 TABLET, FILM COATED ORAL DAILY
Qty: 90 TABLET | Refills: 3 | Status: SHIPPED | OUTPATIENT
Start: 2024-05-20

## 2024-05-20 RX ORDER — LANOLIN ALCOHOL/MO/W.PET/CERES
100 CREAM (GRAM) TOPICAL DAILY
Qty: 90 TABLET | Refills: 3 | Status: SHIPPED | OUTPATIENT
Start: 2024-05-20

## 2024-05-20 RX ORDER — LOSARTAN POTASSIUM 100 MG/1
100 TABLET ORAL DAILY
Qty: 90 TABLET | Refills: 3 | Status: SHIPPED | OUTPATIENT
Start: 2024-05-20

## 2024-05-20 RX ORDER — HYDROCHLOROTHIAZIDE 25 MG/1
25 TABLET ORAL DAILY
Qty: 90 TABLET | Refills: 3 | Status: SHIPPED | OUTPATIENT
Start: 2024-05-20

## 2024-05-20 RX ORDER — LIDOCAINE 50 MG/G
1 PATCH TOPICAL DAILY
Qty: 30 PATCH | Refills: 5 | Status: SHIPPED | OUTPATIENT
Start: 2024-05-20

## 2024-05-20 NOTE — PROGRESS NOTES
Subjective:       Patient ID: Marianne Gottlieb is a 63 y.o. female.    Chief Complaint: Primary hypertension [I10]    Patient is established with me, here today for the following:    Health maintenance -   Colonoscopy performed JULY2020, with Dr. Luz Maria Bradley at [facility]. Unsure screening interval.  Denies family history of colorectal cancer.   Mammogram BI-RADS 1 in JAN2023. Due for repeat.  Denies history of prior abnormal mammogram, lumpectomy right breast.  Denies family history of breast cancers.  Denies family history of ovarian cancers.  Last pap performed >3 years ago.   Denies history of prior abnormal pap smears.  Due for DEXA scan   Denies family history of osteoporosis.  Denies significant family history of cardiac disease.  UTD on COVID primary/booster, Tdap, PPSV23 vaccinations.  Due for COVID, shingles, RSV, PCV20 vaccinations.  Started smoking at age 12, at most 1 PPD. Smoking about 0.5 PPD currently.  Denies drug use.  Never previously had low dose lung CT screening.  Completed HIV screening.          HTN -   Currently prescribed HCTZ and losartan   Patient endorses taking medication as directed.  Denies side effects or concerns while taking medication.  Due for micro albumin creatinine ratio.   Lab Results       Component                Value               Date                       MICALBCREAT                                  11/16/2022             Unable to calculate  BP Readings from Last 5 Encounters:  04/16/24 : (!) 152/85  04/09/24 : (!) 175/123  04/06/24 : (!) 148/90  04/05/24 : 132/70  03/06/24 : (!) 184/86     HLD -   Endorses taking pravastatin as directed  Denies side effects or concerns while taking medication  Lab Results       Component                Value               Date                       CHOL                     180                 02/02/2024            Lab Results       Component                Value               Date                       TRIG                     83   "                02/02/2024            Lab Results       Component                Value               Date                       LDLCALC                  100.4               02/02/2024            Lab Results       Component                Value               Date                       HDL                      63                  02/02/2024               Prediabetes -   Due for diabetes screening.  Lab Results       Component                Value               Date                       HGBA1C                   6.1 (H)             02/02/2024                 HGBA1C                   5.9 (H)             11/16/2022               History of hepatitis C -   Liver cirrhosis -   Endorses treated for hepatitis C years ago  Unsure name of medication   CT abdomen JAN2024 with "The liver is upper normal in size.  The liver is homogeneous in density with no focal liver lesions identified."   Lab Results       Component                Value               Date                       ALT                      16                  02/02/2024                 AST                      15                  02/02/2024                 ALKPHOS                  50 (L)              02/02/2024            Lab Results       Component                Value               Date                       FERRITIN                 277                 01/18/2023            Hepatitis A Antibody IgG       Date                     Value               Ref Range           Status                01/18/2023               Reactive                                Final              Comment:    IgG anti-HAV detected. The presence of IgG anti-HAV  implies past infection (recent or distant) or   vaccination against HAV. Detectable levels above  the assay cutoff suggest immunity to HAV infection.    ----------    Hepatitis B Surface Ag       Date                     Value               Ref Range           Status                01/18/2023               Non-reactive        " Non-reactive        Final            ----------  Hep B Core Total Ab       Date                     Value               Ref Range           Status                01/18/2023               Non-reactive        Non-reactive        Final            ----------  Hep B S Ab       Date                     Value               Ref Range           Status                01/18/2023               <3.00               mIU/mL              Final                 01/18/2023               Non-reactive                            Final              Comment:    Individual is considered not immune to HBV infection.     Alcohol use disorder -   Previously tried naltrexone for alcohol cessation, does not want to restart at this time  Endorses that she stopped drinking alcohol 4 days ago  Denies signs/symptoms of withdrawal  Has tried AA in the past, but states she didn't like it, felt too sad  Never previously received inpatient treatment   Previously drinking alcohol about 4 times weekly, one pint E&J herb and one beer per sitting.  Endorses her frequency of alcohol use varies depending on finances  Has been to ED 9 times in the last 3 months with alcohol related issues     Admitted to [facility] in early MAY2024, stayed [id number] to 15MAY?  Was admitted for SI after hospitalization   Was prescribed lithium and Seroquel at discharge, but has not been taking    Receives food from [employer]  Endorses eating well, no recent weight changes   Denies recent falls or injuries     Taking ASA 81 mg      Endorses left leg painful if lying down   Denies pain with walking or standing   Denies injury to leg preceding pain  Has been present for >3 years  Has tried ibuprofen, Tylenol, naproxen, and gabapentin for pain without improvement  Was referred to PT previously but never followed up          Review of Systems   Constitutional:  Negative for activity change, fatigue and fever.   Respiratory:  Negative for cough and shortness of breath.     Cardiovascular:  Negative for chest pain and palpitations.   Gastrointestinal:  Negative for abdominal pain, constipation, diarrhea, nausea and vomiting.   Neurological:  Negative for syncope and headaches.         Current Outpatient Medications   Medication Instructions    acetaminophen (TYLENOL) 500 mg, Oral, Every 6 hours PRN    aspirin 81 mg, Oral, Daily    hydroCHLOROthiazide (HYDRODIURIL) 25 mg, Oral, Daily    LIDOcaine (LIDODERM) 5 % 1 patch, Transdermal, Daily, Remove & Discard patch within 12 hours or as directed by MD    losartan (COZAAR) 100 mg, Oral, Daily    pravastatin (PRAVACHOL) 40 mg, Oral, Daily    QUEtiapine (SEROQUEL) 50 mg, Oral, Nightly    thiamine 100 mg, Oral, Daily     Objective:      Vitals:    05/20/24 1110   BP: 130/80   Pulse: 80   SpO2: 98%   Weight: 83.2 kg (183 lb 6.8 oz)     Body mass index is 30.52 kg/m².    Physical Exam  Vitals reviewed.   Constitutional:       General: She is not in acute distress.     Appearance: Normal appearance. She is not ill-appearing or diaphoretic.   HENT:      Head: Normocephalic and atraumatic.      Right Ear: Tympanic membrane, ear canal and external ear normal. There is no impacted cerumen.      Left Ear: Tympanic membrane, ear canal and external ear normal. There is no impacted cerumen.      Nose: Nose normal. No rhinorrhea.      Mouth/Throat:      Mouth: Mucous membranes are moist.      Pharynx: Oropharynx is clear. No oropharyngeal exudate or posterior oropharyngeal erythema.   Eyes:      General: No scleral icterus.        Right eye: No discharge.         Left eye: No discharge.      Conjunctiva/sclera: Conjunctivae normal.   Neck:      Thyroid: No thyromegaly or thyroid tenderness.      Trachea: Trachea normal.   Cardiovascular:      Rate and Rhythm: Normal rate and regular rhythm.      Heart sounds: Normal heart sounds. No murmur heard.     No friction rub. No gallop.   Pulmonary:      Effort: Pulmonary effort is normal. No respiratory  distress.      Breath sounds: Normal breath sounds. No stridor. No wheezing, rhonchi or rales.   Abdominal:      General: There is no distension.      Palpations: Abdomen is soft.      Tenderness: There is no abdominal tenderness. There is no guarding or rebound.   Musculoskeletal:         General: No swelling or deformity.      Cervical back: Neck supple.   Lymphadenopathy:      Head:      Right side of head: No submandibular or posterior auricular adenopathy.      Left side of head: No submandibular or posterior auricular adenopathy.      Cervical: No cervical adenopathy.      Right cervical: No superficial, deep or posterior cervical adenopathy.     Left cervical: No superficial, deep or posterior cervical adenopathy.      Upper Body:      Right upper body: No supraclavicular adenopathy.      Left upper body: No supraclavicular adenopathy.   Skin:     General: Skin is warm and dry.   Neurological:      General: No focal deficit present.      Mental Status: She is alert. Mental status is at baseline.      Gait: Gait normal.   Psychiatric:         Mood and Affect: Mood normal.         Behavior: Behavior normal.         Assessment:       1. Primary hypertension    2. Mixed hyperlipidemia    3. Prediabetes    4. History of hepatitis C    5. Alcoholic cirrhosis of liver without ascites    6. Alcohol use disorder, severe, dependence    7. Nutritional deficiency    8. Abnormal levels of other serum enzymes    9. Substance induced mood disorder    10. Vitamin D deficiency    11. Left leg pain    12. Health maintenance examination    13. Need for vaccination    14. Screening mammogram for breast cancer    15. Asymptomatic menopausal state    16. Nicotine dependence, cigarettes, uncomplicated    17. Screening for cervical cancer    18. Screening for HIV without presence of risk factors        Plan:       Primary hypertension  Continue current medications.  RTC in 3 months for follow up.  -     Comprehensive Metabolic  Panel; Future  -     TSH; Future  -     CBC Auto Differential; Future  -     Microalbumin/Creatinine Ratio, Urine; Future  -     hydroCHLOROthiazide (HYDRODIURIL) 25 MG tablet; Take 1 tablet (25 mg total) by mouth once daily.  -     losartan (COZAAR) 100 MG tablet; Take 1 tablet (100 mg total) by mouth once daily.    Mixed hyperlipidemia  Continue current medications.  RTC in 3 months for follow up.  -     pravastatin (PRAVACHOL) 40 MG tablet; Take 1 tablet (40 mg total) by mouth once daily.  -     aspirin 81 MG Chew; Take 1 tablet (81 mg total) by mouth once daily.    Prediabetes  Continue healthy diet and lifestyle modifications  RTC in 3 months for follow up.  -     Hemoglobin A1C; Future  -     CBC Auto Differential; Future    History of hepatitis C  Alcoholic cirrhosis of liver without ascites  RTC in 3 months for follow up.  -     AFP TUMOR MARKER; Future  -     HEPATITIS C RNA, QUANTITATIVE, PCR; Future  -     US Abdomen Limited_Liver; Future  -     Ambulatory referral/consult to Hepatology; Future    Alcohol use disorder, severe, dependence  Nutritional deficiency  Declines starting therapy for alcohol use at this time  Denies s/s of withdrawal during visit today  RTC in 3 months for follow up.  -     Vitamin B12; Future  -     FOLATE; Future  -     VITAMIN B1; Future  -     VITAMIN B6; Future  -     thiamine 100 MG tablet; Take 1 tablet (100 mg total) by mouth once daily.    Abnormal levels of other serum enzymes  -     VITAMIN B6; Future    Substance induced mood disorder  -     Ambulatory referral/consult to Psychiatry; Future    Vitamin D deficiency  -     Vitamin D; Future    Left leg pain  -     LIDOcaine (LIDODERM) 5 %; Place 1 patch onto the skin once daily. Remove & Discard patch within 12 hours or as directed by MD  -     Ambulatory referral/consult to Physical/Occupational Therapy; Future    Health maintenance examination  Reviewed and discussed age appropriate screenings and immunizations.  -      Comprehensive Metabolic Panel; Future  -     TSH; Future  -     Cancel: Lipid Panel; Future  -     Hemoglobin A1C; Future  -     CBC Auto Differential; Future  -     Vitamin D; Future  -     Vitamin B12; Future  -     Microalbumin/Creatinine Ratio, Urine; Future  -     HIV 1/2 Ag/Ab (4th Gen); Future  -     FOLATE; Future  -     VITAMIN B1; Future  -     VITAMIN B6; Future  -     AFP TUMOR MARKER; Future  -     HEPATITIS C RNA, QUANTITATIVE, PCR; Future    Need for vaccination  -     pneumoc 20-heaven conj-dip cr(PF) (PREVNAR-20 (PF)) injection Syrg 0.5 mL    Screening mammogram for breast cancer  -     Mammo Digital Screening Bilat w/ Kunal; Future    Asymptomatic menopausal state  -     DXA Bone Density Axial Skeleton 1 or more sites; Future    Nicotine dependence, cigarettes, uncomplicated  -     CT Chest Lung Screening Low Dose; Future    Screening for cervical cancer  -     Ambulatory referral/consult to Obstetrics / Gynecology; Future    Screening for HIV without presence of risk factors  -     HIV 1/2 Ag/Ab (4th Gen); Future      48 minutes were spent in chart review, documentation and review of results, and evaluation, treatment, and counseling of patient on the same day of service.    Zainab Milian MD  5/20/2024

## 2024-05-20 NOTE — PATIENT INSTRUCTIONS
Make sure your vitamin contains thiamine (vitamin B1) 100 mg daily.    Call to schedule with [facility] psychiat[facility] or psychology: [phone] or [phone]    If you're interested in the Outpatient Psychiatry Program at [facility], call this number [phone], and select option #3.     [facility]  [address] [place] [address] [place] [address]  Phone: [phone]  Fax: [phone]    [address]  By appointment: Monday to Friday, 8 am - 5 pm  And   [address] [place] [address]  Walk-ins: Monday to Thursday,8 am - 5 pm  Phone: [phone]    [address] [address]  E-mail: [email or web address]  Phone: [phone]    Dr. Zuly Harris   [address]  [address]   [place] [address]    Dr. Lidia Alvarez  [address]  [address]   [place] [address]

## 2024-05-20 NOTE — Clinical Note
Can we try to obtain patient's colonoscopy report? I can see documentation that she had it done JULY2020, with Dr. Luz Maria Bradley at [facility], but I don't have the report. Thanks!

## 2024-05-22 ENCOUNTER — PATIENT OUTREACH (OUTPATIENT)
Dept: ADMINISTRATIVE | Facility: HOSPITAL | Age: 64
End: 2024-05-22
Payer: MEDICARE

## 2024-05-22 LAB
HCV RNA SERPL QL NAA+PROBE: NOT DETECTED
HCV RNA SPEC NAA+PROBE-ACNC: NOT DETECTED IU/ML

## 2024-05-23 ENCOUNTER — TELEPHONE (OUTPATIENT)
Dept: INTERNAL MEDICINE | Facility: CLINIC | Age: 64
End: 2024-05-23
Payer: MEDICARE

## 2024-05-23 DIAGNOSIS — E55.9 VITAMIN D DEFICIENCY: Primary | ICD-10-CM

## 2024-05-23 RX ORDER — ERGOCALCIFEROL 1.25 MG/1
50000 CAPSULE ORAL
Qty: 12 CAPSULE | Refills: 3 | Status: SHIPPED | OUTPATIENT
Start: 2024-05-23

## 2024-05-23 NOTE — TELEPHONE ENCOUNTER
----- Message from Zainab Milian MD sent at 5/23/2024 12:25 PM CDT -----  Please let patient know I have sent a vitamin D supplement to her pharmacy to start taking weekly. A1c is stable. Otherwise labs look ok, thank you!

## 2024-05-24 LAB
PYRIDOXAL SERPL-MCNC: 15 UG/L (ref 5–50)
VIT B1 BLD-MCNC: 56 UG/L (ref 38–122)

## 2024-06-07 ENCOUNTER — TELEPHONE (OUTPATIENT)
Dept: INTERNAL MEDICINE | Facility: CLINIC | Age: 64
End: 2024-06-07
Payer: MEDICARE

## 2024-06-07 ENCOUNTER — HOSPITAL ENCOUNTER (EMERGENCY)
Facility: OTHER | Age: 64
Discharge: HOME | End: 2024-06-07
Attending: EMERGENCY MEDICINE
Payer: MEDICARE

## 2024-06-07 ENCOUNTER — HOSPITAL ENCOUNTER (OUTPATIENT)
Dept: RADIOLOGY | Facility: OTHER | Age: 64
Discharge: HOME | End: 2024-06-07
Attending: STUDENT IN AN ORGANIZED HEALTH CARE EDUCATION/TRAINING PROGRAM
Payer: MEDICARE

## 2024-06-07 VITALS
HEIGHT: 65 IN | BODY MASS INDEX: 30.49 KG/M2 | HEART RATE: 102 BPM | TEMPERATURE: 98 F | OXYGEN SATURATION: 95 % | DIASTOLIC BLOOD PRESSURE: 95 MMHG | SYSTOLIC BLOOD PRESSURE: 145 MMHG | WEIGHT: 183 LBS | RESPIRATION RATE: 20 BRPM

## 2024-06-07 DIAGNOSIS — F10.920 ALCOHOLIC INTOXICATION WITHOUT COMPLICATION: Primary | ICD-10-CM

## 2024-06-07 DIAGNOSIS — Z78.0 ASYMPTOMATIC MENOPAUSAL STATE: ICD-10-CM

## 2024-06-07 PROCEDURE — 77080 DXA BONE DENSITY AXIAL: CPT | Mod: 26,,, | Performed by: RADIOLOGY

## 2024-06-07 PROCEDURE — 99281 EMR DPT VST MAYX REQ PHY/QHP: CPT | Mod: 25

## 2024-06-07 PROCEDURE — 77080 DXA BONE DENSITY AXIAL: CPT | Mod: TC

## 2024-06-07 NOTE — TELEPHONE ENCOUNTER
----- Message from Zainab Milian MD sent at 6/7/2024  2:22 PM CDT -----  Please let patient know her bone density is normal, thank you!

## 2024-06-08 NOTE — ED PROVIDER NOTES
Encounter Date: 6/7/2024       History     Chief Complaint   Patient presents with    Alcohol Intoxication     62 y.o. female presents via personal transportation to [facility] with alcohol intoxication.  Patient is a poor historian.  Shortly after arriving to the ER, she decided she wanted to go home.  She offered to kiss me several times.  Admits alcohol today.          Review of patient's allergies indicates:  No Known Allergies  Past Medical History:   Diagnosis Date    ETOH abuse     Hyperlipemia     Hypertension     Unspecified cirrhosis of liver      Past Surgical History:   Procedure Laterality Date    BREAST LUMPECTOMY Right     benign    TUBAL LIGATION       Family History   Problem Relation Name Age of Onset    Lung cancer Mother       Social History     Tobacco Use    Smoking status: Some Days     Current packs/day: 0.50     Types: Cigarettes    Smokeless tobacco: Never   Substance Use Topics    Alcohol use: Yes     Alcohol/week: 42.0 standard drinks of alcohol     Types: 42 Standard drinks or equivalent per week     Comment: heavy drinker    Drug use: No     Review of Systems   Unable to perform ROS: Mental status change (intoxicated)       Physical Exam     Initial Vitals [06/07/24 2155]   BP Pulse Resp Temp SpO2   (!) 145/95 102 20 97.8 °F (36.6 °C) 95 %      MAP       --         Physical Exam    Nursing note and vitals reviewed.  Constitutional: She appears well-developed and well-nourished. She is not diaphoretic.   Ambulatory without assistance with steady gait.   HENT:   Head: Normocephalic and atraumatic.   Eyes: Conjunctivae are normal.   Neck: Neck supple.   Cardiovascular:  Normal rate.           Pulmonary/Chest: No respiratory distress.   Musculoskeletal:         General: Normal range of motion.      Cervical back: Neck supple.     Neurological: She is alert.   Moving all extremities. Steady gait. Slurred speech.   Skin: Skin is warm and dry.   Psychiatric:   Trying to kiss staff members          ED Course   Procedures  Labs Reviewed - No data to display       Imaging Results    None          Medications - No data to display  Medical Decision Making  63 y.o. female well-known to this facility for EtOH use now presents again for evaluation s/p EtOH.      Ddx includes alcohol intoxication, other toxidrome, occult injury, other.    Patient wished to leave the ER shortly after she arrived.  She has slurred speech but is ambulatory with steady gait.  As she lives just down the street from the hospital, staff members escorted her home.                                      Clinical Impression:  Final diagnoses:  [F10.920] Alcoholic intoxication without complication (Primary)          ED Disposition Condition    Discharge Stable          ED Prescriptions    None       Follow-up Information    None          Elissa Calvo MD  06/08/24 0756

## 2024-06-10 ENCOUNTER — CLINICAL SUPPORT (OUTPATIENT)
Dept: REHABILITATION | Facility: OTHER | Age: 64
End: 2024-06-10
Payer: MEDICARE

## 2024-06-10 DIAGNOSIS — M79.605 LEFT LEG PAIN: ICD-10-CM

## 2024-06-10 DIAGNOSIS — M54.16 LUMBAR RADICULAR PAIN: Primary | ICD-10-CM

## 2024-06-10 PROCEDURE — 97530 THERAPEUTIC ACTIVITIES: CPT

## 2024-06-10 PROCEDURE — 97140 MANUAL THERAPY 1/> REGIONS: CPT

## 2024-06-10 PROCEDURE — 97161 PT EVAL LOW COMPLEX 20 MIN: CPT

## 2024-06-10 NOTE — PROGRESS NOTES
"  [facility]  Physical Therapy Initial Evaluation    Date: 6/10/2024   Name: Marianne Gottlieb  Clinic Number: [id number]    Therapy Diagnosis:   Encounter Diagnoses   Name Primary?    Left leg pain     Lumbar radicular pain Yes     Physician: Zainab Milian MD    Physician Orders: {AMB PT KNEE ORDERS:21696} ***  Medical Diagnosis from Referral: ***  Evaluation Date: 6/10/2024  Authorization Period Expiration: ***  Plan of Care Expiration: ***  Visit # / Visits authorized: ***/ *** Progress Note Due: ***  FOTO: 1/ 1    Precautions: {IP WOUND PRECAUTIONS OHS:21339}    Time In: ***  Time Out: ***  Total Appointment Time (timed & untimed codes): *** minutes    Subjective   Date of onset: years and then worse in the last couple of months  History of current condition - Marianne reports: that when she lays down she has pain down the left leg and then a burning sensation down the R side. Reports it feels okay while she is walking, has had some difficulty with sitting and then going to standing. Patient reports that she went to the ER recently and pain pills do not seem to help.     SAMAN: insidious    Pain:  Current {0-10:68100::"0"}/10, worst {0-10:99092::"0"}/10, best {0-10:40902::"0"}/10   Location: {RIGHT LEFT BILATERAL:94984} {LOCATION ON BODY:62227}  Description: {Pain Description:22477}  Aggravating Factors: {Causes; Pain:07783}  Easing Factors: {Pain (activities that relieve):10947}    Prior Therapy: ***  Social History: *** {LIVES WITH:11946}  Occupation: ***  Prior Level of Function: ***  Current level of function:     Pts goals: ***    Imaging, {Mri/ctscan/bone scan:05519}: ***     Medical History:   Past Medical History:   Diagnosis Date    ETOH abuse     Hyperlipemia     Hypertension     Unspecified cirrhosis of liver        Surgical History:   Marianne Gottlieb  has a past surgical history that includes Tubal ligation and Breast lumpectomy (Right).    Medications:   Marianne " has a current medication list which includes the following prescription(s): acetaminophen, aspirin, ergocalciferol, hydrochlorothiazide, lidocaine, losartan, pravastatin, quetiapine, and thiamine.    Allergies:   Review of patient's allergies indicates:  No Known Allergies       Objective       Observation: ***    Posture Alignment: {AMB PT VESTIBULAR POSTURE ALIGNMENT: 12456};{AMB PT VESTIBULAR POSTURE ALIGNMENT: 52588};{AMB PT VESTIBULAR POSTURE ALIGNMENT: 11362}    Sensation: Light touch: {AMB PT KNEE SENSATION:57403}    DTR: {AMB PT KNEE SENSATION:48591}    GAIT DEVIATIONS: Marianne displays {AMB PT VESTIBULAR GAIT DEVIATIONS:62949};{AMB PT VESTIBULAR GAIT DEVIATIONS:52068};{AMB PT VESTIBULAR GAIT DEVIATIONS:29466};{AMB PT VESTIBULAR GAIT DEVIATIONS:95992}    Hip Range of Motion:   Left active Left Passive Right active  Right Passive   Flexion *** *** *** ***   Abduction *** *** *** ***   Extension *** *** *** ***   Ext. Rotation *** *** *** ***   Int. Rotation *** *** *** ***       Lower Extremity Strength  Right LE  Left LE    Knee extension: {AMB PT VESTIBULAR STRENGTH:16566} Knee extension: {AMB PT VESTIBULAR STRENGTH:43113}   Knee flexion: {AMB PT VESTIBULAR STRENGTH:05533} Knee flexion: {AMB PT VESTIBULAR STRENGTH:62917}   Hip flexion: {AMB PT VESTIBULAR STRENGTH:27601} Hip flexion: {AMB PT VESTIBULAR STRENGTH:69285}   Hip Internal Rotation:  {AMB PT VESTIBULAR STRENGTH:15902}    Hip Internal Rotation: {AMB PT VESTIBULAR STRENGTH:38814}      Hip External Rotation: {AMB PT VESTIBULAR STRENGTH:51401}    Hip External Rotation: {AMB PT VESTIBULAR STRENGTH:78717}      Hip extension:  {AMB PT VESTIBULAR STRENGTH:96785} Hip extension: {AMB PT VESTIBULAR STRENGTH:07298}   Hip abduction: {AMB PT VESTIBULAR STRENGTH:45358} Hip abduction: {AMB PT VESTIBULAR STRENGTH:29115}   Hip adduction: {AMB PT VESTIBULAR STRENGTH:76415} Hip adduction: {AMB PT VESTIBULAR STRENGTH:08099}   Ankle dorsiflexion: {AMB PT VESTIBULAR  "STRENGTH:19428} Ankle dorsiflexion: {AMB PT VESTIBULAR STRENGTH:30925}   Ankle plantarflexion: {AMB PT VESTIBULAR STRENGTH:69819} Ankle plantarflexion: {AMB PT VESTIBULAR STRENGTH:72913}     Special Tests:  Bridge Test:{Exam:20264::"negative"}  Step down Test: R: {Exam:20264::"negative"} ; L {Exam:20264::"negative"}  GARY: {Exam:20264::"negative"}  FADIR: {Exam:20264::"negative"}  Scour: {Exam:20264::"negative"}  Hip extension movement pattern: {Exam:20264::"negative"}    Flexibility: ***   Ely's test: R = *** degrees ; L = *** degrees   Hamstring length 90/90: R = *** degrees ; L = *** degrees   Sukhjinder's test: R = *** ; L = ***   Francis test: R = *** ; L = ***   Piriformis test: R= ***; L= ***    Joint Mobility: ***    Palpation:    Edema: ***        CMS Impairment/Limitation/Restriction for FOTO *** Survey    Therapist reviewed FOTO scores for Marianne Gottlieb on 6/10/2024.   FOTO documents entered into [employer] - see Media section.    Limitation Score: ***%           TREATMENT   Treatment Time In: ***  Treatment Time Out: ***  Total Treatment time separate from Evaluation: *** minutes    Marianne received therapeutic exercises to develop {AMB PT PROGRESS OBJECTIVE:73044} for *** minutes including:  ***    Marianne received the following manual therapy techniques: {AMB PT PROGRESS MANUAL THERAPY:96225} were applied to the: *** for *** minutes, including:  ***    Marianne participated in neuromuscular re-education activities to improve: {AMB PT PROGRESS NEURO RE-ED:63255} for *** minutes. The following activities were included:  ***    Marianne participated in dynamic functional therapeutic activities to improve functional performance for ***  minutes, including:  ***    Marianne participated in gait training to improve functional mobility and safety for ***  minutes, including:  ***    Marianne received *** hot or cold pack for *** minutes to ***.    Home Exercises and Patient Education Provided    Education " "provided:   - ***    Written Home Exercises Provided: {Blank single:41124::"yes","Patient instructed to cont prior HEP"}.  Exercises were reviewed and Marianne was able to demonstrate them prior to the end of the session.  Marianne demonstrated {Desc; good/fair/poor:63459} understanding of the education provided.     See EMR under {Blank single:40382::"Media","Patient Instructions"} for exercises provided {Blank single:95860::"6/10/2024","prior visit"}.    Assessment   Marianne is a 63 y.o. female referred to outpatient Physical Therapy with a medical diagnosis of ***. Pt presents with ***    Pt prognosis is {REHAB PROGNOSIS OHS:35335}.   Pt will benefit from skilled outpatient Physical Therapy to address the deficits stated above and in the chart below, provide pt/family education, and to maximize pt's level of independence.     Plan of care discussed with patient: {YES:84264}  Pt's spiritual, cultural and educational needs considered and patient is agreeable to the plan of care and goals as stated below:     Anticipated Barriers for therapy: ***    Medical Necessity is demonstrated by the following  History  Co-morbidities and personal factors that may impact the plan of care Co-morbidities:   {Co-morbidities:96657}    Personal Factors:   {Personal Factors:11905}     low   Examination  Body Structures and Functions, activity limitations and participation restrictions that may impact the plan of care Body Regions:   {Body Regions:72333}    Body Systems:    {Body Systems:32542}    Participation Restrictions:   ***    Activity limitations:   Learning and applying knowledge  {Learning and applying knowledge:81684}    General Tasks and Commands  {Gen tasks and commands:76203}    Communication  {Communication:04825}    Mobility  {Mobility:15431}    Self care  {Self Care:99962}    Domestic Life  {Domestic Life:29890}    Interactions/Relationships  {Interactions/Relationships:12246}    Life Areas  {Life " "Areas:05722}    Community and Social Life  {Community/Social Life:21528}         Complexity: low  See FOTO outcome assessment    Clinical Presentation {Clinical Presentation :93515} low   Decision Making/ Complexity Score: low     GOALS: Short Term Goals:  2-3 weeks  1.Report decreased in pain at worse less than <   / =  ***  /10  to increase tolerance for functional activities. On going  2. Pt to improve **** range of motion by 25% to allow for improved functional mobility to allow for improvement in IADLs. On going  3. Increased ***  MMT 1/2  to increase tolerance for ADL and work activities.On going  4. Pt to report a ***  5. Pt to tolerate HEP to improve ROM and independence with ADL's.On going    Long Term Goals:  6-8 weeks  1.Report decreased in pain at worse less than  <   / =  ***  /10  to increase tolerance for functional mobility. On going  2.Pt to improve range of motion by 75% to allow for improved functional mobility to allow for improvement in IADLs. On going  3.Increased *** MMT 1 grade  to increase tolerance for ADL and work activities.On going  4. Pt will scores report ***  on FOTO hip survey  to demonstrate increase in LE function with every day tasks. On going  5. Pt to be Independent with HEP to improve ROM and independence with ADL's. On going    Plan   Plan of care Certification: 6/10/2024 to ***.    Outpatient Physical Therapy {NUMBERS 1-5:01453} times weekly for {0-10:23484::"0"} weeks to include the following interventions: {TX PLAN:98691}. Dry needling    Jennifer Veliz, PT      I CERTIFY THE NEED FOR THESE SERVICES FURNISHED UNDER THIS PLAN OF TREATMENT AND WHILE UNDER MY CARE   Physician's comments:     Physician's Signature: ___________________________________________________     "

## 2024-06-12 PROBLEM — M54.16 LUMBAR RADICULAR PAIN: Status: ACTIVE | Noted: 2024-06-12

## 2024-06-12 NOTE — PLAN OF CARE
[facility]  Physical Therapy Initial Evaluation    Date: 6/10/2024   Name: Marianne Gottlieb  Clinic Number: [id number]    Therapy Diagnosis:   Encounter Diagnoses   Name Primary?    Left leg pain     Lumbar radicular pain Yes     Physician: Zainab Milian MD    Physician Orders: PT Eval and Treat   Medical Diagnosis from Referral: M79.605 (ICD-10-CM) - Left leg pain   Evaluation Date: 6/10/2024  Authorization Period Expiration: 12/31/24  Plan of Care Expiration: 8/19/24  Visit # / Visits authorized: 1/ 1 Progress Note Due: 7/10/24  FOTO: 1/ 1    Precautions: Standard    Time In: 9:00 am  Time Out: 9:45 am  Total Appointment Time (timed & untimed codes): 45 minutes    Subjective   Date of onset: years and then worse in the last couple of months  History of current condition - Marianne reports: that when she lays down she has pain down the left leg and then a burning sensation down the R side. Reports it feels okay while she is walking, but has had some difficulty with sitting and then going to standing. Patient reports that she went to the ER recently and pain pills do not seem to help.     SAMAN: insidious    Pain:  Current 3/10, worst 9/10, best 2/10   Location: left leg and lower back   Description: Aching, Grabbing, Tight, and Shooting  Aggravating Factors: Laying and Night Time  Easing Factors: movement and heat    Prior Therapy: none  Social History:  lives with their family  Occupation: retired  Prior Level of Function: IND  Current level of function: MI with increase in symptoms    Pts goals: decrease pain, improve sleeping, and return to PLOF    Imaging: none     Medical History:   Past Medical History:   Diagnosis Date    ETOH abuse     Hyperlipemia     Hypertension     Unspecified cirrhosis of liver      Surgical History:   Marianne Gottlieb  has a past surgical history that includes Tubal ligation and Breast lumpectomy (Right).    Medications:   Marianne has a current  medication list which includes the following prescription(s): acetaminophen, aspirin, ergocalciferol, hydrochlorothiazide, lidocaine, losartan, pravastatin, quetiapine, and thiamine.    Allergies:   Review of patient's allergies indicates:  No Known Allergies     Objective     Posture Alignment: slouched posture;increased kyphosis;increased lordosis    Sensation: Light touch: increased sensitivity to L left lateral lower leg    DTR: normal    GAIT DEVIATIONS: Marianne displays decreased weight shift;decreased step length;antalgic gait    Hip Range of Motion: (percent)   Left Passive  Right Passive   Flexion 75% * 90%   Abduction 75% 90%   Extension 75% 75%   Ext. Rotation 75% * 90%   Int. Rotation 75% * 90%     Lumbar ROM: (percent)  Flexion: 90% increase in leg symptoms  Extension: 75% increase in back symptoms  R Sbin%  L Sbin%  R rotation: 100%  L rotation: 100%    Lower Extremity Strength  Right LE  Left LE    Knee extension: 4+/5 Knee extension: 4+/5   Knee flexion: 4+/5 Knee flexion: 4+/5   Hip flexion: 4+/5 Hip flexion: 4+/5   Hip Internal Rotation:  4/5    Hip Internal Rotation: 4/5      Hip External Rotation: 4/5    Hip External Rotation: 4/5      Hip extension:  4-/5 Hip extension: 4-/5   Hip abduction: 4/5 Hip abduction: 4/5   Hip adduction: 4/5 Hip adduction: 4/5   Ankle dorsiflexion: 4+/5 Ankle dorsiflexion: 4+/5   Ankle plantarflexion: 4+/5 Ankle plantarflexion: 4+/5     Special Tests:  Bridge Test:negative  GARY: negative  FADIR: negative  Scour: negative  Hip extension movement pattern: positive  Repeated Flexion: positive  Repeated Extension: positive    Flexibility:    Francis test: R = pos ; L = pos   Piriformis test: R= neg; L= pos    Joint Mobility: hypomobile to lumbar spine and B hips    Palpation: TTP to lumbar paraspinals, lumbar P/As (more so on L side), and to L piriformis    CMS Impairment/Limitation/Restriction for FOTO Lower Leg Survey    Therapist reviewed FOTO scores for  Marianne Gottlieb on 6/10/2024.   FOTO documents entered into [employer] - see Media section.    Intake Score: 58    Goal Score: 67       TREATMENT   Treatment Time In: 9:20 am  Treatment Time Out: 9:45 am  Total Treatment time separate from Evaluation: 25 minutes    Marianne received the following manual therapy techniques: Joint mobilizations and Soft tissue Mobilization were applied to the: lumbar/hip for 10 minutes, including:  L lumbar P/As grade 2/3  L piriformis release  L STM along dermatome    Marianne participated in dynamic functional therapeutic activities to improve functional performance for 15  minutes, including:  HEP review  Rehab progression/potential (while on heat)    Home Exercises and Patient Education Provided    Education provided:   - See above    Written Home Exercises Provided: yes.  Exercises were reviewed and Marianne was able to demonstrate them prior to the end of the session.  Marianne demonstrated good  understanding of the education provided.     See EMR under Patient Instructions for exercises provided 6/10/2024.    Assessment   Marianne is a 63 y.o. female referred to outpatient Physical Therapy with a medical diagnosis of M79.605 (ICD-10-CM) - Left leg pain . Pt presents with decreased lumbar and hip ROM, increased pain/adverse symptoms, increased neural tension, decreased strength, impairment in gait, and decreased tolerance to activity. Patient's symptoms are consistent with lumbar radiculopathy and seems to respond better with extension. Due to alcohol use, made sure to screen hips for any possible AVN, but symptoms seem to be more lumbar related with some hip hypomobility and stiffness (not consistent with AVN).    Pt prognosis is Good.   Pt will benefit from skilled outpatient Physical Therapy to address the deficits stated above and in the chart below, provide pt/family education, and to maximize pt's level of independence.     Plan of care discussed with patient: Yes  Pt's  spiritual, cultural and educational needs considered and patient is agreeable to the plan of care and goals as stated below:     Anticipated Barriers for therapy: ETOH abuse    Medical Necessity is demonstrated by the following  History  Co-morbidities and personal factors that may impact the plan of care Co-morbidities:   HTN and ETOH abuse    Personal Factors:   no deficits     low   Examination  Body Structures and Functions, activity limitations and participation restrictions that may impact the plan of care Body Regions:   back  lower extremities    Body Systems:    ROM  strength  gait  transfers  transitions  motor control  motor learning    Participation Restrictions:   ADLs, iADLs, and wanted activity    Activity limitations:   Learning and applying knowledge  no deficits    General Tasks and Commands  no deficits    Communication  no deficits    Mobility  lifting and carrying objects  walking    Self care  no deficits    Domestic Life  shopping  cooking  doing house work (cleaning house, washing dishes, laundry)  assisting others    Interactions/Relationships  no deficits    Life Areas  no deficits    Community and Social Life  community life  recreation and leisure         Complexity: low  See FOTO outcome assessment    Clinical Presentation stable and uncomplicated low   Decision Making/ Complexity Score: low     GOALS: Short Term Goals:  5 weeks  1.Report decreased in pain at worse less than <   / =  4  /10  to increase tolerance for functional activities. On going  2. Pt to improve lumbar and hip range of motion by 75% to allow for improved functional mobility to allow for improvement in IADLs. On going  3. Increased B hip/LE MMT 1/2  to increase tolerance for ADL and work activities.On going  4. Pt to report being able to sleep with 50% improvement. On going.  5. Pt to tolerate HEP to improve ROM and independence with ADL's.On going    Long Term Goals:  10 weeks  1.Report decreased in pain at worse  less than  <   / =  2  /10  to increase tolerance for functional mobility. On going  2.Pt to improve range of motion by 90% to allow for improved functional mobility to allow for improvement in IADLs. On going  3.Increased B hip/LE MMT 1 grade  to increase tolerance for ADL and work activities.On going  4. Pt will scores 67 or greater on FOTO lower leg survey  to demonstrate increase in LE function with every day tasks. On going  5. Pt to be Independent with HEP to improve ROM and independence with ADL's. On going    Plan   Plan of care Certification: 6/10/2024 to 8/19/24.    Outpatient Physical Therapy 1-2 times weekly for 10 weeks to include the following interventions: Electrical Stimulation TENS/IFC/NMES, Gait Training, Manual Therapy, Moist Heat/ Ice, Neuromuscular Re-ed, Self Care, Therapeutic Activities, and Therapeutic Exercise. Amelie Veliz, PT      I CERTIFY THE NEED FOR THESE SERVICES FURNISHED UNDER THIS PLAN OF TREATMENT AND WHILE UNDER MY CARE   Physician's comments:     Physician's Signature: ___________________________________________________

## 2024-06-20 ENCOUNTER — HOSPITAL ENCOUNTER (OUTPATIENT)
Dept: RADIOLOGY | Facility: OTHER | Age: 64
Discharge: HOME | End: 2024-06-20
Attending: STUDENT IN AN ORGANIZED HEALTH CARE EDUCATION/TRAINING PROGRAM
Payer: MEDICARE

## 2024-06-20 DIAGNOSIS — K70.30 ALCOHOLIC CIRRHOSIS OF LIVER WITHOUT ASCITES: ICD-10-CM

## 2024-06-20 DIAGNOSIS — Z86.19 HISTORY OF HEPATITIS C: ICD-10-CM

## 2024-06-20 DIAGNOSIS — F17.210 NICOTINE DEPENDENCE, CIGARETTES, UNCOMPLICATED: ICD-10-CM

## 2024-06-20 DIAGNOSIS — R91.8 ABNORMAL CT LUNG SCREENING: Primary | ICD-10-CM

## 2024-06-20 DIAGNOSIS — Z12.31 SCREENING MAMMOGRAM FOR BREAST CANCER: ICD-10-CM

## 2024-06-20 PROCEDURE — 76705 ECHO EXAM OF ABDOMEN: CPT | Mod: TC

## 2024-06-20 PROCEDURE — 71271 CT THORAX LUNG CANCER SCR C-: CPT | Mod: TC

## 2024-06-20 PROCEDURE — 77067 SCR MAMMO BI INCL CAD: CPT | Mod: TC

## 2024-06-20 PROCEDURE — 71271 CT THORAX LUNG CANCER SCR C-: CPT | Mod: 26,,, | Performed by: RADIOLOGY

## 2024-06-21 ENCOUNTER — TELEPHONE (OUTPATIENT)
Dept: INTERNAL MEDICINE | Facility: CLINIC | Age: 64
End: 2024-06-21
Payer: MEDICARE

## 2024-06-21 NOTE — TELEPHONE ENCOUNTER
----- Message from Dimas aL sent at 6/20/2024  7:12 PM CDT -----  Type:  Patient Returning Call    Who Called:ELODIA VELIZ [[id number]]  Who Left Message for Patient:n/a  Does the patient know what this is regarding?:returning call  Would the patient rather a call back or a response via [facility]? call  Best Call Back Number: [phone]  Additional Information: Patient states she was returning your call.  Please give patient a call back as soon as possible to advise and further assist.

## 2024-06-21 NOTE — TELEPHONE ENCOUNTER
----- Message from Bonny Goncalvse MA sent at 6/21/2024  7:59 AM CDT -----  Name of Who is Calling:ELODIA VELIZ [[id number]]                What is the request in detail: Pt states someone from the office gave her a call on yesterday. Please assist.                Can the clinic reply by [facility]: No                What Number to [facility] Back [facility] not [facility]: [phone]

## 2024-06-21 NOTE — TELEPHONE ENCOUNTER
----- Message from Zainab Milian MD sent at 6/20/2024  6:05 PM CDT -----  Attempted to call patient to discuss results, no answer, left voicemail requesting patient call us back. I have placed a referral to pulmonology for further evaluation, please assist patient with scheduling appointment asap. Please let me know if the appointment is >2 weeks out, thank you!

## 2024-06-21 NOTE — TELEPHONE ENCOUNTER
----- Message from Zainab Milian MD sent at 6/20/2024  5:51 PM CDT -----  Please let patient know her liver/gallbladder ultrasound looks ok, no concerning findings, thank you!

## 2024-06-27 ENCOUNTER — OFFICE VISIT (OUTPATIENT)
Dept: PULMONOLOGY | Facility: CLINIC | Age: 64
End: 2024-06-27
Payer: MEDICARE

## 2024-06-27 VITALS
BODY MASS INDEX: 30.78 KG/M2 | HEIGHT: 65 IN | OXYGEN SATURATION: 97 % | SYSTOLIC BLOOD PRESSURE: 147 MMHG | HEART RATE: 62 BPM | DIASTOLIC BLOOD PRESSURE: 84 MMHG | RESPIRATION RATE: 19 BRPM | WEIGHT: 184.75 LBS

## 2024-06-27 DIAGNOSIS — R91.8 ABNORMAL CT LUNG SCREENING: ICD-10-CM

## 2024-06-27 DIAGNOSIS — T78.40XA ALLERGY, UNSPECIFIED, INITIAL ENCOUNTER: ICD-10-CM

## 2024-06-27 DIAGNOSIS — Z77.120 MOLD EXPOSURE: ICD-10-CM

## 2024-06-27 DIAGNOSIS — J84.9 INTERSTITIAL PULMONARY DISEASE, UNSPECIFIED: Primary | ICD-10-CM

## 2024-06-27 DIAGNOSIS — T78.40XS ALLERGY, UNSPECIFIED, SEQUELA: ICD-10-CM

## 2024-06-27 PROCEDURE — 3061F NEG MICROALBUMINURIA REV: CPT | Mod: CPTII,S$GLB,,

## 2024-06-27 PROCEDURE — 99204 OFFICE O/P NEW MOD 45 MIN: CPT | Mod: S$GLB,,,

## 2024-06-27 PROCEDURE — 3008F BODY MASS INDEX DOCD: CPT | Mod: CPTII,S$GLB,,

## 2024-06-27 PROCEDURE — 99999 PR PBB SHADOW E&M-EST. PATIENT-LVL IV: CPT | Mod: PBBFAC,,,

## 2024-06-27 PROCEDURE — 3077F SYST BP >= 140 MM HG: CPT | Mod: CPTII,S$GLB,,

## 2024-06-27 PROCEDURE — 3288F FALL RISK ASSESSMENT DOCD: CPT | Mod: CPTII,S$GLB,,

## 2024-06-27 PROCEDURE — 1159F MED LIST DOCD IN RCRD: CPT | Mod: CPTII,S$GLB,,

## 2024-06-27 PROCEDURE — 3044F HG A1C LEVEL LT 7.0%: CPT | Mod: CPTII,S$GLB,,

## 2024-06-27 PROCEDURE — 3066F NEPHROPATHY DOC TX: CPT | Mod: CPTII,S$GLB,,

## 2024-06-27 PROCEDURE — 3079F DIAST BP 80-89 MM HG: CPT | Mod: CPTII,S$GLB,,

## 2024-06-27 PROCEDURE — 1101F PT FALLS ASSESS-DOCD LE1/YR: CPT | Mod: CPTII,S$GLB,,

## 2024-06-27 PROCEDURE — 1126F AMNT PAIN NOTED NONE PRSNT: CPT | Mod: CPTII,S$GLB,,

## 2024-06-27 PROCEDURE — 4010F ACE/ARB THERAPY RXD/TAKEN: CPT | Mod: CPTII,S$GLB,,

## 2024-06-27 NOTE — PROGRESS NOTES
"History and Physical Note  [facility]    Subjective:     Reason for visit: Abnormal CT scan    Patient ID:  Marianne Gottlieb is a 63 y.o. female    Interval History:  06/27/2024:   Patient here in clinic with daughter Evelyn over the phone. Daughter lives in [place] is unable to be here in person. She is a pediatric nurse in [place] reports that her mother has mild intellectual disability and would like to be involved with her care. The patient is agreeable to this and uses her personal phone to call daughter.     Patient presents as a referral from her PCP for evaluation of abnormal CT scan. LDCT screening on 06/20/2024 with ground glass opacities in both lungs in perihilar distribution.     She has a history of HTN, HLD, and CVA no defecits.  No history of chemotherapy or radiation.   Endorses joint pain, burning sensation in her legs. She has noticed dry mouth. No amiodarone use/no macrobid.     No shortness of breath. No coughing. She reports audible wheezing at times but no shortness of breath associated with it.      Additional Pulmonary History:  Occupational: None;  Environmental Exposures: No asbestos exposure, mold in her bathroom (14 years same home)  Exposure to Animals/Pets: None;   Travel History: None  History of exposures to TB: No  services; no overseas deployment  Family History of Lung Cancer: Mother (smoker)  Childhood Illnesses:  None;   Tobacco/Smoking: No inhalation of drugs; cigarettes about 25ttl pack-year history  Social History     Tobacco Use   Smoking Status Some Days    Current packs/day: 0.50    Average packs/day: 0.5 packs/day for 49.5 years (24.7 ttl pk-yrs)    Types: Cigarettes    Start date: 1975   Smokeless Tobacco Never       Objective:     Vitals:    06/27/24 0924   BP: (!) 147/84   BP Location: Left arm   Patient Position: Sitting   BP Method: Medium (Manual)   Pulse: 62   Resp: 19   SpO2: 97%   Weight: 83.8 kg (184 lb 11.9 oz)   Height: 5' 5" (1.651 m)    "      Physical Exam  Constitutional:       Appearance: Normal appearance. She is well-developed.   HENT:      Head: Normocephalic.   Cardiovascular:      Rate and Rhythm: Normal rate.      Pulses: Normal pulses.      Heart sounds: Normal heart sounds, S1 normal and S2 normal.   Pulmonary:      Effort: Pulmonary effort is normal.      Breath sounds: Normal breath sounds. No decreased breath sounds or wheezing.   Musculoskeletal:      Right lower leg: No edema.      Left lower leg: No edema.   Skin:     General: Skin is warm.      Capillary Refill: Capillary refill takes less than 2 seconds.      Findings: No rash.      Nails: There is no clubbing.   Neurological:      Mental Status: She is alert and oriented to person, place, and time. Mental status is at baseline.   Psychiatric:         Attention and Perception: Attention normal.         Mood and Affect: Mood and affect normal.         Speech: Speech normal.         Behavior: Behavior is cooperative.          Personal Diagnostic Review and Interpretation  LDCT Chest 06/20/2024:   Ground-glass opacities in both lungs with a perihilar and bibasilar predominance.  Findings are nonspecific and could represent an inflammatory process.  Interstitial lung disease, including smoking related lung disease, could have a similar appearance.  Correlation with bronchoscopy will be required for definitive diagnosis.       Pertinent Studies Reviewed & Interpreted:     Pulmonary Function Tests:   pending    6 Minute Walk Tests:   pending      Other Pertinent Laboratories:  Lab Results   Component Value Date    WBC 5.29 05/20/2024    RBC 5.68 (H) 05/20/2024    HGB 15.3 05/20/2024    MCV 86 05/20/2024    MCH 26.9 (L) 05/20/2024    MCHC 31.4 (L) 05/20/2024    RDW 13.4 05/20/2024     05/20/2024    MPV 10.7 05/20/2024    GRAN 2.4 05/20/2024    GRAN 44.8 05/20/2024    LYMPH 2.1 05/20/2024    LYMPH 38.9 05/20/2024    MONO 0.4 05/20/2024    MONO 7.4 05/20/2024    EOS 0.4 05/20/2024     ERICKSONO 0.03 05/20/2024       Assessment & Plan:       Plan:  Clinical impression is resonably certain and repeated evaluation prn +/- follow up will be needed as below.      1. Interstitial pulmonary disease, unspecified  -     CT Chest Without Contrast; Future; Expected date: 09/27/2024  -     ERIK; Future; Expected date: 06/27/2024    2. Abnormal CT lung screening  -     Ambulatory referral/consult to Pulmonology  -     Anti-DNA Ab, Double-Stranded; Future; Expected date: 06/27/2024  -     Rheumatoid Factor; Future; Expected date: 06/27/2024  -     C-Reactive Protein; Future; Expected date: 06/27/2024  -     IgE; Future; Expected date: 06/27/2024  -     Cyclic Citrullinated Peptide Antibody, IgG; Future; Expected date: 06/27/2024  -     C3 Complement; Future; Expected date: 06/27/2024  -     C4 Complement; Future; Expected date: 06/27/2024  -     Complete PFT with bronchodilator; Future  -     Stress test, pulmonary; Future  -     Aspergillus Antigen; Future; Expected date: 06/27/2024  -     Allergen Aspergillus Fumagatus IgE; Future; Expected date: 06/27/2024  -     CT Chest Without Contrast; Future; Expected date: 09/27/2024  -     ERIK; Future; Expected date: 06/27/2024    3. Mold exposure  -     Anti-DNA Ab, Double-Stranded; Future; Expected date: 06/27/2024  -     Rheumatoid Factor; Future; Expected date: 06/27/2024  -     C-Reactive Protein; Future; Expected date: 06/27/2024  -     IgE; Future; Expected date: 06/27/2024  -     Cyclic Citrullinated Peptide Antibody, IgG; Future; Expected date: 06/27/2024  -     C3 Complement; Future; Expected date: 06/27/2024  -     C4 Complement; Future; Expected date: 06/27/2024  -     Complete PFT with bronchodilator; Future  -     Stress test, pulmonary; Future  -     Aspergillus Antigen; Future; Expected date: 06/27/2024  -     Allergen Aspergillus Fumagatus IgE; Future; Expected date: 06/27/2024  -     CT Chest Without Contrast; Future; Expected date: 09/27/2024  -     ERIK;  Future; Expected date: 06/27/2024    4. Allergy, unspecified, sequela  -     Allergen Aspergillus Fumagatus IgE; Future; Expected date: 06/27/2024  -     ERIK; Future; Expected date: 06/27/2024    5. Allergy, unspecified, initial encounter  -     IgE; Future; Expected date: 06/27/2024  -     ERIK; Future; Expected date: 06/27/2024      The patient, who presents with her daughter Evelyn (a pediatric nurse in [place]) involved via phone, is here for evaluation following an abnormal low-dose CT scan on 06/20/2024, revealing ground-glass opacities in both lungs in a perihilar distribution. She denies any history of chemotherapy, radiation, amiodarone, or macrobid use.  The patient reports joint pain, a burning sensation in her legs, and a dry mouth, but no shortness of breath, cough, or significant occupational or environmental exposures. She mentions audible wheezing at times without associated shortness of breath. Notably, she has a 25-pack-year smoking history and a family history of lung cancer (mother, a smoker). There are no significant childhood illnesses, occupational or environmental exposures, or travel history, and she has not been exposed to tuberculosis (TB).  Given the findings of ground-glass opacities on the LDCT scan, our primary differential diagnosis includes interstitial lung disease (ILD). We are also considering the possibility of chronic obstructive pulmonary disease (COPD), given her smoking history and reported wheezing.  Bloodwork - To rule out any underlying conditions or contributing factors.  Follow-up CT Chest in 3 Months - To monitor the progression or resolution of ground-glass opacities and rule out ILD.  Pulmonary Function Test (PFT) - To evaluate for obstructive or restrictive lung disease.  6-Minute Walk Test - To assess the patients functional status and exercise tolerance.  By implementing this plan, we aim to rule out ILD and assess whether the patient has COPD, ensuring a  comprehensive evaluation of her pulmonary health.    Discussed with patient above for education the following:    -Obtain bloodwork   -Obtain CT chest in 3 months to rule out ILD  -Obtain PFT to evaluate for an obstructive or restrictive lung disease  -6 minute walk test to to assess functional status     RETURN TO CLINIC IN 3 MONTHS     Total professional time spent for the encounter: 52 minutes  Time was spent preparing to see the patient, reviewing results of prior testing, obtaining and/or reviewing separately obtained history, performing a medically appropriate examination and interview, counseling and educating the patient/family, ordering medications/tests/procedures, referring and communicating with other health care professionals, documenting clinical information in the electronic health record, and independently interpreting results.    Hue Wiggins, DNP

## 2024-07-03 ENCOUNTER — TELEPHONE (OUTPATIENT)
Dept: INTERNAL MEDICINE | Facility: CLINIC | Age: 64
End: 2024-07-03
Payer: MEDICARE

## 2024-07-03 NOTE — TELEPHONE ENCOUNTER
----- Message from Lauren Cagle LPN sent at 7/3/2024 10:05 AM CDT -----  Regarding: FW: Return Call  Did you contact this patient.  I do not see any notes.  ----- Message -----  From: Bonny Goncalves MA  Sent: 7/3/2024   9:56 AM CDT  To: Ma[facility] FISHER Staff  Subject: Return Call                                      Message Type: Return Call           Who Called:ELODIA VELIZ [[id number]]              Who Left Message for Patient:Dori Funes LPN                Does the patient know what this is regarding?  no              Best Call Back Number: [phone]               Additional Information: Patient is returning a call.  Please assist.

## 2024-07-03 NOTE — TELEPHONE ENCOUNTER
----- Message from Zainab Milian MD sent at 7/2/2024  6:29 PM CDT -----  Please let patient know her mammogram was normal, thank you!

## 2024-07-03 NOTE — TELEPHONE ENCOUNTER
----- Message from Bonny Goncalves MA sent at 7/3/2024  9:55 AM CDT -----  Regarding: Return Call  Message Type: Return Call           Who Called:ELODIA VELIZ [[id number]]              Who Left Message for Patient:Dori Funes LPN                Does the patient know what this is regarding?  no              Best Call Back Number: [phone]               Additional Information: Patient is returning a call.  Please assist.

## 2024-07-03 NOTE — TELEPHONE ENCOUNTER
LM for the patient to call the office.     ----- Message from Zainab Milian MD sent at 7/2/2024  6:29 PM CDT -----  Please let patient know her mammogram was normal, thank you!

## 2024-07-16 ENCOUNTER — HOSPITAL ENCOUNTER (OUTPATIENT)
Dept: PULMONOLOGY | Facility: OTHER | Age: 64
Discharge: HOME | End: 2024-07-16
Attending: STUDENT IN AN ORGANIZED HEALTH CARE EDUCATION/TRAINING PROGRAM
Payer: MEDICARE

## 2024-07-16 VITALS — HEART RATE: 60 BPM | RESPIRATION RATE: 20 BRPM

## 2024-07-16 VITALS — BODY MASS INDEX: 34.74 KG/M2 | HEIGHT: 61 IN | WEIGHT: 184 LBS

## 2024-07-16 DIAGNOSIS — R91.8 ABNORMAL CT LUNG SCREENING: ICD-10-CM

## 2024-07-16 DIAGNOSIS — Z77.120 MOLD EXPOSURE: ICD-10-CM

## 2024-07-16 PROCEDURE — 94727 GAS DIL/WSHOT DETER LNG VOL: CPT

## 2024-07-16 PROCEDURE — 94618 PULMONARY STRESS TESTING: CPT

## 2024-07-16 PROCEDURE — 94729 DIFFUSING CAPACITY: CPT

## 2024-07-16 PROCEDURE — 25000242 PHARM REV CODE 250 ALT 637 W/ HCPCS: Performed by: STUDENT IN AN ORGANIZED HEALTH CARE EDUCATION/TRAINING PROGRAM

## 2024-07-16 PROCEDURE — 94060 EVALUATION OF WHEEZING: CPT

## 2024-07-16 RX ORDER — ALBUTEROL SULFATE 2.5 MG/.5ML
2.5 SOLUTION RESPIRATORY (INHALATION) ONCE
Status: COMPLETED | OUTPATIENT
Start: 2024-07-16 | End: 2024-07-16

## 2024-07-16 RX ADMIN — ALBUTEROL SULFATE 2.5 MG: 2.5 SOLUTION RESPIRATORY (INHALATION) at 10:07

## 2024-07-16 NOTE — PROGRESS NOTES
"   07/16/24 1151   6MW Test   Ordering Provider Dr. Zainab Grossman        07/16/24 1151   6MW Test   Ordering Provider Dr. Zainab Grossman        07/16/24 1151   6MW Test   Ordering Provider Dr. Zainab Grossman     [facility]  Six Minute Walk     SUMMARY     Ordering Provider: Dr. Zainab Grossman      Performing nurse/tech/RT: Valentina Villagran CRT  Diagnosis:  (Abnormal CT Lung Screening)  Height: 5' 0.5" (153.7 cm)  Weight: 83.5 kg (184 lb)  BMI (Calculated): 35.3   Patient Race:             Phase Oxygen Assessment Supplemental O2 Heart   Rate Blood Pressure Aby Dyspnea Scale Rating   Resting 96 % Room Air 56 bpm (!) 146/95 (Patient did not take her B/P medicine this morning.) 1   Exercise        Minute        1           2           3           4           5           6  95 % Room Air 57 bpm (!) 141/91 2   Recovery        Minute        1           2           3           4 96 % Room Air 54 bpm (!) 147/91 1     Six Minute Walk Summary  6MWT Status: completed without stopping  Patient Reported: No complaints     Interpretation:                                                     Predicted Distance Meters (Calculated): 424.48 meters                        "

## 2024-07-17 LAB
DLCO ADJ PRE: 14.3 ML/(MIN*MMHG)
DLCO SINGLE BREATH LLN: 16.63
DLCO SINGLE BREATH PRE REF: 67.4 %
DLCO SINGLE BREATH REF: 22.37
DLCOC SBVA LLN: 3.01
DLCOC SBVA PRE REF: 128.3 %
DLCOC SBVA REF: 4.44
DLCOC SINGLE BREATH LLN: 16.63
DLCOC SINGLE BREATH PRE REF: 63.9 %
DLCOC SINGLE BREATH REF: 22.37
DLCOCSBVAULN: 5.88
DLCOCSINGLEBREATHULN: 28.1
DLCOCSINGLEBREATHZSCORE: -2.31
DLCOSINGLEBREATHULN: 28.1
DLCOSINGLEBREATHZSCORE: -2.09
DLCOVA LLN: 3.01
DLCOVA PRE REF: 135.2 %
DLCOVA PRE: 6.01 ML/(MIN*MMHG*L)
DLCOVA REF: 4.44
DLCOVAULN: 5.88
DLVAADJ PRE: 5.7 ML/(MIN*MMHG*L)
ERVN2 LLN: -16449.27
ERVN2 PRE REF: 44.5 %
ERVN2 PRE: 0.33 L
ERVN2 REF: 0.73
ERVN2ULN: NORMAL
FEF 25 75 LLN: 1.36
FEF 25 75 PRE REF: 54.6 %
FEF 25 75 REF: 2.76
FET100 CHG: 3.3 %
FEV05 LLN: 0.94
FEV05 REF: 1.8
FEV1 CHG: 7.2 %
FEV1 FVC LLN: 67
FEV1 FVC PRE REF: 101.7 %
FEV1 FVC REF: 79
FEV1 LLN: 1.46
FEV1 PRE REF: 70.9 %
FEV1 REF: 2.05
FEV1FVCZSCORE: 0.19
FEV1ZSCORE: -1.66
FRCN2 LLN: 1.92
FRCN2 PRE REF: 59.8 %
FRCN2 REF: 2.74
FRCN2ULN: 3.56
FVC CHG: 5.9 %
FVC LLN: 1.88
FVC PRE REF: 69.4 %
FVC REF: 2.61
FVCZSCORE: -1.81
ICN2REF: 2.07
IVC PRE: 1.16 L
IVC SINGLE BREATH LLN: 1.88
IVC SINGLE BREATH PRE REF: 44.4 %
IVC SINGLE BREATH REF: 2.61
IVCSINGLEBREATHULN: 3.37
LLN IC N2: -16447.93
PEF LLN: 3.3
PEF PRE REF: 81.4 %
PEF REF: 5.34
PHYSICIAN COMMENT: NORMAL
POST FEF 25 75: 1.67 L/S
POST FET 100: 7.94 SEC
POST FEV1 FVC: 81.31 %
POST FEV1: 1.56 L
POST FEV5: 1.3 L
POST FVC: 1.92 L
POST PEF: 5.29 L/S
PRE DLCO: 15.07 ML/(MIN*MMHG)
PRE FEF 25 75: 1.51 L/S
PRE FET 100: 7.68 SEC
PRE FEV05 REF: 68.3 %
PRE FEV1 FVC: 80.31 %
PRE FEV1: 1.45 L
PRE FEV5: 1.23 L
PRE FRC N2: 1.64 L
PRE FVC: 1.81 L
PRE IC N2: 1.12 L
PRE PEF: 4.35 L/S
PRE REF IC N2: 54.2 %
RVN2 LLN: 1.43
RVN2 PRE REF: 33.7 %
RVN2 PRE: 0.68 L
RVN2 REF: 2.01
RVN2TLCN2 LLN: 31.47
RVN2TLCN2 PRE REF: 59.7 %
RVN2TLCN2 PRE: 24.51 %
RVN2TLCN2 REF: 41.06
RVN2TLCN2ULN: 50.65
RVN2ULN: 2.58
TLCN2 LLN: 4.05
TLCN2 PRE REF: 54.9 %
TLCN2 PRE: 2.76 L
TLCN2 REF: 5.03
TLCN2ULN: 6.02
TLCN2ZSCORE: -3.79
ULN IC N2: NORMAL
VA PRE: 2.51 L
VA SINGLE BREATH LLN: 4.88
VA SINGLE BREATH PRE REF: 51.4 %
VA SINGLE BREATH REF: 4.88
VASINGLEBREATHULN: 4.88
VCMAXN2 LLN: 1.88
VCMAXN2 PRE REF: 80 %
VCMAXN2 PRE: 2.08 L
VCMAXN2 REF: 2.61
VCMAXN2ULN: 3.37

## 2024-07-17 PROCEDURE — 94727 GAS DIL/WSHOT DETER LNG VOL: CPT | Mod: 26,,, | Performed by: INTERNAL MEDICINE

## 2024-07-17 PROCEDURE — 94060 EVALUATION OF WHEEZING: CPT | Mod: 26,,, | Performed by: INTERNAL MEDICINE

## 2024-07-17 PROCEDURE — 94729 DIFFUSING CAPACITY: CPT | Mod: 26,,, | Performed by: INTERNAL MEDICINE

## 2024-07-23 ENCOUNTER — TELEPHONE (OUTPATIENT)
Dept: HEPATOLOGY | Facility: CLINIC | Age: 64
End: 2024-07-23
Payer: MEDICARE

## 2024-07-23 DIAGNOSIS — E78.2 MIXED HYPERLIPIDEMIA: ICD-10-CM

## 2024-07-23 RX ORDER — PRAVASTATIN SODIUM 40 MG/1
40 TABLET ORAL DAILY
Qty: 90 TABLET | Refills: 3 | Status: SHIPPED | OUTPATIENT
Start: 2024-07-23

## 2024-07-23 NOTE — TELEPHONE ENCOUNTER
The pt stated that she took her medicine for Hep C and no longer has it. Canceled appt with Dr. Schneider per the pt's request.

## 2024-07-23 NOTE — TELEPHONE ENCOUNTER
----- Message from Ahmet Bob sent at 7/23/2024  1:11 PM CDT -----  Regarding: Refill Request    Who Called: Patient        New Prescription or Refill : Refill    RX Name and Strength:   pravastatin (PRAVACHOL) 40 MG tablet      RX Name and Strength:         RX Name and Strength:      30 day or 90 day RX:     Preferred Pharmacy:[facility] #[id number] [address] AT [facility] [address]        Would the patient rather a call back or a response via [facility]?    Best Call Back Number:   [phone]    Additional Information:Patient stated she has no more medication

## 2024-07-23 NOTE — TELEPHONE ENCOUNTER
No care due was identified.  [facility] Embedded Care Due Messages. Reference number: [id number].   7/23/2024 1:22:12 PM CDT

## 2024-08-01 NOTE — ED NOTES
Patient presents to the ED speaking loudly to registration staff and security. Patient with no verbal complaint at this time, but hx of alcohol abuse.   
Pt discharged from [facility].   
Followed protocol

## 2024-08-20 ENCOUNTER — OFFICE VISIT (OUTPATIENT)
Dept: INTERNAL MEDICINE | Facility: CLINIC | Age: 64
End: 2024-08-20
Payer: MEDICARE

## 2024-08-20 VITALS
SYSTOLIC BLOOD PRESSURE: 140 MMHG | HEART RATE: 64 BPM | DIASTOLIC BLOOD PRESSURE: 90 MMHG | WEIGHT: 181.19 LBS | OXYGEN SATURATION: 98 % | BODY MASS INDEX: 34.81 KG/M2

## 2024-08-20 DIAGNOSIS — R73.03 PREDIABETES: ICD-10-CM

## 2024-08-20 DIAGNOSIS — Z00.00 HEALTH MAINTENANCE EXAMINATION: ICD-10-CM

## 2024-08-20 DIAGNOSIS — I10 PRIMARY HYPERTENSION: ICD-10-CM

## 2024-08-20 DIAGNOSIS — Z12.4 SCREENING FOR CERVICAL CANCER: ICD-10-CM

## 2024-08-20 DIAGNOSIS — R91.8 ABNORMAL CT LUNG SCREENING: Primary | ICD-10-CM

## 2024-08-20 DIAGNOSIS — E78.2 MIXED HYPERLIPIDEMIA: ICD-10-CM

## 2024-08-20 DIAGNOSIS — Z86.19 HISTORY OF HEPATITIS C: ICD-10-CM

## 2024-08-20 DIAGNOSIS — Z23 NEED FOR VACCINATION: ICD-10-CM

## 2024-08-20 DIAGNOSIS — F10.20 ALCOHOL USE DISORDER, SEVERE, DEPENDENCE: ICD-10-CM

## 2024-08-20 DIAGNOSIS — F33.42 RECURRENT MAJOR DEPRESSIVE DISORDER, IN FULL REMISSION: ICD-10-CM

## 2024-08-20 PROCEDURE — G2211 COMPLEX E/M VISIT ADD ON: HCPCS | Mod: S$GLB,,, | Performed by: STUDENT IN AN ORGANIZED HEALTH CARE EDUCATION/TRAINING PROGRAM

## 2024-08-20 PROCEDURE — 3288F FALL RISK ASSESSMENT DOCD: CPT | Mod: CPTII,S$GLB,, | Performed by: STUDENT IN AN ORGANIZED HEALTH CARE EDUCATION/TRAINING PROGRAM

## 2024-08-20 PROCEDURE — 1126F AMNT PAIN NOTED NONE PRSNT: CPT | Mod: CPTII,S$GLB,, | Performed by: STUDENT IN AN ORGANIZED HEALTH CARE EDUCATION/TRAINING PROGRAM

## 2024-08-20 PROCEDURE — 1159F MED LIST DOCD IN RCRD: CPT | Mod: CPTII,S$GLB,, | Performed by: STUDENT IN AN ORGANIZED HEALTH CARE EDUCATION/TRAINING PROGRAM

## 2024-08-20 PROCEDURE — 3077F SYST BP >= 140 MM HG: CPT | Mod: CPTII,S$GLB,, | Performed by: STUDENT IN AN ORGANIZED HEALTH CARE EDUCATION/TRAINING PROGRAM

## 2024-08-20 PROCEDURE — 4010F ACE/ARB THERAPY RXD/TAKEN: CPT | Mod: CPTII,S$GLB,, | Performed by: STUDENT IN AN ORGANIZED HEALTH CARE EDUCATION/TRAINING PROGRAM

## 2024-08-20 PROCEDURE — 99214 OFFICE O/P EST MOD 30 MIN: CPT | Mod: S$GLB,,, | Performed by: STUDENT IN AN ORGANIZED HEALTH CARE EDUCATION/TRAINING PROGRAM

## 2024-08-20 PROCEDURE — 3008F BODY MASS INDEX DOCD: CPT | Mod: CPTII,S$GLB,, | Performed by: STUDENT IN AN ORGANIZED HEALTH CARE EDUCATION/TRAINING PROGRAM

## 2024-08-20 PROCEDURE — 3044F HG A1C LEVEL LT 7.0%: CPT | Mod: CPTII,S$GLB,, | Performed by: STUDENT IN AN ORGANIZED HEALTH CARE EDUCATION/TRAINING PROGRAM

## 2024-08-20 PROCEDURE — 99999 PR PBB SHADOW E&M-EST. PATIENT-LVL III: CPT | Mod: PBBFAC,,, | Performed by: STUDENT IN AN ORGANIZED HEALTH CARE EDUCATION/TRAINING PROGRAM

## 2024-08-20 PROCEDURE — 3066F NEPHROPATHY DOC TX: CPT | Mod: CPTII,S$GLB,, | Performed by: STUDENT IN AN ORGANIZED HEALTH CARE EDUCATION/TRAINING PROGRAM

## 2024-08-20 PROCEDURE — 3080F DIAST BP >= 90 MM HG: CPT | Mod: CPTII,S$GLB,, | Performed by: STUDENT IN AN ORGANIZED HEALTH CARE EDUCATION/TRAINING PROGRAM

## 2024-08-20 PROCEDURE — 3061F NEG MICROALBUMINURIA REV: CPT | Mod: CPTII,S$GLB,, | Performed by: STUDENT IN AN ORGANIZED HEALTH CARE EDUCATION/TRAINING PROGRAM

## 2024-08-20 PROCEDURE — 1101F PT FALLS ASSESS-DOCD LE1/YR: CPT | Mod: CPTII,S$GLB,, | Performed by: STUDENT IN AN ORGANIZED HEALTH CARE EDUCATION/TRAINING PROGRAM

## 2024-08-20 RX ORDER — PRAVASTATIN SODIUM 80 MG/1
80 TABLET ORAL DAILY
Qty: 90 TABLET | Refills: 3 | Status: SHIPPED | OUTPATIENT
Start: 2024-08-20

## 2024-08-20 RX ORDER — ACETAMINOPHEN 500 MG
TABLET ORAL
Qty: 1 EACH | Refills: 0 | Status: SHIPPED | OUTPATIENT
Start: 2024-08-20

## 2024-08-20 RX ORDER — VALSARTAN 320 MG/1
320 TABLET ORAL DAILY
Qty: 90 TABLET | Refills: 3 | Status: SHIPPED | OUTPATIENT
Start: 2024-08-20 | End: 2025-08-20

## 2024-08-20 NOTE — PROGRESS NOTES
"Subjective:       Patient ID: Marianne Gottlieb is a 63 y.o. female.    Chief Complaint: Abnormal CT lung screening [R91.8]    Patient is established with me, here today for the following:     Health maintenance -   Colonoscopy performed JULY2020, with Dr. Luz Maria Bradley at [facility]. Recommended repeat in 5 years.  Denies family history of colorectal cancer.   Mammogram BI-RADS 1 in JUNE2024.   History of prior abnormal mammogram.  Underwent lumpectomy right breast.  Denies family history of breast cancers.  Denies family history of ovarian cancers.  Last pap performed FEB2023.   Denies history of prior abnormal pap smears.  DEXA completed JUNE2024.  Showed normal bone density.   Denies family history of osteoporosis.  Denies significant family history of cardiac disease.  UTD on COVID primary/booster, Tdap, PPSV23 vaccinations.  Due for COVID, shingles, RSV, PCV20 vaccinations.  Started smoking at age 12, at most 1 PPD. Smoking about 0.5 PPD currently.  Denies drug use.  Low dose lung CT LUNG-RADS 1 in JUNE2024.  Completed HIV screening.                  LDLCT JUNE2024 showed "Ground-glass opacities in both lungs with a perihilar and bibasilar predominance.  Findings are nonspecific and could represent an inflammatory process.  Interstitial lung disease, including smoking related lung disease, could have a similar appearance.  Correlation with bronchoscopy will be required for definitive diagnosis"  Followed with NP Rosalie for pulmonology  Concern for possible ILD vs COPD  Completed lab testing   Plans for repeat CT in SEP2024  PFT's completed JULY2024  Plans for 6MWT  Has follow up with pulmonologist scheduled for next month     HTN -   Currently prescribed HCTZ and losartan   Patient endorses taking medication as directed.  Denies side effects or concerns while taking medication.  Patient not currently checking BP at home.   Lab Results       Component                Value               Date                     "   MICALBCREAT              4.7                 05/20/2024            BP Readings from Last 5 Encounters:  06/27/24 : (!) 147/84  06/07/24 : (!) 145/95  05/20/24 : 130/80  04/16/24 : (!) 152/85  04/09/24 : (!) 175/123     HLD -   Endorses taking pravastatin as directed  Denies side effects or concerns while taking medication  Lab Results       Component                Value               Date                       CHOL                     180                 02/02/2024            Lab Results       Component                Value               Date                       TRIG                     83                  02/02/2024            Lab Results       Component                Value               Date                       LDLCALC                  100.4               02/02/2024            Lab Results       Component                Value               Date                       HDL                      63                  02/02/2024              Prediabetes -   Endorses could use dietary improvements.  Eats mostly at home.  UTD on diabetes screening.  Lab Results       Component                Value               Date                       HGBA1C                   6.1 (H)             05/20/2024                 HGBA1C                   6.1 (H)             02/02/2024                 HGBA1C                   5.9 (H)             11/16/2022                     History of hepatitis C -   Endorses treated for hepatitis C years ago with [facility]  Following with Dr. Schneider routinely for hepatology.  Has follow up scheduled in OCT2024  Lab Results       Component                Value               Date                       ALT                      21                  05/20/2024                 AST                      18                  05/20/2024                 ALKPHOS                  50 (L)              05/20/2024            Lab Results       Component                Value               Date                       FERRITIN                  277                 01/18/2023            Hepatitis A Antibody IgG       Date                     Value               Ref Range           Status                01/18/2023               Reactive                                Final              Comment:    IgG anti-HAV detected. The presence of IgG anti-HAV  implies past infection (recent or distant) or   vaccination against HAV. Detectable levels above  the assay cutoff suggest immunity to HAV infection.    ----------    Hepatitis B Surface Ag       Date                     Value               Ref Range           Status                01/18/2023               Non-reactive        Non-reactive        Final            ----------  Hep B Core Total Ab       Date                     Value               Ref Range           Status                01/18/2023               Non-reactive        Non-reactive        Final            ----------  Hep B S Ab       Date                     Value               Ref Range           Status                01/18/2023               <3.00               mIU/mL              Final                 01/18/2023               Non-reactive                            Final              Comment:    Individual is considered not immune to HBV infection.     Alcohol use disorder -   Currently drinking about a fifth of herb every 1-2 weeks, occasional beer  Declines naltrexone therapy at this time  Declines going to AA again  Not currently interested in alcohol cessation/reduction     Not currently following with psychiatrist, never scheduled after last referral  Endorses does not feel depressed at this time, does not wish to follow with psychiatrist or other mental health provider  Declines therapy or counseling at this time  Denies suicidal ideation recently or currently  Has not been taking medications prescribed after discharge from [facility] in MAY2024  Was admitted for SI after hospitalization      Endorses leg pain resolved, did not  end up using the lidocaine patches                Review of Systems   Constitutional:  Negative for activity change, fatigue and fever.   Respiratory:  Negative for cough and shortness of breath.    Cardiovascular:  Negative for chest pain and palpitations.   Gastrointestinal:  Negative for abdominal pain, constipation, diarrhea, nausea and vomiting.   Neurological:  Negative for syncope and headaches.         Current Outpatient Medications   Medication Instructions    aspirin 81 mg, Oral, Daily    blood pressure monitor (BLOOD PRESSURE KIT) Kit Use to check blood pressure at  home daily.    ergocalciferol (ERGOCALCIFEROL) 50,000 Units, Oral, Every 7 days    hydroCHLOROthiazide (HYDRODIURIL) 25 mg, Oral, Daily    pravastatin (PRAVACHOL) 80 mg, Oral, Daily    thiamine 100 mg, Oral, Daily    valsartan (DIOVAN) 320 mg, Oral, Daily     Objective:      Vitals:    08/20/24 0916   BP: (!) 140/90   Pulse: 64   SpO2: 98%   Weight: 82.2 kg (181 lb 3.5 oz)   PainSc: 0-No pain     Body mass index is 34.81 kg/m².    Physical Exam  Vitals reviewed.   Constitutional:       General: She is not in acute distress.     Appearance: She is not ill-appearing or diaphoretic.   Cardiovascular:      Rate and Rhythm: Normal rate and regular rhythm.      Heart sounds: Normal heart sounds. No murmur heard.     No friction rub. No gallop.   Pulmonary:      Effort: Pulmonary effort is normal. No respiratory distress.      Breath sounds: Normal breath sounds. No stridor. No wheezing, rhonchi or rales.   Skin:     General: Skin is warm and dry.      Comments: Color WNL   Neurological:      Mental Status: She is alert. Mental status is at baseline.   Psychiatric:         Mood and Affect: Mood normal.         Behavior: Behavior normal. Behavior is cooperative.         Assessment:       1. Abnormal CT lung screening    2. Primary hypertension    3. Mixed hyperlipidemia    4. Prediabetes    5. History of hepatitis C    6. Alcohol use disorder, severe,  dependence    7. Recurrent major depressive disorder, in full remission    8. Health maintenance examination    9. Need for vaccination    10. Screening for cervical cancer        Plan:       Abnormal CT lung screening  Continue evaluation and management per pulmonologist.    Primary hypertension  Continue HCTZ  Discontinue losartan  Start valsartan  Check BP at home 3-4 times weekly, keep log for review.   Contact office with home blood pressure logs in 2-3 weeks.   RTC in 6 months for follow up.  -     valsartan (DIOVAN) 320 MG tablet; Take 1 tablet (320 mg total) by mouth once daily.  -     blood pressure monitor (BLOOD PRESSURE KIT) Kit; Use to check blood pressure at  home daily.    Mixed hyperlipidemia  Increase pravastatin to 80 mg  RTC in 6 months for follow up.  -     pravastatin (PRAVACHOL) 80 MG tablet; Take 1 tablet (80 mg total) by mouth once daily.    Prediabetes  RTC in 6 months for follow up.    History of hepatitis C  Continue evaluation and management per hepatologist.    Alcohol use disorder, severe, dependence  Discussed concerns with alcohol use and dangers related to health and safety  Patient not currently interested in alcohol reduction   RTC in 6 months for follow up.    Recurrent major depressive disorder, in full remission  Declines referral to mental health services at this time  Discussed calling 911, seeking emergency treatment for SI if occurs  Advised to contact office if symptoms return  RTC in 6 months for follow up.    Health maintenance examination  Reviewed and discussed age appropriate screenings and immunizations.    Need for vaccination  Declines recommended vaccinations today    Screening for cervical cancer  -     Ambulatory referral/consult to Obstetrics / Gynecology; Future      Zainab Milian MD  8/20/2024

## 2024-08-20 NOTE — PROGRESS NOTES
HTN - Continues to take HCTZ and Losartan   No longer follows hepatology - patient states she was told she does not have cirrhosis  Alcohol - Drinks a bottle of herb and beer. Not interested in cessation  Diet: Fried fish and pork chops and potato salad for lunch and dinner. Plans to get a BP cuff at home.   Leg pain - Improved. No pain currently. Does not use any pain medications.

## 2024-08-21 DIAGNOSIS — I10 PRIMARY HYPERTENSION: ICD-10-CM

## 2024-08-21 NOTE — TELEPHONE ENCOUNTER
----- Message from Bonny Goncalves MA sent at 8/21/2024 10:26 AM CDT -----  Regarding: Refill Request  Who Called:ELODIA VELIZ [[id number]]           New Prescription or Refill : Refill      RX Name and Strength:  hydroCHLOROthiazide (HYDRODIURIL) 25 MG tablet            30 day or 90 day RX: 30           Local or Mail Order : local            Preferred Pharmacy:[facility] #[id number] [place] [address] AT [facility] [address]       Would the patient rather a call back or a response via [facility]? call        Best Call Back Number:  [phone]

## 2024-08-21 NOTE — TELEPHONE ENCOUNTER
No care due was identified.  [facility] Embedded Care Due Messages. Reference number: [id number].   8/21/2024 11:45:48 AM CDT

## 2024-08-22 RX ORDER — HYDROCHLOROTHIAZIDE 25 MG/1
25 TABLET ORAL DAILY
Qty: 90 TABLET | Refills: 3 | Status: SHIPPED | OUTPATIENT
Start: 2024-08-22

## 2024-08-22 NOTE — TELEPHONE ENCOUNTER
Refill Routing Note   Medication(s) are not appropriate for processing by [facility] for the following reason(s):        Required vitals abnormal    [facility] action(s):  Defer             Appointments  past 12m or future 3m with PCP    Date Provider   Last Visit   8/20/2024 Zainab Milian MD   Next Visit   Visit date not found Zainab Milian MD   ED visits in past 90 days: 1        Note composed:11:17 PM 08/21/2024

## 2024-08-25 ENCOUNTER — HOSPITAL ENCOUNTER (EMERGENCY)
Facility: OTHER | Age: 64
Discharge: HOME | End: 2024-08-25
Attending: EMERGENCY MEDICINE
Payer: MEDICARE

## 2024-08-25 VITALS
RESPIRATION RATE: 18 BRPM | HEART RATE: 95 BPM | OXYGEN SATURATION: 98 % | DIASTOLIC BLOOD PRESSURE: 82 MMHG | SYSTOLIC BLOOD PRESSURE: 171 MMHG

## 2024-08-25 DIAGNOSIS — F10.920 ALCOHOLIC INTOXICATION WITHOUT COMPLICATION: ICD-10-CM
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
## 2024-08-25 PROCEDURE — 99285 EMERGENCY DEPT VISIT HI MDM: CPT | Mod: 25

## 2024-08-25 NOTE — ED TRIAGE NOTES
64 yo female reports to ed intoxicated. Pt states she wanted to bring staff fried fish she made. Arrived with food in hand. Pt dropped food in lobby and bended over to get it. Pt then fell onto her shoulder per security who witnessed fall. Pt denies pain. No injuries noted.

## 2024-08-25 NOTE — ED PROVIDER NOTES
Encounter Date: 8/25/2024    SCRIBE #1 NOTE: I, Lupe Prasad, am scribing for, and in the presence of,  Jhon Cali MD. I have scribed the following portions of the note - Other sections scribed: HPI, ROS, PE.       History     Chief Complaint   Patient presents with    Fall     Witnessed fall in the waiting room of ED. Pt appears clinically intoxicated and potentially hit her head. Awake and alert with slurred speech.      Time seen by provider: 4:37 PM    This is a 63 y.o. female who presents with concerns after a fall. ED staff and security found her on the ground.  Per security her fall she was bending over to get her box of food and accidentally fell and hit her shoulder.  Unclear if she actually hit her head. No LOC. Acting herself since fall.  Patient denies any pain.  She states she has no injuries. Patient reports drinking gin today.  Denies any nausea vomiting.  Denies any bleeding or bruising.  Patient feels well.  This is the extent of the patient's complaints at this time.          The history is provided by the patient.     Review of patient's allergies indicates:  No Known Allergies  Past Medical History:   Diagnosis Date    ETOH abuse     Hyperlipemia     Hypertension     Unspecified cirrhosis of liver      Past Surgical History:   Procedure Laterality Date    BREAST LUMPECTOMY Right     benign    TUBAL LIGATION       Family History   Problem Relation Name Age of Onset    Lung cancer Mother       Social History     Tobacco Use    Smoking status: Some Days     Current packs/day: 0.50     Average packs/day: 0.5 packs/day for 49.6 years (24.8 ttl pk-yrs)     Types: Cigarettes     Start date: 1975    Smokeless tobacco: Never   Substance Use Topics    Alcohol use: Yes     Alcohol/week: 42.0 standard drinks of alcohol     Types: 42 Standard drinks or equivalent per week     Comment: heavy drinker    Drug use: No     Review of Systems   Constitutional:  Negative for activity change, diaphoresis and fever.    HENT:  Negative for ear pain, rhinorrhea, sore throat and trouble swallowing.    Eyes:  Negative for pain and visual disturbance.   Respiratory:  Negative for cough, shortness of breath and stridor.    Cardiovascular:  Negative for chest pain.   Gastrointestinal:  Negative for abdominal pain, blood in stool, diarrhea, nausea and vomiting.   Genitourinary:  Negative for dysuria, hematuria, vaginal bleeding and vaginal discharge.   Musculoskeletal:  Negative for gait problem.   Skin:  Negative for rash and wound.   Neurological:  Negative for seizures and headaches.   Psychiatric/Behavioral:  Negative for hallucinations and suicidal ideas.        Physical Exam     Initial Vitals   BP Pulse Resp Temp SpO2   08/25/24 1653 08/25/24 1707 08/25/24 1635 -- 08/25/24 1707   (!) 171/82 95 18  98 %      MAP       --                Physical Exam    Nursing note and vitals reviewed.  Constitutional: She appears well-developed and well-nourished. She is not diaphoretic. No distress.   Smells of alcohol, admits to alcohol use. Pleasant and oriented.    HENT:   Head: Normocephalic and atraumatic. Head is without raccoon's eyes and without Sweet's sign.   Right Ear: External ear normal.   Left Ear: External ear normal.   Nose: Nose normal.   Moist mucous membranes.   Eyes: Conjunctivae and EOM are normal. Pupils are equal, round, and reactive to light. No scleral icterus.   No pallor or icterus.   Neck: Neck supple.   Normal range of motion.  Cardiovascular:  Normal rate, regular rhythm, normal heart sounds and intact distal pulses.     Exam reveals no gallop and no friction rub.       No murmur heard.  Pulmonary/Chest: Breath sounds normal. No stridor. No respiratory distress. She has no wheezes. She has no rhonchi. She has no rales.   Abdominal: Abdomen is soft. Bowel sounds are normal. She exhibits no distension. There is no abdominal tenderness. There is no rebound and no guarding.   Musculoskeletal:         General: Normal  range of motion.      Cervical back: Normal range of motion and neck supple.     Neurological: She is alert and oriented to person, place, and time. She has normal strength. No cranial nerve deficit or sensory deficit. GCS score is 15. GCS eye subscore is 4. GCS verbal subscore is 5. GCS motor subscore is 6.   Cranial nerves II-XII grossly intact. 5/5 BUE and BLE strength. Moving all 4 extremities. No focal neurologic deficit.    Skin: Skin is warm and dry. Capillary refill takes less than 2 seconds. No rash noted.   No external signs of trauma.   Psychiatric: She has a normal mood and affect. Thought content normal.         ED Course   Procedures  Labs Reviewed - No data to display       Imaging Results              CT Head Without Contrast (Final result)  Result time 08/25/24 17:05:43      Final result by Carson Horton MD (08/25/24 17:05:43)                   Impression:      No acute intracranial abnormalities identified.      Electronically signed by: Carson Horton MD  Date:    08/25/2024  Time:    17:05               Narrative:    EXAMINATION:  CT HEAD WITHOUT CONTRAST    CLINICAL HISTORY:  Head trauma, minor (Age >= 65y);    TECHNIQUE:  Low dose axial images were obtained through the head.  Coronal and sagittal reformations were also performed. Contrast was not administered.    COMPARISON:  CT head from August 2018.    FINDINGS:  There is chronic microvascular ischemic disease.  No evidence of acute/recent major vascular distribution cerebral infarction, intraparenchymal hemorrhage, or intra-axial space occupying lesion. The ventricular system is normal in size and configuration with no evidence of hydrocephalus. No effacement of the skull-base cisterns. No abnormal extra-axial fluid collections or blood products. Visualized paranasal sinuses and mastoid air cells are essentially clear.  The calvarium shows no significant abnormality.                                       Medications - No data to  display  Medical Decision Making  My differential diagnosis includes intracranial bleed, skull fracture, alcohol abuse    Patient presented after fall and alcohol intoxication. Pt smelling of alcohol with documented history of alcohol dependence. They arrived afebrile, with stable vital signs. No signs of trauma  but given witnessed fall ordered CTH which was negative. Patient was monitored with serial exams while they cleared their alcohol and back to baseline MS. Repeat exam benign. Patient then demonstrated ability to ambulate safely, tolerated oral intake, and articulated a safe discharge plan. Discharged to self-care, with return precautions for any new or concerning symptoms.          Amount and/or Complexity of Data Reviewed  Radiology: ordered. Decision-making details documented in ED Course.            Scribe Attestation:   Scribe #1: I performed the above scribed service and the documentation accurately describes the services I performed. I attest to the accuracy of the note.    Physician Attestation for Scribe: I, Jhon Cali, reviewed documentation as scribed in my presence, which is both accurate and complete.        ED Course as of 08/25/24 1734   Sun Aug 25, 2024   1709 CT Head Without Contrast [BD]   1709 Impression:     No acute intracranial abnormalities identified.        Electronically signed by:Carson Horton MD  Date:                                            08/25/2024  Time:                                           17:05                 [BD]   1709 CT head individually interpreted by me shows no signs of intracranial bleed.  Concur with the radiology read [BD]      ED Course User Index  [BD] Jhon Cali MD                             Clinical Impression:  Final diagnoses:  [W19.XXXA] Fall, initial encounter (Primary)  [F10.920] Alcoholic intoxication without complication          ED Disposition Condition    Discharge Stable          ED Prescriptions    None       Follow-up  Information       Follow up With Specialties Details Why Contact Info    Zainab Milian MD Internal Medicine Go in 1 day  [address]  [facility] [address]  [phone]      [facility] Dept Emergency Medicine Go to  If symptoms worsen, As needed [address] [address]  [phone]             Jhon Cali MD  08/25/24 [id number]

## 2024-10-03 ENCOUNTER — OFFICE VISIT (OUTPATIENT)
Dept: HEPATOLOGY | Facility: CLINIC | Age: 64
End: 2024-10-03
Payer: MEDICARE

## 2024-10-03 VITALS
DIASTOLIC BLOOD PRESSURE: 88 MMHG | RESPIRATION RATE: 18 BRPM | SYSTOLIC BLOOD PRESSURE: 142 MMHG | OXYGEN SATURATION: 98 % | WEIGHT: 183.75 LBS | BODY MASS INDEX: 36.08 KG/M2 | HEART RATE: 67 BPM | HEIGHT: 60 IN

## 2024-10-03 DIAGNOSIS — K73.9 CHRONIC HEPATITIS, UNSPECIFIED: ICD-10-CM

## 2024-10-03 DIAGNOSIS — Z86.19 HISTORY OF HEPATITIS C: ICD-10-CM

## 2024-10-03 DIAGNOSIS — F43.0 ACUTE STRESS REACTION: ICD-10-CM

## 2024-10-03 DIAGNOSIS — F10.10 ALCOHOL ABUSE, CONTINUOUS: ICD-10-CM

## 2024-10-03 DIAGNOSIS — F10.90 ALCOHOL USE DISORDER: Primary | ICD-10-CM

## 2024-10-03 DIAGNOSIS — R41.82 ALTERED MENTAL STATUS, UNSPECIFIED ALTERED MENTAL STATUS TYPE: ICD-10-CM

## 2024-10-03 DIAGNOSIS — K73.2 CHRONIC ACTIVE HEPATITIS, NOT ELSEWHERE CLASSIFIED: ICD-10-CM

## 2024-10-03 DIAGNOSIS — K70.30 ALCOHOLIC CIRRHOSIS OF LIVER WITHOUT ASCITES: ICD-10-CM

## 2024-10-03 PROCEDURE — 99999 PR PBB SHADOW E&M-EST. PATIENT-LVL V: CPT | Mod: PBBFAC,,, | Performed by: INTERNAL MEDICINE

## 2024-10-03 NOTE — PATIENT INSTRUCTIONS
You do not have hepatitis C  You need a fibroscan, abdo US and labs  Try to stop drinking  Return 3 months

## 2024-10-03 NOTE — PROGRESS NOTES
HEPATOLOGY FOLLOW UP    Referring Physician: Zainab Milian MD   Current Corresponding Physician: Zainab Milian MD     Marianne Gottlieb is here for follow up of History of hepatitis C      HPI  Marianne Gottlieb is a 62 y.o. female who presented 1/18/23 for evaluation of a hx of hepatitis C.     --tx for HCV 2 years ago with [facility]  --stopped drinking alcohol- stopped x 2 years - now drinking again (started drinking since age 12)     Abdo US 11/25/22: Liver: Mildly enlarged measuring 17.5 cm. Mildly coarsened liver parenchymal echotexture.  No focal hepatic lesions. Spleen normal size and no ascites     Labs 11/16/22: ALT 26, AST 19, Tbil 0.4, ALKP 50, plts 211     [place] [facility] [place]     The patient denied any symptoms of decompensated cirrhosis, including no ascites or edema, cognitive problems that would suggest hepatic encephalopathy, or GI bleeding from varices.     Interval History  Impression at time of consultation: history of chronic hepatitis C. Abdo US shows coarsened liver parenchyma but 2020 elastography did not suggest any significant fibrosis. I am recommending an HCV RNA to confirm he is cured of HCV. Will check for immunity to hepatitis A and B and if he is not immune, will recommend vaccination. To clarify stage of disease, recommend fibroscan.     Since Marianne Gottlieb's last visit (saw her only once 1/23):  --unfortunately started drinking heavily again (states +++daily in 2024)  --denies any symptoms of decompensated cirrhosis, including no ascites or edema, cognitive problems that would suggest hepatic encephalopathy, or GI bleeding from varices  --pt reminded me that her 2 brothers dies of complications of alc-induced liver disease  --denies drug abuse at the present time    Labs 5/20/24: ALT 21, AST 18, ALKP 50, Tbil 0.5, HCV VL not detected, plts 209    Abod US limited 6/20/24: no significant abnormality       Outpatient Encounter Medications as of  10/3/2024   Medication Sig Dispense Refill    aspirin 81 MG Chew Take 1 tablet (81 mg total) by mouth once daily. 90 tablet 3    blood pressure monitor (BLOOD PRESSURE KIT) Kit Use to check blood pressure at  home daily. 1 each 0    ergocalciferol (ERGOCALCIFEROL) 50,000 unit Cap Take 1 capsule (50,000 Units total) by mouth every 7 days. 12 capsule 3    hydroCHLOROthiazide (HYDRODIURIL) 25 MG tablet Take 1 tablet (25 mg total) by mouth once daily. 90 tablet 3    pravastatin (PRAVACHOL) 80 MG tablet Take 1 tablet (80 mg total) by mouth once daily. 90 tablet 3    thiamine 100 MG tablet Take 1 tablet (100 mg total) by mouth once daily. 90 tablet 3    valsartan (DIOVAN) 320 MG tablet Take 1 tablet (320 mg total) by mouth once daily. 90 tablet 3     No facility-administered encounter medications on file as of 10/3/2024.     Review of patient's allergies indicates:  No Known Allergies  Past Medical History:   Diagnosis Date    ETOH abuse     Hyperlipemia     Hypertension     Unspecified cirrhosis of liver        Review of Systems   Constitutional: Negative.    HENT: Negative.     Eyes: Negative.    Respiratory: Negative.     Cardiovascular: Negative.    Gastrointestinal: Negative.    Genitourinary: Negative.    Musculoskeletal: Negative.    Skin: Negative.    Neurological: Negative.    Psychiatric/Behavioral: Negative.       Vitals:    10/03/24 1032   BP: (!) 142/88   Pulse: 67   Resp: 18       Physical Exam  Vitals reviewed.   Constitutional:       Appearance: She is well-developed.   HENT:      Head: Normocephalic and atraumatic.   Eyes:      General: No scleral icterus.     Conjunctiva/sclera: Conjunctivae normal.      Pupils: Pupils are equal, round, and reactive to light.   Neck:      Thyroid: No thyromegaly.   Cardiovascular:      Rate and Rhythm: Normal rate and regular rhythm.      Heart sounds: Normal heart sounds.   Pulmonary:      Effort: Pulmonary effort is normal.      Breath sounds: Normal breath sounds. No  rales.   Abdominal:      General: Bowel sounds are normal. There is no distension.      Palpations: Abdomen is soft. There is no mass.      Tenderness: There is no abdominal tenderness.   Musculoskeletal:         General: Normal range of motion.      Cervical back: Normal range of motion and neck supple.   Skin:     General: Skin is warm and dry.      Findings: No rash.   Neurological:      Mental Status: She is alert and oriented to person, place, and time.         Computed MELD 3.0 unavailable. One or more values for this score either were not found within the given timeframe or did not fit some other criterion.  Computed MELD-Na unavailable. One or more values for this score either were not found within the given timeframe or did not fit some other criterion.      Lab Results   Component Value Date    GLU 97 05/20/2024    BUN 16 05/20/2024    CREATININE 0.8 05/20/2024    CALCIUM 10.0 05/20/2024     05/20/2024    K 3.7 05/20/2024     05/20/2024    PROT 8.4 05/20/2024    CO2 23 05/20/2024    ANIONGAP 14 05/20/2024    WBC 5.29 05/20/2024    RBC 5.68 (H) 05/20/2024    HGB 15.3 05/20/2024    HCT 48.8 (H) 05/20/2024    MCV 86 05/20/2024    MCH 26.9 (L) 05/20/2024    MCHC 31.4 (L) 05/20/2024     Lab Results   Component Value Date    RDW 13.4 05/20/2024     05/20/2024    MPV 10.7 05/20/2024    GRAN 2.4 05/20/2024    GRAN 44.8 05/20/2024    LYMPH 2.1 05/20/2024    LYMPH 38.9 05/20/2024    MONO 0.4 05/20/2024    MONO 7.4 05/20/2024    EOSINOPHIL 7.9 05/20/2024    BASOPHIL 0.6 05/20/2024    EOS 0.4 05/20/2024    BASO 0.03 05/20/2024    BNP <10 05/17/2013    CHOL 180 02/02/2024    TRIG 83 02/02/2024    HDL 63 02/02/2024    CHOLHDL 35.0 02/02/2024    TOTALCHOLEST 2.9 02/02/2024    ALBUMIN 4.4 05/20/2024    AST 18 05/20/2024    ALT 21 05/20/2024    ALKPHOS 50 (L) 05/20/2024    MG 2.7 (H) 11/30/2012    LABPROT 11.7 01/18/2023    INR 1.1 01/18/2023       Assessment and Plan:  Marianne Gottlieb is a 64 y.o.  female with a history HCV and now recidivism of alcohol. Current recommendations:  History of HCV: appears to be RNA neg; will repeat now to confirm remains negative  Alcohol recidivism: counseled pt to stop; explained would not be a liver transplant candidate if continues to drink alcohol; check Peth, ethanol, MELD labs, abdo US and fibroscan   Return 3 months  A total of 35 minutes was spent reviewing the patient's chart, examining the patient, reviewing labs and imaging and coordinating care with the patient's care team.

## 2024-10-09 ENCOUNTER — HOSPITAL ENCOUNTER (OUTPATIENT)
Dept: RADIOLOGY | Facility: OTHER | Age: 64
Discharge: HOME | End: 2024-10-09
Attending: INTERNAL MEDICINE
Payer: MEDICARE

## 2024-10-09 DIAGNOSIS — Z86.19 HISTORY OF HEPATITIS C: ICD-10-CM

## 2024-10-09 DIAGNOSIS — F10.90 ALCOHOL USE DISORDER: ICD-10-CM

## 2024-10-09 PROCEDURE — 76700 US EXAM ABDOM COMPLETE: CPT | Mod: TC

## 2024-10-10 ENCOUNTER — TELEPHONE (OUTPATIENT)
Dept: HEPATOLOGY | Facility: CLINIC | Age: 64
End: 2024-10-10
Payer: MEDICARE

## 2024-10-10 NOTE — TELEPHONE ENCOUNTER
----- Message from Magdalena Schneider MD sent at 10/9/2024 11:06 PM CDT -----  Stable abdo - let pt know

## 2024-10-11 NOTE — TELEPHONE ENCOUNTER
----- Message from Shanice sent at 10/11/2024  9:36 AM CDT -----  Regarding: Appt  Contact: Pt  [phone]            Caller:  Marianne      Returning call to:   Lindy       Caller can be reached at:  [phone]    Nature of the call:  Returning missed call

## 2024-10-14 ENCOUNTER — TELEPHONE (OUTPATIENT)
Dept: HEPATOLOGY | Facility: CLINIC | Age: 64
End: 2024-10-14
Payer: MEDICARE

## 2024-10-14 NOTE — TELEPHONE ENCOUNTER
"----- Message from Ramirez sent at 10/14/2024 10:46 AM CDT -----  Regarding: miss call  Consult/Advisory:        Name Of Caller: Self        Contact Preference?:[phone]        What is the nature of the call?: Returning miss call         Additional Notes:  "Thank you for all that you do for our patients"  "

## 2024-10-25 ENCOUNTER — HOSPITAL ENCOUNTER (EMERGENCY)
Facility: OTHER | Age: 64
Discharge: HOME | End: 2024-10-25
Payer: MEDICARE

## 2024-10-25 VITALS
DIASTOLIC BLOOD PRESSURE: 97 MMHG | WEIGHT: 175 LBS | TEMPERATURE: 99 F | BODY MASS INDEX: 34.36 KG/M2 | HEART RATE: 107 BPM | HEIGHT: 60 IN | RESPIRATION RATE: 16 BRPM | SYSTOLIC BLOOD PRESSURE: 137 MMHG | OXYGEN SATURATION: 99 %

## 2024-10-25 PROCEDURE — 99281 EMR DPT VST MAYX REQ PHY/QHP: CPT

## 2024-11-25 ENCOUNTER — OFFICE VISIT (OUTPATIENT)
Dept: INTERNAL MEDICINE | Facility: CLINIC | Age: 64
End: 2024-11-25
Payer: MEDICARE

## 2024-11-25 VITALS
OXYGEN SATURATION: 97 % | HEART RATE: 68 BPM | SYSTOLIC BLOOD PRESSURE: 140 MMHG | HEIGHT: 60 IN | DIASTOLIC BLOOD PRESSURE: 86 MMHG | WEIGHT: 182.56 LBS | BODY MASS INDEX: 35.84 KG/M2

## 2024-11-25 DIAGNOSIS — R05.1 ACUTE COUGH: ICD-10-CM

## 2024-11-25 DIAGNOSIS — I10 PRIMARY HYPERTENSION: ICD-10-CM

## 2024-11-25 DIAGNOSIS — J98.8 BACTERIAL RESPIRATORY INFECTION: Primary | ICD-10-CM

## 2024-11-25 DIAGNOSIS — B96.89 BACTERIAL RESPIRATORY INFECTION: Primary | ICD-10-CM

## 2024-11-25 DIAGNOSIS — M25.50 POLYARTHRALGIA: ICD-10-CM

## 2024-11-25 LAB
CTP QC/QA: YES
CTP QC/QA: YES
FLUAV AG NPH QL: NEGATIVE
FLUBV AG NPH QL: NEGATIVE
SARS-COV-2 RDRP RESP QL NAA+PROBE: NEGATIVE

## 2024-11-25 PROCEDURE — 3077F SYST BP >= 140 MM HG: CPT | Mod: CPTII,S$GLB,,

## 2024-11-25 PROCEDURE — 87804 INFLUENZA ASSAY W/OPTIC: CPT | Mod: QW,S$GLB,,

## 2024-11-25 PROCEDURE — 1159F MED LIST DOCD IN RCRD: CPT | Mod: CPTII,S$GLB,,

## 2024-11-25 PROCEDURE — 99999 PR PBB SHADOW E&M-EST. PATIENT-LVL III: CPT | Mod: PBBFAC,,,

## 2024-11-25 PROCEDURE — 3066F NEPHROPATHY DOC TX: CPT | Mod: CPTII,S$GLB,,

## 2024-11-25 PROCEDURE — 99213 OFFICE O/P EST LOW 20 MIN: CPT | Mod: S$GLB,,,

## 2024-11-25 PROCEDURE — 1126F AMNT PAIN NOTED NONE PRSNT: CPT | Mod: CPTII,S$GLB,,

## 2024-11-25 PROCEDURE — 3079F DIAST BP 80-89 MM HG: CPT | Mod: CPTII,S$GLB,,

## 2024-11-25 PROCEDURE — 3008F BODY MASS INDEX DOCD: CPT | Mod: CPTII,S$GLB,,

## 2024-11-25 PROCEDURE — 4010F ACE/ARB THERAPY RXD/TAKEN: CPT | Mod: CPTII,S$GLB,,

## 2024-11-25 PROCEDURE — 3288F FALL RISK ASSESSMENT DOCD: CPT | Mod: CPTII,S$GLB,,

## 2024-11-25 PROCEDURE — 1101F PT FALLS ASSESS-DOCD LE1/YR: CPT | Mod: CPTII,S$GLB,,

## 2024-11-25 PROCEDURE — 87635 SARS-COV-2 COVID-19 AMP PRB: CPT | Mod: QW,S$GLB,,

## 2024-11-25 PROCEDURE — 3044F HG A1C LEVEL LT 7.0%: CPT | Mod: CPTII,S$GLB,,

## 2024-11-25 PROCEDURE — 3061F NEG MICROALBUMINURIA REV: CPT | Mod: CPTII,S$GLB,,

## 2024-11-25 RX ORDER — PROMETHAZINE HYDROCHLORIDE AND DEXTROMETHORPHAN HYDROBROMIDE 6.25; 15 MG/5ML; MG/5ML
5 SYRUP ORAL EVERY 6 HOURS PRN
Qty: 118 ML | Refills: 0 | Status: SHIPPED | OUTPATIENT
Start: 2024-11-25 | End: 2024-12-19

## 2024-11-25 RX ORDER — HYDROCHLOROTHIAZIDE 25 MG/1
25 TABLET ORAL DAILY
Qty: 90 TABLET | Refills: 3 | Status: SHIPPED | OUTPATIENT
Start: 2024-11-25

## 2024-11-25 RX ORDER — AZITHROMYCIN 250 MG/1
TABLET, FILM COATED ORAL
Qty: 6 TABLET | Refills: 0 | Status: SHIPPED | OUTPATIENT
Start: 2024-11-25 | End: 2024-11-30

## 2024-11-25 RX ORDER — ACETAMINOPHEN 500 MG
1000 TABLET ORAL EVERY 8 HOURS PRN
Qty: 30 TABLET | Refills: 0 | Status: SHIPPED | OUTPATIENT
Start: 2024-11-25 | End: 2024-12-10

## 2024-11-25 NOTE — PROGRESS NOTES
INTERNAL MEDICINE  [facility]    Reason for visit:   Chief Complaint   Patient presents with    Cough     Pt states she has been having this cough for a week.  Pt states she has really dark green mucus     HPI: Marianne Gottlieb is a 64 y.o. female presenting today for acute cough.    Patient is an established patient of PCP, Dr. Zainab Milian MD. This patient is new to me.    History of Present Illness    CHIEF COMPLAINT:  Patient presents today for cough and cold symptoms.    RESPIRATORY SYMPTOMS:  She reports coughing up dark green sputum and a sore throat. She denies chest tightness, wheezing, or fever. Over-the-counter cough medicine (Vicks 44) has been ineffective in managing her symptoms. She denies significant nasal congestion or sinus-related symptoms.    MUSCULOSKELETAL:  She reports bilateral shoulder pain, with the right shoulder being more painful. This has been a long-standing issue with current exacerbation present for about a month. She has a history of a fall in August affecting her shoulder, though she does not recall which one. She denies that recent coughing episodes have contributed to or exacerbated the shoulder pain. She declines shoulder imaging.    MEDICATIONS:  She reports that ibuprofen is ineffective for pain management. She is currently taking hydrochlorothiazide as part of her medication regimen and she requires a refill.      ROS:  General: -fever, -chills, +fatigue, -weight gain, -weight loss  Eyes: -vision changes, -redness, -discharge  ENT: -ear pain, +nasal congestion, -sore throat  Cardiovascular: -chest pain, -palpitations, -lower extremity edema, -chest tightness  Respiratory: +cough, -shortness of breath  Musculoskeletal: +joint pain, -muscle pain  Skin: -rash, -lesion  Neurological: -headache, -dizziness, -numbness, -tingling  Psychiatric: -anxiety, -depression, -sleep difficulty         Social History     Social History Narrative    Not on file        ALLERGIES:   Review of patient's allergies indicates:  No Known Allergies    MEDS:   Current Outpatient Medications on File Prior to Visit   Medication Sig Dispense Refill Last Dose/Taking    pravastatin (PRAVACHOL) 80 MG tablet Take 1 tablet (80 mg total) by mouth once daily. 90 tablet 3 Taking    valsartan (DIOVAN) 320 MG tablet Take 1 tablet (320 mg total) by mouth once daily. 90 tablet 3 Taking    [DISCONTINUED] hydroCHLOROthiazide (HYDRODIURIL) 25 MG tablet Take 1 tablet (25 mg total) by mouth once daily. 90 tablet 3 Taking    aspirin 81 MG Chew Take 1 tablet (81 mg total) by mouth once daily. (Patient not taking: Reported on 11/25/2024) 90 tablet 3 Not Taking    blood pressure monitor (BLOOD PRESSURE KIT) Kit Use to check blood pressure at  home daily. (Patient not taking: Reported on 11/25/2024) 1 each 0 Not Taking    ergocalciferol (ERGOCALCIFEROL) 50,000 unit Cap Take 1 capsule (50,000 Units total) by mouth every 7 days. (Patient not taking: Reported on 11/25/2024) 12 capsule 3 Not Taking    thiamine 100 MG tablet Take 1 tablet (100 mg total) by mouth once daily. (Patient not taking: Reported on 11/25/2024) 90 tablet 3 Not Taking       Vital signs:   Vitals:    11/25/24 0913   BP: (!) 140/86   Patient Position: Sitting   Pulse: 68   SpO2: 97%   Weight: 82.8 kg (182 lb 8.7 oz)   Height: 5' (1.524 m)     Body mass index is 35.65 kg/m².    PHYSICAL EXAM:     Physical Exam    General: No acute distress. Well-developed. Well-nourished.  Eyes: Sclerae anicteric.  HENT: Normocephalic. Atraumatic. Nares patent. Moist oral mucosa. No pharyngeal erythema swelling or exudate.  Ears: Bilateral TMs clear. Cerumen present.  Cardiovascular: Regular rate.   Respiratory: Normal respiratory effort. Clear to auscultation bilaterally. No rales. No rhonchi. No wheezing.  Musculoskeletal: Bilateral upper extremity ROM intact. Muscle tenderness to bilateral trapezius.   Neurological: Alert & oriented x3. No slurred speech.    Psychiatric: Normal mood. Normal affect. Good insight. Good judgment.  Skin: Warm. Dry. No rash.  Neck: No lymphadenopathy.         PERTINENT RESULTS:   No visits with results within 1 Week(s) from this visit.   Latest known visit with results is:   Lab Visit on 10/09/2024   Component Date Value Ref Range Status    PEth 16:0/18.1 (POPEth) 10/09/2024 306  Cutoff: 10 ng/mL Final    Comment: Phosphatidylethanol (PEth) homologues result interpretation    PEth 16:0/18:1 (POPEth)  Less than 10 ng/mL: Not detected  10 - 19 ng/mL: Abstinence or light alcohol consumption  (<2 drinks per day for several days a week)  20 - 200 ng/mL: Moderate alcohol consumption  (up to 4 drinks per day for several days a week)  Greater than 200 ng/mL: Heavy alcohol consumption or   chronic alcohol use (at least 4 drinks per day several days   a week)    (Reference: HUGO Franco and TELMA Harris 2018 J. Forensic Sci)      PEth 16:0/18.2 (PLPEth) 10/09/2024 201  Cutoff: 10 ng/mL Final    Comment: PEth 16:0/18:2 (PLPEth)  Reference ranges are not well established      PETH INTERPRETATION 10/09/2024 Positive.   Final    Comment: -------------------ADDITIONAL INFORMATION-------------------  This report is intended for use in clinical monitoring and   management of patients.  It is not intended for use in   employment-related testing.  This test was developed and its performance characteristics   determined by [facility] in a manner consistent with CLIA   requirements. This test has not been cleared or approved by   the U.S. Food and Drug Administration.    Test Performed by:  [facility] [id number] [place] [address]  : Jose Alberto Wilson Ph.D.; CLIA# [id number]         ASSESSMENT/PLAN:    Assessment & Plan    Assessed patient for respiratory infection, considering COVID-19, influenza, and bacterial causes  Noted current symptoms suggest possible bacterial respiratory  infection.  Evaluated long-standing shoulder pain, unrelated to current respiratory symptoms  Considered cost-effective treatment options due to patient's financial constraints  Will prescribe antibiotics empirically, pending COVID-19 and flu test results  Performed physical exam, including ears, throat, lymph nodes, and lung auscultation  Lungs clear on exam, suggesting upper respiratory infection rather than lower respiratory involvement    ACUTE SINUSITIS AND PHARYNGITIS:  - Explained difference between viral and bacterial infections and their respective treatments.  - Discussed that antibiotics would not be effective if COVID-19 or flu tests return positive.  - Started antibiotics pending negative COVID-19 and flu test results.  - Started prescription-strength cough syrup, to be taken during day and night as needed.  - COVID-19 and influenza tests ordered.    PAIN MANAGEMENT:  - Started extra strength Tylenol (acetaminophen) 1000 mg for pain relief.    HYPERTENSION:  - Continued hydrochlorothiazide.         1. Bacterial respiratory infection  -     promethazine-dextromethorphan (PROMETHAZINE-DM) 6.25-15 mg/5 mL Syrp; Take 5 mLs by mouth every 6 (six) hours as needed (cough).  Dispense: 118 mL; Refill: 0  -     azithromycin (Z-SOPHIE) 250 MG tablet; Take 2 tablets by mouth on day 1; Take 1 tablet by mouth on days 2-5  Dispense: 6 tablet; Refill: 0    2. Primary hypertension  -     hydroCHLOROthiazide (HYDRODIURIL) 25 MG tablet; Take 1 tablet (25 mg total) by mouth once daily.  Dispense: 90 tablet; Refill: 3    3. Polyarthralgia  -     acetaminophen (TYLENOL) 500 MG tablet; Take 2 tablets (1,000 mg total) by mouth every 8 (eight) hours as needed for Pain.  Dispense: 30 tablet; Refill: 0    4. Acute cough  -     promethazine-dextromethorphan (PROMETHAZINE-DM) 6.25-15 mg/5 mL Syrp; Take 5 mLs by mouth every 6 (six) hours as needed (cough).  Dispense: 118 mL; Refill: 0  -     POCT COVID-19 Rapid Screening  -     POCT  Influenza A/B    Health maintenance addressed: UTD  Vaccines recommended: Shingles, RSV, influenza, Pneumococcal, Covid-19 update    Follow up if symptoms worsen or fail to improve.     WILLAM Noriega-MOSHE   Internal Medicine

## 2025-01-18 ENCOUNTER — HOSPITAL ENCOUNTER (EMERGENCY)
Facility: OTHER | Age: 65
Discharge: HOME | End: 2025-01-18
Attending: EMERGENCY MEDICINE
Payer: MEDICARE

## 2025-01-18 VITALS
OXYGEN SATURATION: 96 % | DIASTOLIC BLOOD PRESSURE: 60 MMHG | RESPIRATION RATE: 18 BRPM | TEMPERATURE: 98 F | HEART RATE: 74 BPM | SYSTOLIC BLOOD PRESSURE: 130 MMHG

## 2025-01-18 DIAGNOSIS — M25.561 ACUTE PAIN OF RIGHT KNEE: Primary | ICD-10-CM

## 2025-01-18 DIAGNOSIS — R52 PAIN: ICD-10-CM

## 2025-01-18 PROCEDURE — 99283 EMERGENCY DEPT VISIT LOW MDM: CPT | Mod: 25

## 2025-01-18 PROCEDURE — 25000003 PHARM REV CODE 250: Performed by: PHYSICIAN ASSISTANT

## 2025-01-18 RX ORDER — KETOROLAC TROMETHAMINE 10 MG/1
10 TABLET, FILM COATED ORAL EVERY 6 HOURS
Qty: 12 TABLET | Refills: 0 | Status: SHIPPED | OUTPATIENT
Start: 2025-01-18 | End: 2025-01-21

## 2025-01-18 RX ORDER — KETOROLAC TROMETHAMINE 10 MG/1
10 TABLET, FILM COATED ORAL
Status: COMPLETED | OUTPATIENT
Start: 2025-01-18 | End: 2025-01-18

## 2025-01-18 RX ADMIN — KETOROLAC TROMETHAMINE 10 MG: 10 TABLET, FILM COATED ORAL at 09:01

## 2025-01-18 NOTE — ED PROVIDER NOTES
Source of History:  Patient and medical chart    Chief complaint:  Leg Pain (Pt reports R leg pain x 1 week after trip and fall. Ambulatory with limp in triage/)      HPI:  Marianne Gottlieb is a 64 y.o. female presenting with  right knee pain.  Patient states that she had mechanical trip and fall last week with impact to the right knee.  States since this time she has had pain to the site that radiates to the posterior aspect.  States that is worse with certain movements and palpation.  She denies any numbness, tingling, chest pain or shortness of breath.  Denies any inability to bear weight.      This is the extent to the patients complaints today here in the emergency department.    ROS: As per HPI     Review of patient's allergies indicates:  No Known Allergies    PMH:  As per HPI and below:  Past Medical History:   Diagnosis Date    Alcohol abuse, continuous 10/03/2024    ETOH abuse     Hyperlipemia     Hypertension     Unspecified cirrhosis of liver      Past Surgical History:   Procedure Laterality Date    BREAST LUMPECTOMY Right     benign    TUBAL LIGATION         Social History     Tobacco Use    Smoking status: Some Days     Current packs/day: 0.50     Average packs/day: 0.5 packs/day for 50.0 years (25.0 ttl pk-yrs)     Types: Cigarettes     Start date: 1975    Smokeless tobacco: Never   Substance Use Topics    Alcohol use: Yes     Alcohol/week: 42.0 standard drinks of alcohol     Types: 42 Standard drinks or equivalent per week     Comment: heavy drinker    Drug use: No       Physical Exam:    /61   Pulse 77   Temp 98.3 °F (36.8 °C) (Oral)   Resp 20   SpO2 95%   Nursing note and vital signs reviewed.  Constitutional: No acute distress; ambulates with antalgic gait  Eyes: No conjunctival injection.  Extraocular muscles are intact.  ENT: Normal phonation.  Musculoskeletal: Fair range motion of affected right lower extremity.  No obvious bony deformity, laxity, ecchymosis or edema.  Chronic  joint space narrowing noted.  Slight tenderness palpation along the medial joint line.  Neurovascularly intact.  Skin: No rashes seen.  Good turgor.  No abrasions.  No ecchymoses.  Psych: Appropriate, conversant.        I decided to obtain the patient's medical records.    Results for orders placed or performed in visit on 11/25/24   POCT COVID-19 Rapid Screening    Collection Time: 11/25/24 11:58 AM   Result Value Ref Range    POC Rapid COVID Negative Negative     Acceptable Yes    POCT Influenza A/B    Collection Time: 11/25/24 11:59 AM   Result Value Ref Range    Rapid Influenza A Ag Negative Negative    Rapid Influenza B Ag Negative Negative     Acceptable Yes      Imaging Results              X-Ray Knee 3 View Right (Final result)  Result time 01/18/25 09:55:26      Final result by Jean Keith MD (01/18/25 09:55:26)                   Impression:      Mild degenerative changes.      Electronically signed by: Jean Keith  Date:    01/18/2025  Time:    09:55               Narrative:    EXAMINATION:  XR KNEE 3 VIEW RIGHT    CLINICAL HISTORY:  Pain, unspecified    TECHNIQUE:  AP, lateral, and Merchant views of the right knee were performed.    COMPARISON:  None    FINDINGS:  No fracture or dislocation.  Tiny marginal osteophytes in all 3 compartments indicating mild degenerative changes.  Joint spaces are preserved.  No joint effusion.                                      MDM:    64 y.o. female with right knee pain.  Physical exam reveals patient well appearing in some distress with antalgic gait to room.  Fair range motion of affected right lower extremity.  No obvious bony deformity, laxity, ecchymosis or edema.  Chronic joint space narrowing noted.  Slight tenderness palpation along the medial joint line.  Neurovascularly intact.    DDX: fracture, sprain, dislocation, osteoarthritis, contusion    ED management: ice and Toradol in the ED. X-ray with no acute deformity. We will  place Ace wrap for protection and comfort. Instructed patient on RICE, NSAID's for pain and swelling, limited ambulation and to follow up with Ortho or PCP for evaluation an possible outpatient MRI    Impression/Plan: The primary encounter diagnosis was Acute pain of right knee. A diagnosis of Pain was also pertinent to this visit.  Patient cautioned on when to return to ED.  Pt. Understands and agrees with current treatment plan         Diagnostic Impression:    1. Acute pain of right knee    2. Pain          Yessenia Garcia PA  01/18/25 1016

## 2025-01-31 ENCOUNTER — TELEPHONE (OUTPATIENT)
Dept: ORTHOPEDICS | Facility: CLINIC | Age: 65
End: 2025-01-31
Payer: MEDICARE

## 2025-01-31 ENCOUNTER — OFFICE VISIT (OUTPATIENT)
Dept: INTERNAL MEDICINE | Facility: CLINIC | Age: 65
End: 2025-01-31
Payer: MEDICARE

## 2025-01-31 VITALS
DIASTOLIC BLOOD PRESSURE: 72 MMHG | HEIGHT: 60 IN | WEIGHT: 184.31 LBS | BODY MASS INDEX: 36.18 KG/M2 | OXYGEN SATURATION: 97 % | SYSTOLIC BLOOD PRESSURE: 126 MMHG | HEART RATE: 83 BPM

## 2025-01-31 DIAGNOSIS — M25.511 CHRONIC RIGHT SHOULDER PAIN: Primary | ICD-10-CM

## 2025-01-31 DIAGNOSIS — E78.2 MIXED HYPERLIPIDEMIA: ICD-10-CM

## 2025-01-31 DIAGNOSIS — G89.29 CHRONIC PAIN OF RIGHT KNEE: Primary | ICD-10-CM

## 2025-01-31 DIAGNOSIS — G89.29 CHRONIC RIGHT SHOULDER PAIN: ICD-10-CM

## 2025-01-31 DIAGNOSIS — M25.561 CHRONIC PAIN OF RIGHT KNEE: Primary | ICD-10-CM

## 2025-01-31 DIAGNOSIS — G89.29 CHRONIC RIGHT SHOULDER PAIN: Primary | ICD-10-CM

## 2025-01-31 DIAGNOSIS — I10 PRIMARY HYPERTENSION: ICD-10-CM

## 2025-01-31 DIAGNOSIS — M25.511 CHRONIC RIGHT SHOULDER PAIN: ICD-10-CM

## 2025-01-31 PROCEDURE — 1101F PT FALLS ASSESS-DOCD LE1/YR: CPT | Mod: CPTII,S$GLB,, | Performed by: FAMILY MEDICINE

## 2025-01-31 PROCEDURE — 99214 OFFICE O/P EST MOD 30 MIN: CPT | Mod: S$GLB,,, | Performed by: FAMILY MEDICINE

## 2025-01-31 PROCEDURE — 1125F AMNT PAIN NOTED PAIN PRSNT: CPT | Mod: CPTII,S$GLB,, | Performed by: FAMILY MEDICINE

## 2025-01-31 PROCEDURE — 1160F RVW MEDS BY RX/DR IN RCRD: CPT | Mod: CPTII,S$GLB,, | Performed by: FAMILY MEDICINE

## 2025-01-31 PROCEDURE — 3288F FALL RISK ASSESSMENT DOCD: CPT | Mod: CPTII,S$GLB,, | Performed by: FAMILY MEDICINE

## 2025-01-31 PROCEDURE — 3008F BODY MASS INDEX DOCD: CPT | Mod: CPTII,S$GLB,, | Performed by: FAMILY MEDICINE

## 2025-01-31 PROCEDURE — 99999 PR PBB SHADOW E&M-EST. PATIENT-LVL V: CPT | Mod: PBBFAC,,, | Performed by: FAMILY MEDICINE

## 2025-01-31 PROCEDURE — 3078F DIAST BP <80 MM HG: CPT | Mod: CPTII,S$GLB,, | Performed by: FAMILY MEDICINE

## 2025-01-31 PROCEDURE — 1159F MED LIST DOCD IN RCRD: CPT | Mod: CPTII,S$GLB,, | Performed by: FAMILY MEDICINE

## 2025-01-31 PROCEDURE — 3074F SYST BP LT 130 MM HG: CPT | Mod: CPTII,S$GLB,, | Performed by: FAMILY MEDICINE

## 2025-01-31 RX ORDER — PREDNISONE 20 MG/1
20 TABLET ORAL DAILY
Qty: 5 TABLET | Refills: 0 | Status: SHIPPED | OUTPATIENT
Start: 2025-01-31 | End: 2025-02-05

## 2025-01-31 RX ORDER — PRAVASTATIN SODIUM 80 MG/1
80 TABLET ORAL DAILY
Qty: 90 TABLET | Refills: 3 | Status: SHIPPED | OUTPATIENT
Start: 2025-01-31

## 2025-01-31 RX ORDER — HYDROCHLOROTHIAZIDE 25 MG/1
25 TABLET ORAL DAILY
Qty: 90 TABLET | Refills: 3 | Status: SHIPPED | OUTPATIENT
Start: 2025-01-31

## 2025-01-31 RX ORDER — VALSARTAN 320 MG/1
320 TABLET ORAL DAILY
Qty: 90 TABLET | Refills: 3 | Status: SHIPPED | OUTPATIENT
Start: 2025-01-31 | End: 2026-01-31

## 2025-01-31 NOTE — PROGRESS NOTES
Subjective:      Patient ID: Marianne Gottlieb is a 64 y.o. female.    Chief Complaint: Leg Pain    History of Present Illness    CHIEF COMPLAINT:  - Ms. Gottlieb presents with persistent right knee pain following a fall two weeks ago, accompanied by pain in her right hand, fingers, and shoulder. Her daughter, Nicole, is on the phone during the interview and exam to help with history as patient has a learning disability.    HPI:  Ms. Gottlieb fell and injured her right knee 2 weeks ago, leading to an emergency room visit. X-rays showed arthritis but no fractures. She has had persistent pain behind her right knee, localized without radiation, since the fall. The pain intensity has remained constant, causing difficulty walking and a sense of imbalance. Her daughter notes that she's had chronic joint pain even before the fall.    Ms. Gottlieb reports pain in her right hand, fingers, and shoulder that has been present for months and is worsening. Her daughter suggests these joint pain is chronic.    Ketoralac was prescribed at the emergency room but ineffective in alleviating symptoms. Ice packs and OTC pain medications (ibuprofen, naproxen, and Tylenol) were also ineffective. Ms. Gottlieb previously used gabapentin but discontinued it due to adverse effects.    Ms. Gottlieb's symptoms limit her ability to perform household tasks. She has been following ER advice to elevate her leg on a pillow and apply ice, without pain relief.    Chart review shows labs workup for inflammatory arthritis (CRP, rheumatoid factor, ERIK) negative. She's not seen orthopedic or physical therapy.  Reviewed ER note from 1/18/25.    MEDICAL HISTORY:  - Arthritis: Right knee, right hand, fingers, shoulder  - Liver issues in the past, alcohol abuse, h/o hepatitis        Current Outpatient Medications:     aspirin 81 MG Chew, Take 1 tablet (81 mg total) by mouth once daily., Disp: 90 tablet, Rfl: 3    thiamine 100 MG tablet, Take 1 tablet (100 mg  total) by mouth once daily., Disp: 90 tablet, Rfl: 3    blood pressure monitor (BLOOD PRESSURE KIT) Kit, Use to check blood pressure at  home daily. (Patient not taking: Reported on 1/31/2025), Disp: 1 each, Rfl: 0    ergocalciferol (ERGOCALCIFEROL) 50,000 unit Cap, Take 1 capsule (50,000 Units total) by mouth every 7 days. (Patient not taking: Reported on 1/31/2025), Disp: 12 capsule, Rfl: 3    hydroCHLOROthiazide (HYDRODIURIL) 25 MG tablet, Take 1 tablet (25 mg total) by mouth once daily., Disp: 90 tablet, Rfl: 3    pravastatin (PRAVACHOL) 80 MG tablet, Take 1 tablet (80 mg total) by mouth once daily., Disp: 90 tablet, Rfl: 3    predniSONE (DELTASONE) 20 MG tablet, Take 1 tablet (20 mg total) by mouth once daily. for 5 days, Disp: 5 tablet, Rfl: 0    valsartan (DIOVAN) 320 MG tablet, Take 1 tablet (320 mg total) by mouth once daily., Disp: 90 tablet, Rfl: 3  Review of patient's allergies indicates:  No Known Allergies     Patient Active Problem List   Diagnosis    Primary hypertension    Hyperlipidemia    History of hepatitis C    Alcohol use disorder, severe, dependence    Nicotine dependence, cigarettes, uncomplicated    History of cardioembolic cerebrovascular accident (CVA)    Hypokalemia    Substance abuse    Prediabetes    Lumbar radicular pain    Abnormal CT lung screening    Alcohol abuse, continuous        Review of systems negative except for those noted in above HPI.    Lab Results   Component Value Date     05/20/2024    K 3.7 05/20/2024     05/20/2024    CO2 23 05/20/2024    GLU 97 05/20/2024    BUN 16 05/20/2024    CREATININE 0.8 05/20/2024    CALCIUM 10.0 05/20/2024    PROT 8.4 05/20/2024    ALBUMIN 4.4 05/20/2024    BILITOT 0.5 05/20/2024    ALKPHOS 50 (L) 05/20/2024    AST 18 05/20/2024    ALT 21 05/20/2024    ANIONGAP 14 05/20/2024    ESTGFRAFRICA >60.0 05/07/2022    EGFRNONAA >60.0 05/07/2022    WBC 5.29 05/20/2024    HGB 15.3 05/20/2024    HGB 14.4 02/02/2024    HCT 48.8 (H)  05/20/2024    MCV 86 05/20/2024     05/20/2024    TSH 0.638 05/20/2024       Lab Results   Component Value Date    HGBA1C 6.1 (H) 05/20/2024    HGBA1C 6.1 (H) 02/02/2024    HGBA1C 5.9 (H) 11/16/2022     Lab Results   Component Value Date    MICALBCREAT 4.7 05/20/2024     Lab Results   Component Value Date    LDLCALC 100.4 02/02/2024    LDLCALC 126.6 11/16/2022    CHOL 180 02/02/2024    HDL 63 02/02/2024    TRIG 83 02/02/2024       Reviewed PMH, PSH, SH, FH and any changes have been updated.    Vitals:    01/31/25 0921   BP: 126/72   Pulse: 83   SpO2: 97%   Weight: 83.6 kg (184 lb 4.9 oz)   Height: 5' (1.524 m)   PainSc: 10-Worst pain ever   PainLoc: Leg     Objective:   Physical Exam    General: Pleasant. No acute distress. Well-developed. Well-nourished.  Eyes: EOMBI. Sclerae anicteric.  HENT: Normocephalic. Atraumatic. Nares patent. Moist oral mucosa.  Cardiovascular: Regular rate and rhythm. No murmurs. No gallops. No rubs. Normal S1 and S2.  Respiratory: Normal respiratory effort. Clear to auscultation bilaterally. No rales. No rhonchi. No wheezing.  Abdomen: Soft. Nontender. Nondistended. Normoactive bowel sounds.  Musculoskeletal: No obvious deformity. Normal range of motion.  Extremities: No lower extremity edema.  Neurological: Alert and lucid. Normal gait. No gross deficits.  Psychiatric: Normal mood. Normal affect. Good insight. Good judgment.  Skin: Warm. Intact.          Assessment:         ICD-10-CM ICD-9-CM   1. Chronic pain of right knee  M25.561 719.46    G89.29 338.29   2. Mixed hyperlipidemia  E78.2 272.2   3. Primary hypertension  I10 401.9   4. Chronic right shoulder pain  M25.511 719.41    G89.29 338.29        Plan:   Assessment & Plan     Considered orthopedic evaluation for right knee pain after fall 2 weeks ago; x-ray showed arthritis but no fracture   Assessed for possible rheumatoid arthritis given additional right hand, finger, and shoulder pain; prior labs (RF, CRP, ERIK) were  normal   Decided on short-term prednisone for inflammation management, given patient's reported lack of response to other pain medications   Recommend comprehensive approach including orthopedics, pain management, and physical therapy to address multiple joint issues    RIGHT KNEE PAIN: Acute on chronic  - Prednisone course Rx'd; potential adverse s/e discussed with patient and her daughter.  - Discussed IBP/Tylenol after finishing prednisone course  - Con't supportive care measures.  - Referred to Orthopedics, Pain management, and Physical therapy for evaluation and treatment.    JOINT PAIN:  - Refer to orthopedic, PT, and pain mgmt as noted.     HYPERTENSION: Controlled.  - Continue current medication.    HYPERLIPIDEMIA:  - Continue current medication; refilled.    OTHER INSTRUCTIONS:  - Confirmed patient is living alone and assessed support system, noting daughter's involvement in care.  - Set up proxy access for daughter to view medical records and assist with care.        Chronic pain of right knee  -     Ambulatory Referral/Consult to Physical Therapy/Occupational Therapy; Future; Expected date: 02/07/2025  -     Ambulatory referral/consult to Orthopedics; Future; Expected date: 02/07/2025  -     Ambulatory referral/consult to Pain Clinic; Future; Expected date: 02/07/2025    Mixed hyperlipidemia  -     pravastatin (PRAVACHOL) 80 MG tablet; Take 1 tablet (80 mg total) by mouth once daily.  Dispense: 90 tablet; Refill: 3    Primary hypertension  -     valsartan (DIOVAN) 320 MG tablet; Take 1 tablet (320 mg total) by mouth once daily.  Dispense: 90 tablet; Refill: 3  -     hydroCHLOROthiazide (HYDRODIURIL) 25 MG tablet; Take 1 tablet (25 mg total) by mouth once daily.  Dispense: 90 tablet; Refill: 3    Chronic right shoulder pain  -     Ambulatory Referral/Consult to Physical Therapy/Occupational Therapy; Future; Expected date: 02/07/2025  -     Ambulatory referral/consult to Orthopedics; Future; Expected date:  02/07/2025  -     Ambulatory referral/consult to Pain Clinic; Future; Expected date: 02/07/2025    Other orders  -     predniSONE (DELTASONE) 20 MG tablet; Take 1 tablet (20 mg total) by mouth once daily. for 5 days  Dispense: 5 tablet; Refill: 0        Chronic conditions status updated as per HPI.  Other than changes above, cont current medications and maintain follow up with specialists.      Follow up in about 3 months (around 4/30/2025) for Dr. Milian, right knee and right shoulder pain, or as needed.         Kalpana Lentz MD  [facility]    Total time spent on this encounter: 35 minutes. This includes face to face time and non-face to face time preparing to see the patient (eg, review of tests), obtaining and/or reviewing separately obtained history, documenting clinical information in the electronic or other health record, independently interpreting results and communicating results to the patient/family/caregiver, or care coordinator.    This note was generated with the assistance of ambient listening technology. Verbal consent was obtained by the patient and accompanying visitor(s) for the recording of patient appointment to facilitate this note. I attest to having reviewed and edited the generated note for accuracy, though some syntax or spelling errors may persist. Please contact the author of this note for any clarification.       Patient Instructions   Take PREDNISONE (steroid pill) once a day early in the morning for five days. This will help with the pain and inflammation.    If you still have pain after finishing the prednisone, take ibuprofen (with food). If the pain is better, you can just use the ibuprofen as needed.    Keep icing your knee for 10 min at a time, do this at least 3-4 times a day.    Keep taking your blood pressure pill, water pill, and cholesterol pill.    Keep your physical therapy appointment, orthopedic appointment, and pain management appointment. I've put in  referrals, they will call you to make an appointment.  Tests to Keep You Healthy    Mammogram: Met on 6/20/2024  Colon Cancer Screening: Met on 7/20/2020  Last Blood Pressure <= 139/89 (1/31/2025): Yes  Tobacco Cessation: NO

## 2025-01-31 NOTE — PATIENT INSTRUCTIONS
Take PREDNISONE (steroid pill) once a day early in the morning for five days. This will help with the pain and inflammation.    If you still have pain after finishing the prednisone, take ibuprofen (with food). If the pain is better, you can just use the ibuprofen as needed.    Keep icing your knee for 10 min at a time, do this at least 3-4 times a day.    Keep taking your blood pressure pill, water pill, and cholesterol pill.    Keep your physical therapy appointment, orthopedic appointment, and pain management appointment. I've put in referrals, they will call you to make an appointment.

## 2025-02-05 ENCOUNTER — TELEPHONE (OUTPATIENT)
Dept: ORTHOPEDICS | Facility: CLINIC | Age: 65
End: 2025-02-05
Payer: MEDICARE

## 2025-02-05 NOTE — TELEPHONE ENCOUNTER
Second attempt to reach pt. LVM c pt.tp confirm 15 min sooner appt location & time c Dr. Baldwin 02/06/25 with XR prior. Requested a call back to the [facility] at [phone] with any questions, concerns or need for a different appointment time.

## 2025-02-12 ENCOUNTER — HOSPITAL ENCOUNTER (OUTPATIENT)
Dept: RADIOLOGY | Facility: OTHER | Age: 65
Discharge: HOME | End: 2025-02-12
Attending: ORTHOPAEDIC SURGERY
Payer: MEDICARE

## 2025-02-12 ENCOUNTER — OFFICE VISIT (OUTPATIENT)
Dept: PAIN MEDICINE | Facility: CLINIC | Age: 65
End: 2025-02-12
Payer: MEDICARE

## 2025-02-12 VITALS
DIASTOLIC BLOOD PRESSURE: 56 MMHG | WEIGHT: 184.31 LBS | RESPIRATION RATE: 18 BRPM | TEMPERATURE: 99 F | SYSTOLIC BLOOD PRESSURE: 82 MMHG | HEART RATE: 74 BPM | OXYGEN SATURATION: 98 % | BODY MASS INDEX: 35.99 KG/M2

## 2025-02-12 DIAGNOSIS — M25.561 ACUTE PAIN OF RIGHT KNEE: ICD-10-CM

## 2025-02-12 PROCEDURE — 3074F SYST BP LT 130 MM HG: CPT | Mod: CPTII,S$GLB,, | Performed by: ANESTHESIOLOGY

## 2025-02-12 PROCEDURE — 1125F AMNT PAIN NOTED PAIN PRSNT: CPT | Mod: CPTII,S$GLB,, | Performed by: ANESTHESIOLOGY

## 2025-02-12 PROCEDURE — 1101F PT FALLS ASSESS-DOCD LE1/YR: CPT | Mod: CPTII,S$GLB,, | Performed by: ANESTHESIOLOGY

## 2025-02-12 PROCEDURE — 99999 PR PBB SHADOW E&M-EST. PATIENT-LVL IV: CPT | Mod: PBBFAC,,, | Performed by: ANESTHESIOLOGY

## 2025-02-12 PROCEDURE — 73030 X-RAY EXAM OF SHOULDER: CPT | Mod: 26,RT,, | Performed by: RADIOLOGY

## 2025-02-12 PROCEDURE — 4010F ACE/ARB THERAPY RXD/TAKEN: CPT | Mod: CPTII,S$GLB,, | Performed by: ANESTHESIOLOGY

## 2025-02-12 PROCEDURE — 3078F DIAST BP <80 MM HG: CPT | Mod: CPTII,S$GLB,, | Performed by: ANESTHESIOLOGY

## 2025-02-12 PROCEDURE — 3288F FALL RISK ASSESSMENT DOCD: CPT | Mod: CPTII,S$GLB,, | Performed by: ANESTHESIOLOGY

## 2025-02-12 PROCEDURE — 73030 X-RAY EXAM OF SHOULDER: CPT | Mod: TC,FY,RT

## 2025-02-12 PROCEDURE — 1159F MED LIST DOCD IN RCRD: CPT | Mod: CPTII,S$GLB,, | Performed by: ANESTHESIOLOGY

## 2025-02-12 PROCEDURE — 3008F BODY MASS INDEX DOCD: CPT | Mod: CPTII,S$GLB,, | Performed by: ANESTHESIOLOGY

## 2025-02-12 PROCEDURE — 99204 OFFICE O/P NEW MOD 45 MIN: CPT | Mod: S$GLB,,, | Performed by: ANESTHESIOLOGY

## 2025-02-12 NOTE — PATIENT INSTRUCTIONS
Continue with intermittent ice. Do not apply for greater than 20 minutes at a time.  Can use heating pad or hot showers for pain relief  Trial over the counter Voltaren Gel (topical diclofenac cream). Apply this to painful area about four times every day  Continue with physical therapy  We will see you again in 1 month

## 2025-02-13 ENCOUNTER — CLINICAL SUPPORT (OUTPATIENT)
Dept: REHABILITATION | Facility: OTHER | Age: 65
End: 2025-02-13
Attending: FAMILY MEDICINE
Payer: MEDICARE

## 2025-02-13 DIAGNOSIS — G89.29 CHRONIC RIGHT SHOULDER PAIN: ICD-10-CM

## 2025-02-13 DIAGNOSIS — G89.29 CHRONIC PAIN OF RIGHT KNEE: ICD-10-CM

## 2025-02-13 DIAGNOSIS — M25.511 CHRONIC RIGHT SHOULDER PAIN: ICD-10-CM

## 2025-02-13 DIAGNOSIS — M25.561 CHRONIC PAIN OF RIGHT KNEE: ICD-10-CM

## 2025-02-13 DIAGNOSIS — R26.2 DIFFICULTY IN WALKING: Primary | ICD-10-CM

## 2025-02-13 PROBLEM — M54.16 LUMBAR RADICULAR PAIN: Status: RESOLVED | Noted: 2024-06-12 | Resolved: 2025-02-13

## 2025-02-13 PROCEDURE — 97530 THERAPEUTIC ACTIVITIES: CPT

## 2025-02-13 PROCEDURE — 97161 PT EVAL LOW COMPLEX 20 MIN: CPT

## 2025-02-13 NOTE — PROGRESS NOTES
[facility]  Physical Therapy Initial Evaluation    Date: 2/13/2025   Name: Marianne Gottlieb  Clinic Number: [id number]    Therapy Diagnosis:   Encounter Diagnoses   Name Primary?    Chronic pain of right knee     Chronic right shoulder pain     Difficulty in walking Yes     Physician: Kalpana Lentz MD    Physician Orders: PT Eval and Treat   Medical Diagnosis from Referral:   M25.561,G89.29 (ICD-10-CM) - Chronic pain of right knee   M25.511,G89.29 (ICD-10-CM) - Chronic right shoulder pain   Evaluation Date: 2/13/2025  Authorization Period Expiration: 12/31/25  Plan of Care Expiration: 4/24/25  Visit # / Visits authorized: 1/ 1   Progress Note Due: 3/13/25  FOTO: 1/ 3    Precautions:  standard ; treating as lumbar     Time In: 9:20 am  Time Out: 10:00 am  Total Appointment Time (timed & untimed codes): 40 minutes    Subjective   Date of onset: 1 month ago  History of current condition - Marianne reports: that she has been having more pain to the back of her right knee after a fall.  Patient reports that she had to go to the ER and they did no imaging- no fractures. She is having some R shoulder pain as well, but the knee is giving her more issues than anything. Reports the pain is mainly to the back of her R knee while walking and she cannot walk nearly as far as she is normally able to.    SAMAN: fall  Any Locking: none  Any buckling: none  Pain radiates: none  Pain constant or intermittent: intermittent  Any injections: none for her R knee currently  Hx of lower back pain: yes    Pain:  Current 9/10, worst 10/10, best 6/10   Location: right posterior knee   Description: Aching, Throbbing, Grabbing, and Tight  Aggravating Factors: Walking  Easing Factors:  pain cream to R knee and laying flat    Prior Therapy: yes evaluation  Social History: lives with their family  Occupation: not working  Prior Level of Function: IND  Current Level of Function: MI with increase in symptoms    Pts  goals: decrease pain and return to PLOF    Imaging: XRAY: R shoulder  Impression:  No evidence for acute fracture, bone destruction, or dislocation.  Degenerative changes particularly at the acromioclavicular joint.    XRAY: R knee  FINDINGS:  No fracture or dislocation.  Tiny marginal osteophytes in all 3 compartments indicating mild degenerative changes.  Joint spaces are preserved.  No joint effusion.  Impression:  Mild degenerative changes.     Medical History:   Past Medical History:   Diagnosis Date    Alcohol abuse, continuous 10/03/2024    ETOH abuse     Hyperlipemia     Hypertension     Unspecified cirrhosis of liver      Surgical History:   Marianne Gottlieb  has a past surgical history that includes Tubal ligation and Breast lumpectomy (Right).    Medications:   Marianne has a current medication list which includes the following prescription(s): aspirin, blood pressure monitor, ergocalciferol, hydrochlorothiazide, pravastatin, thiamine, and valsartan.    Allergies:   Review of patient's allergies indicates:  No Known Allergies     Objective     Sensation: intact to light touch    DTR: DNT    GAIT DEVIATIONS: Marianne displays occasional unsteady gait;decreased weight shift;antalgic gait    Range of Motion:   Knee Left active Left Passive   Flexion WNL WNL   Extension WNL WNL     Knee Right active Right Passive   Flexion 100 120 *   Extension 0 * WNL       Lower Extremity Strength   Right LE  Left LE    Knee extension: 4/5 Knee extension: 4+/5   Knee flexion: 4/5 Knee flexion: 4+/5   Hip flexion: 4/5 Hip flexion: 4/5   Hip extension:  4/5 Hip extension: 4/5   Hip abduction: 4/5 Hip abduction: 4/5   Hip adduction: 4/5 Hip adduction 4/5   Ankle dorsiflexion: 4/5 Ankle dorsiflexion: 4+/5   Ankle plantarflexion: 4/5 Ankle plantarflexion: 4+/5     Special Tests:   Right Left   Valgus Stress Test Negative Negative   Varus Stress test Negative Negative   Lachman's test Negative Negative   Posterior Drawer  Negative Negative   Anterior Drawer Negative Negative   Gladys's Test Negative Negative   Thessaly's Test Negative Negative   Patellar Grind Test Negative Negative     SLR: positive R side  Slump: positive on R side  Repeated FL: positive  Repeated EX: negative    Joint Mobility: hypomobile to R knee and to lumbar spine      Palpation: TTP to posterior R knee around gastroc, popliteal fossa, and proximal sciatic nerve    CMS Impairment/Limitation/Restriction for FOTO Knee Survey    Therapist reviewed FOTO scores for Marianne Gottlieb on 2/13/2025.   FOTO documents entered into EPIC - see Media section.    Limitation Score: 42    Goal Score: 61     TREATMENT     Total Treatment time separate from Evaluation: 20 minutes    Next visit: HEP review (SURAJ, seated nerve glide, and seated/standing lumbar EX) nustep, supine nerve glide, bridge, standing glut med, supine TA, supine BKFO, STM to R sciatic nerve    Marianne received the following manual therapy techniques: Soft tissue Mobilization were applied to the: R knee for 5 minutes, including:  STM to R sciatic nerve- especially around R gastroc    Marianne participated in dynamic functional therapeutic activities to improve functional performance for 15  minutes, including:  HEP review  Rehab progression/potential  Treatment and diagnosis  Home Exercises and Patient Education Provided    Education provided:   - See above    Written Home Exercises Provided: yes.  Exercises were reviewed and Marianne was able to demonstrate them prior to the end of the session.  Marianne demonstrated good  understanding of the education provided.     See EMR under Patient Instructions for exercises provided 2/13/2025.    Assessment   Marianne is a 64 y.o. female referred to outpatient Physical Therapy with a medical diagnosis of M25.561- chronic pain of right knee and M25.511- chronic right shoulder pain. Pt presents with decreased lumbar and R knee ROM, increased pain/adverse  symptoms, increased neural tension, decreased strength, difficulty in walking, and decreased tolerance to activity. Patient's symptoms are consistent with lumbar radiculopathy causing R sided knee pain and difficulty in walking.    Pt prognosis is Good.   Pt will benefit from skilled outpatient Physical Therapy to address the deficits stated above and in the chart below, provide pt/family education, and to maximize pt's level of independence.     Plan of care discussed with patient: Yes  Pt's spiritual, cultural and educational needs considered and patient is agreeable to the plan of care and goals as stated below:     Anticipated Barriers for therapy: compliance with therapy    Medical Necessity is demonstrated by the following  History  Co-morbidities and personal factors that may impact the plan of care Co-morbidities:   HTN and severe alcohol abuse    Personal Factors:   no deficits     low   Examination  Body Structures and Functions, activity limitations and participation restrictions that may impact the plan of care Body Regions:   back  lower extremities  upper extremities    Body Systems:    ROM  strength  balance  gait  transfers  transitions  motor control  motor learning    Participation Restrictions:    ADLs, iADLs, and wanted activities    Activity limitations:   Learning and applying knowledge  no deficits    General Tasks and Commands  no deficits    Communication  no deficits    Mobility  lifting and carrying objects  walking  driving (bike, car, motorcycle)    Self care  no deficits    Domestic Life  shopping  cooking  doing house work (cleaning house, washing dishes, laundry)  assisting others    Interactions/Relationships   no deficits    Life Areas  no deficits    Community and Social Life  community life  recreation and leisure         Complexity: low   Clinical Presentation stable and uncomplicated low   Decision Making/ Complexity Score: low     GOALS: Short Term Goals:  4-5 weeks  1.Report  decreased in pain at worse less than  <   / =  5  /10  to increase tolerance for functional mobility.On going  2. Pt to improve R knee range of motion by 90% to allow for improved functional mobility to allow for improvement in IADLs. .On going  3. Increased B hip/LE MMT 1/2 grade to increase tolerance for ADL and work activities.On going  4. Pt to tolerate HEP to improve ROM and independence with ADL's.On going    Long Term Goals: 8-10 weeks  1.Report decreased in pain at worse less than  <   / =  3  /10  to increase tolerance for functional mobility. On going  2.Increased B hip/LE MMT 1 grade to increase tolerance for ADL and work activities.On going  3. Pt will report 61 or greater score on FOTO knee survey to demonstrate increase in LE function with every day tasks. On going  4. Pt to be Independent with HEP to improve ROM and independence with ADL's. On going    Plan   Plan of care Certification: 2/13/2025 to 4/24/25.    Outpatient Physical Therapy 1-2 times weekly for 8-10 weeks to include the following interventions: Electrical Stimulation TENS/IFC/NMES, Gait Training, Manual Therapy, Moist Heat/ Ice, Neuromuscular Re-ed, Self Care, Therapeutic Activities, and Therapeutic Exercise. Dry needling    Jennifer Veliz, PT      I CERTIFY THE NEED FOR THESE SERVICES FURNISHED UNDER THIS PLAN OF TREATMENT AND WHILE UNDER MY CARE   Physician's comments:     Physician's Signature: ___________________________________________________

## 2025-03-31 ENCOUNTER — HOSPITAL ENCOUNTER (EMERGENCY)
Facility: OTHER | Age: 65
Discharge: HOME | End: 2025-03-31
Attending: EMERGENCY MEDICINE
Payer: MEDICARE

## 2025-03-31 VITALS
SYSTOLIC BLOOD PRESSURE: 121 MMHG | RESPIRATION RATE: 15 BRPM | DIASTOLIC BLOOD PRESSURE: 79 MMHG | OXYGEN SATURATION: 99 % | TEMPERATURE: 98 F | HEIGHT: 63 IN | HEART RATE: 71 BPM | BODY MASS INDEX: 32.65 KG/M2

## 2025-03-31 DIAGNOSIS — R07.81 RIB PAIN ON RIGHT SIDE: ICD-10-CM

## 2025-03-31 DIAGNOSIS — M25.561 RIGHT KNEE PAIN: ICD-10-CM

## 2025-03-31 PROCEDURE — 99284 EMERGENCY DEPT VISIT MOD MDM: CPT | Mod: 25

## 2025-03-31 PROCEDURE — 96372 THER/PROPH/DIAG INJ SC/IM: CPT

## 2025-03-31 PROCEDURE — 63600175 PHARM REV CODE 636 W HCPCS: Mod: JZ,TB

## 2025-03-31 PROCEDURE — 25000003 PHARM REV CODE 250

## 2025-03-31 RX ORDER — LIDOCAINE 50 MG/G
1 PATCH TOPICAL
Status: DISCONTINUED | OUTPATIENT
Start: 2025-03-31 | End: 2025-03-31 | Stop reason: HOSPADM

## 2025-03-31 RX ORDER — LIDOCAINE 50 MG/G
1 PATCH TOPICAL DAILY
Qty: 5 PATCH | Refills: 0 | Status: SHIPPED | OUTPATIENT
Start: 2025-03-31

## 2025-03-31 RX ORDER — KETOROLAC TROMETHAMINE 30 MG/ML
15 INJECTION, SOLUTION INTRAMUSCULAR; INTRAVENOUS
Status: COMPLETED | OUTPATIENT
Start: 2025-03-31 | End: 2025-03-31

## 2025-03-31 RX ORDER — KETOROLAC TROMETHAMINE 10 MG/1
10 TABLET, FILM COATED ORAL EVERY 6 HOURS
Qty: 20 TABLET | Refills: 0 | Status: SHIPPED | OUTPATIENT
Start: 2025-03-31 | End: 2025-04-05

## 2025-03-31 RX ADMIN — LIDOCAINE 1 PATCH: 50 PATCH CUTANEOUS at 08:03

## 2025-03-31 RX ADMIN — KETOROLAC TROMETHAMINE 15 MG: 30 INJECTION, SOLUTION INTRAMUSCULAR; INTRAVENOUS at 08:03

## 2025-03-31 NOTE — ED PROVIDER NOTES
Encounter Date: 3/31/2025       History     Chief Complaint   Patient presents with    Knee Pain     Pt reporting trip and fall yesterday. Now reporting R knee pain/swelling and R rib pain. Pt thinks she broke her ribs. Denies hitting head. Last drink yesterday.      64-year-old female with history of hypertension, hyperlipidemia, alcohol abuse presents for right rib pain and right knee pain after feeling like her leg gave out on her yesterday and falling.  Patient denies hitting her head or LOC. Patient denies headache, neck pain, vision changes.  Patient endorses the pain is worse with ambulation.  Patient denies worsening rib pain with deep breaths.  Patient denies having tried anything for symptom relief.  Patient denies pain or burning with urination.  Pt denies CP, SOB, N/V/D, abdominal pain, fever, chills.         The history is provided by the patient.     Review of patient's allergies indicates:  No Known Allergies  Past Medical History:   Diagnosis Date    Alcohol abuse, continuous 10/03/2024    ETOH abuse     Hyperlipemia     Hypertension     Unspecified cirrhosis of liver      Past Surgical History:   Procedure Laterality Date    BREAST LUMPECTOMY Right     benign    TUBAL LIGATION       Family History   Problem Relation Name Age of Onset    Lung cancer Mother       Social History[1]  Review of Systems  As per hpi  Physical Exam     Initial Vitals [03/31/25 0750]   BP Pulse Resp Temp SpO2   127/74 79 20 98.4 °F (36.9 °C) 96 %      MAP       --         Physical Exam    Nursing note and vitals reviewed.  Constitutional: Vital signs are normal. She appears well-developed and well-nourished. She is cooperative.   HENT:   Head: Normocephalic and atraumatic.   Eyes: Conjunctivae and lids are normal.   Neck: Trachea normal. No thyroid mass present.   Cardiovascular:  Normal rate and regular rhythm.           Pulmonary/Chest: No respiratory distress. She has no wheezes.   Musculoskeletal:         General:  Tenderness present.        Arms:       Cervical back: No tenderness or bony tenderness.      Thoracic back: Tenderness present. No bony tenderness.      Lumbar back: No tenderness or bony tenderness.        Legs:       Comments: Ttp of R mid back. No rash, laceration, contusion.   Ttp of R medial anterior knee. Able to flex, extend, and ambulate. No rash, laceration, or contusion.      Neurological: She is alert and oriented to person, place, and time. GCS eye subscore is 4. GCS verbal subscore is 5. GCS motor subscore is 6.   Skin: Skin is warm, dry and intact. No rash noted.   Psychiatric: She has a normal mood and affect. Her speech is normal and behavior is normal. Thought content normal.         ED Course   Procedures  Labs Reviewed - No data to display       Imaging Results              X-Ray Knee 1 or 2 View Right (Final result)  Result time 03/31/25 09:37:31      Final result by Janeth Ervin MD (03/31/25 09:37:31)                   Impression:      Moderate suprapatellar joint effusion.  No acute osseous abnormality seen.      Electronically signed by: Janeth Ervin  Date:    03/31/2025  Time:    09:37               Narrative:    EXAMINATION:  XR KNEE 1 OR 2 VIEW RIGHT    CLINICAL HISTORY:  Pain in right knee    TECHNIQUE:  AP and lateral views of the right knee were performed.    COMPARISON:  1/18/2025    FINDINGS:  No acute fracture seen.    Degenerative osteophyte formation with mild narrowing medial tibiofemoral compartment.    Moderate suprapatellar joint effusion.  No significant soft tissue edema.                                       X-Ray Ribs 2 View Right (Final result)  Result time 03/31/25 09:35:53      Final result by Janeth Ervin MD (03/31/25 09:35:53)                   Impression:      Please see above.      Electronically signed by: Janeth Ervin  Date:    03/31/2025  Time:    09:35               Narrative:    EXAMINATION:  XR RIBS 2 VIEW RIGHT    CLINICAL  HISTORY:  Pleurodynia    TECHNIQUE:  Two views of the right ribs were performed.    COMPARISON:  None    FINDINGS:  I see no acute right rib fracture.  Right lung is clear.                                       Medications   LIDOcaine 5 % patch 1 patch (1 patch Transdermal Patch Applied 3/31/25 0815)   ketorolac injection 15 mg (15 mg Intramuscular Given 3/31/25 0815)     Medical Decision Making  Patient is a afebrile, well appearing 64 y.o.  female who presents for evaluation of R anterior knee pain and R posterior Rib pain after a fall yesterday. Patient is able to ambulate. Denies cyanosis, pallor, decreased strength or sensation. Pulses normal. Neurovascularly intact. The patient remained comfortable and stable during their visit in the ED. Details of ED course documented in ED workup.     Differential Diagnosis includes, but is not limited to: Fracture, dislocation, cellulitis, bursitis, muscle strain, ligament tear/sprain, soft tissue contusion, osteoarthritis     All historical, clinical, and radiographic findings reviewed and discussed with patient.  Xray without any acute bony abnormalities. There are no concerning features on physical exam to suggest an emergent or life threatening condition. No further intervention is indicated at this time, the patient is at low risk for an emergent/life threatening medical condition at this time and I am of the belief that that it is safe to discharge the patient from the emergency department.     Patient advised to follow up with her PCP.  Patient advised rice therapy in addition to Toradol and lidocaine patches.  Strict return precautions given.    Discharge and follow-up instructions discussed with the patient who expressed understanding and willingness to comply with recommendations. Patient discharged from the emergency department in stable condition, in no acute distress.      Problems Addressed:  Rib pain on right side: acute illness or injury  Right knee pain:  acute illness or injury    Amount and/or Complexity of Data Reviewed  Radiology: ordered. Decision-making details documented in ED Course.    Risk  Prescription drug management.               ED Course as of 03/31/25 0957   Mon Mar 31, 2025   0940 X-Ray Knee 1 or 2 View Right  Moderate suprapatellar joint effusion.  No acute osseous abnormality seen. [RM]   0941 X-Ray Ribs 2 View Right  I see no acute right rib fracture.  Right lung is clear. [RM]      ED Course User Index  [RM] Angie Lee PA-C                           Clinical Impression:  Final diagnoses:  [R07.81] Rib pain on right side  [M25.561] Right knee pain          ED Disposition Condition    Discharge Stable          ED Prescriptions       Medication Sig Dispense Start Date End Date Auth. Provider    ketorolac (TORADOL) 10 mg tablet Take 1 tablet (10 mg total) by mouth every 6 (six) hours. for 5 days 20 tablet 3/31/2025 4/5/2025 Angie Lee PA-C    LIDOcaine (LIDODERM) 5 % Place 1 patch onto the skin once daily. Remove & Discard patch within 12 hours or as directed by MD 5 patch 3/31/2025 -- Angie Lee PA-C          Follow-up Information       Follow up With Specialties Details Why Contact Info    Zainab Milian MD Internal Medicine Schedule an appointment as soon as possible for a visit   [address] [address]  [phone]                 [1]   Social History  Tobacco Use    Smoking status: Some Days     Current packs/day: 0.50     Average packs/day: 0.5 packs/day for 50.2 years (25.1 ttl pk-yrs)     Types: Cigarettes     Start date: 1975    Smokeless tobacco: Never   Substance Use Topics    Alcohol use: Yes     Alcohol/week: 42.0 standard drinks of alcohol     Types: 42 Standard drinks or equivalent per week     Comment: heavy drinker    Drug use: No        Angie Lee PA-C  03/31/25 0957

## 2025-03-31 NOTE — DISCHARGE INSTRUCTIONS
I have sent you a prescription for Toradol to take as needed for pain relief.  Do not take this at the same time as ibuprofen or Aleve.  I have also sent you a prescription for lidocaine patches to use as needed for side pain.    I expect your pain should improve over the next week, expect some minor body aches.  Follow-up with your PCP

## 2025-03-31 NOTE — ED TRIAGE NOTES
Pt presents to ED c/o R knee pain and swelling with R ribcage pain onset last night after trip and fall. Pt denies LOC or head trauma. Reports last drink last night.

## 2025-04-30 ENCOUNTER — HOSPITAL ENCOUNTER (EMERGENCY)
Facility: OTHER | Age: 65
Discharge: HOME | End: 2025-04-30
Attending: EMERGENCY MEDICINE
Payer: MEDICARE

## 2025-04-30 VITALS — SYSTOLIC BLOOD PRESSURE: 125 MMHG | DIASTOLIC BLOOD PRESSURE: 87 MMHG | RESPIRATION RATE: 20 BRPM | HEART RATE: 80 BPM

## 2025-04-30 DIAGNOSIS — F10.920 ALCOHOLIC INTOXICATION WITHOUT COMPLICATION: Primary | ICD-10-CM

## 2025-04-30 LAB
GLUCOSE SERPL-MCNC: 109 MG/DL (ref 70–110)
POCT GLUCOSE: 109 MG/DL (ref 70–110)

## 2025-04-30 PROCEDURE — 99282 EMERGENCY DEPT VISIT SF MDM: CPT

## 2025-04-30 PROCEDURE — 82962 GLUCOSE BLOOD TEST: CPT

## 2025-04-30 NOTE — ED NOTES
Bed: RWR 02  Expected date:   Expected time:   Means of arrival:   Comments:  
Pt awake. Talking in complete sentences. No slurred speech. Pt stable for d/c  
Pt currently crying about her fight knee hurting. + swelling noted to right knee while pt was standing. MD made aware.   
Pt oob ambulating to restroom  
Pt reports feels ok to walk home, will f/u with PCP for arthritis   
Upon d/c, pt reports right knee pain, hx of arthritis. Pt requesting to speak to provider about knee before d/c   
detailed exam

## 2025-04-30 NOTE — ED TRIAGE NOTES
Marianne Gottlieb presents to the ED intoxicated. Pt speech slurred, pt noted using obscene language. Pt c/o of right knee swelling.      Pt resting comfortably. Connected to cardiac monitor, BP cuff, and pulse oximeter. Call light within reach. Safety measures in place. Denies further needs.

## 2025-04-30 NOTE — ED PROVIDER NOTES
Encounter Date: 4/30/2025       History     Chief Complaint   Patient presents with    Alcohol Intoxication     Pt arrived and states she's been drinking and came to visit, unsteady gait in triage.     64-year-old female with history of alcohol abuse, hypertension hyperlipidemia presented on her own intoxicated.  She denies any complaints at this time.  She is well known to this department for presenting intoxicated.       Review of patient's allergies indicates:  No Known Allergies  Past Medical History:   Diagnosis Date    Alcohol abuse, continuous 10/03/2024    ETOH abuse     Hyperlipemia     Hypertension     Unspecified cirrhosis of liver      Past Surgical History:   Procedure Laterality Date    BREAST LUMPECTOMY Right     benign    TUBAL LIGATION       Family History   Problem Relation Name Age of Onset    Lung cancer Mother       Social History[1]  Review of Systems    Physical Exam     Initial Vitals [04/30/25 0400]   BP Pulse Resp Temp SpO2   (!) 139/91 -- -- -- --      MAP       --         Physical Exam    Vitals reviewed.  Constitutional: She appears well-developed and well-nourished. She is not diaphoretic. No distress.   Slurred speech.  No alcohol.   HENT:   Head: Normocephalic and atraumatic.   Right Ear: External ear normal.   Left Ear: External ear normal.   Nose: Nose normal.   Eyes: Right eye exhibits no discharge. Left eye exhibits no discharge.   Bilateral conjunctival injection   Neck: Neck supple.   Normal range of motion.  Cardiovascular:  Normal rate, regular rhythm and normal heart sounds.           Pulmonary/Chest: Breath sounds normal. No respiratory distress. She has no wheezes. She has no rhonchi. She has no rales.   Musculoskeletal:      Cervical back: Normal range of motion and neck supple.      Comments: Unsteady gait     Neurological: She is alert.   Oriented to person and place   Skin: Skin is warm.         ED Course   Procedures  Labs Reviewed   POCT GLUCOSE MONITORING CONTINUOUS           Imaging Results    None          Medications - No data to display  Medical Decision Making  64-year-old female with history of alcohol abuse presents intoxicated.  She is asymptomatic.  No signs of trauma on exam.  Will check blood glucose and observe until clinically sober.                                      Clinical Impression:  Final diagnoses:  [F10.920] Alcoholic intoxication without complication (Primary)          ED Disposition Condition    Discharge Stable          ED Prescriptions    None       Follow-up Information       Follow up With Specialties Details Why Contact Info    Zainab Milian MD Internal Medicine Schedule an appointment as soon as possible for a visit  As needed [address] [facility] [address]  [facility] [address]  [phone]                   [1]   Social History  Tobacco Use    Smoking status: Some Days     Current packs/day: 0.50     Average packs/day: 0.5 packs/day for 50.3 years (25.2 ttl pk-yrs)     Types: Cigarettes     Start date: 1975    Smokeless tobacco: Never   Substance Use Topics    Alcohol use: Yes     Alcohol/week: 42.0 standard drinks of alcohol     Types: 42 Standard drinks or equivalent per week     Comment: heavy drinker    Drug use: No        Mariana Joseph MD  04/30/25 0021

## 2025-05-15 ENCOUNTER — HOSPITAL ENCOUNTER (EMERGENCY)
Facility: OTHER | Age: 65
Discharge: HOME | End: 2025-05-15
Attending: EMERGENCY MEDICINE
Payer: MEDICARE

## 2025-05-15 VITALS
RESPIRATION RATE: 18 BRPM | DIASTOLIC BLOOD PRESSURE: 81 MMHG | BODY MASS INDEX: 32.77 KG/M2 | WEIGHT: 185 LBS | SYSTOLIC BLOOD PRESSURE: 159 MMHG | TEMPERATURE: 97 F | HEART RATE: 63 BPM | OXYGEN SATURATION: 99 %

## 2025-05-15 DIAGNOSIS — S20.212A CONTUSION OF LEFT CHEST WALL, INITIAL ENCOUNTER: Primary | ICD-10-CM

## 2025-05-15 DIAGNOSIS — R07.89 CHEST WALL PAIN: ICD-10-CM

## 2025-05-15 PROCEDURE — 25000003 PHARM REV CODE 250: Performed by: EMERGENCY MEDICINE

## 2025-05-15 PROCEDURE — 99284 EMERGENCY DEPT VISIT MOD MDM: CPT | Mod: 25

## 2025-05-15 RX ORDER — METHOCARBAMOL 750 MG/1
750 TABLET, FILM COATED ORAL
Status: COMPLETED | OUTPATIENT
Start: 2025-05-15 | End: 2025-05-15

## 2025-05-15 RX ORDER — HYDROCODONE BITARTRATE AND ACETAMINOPHEN 5; 325 MG/1; MG/1
1 TABLET ORAL
Refills: 0 | Status: COMPLETED | OUTPATIENT
Start: 2025-05-15 | End: 2025-05-15

## 2025-05-15 RX ORDER — NAPROXEN 500 MG/1
500 TABLET ORAL
Status: COMPLETED | OUTPATIENT
Start: 2025-05-15 | End: 2025-05-15

## 2025-05-15 RX ORDER — METHOCARBAMOL 750 MG/1
750 TABLET, FILM COATED ORAL 3 TIMES DAILY PRN
Qty: 21 TABLET | Refills: 0 | Status: SHIPPED | OUTPATIENT
Start: 2025-05-15 | End: 2025-05-22

## 2025-05-15 RX ORDER — NAPROXEN 500 MG/1
500 TABLET ORAL 2 TIMES DAILY PRN
Qty: 28 TABLET | Refills: 0 | Status: SHIPPED | OUTPATIENT
Start: 2025-05-15 | End: 2025-05-29

## 2025-05-15 RX ORDER — LIDOCAINE 50 MG/G
1 PATCH TOPICAL
Status: DISCONTINUED | OUTPATIENT
Start: 2025-05-15 | End: 2025-05-15 | Stop reason: HOSPADM

## 2025-05-15 RX ORDER — LIDOCAINE 50 MG/G
PATCH TOPICAL
Qty: 15 PATCH | Refills: 1 | Status: SHIPPED | OUTPATIENT
Start: 2025-05-15

## 2025-05-15 RX ORDER — DICLOFENAC SODIUM 10 MG/G
2 GEL TOPICAL 3 TIMES DAILY PRN
Qty: 100 G | Refills: 0 | Status: SHIPPED | OUTPATIENT
Start: 2025-05-15

## 2025-05-15 RX ADMIN — HYDROCODONE BITARTRATE AND ACETAMINOPHEN 1 TABLET: 5; 325 TABLET ORAL at 09:05

## 2025-05-15 RX ADMIN — NAPROXEN 500 MG: 500 TABLET ORAL at 08:05

## 2025-05-15 RX ADMIN — LIDOCAINE 1 PATCH: 50 PATCH CUTANEOUS at 08:05

## 2025-05-15 RX ADMIN — METHOCARBAMOL 750 MG: 750 TABLET ORAL at 08:05

## 2025-05-15 NOTE — ED TRIAGE NOTES
Pt reports to ED with L sided flank pain that she believes started last Thursday after she fell while dancing at a bar. Denies hitting her head or LOC. Denies urinary symptoms. Unsure if she landed on her back or not when she fell. Hypertensive on arrival.

## 2025-05-15 NOTE — DISCHARGE INSTRUCTIONS

## 2025-05-21 ENCOUNTER — LAB VISIT (OUTPATIENT)
Dept: LAB | Facility: OTHER | Age: 65
End: 2025-05-21
Attending: INTERNAL MEDICINE
Payer: MEDICARE

## 2025-05-21 ENCOUNTER — OFFICE VISIT (OUTPATIENT)
Dept: INTERNAL MEDICINE | Facility: CLINIC | Age: 65
End: 2025-05-21
Payer: MEDICARE

## 2025-05-21 VITALS
HEART RATE: 79 BPM | OXYGEN SATURATION: 97 % | HEIGHT: 63 IN | BODY MASS INDEX: 32.3 KG/M2 | WEIGHT: 182.31 LBS | DIASTOLIC BLOOD PRESSURE: 80 MMHG | SYSTOLIC BLOOD PRESSURE: 134 MMHG

## 2025-05-21 DIAGNOSIS — K70.30 ALCOHOLIC CIRRHOSIS OF LIVER WITHOUT ASCITES: ICD-10-CM

## 2025-05-21 DIAGNOSIS — F43.0 ACUTE STRESS REACTION: ICD-10-CM

## 2025-05-21 DIAGNOSIS — K73.2 CHRONIC ACTIVE HEPATITIS, NOT ELSEWHERE CLASSIFIED: ICD-10-CM

## 2025-05-21 DIAGNOSIS — E63.9 NUTRITIONAL DEFICIENCY: ICD-10-CM

## 2025-05-21 DIAGNOSIS — Z12.31 SCREENING MAMMOGRAM FOR BREAST CANCER: ICD-10-CM

## 2025-05-21 DIAGNOSIS — R73.03 PREDIABETES: ICD-10-CM

## 2025-05-21 DIAGNOSIS — F10.90 ALCOHOL USE DISORDER: ICD-10-CM

## 2025-05-21 DIAGNOSIS — I10 PRIMARY HYPERTENSION: ICD-10-CM

## 2025-05-21 DIAGNOSIS — R74.8 ABNORMAL LEVELS OF OTHER SERUM ENZYMES: ICD-10-CM

## 2025-05-21 DIAGNOSIS — Z12.12 SCREENING FOR COLORECTAL CANCER: ICD-10-CM

## 2025-05-21 DIAGNOSIS — Z11.4 SCREENING FOR HIV WITHOUT PRESENCE OF RISK FACTORS: ICD-10-CM

## 2025-05-21 DIAGNOSIS — E55.9 VITAMIN D DEFICIENCY: ICD-10-CM

## 2025-05-21 DIAGNOSIS — Z00.00 HEALTH MAINTENANCE EXAMINATION: ICD-10-CM

## 2025-05-21 DIAGNOSIS — F10.20 ALCOHOL USE DISORDER, SEVERE, DEPENDENCE: ICD-10-CM

## 2025-05-21 DIAGNOSIS — R41.82 ALTERED MENTAL STATUS, UNSPECIFIED ALTERED MENTAL STATUS TYPE: ICD-10-CM

## 2025-05-21 DIAGNOSIS — K73.9 CHRONIC HEPATITIS, UNSPECIFIED: ICD-10-CM

## 2025-05-21 DIAGNOSIS — Z86.19 HISTORY OF HEPATITIS C: ICD-10-CM

## 2025-05-21 DIAGNOSIS — F10.20 ALCOHOL USE DISORDER, SEVERE, DEPENDENCE: Primary | ICD-10-CM

## 2025-05-21 DIAGNOSIS — E78.2 MIXED HYPERLIPIDEMIA: ICD-10-CM

## 2025-05-21 DIAGNOSIS — Z12.11 SCREENING FOR COLORECTAL CANCER: ICD-10-CM

## 2025-05-21 DIAGNOSIS — F17.210 NICOTINE DEPENDENCE, CIGARETTES, UNCOMPLICATED: ICD-10-CM

## 2025-05-21 LAB
25(OH)D3+25(OH)D2 SERPL-MCNC: 27 NG/ML (ref 30–96)
ABSOLUTE EOSINOPHIL (OHS): 0.47 K/UL
ABSOLUTE MONOCYTE (OHS): 0.43 K/UL (ref 0.3–1)
ABSOLUTE NEUTROPHIL COUNT (OHS): 3.35 K/UL (ref 1.8–7.7)
AFP SERPL-MCNC: 5.6 NG/ML
ALBUMIN SERPL BCP-MCNC: 4.5 G/DL (ref 3.5–5.2)
ALBUMIN/CREAT UR: 6.4 UG/MG
ALP SERPL-CCNC: 56 UNIT/L (ref 40–150)
ALT SERPL W/O P-5'-P-CCNC: 23 UNIT/L (ref 10–44)
ANION GAP (OHS): 10 MMOL/L (ref 8–16)
AST SERPL-CCNC: 19 UNIT/L (ref 11–45)
BASOPHILS # BLD AUTO: 0.04 K/UL
BASOPHILS NFR BLD AUTO: 0.6 %
BILIRUB DIRECT SERPL-MCNC: 0.2 MG/DL (ref 0.1–0.3)
BILIRUB SERPL-MCNC: 0.4 MG/DL (ref 0.1–1)
BUN SERPL-MCNC: 11 MG/DL (ref 8–23)
CALCIUM SERPL-MCNC: 10.5 MG/DL (ref 8.7–10.5)
CHLORIDE SERPL-SCNC: 104 MMOL/L (ref 95–110)
CO2 SERPL-SCNC: 28 MMOL/L (ref 23–29)
CREAT SERPL-MCNC: 0.8 MG/DL (ref 0.5–1.4)
CREAT UR-MCNC: 78 MG/DL (ref 15–325)
EAG (OHS): 131 MG/DL (ref 68–131)
ERYTHROCYTE [DISTWIDTH] IN BLOOD BY AUTOMATED COUNT: 13.9 % (ref 11.5–14.5)
ETHANOL SERPL-MCNC: <10 MG/DL
FOLATE SERPL-MCNC: 10.8 NG/ML (ref 4–24)
GFR SERPLBLD CREATININE-BSD FMLA CKD-EPI: >60 ML/MIN/1.73/M2
GLUCOSE SERPL-MCNC: 98 MG/DL (ref 70–110)
HAV AB SER QL IA: REACTIVE
HBA1C MFR BLD: 6.2 % (ref 4–5.6)
HBV CORE AB SERPL QL IA: NORMAL
HBV SURFACE AB SER-ACNC: 3.21 MIU/ML
HBV SURFACE AB SERPL IA-ACNC: NORMAL M[IU]/ML
HBV SURFACE AG SERPL QL IA: NORMAL
HCT VFR BLD AUTO: 49.7 % (ref 37–48.5)
HGB BLD-MCNC: 15.7 GM/DL (ref 12–16)
IMM GRANULOCYTES # BLD AUTO: 0.02 K/UL (ref 0–0.04)
IMM GRANULOCYTES NFR BLD AUTO: 0.3 % (ref 0–0.5)
INR PPP: 1.1 (ref 0.8–1.2)
LYMPHOCYTES # BLD AUTO: 2.38 K/UL (ref 1–4.8)
MCH RBC QN AUTO: 26.7 PG (ref 27–31)
MCHC RBC AUTO-ENTMCNC: 31.6 G/DL (ref 32–36)
MCV RBC AUTO: 85 FL (ref 82–98)
MICROALBUMIN UR-MCNC: 5 UG/ML (ref ?–5000)
NUCLEATED RBC (/100WBC) (OHS): 0 /100 WBC
PLATELET # BLD AUTO: 242 K/UL (ref 150–450)
PMV BLD AUTO: 10.5 FL (ref 9.2–12.9)
POTASSIUM SERPL-SCNC: 4.2 MMOL/L (ref 3.5–5.1)
PROT SERPL-MCNC: 8.8 GM/DL (ref 6–8.4)
PROTHROMBIN TIME: 12.4 SECONDS (ref 9–12.5)
RBC # BLD AUTO: 5.87 M/UL (ref 4–5.4)
RELATIVE EOSINOPHIL (OHS): 7 %
RELATIVE LYMPHOCYTE (OHS): 35.6 % (ref 18–48)
RELATIVE MONOCYTE (OHS): 6.4 % (ref 4–15)
RELATIVE NEUTROPHIL (OHS): 50.1 % (ref 38–73)
SODIUM SERPL-SCNC: 142 MMOL/L (ref 136–145)
TSH SERPL-ACNC: 0.74 UIU/ML (ref 0.4–4)
VIT B12 SERPL-MCNC: 451 PG/ML (ref 210–950)
WBC # BLD AUTO: 6.69 K/UL (ref 3.9–12.7)

## 2025-05-21 PROCEDURE — 85025 COMPLETE CBC W/AUTO DIFF WBC: CPT

## 2025-05-21 PROCEDURE — 84425 ASSAY OF VITAMIN B-1: CPT

## 2025-05-21 PROCEDURE — 1101F PT FALLS ASSESS-DOCD LE1/YR: CPT | Mod: CPTII,S$GLB,, | Performed by: STUDENT IN AN ORGANIZED HEALTH CARE EDUCATION/TRAINING PROGRAM

## 2025-05-21 PROCEDURE — 99999 PR PBB SHADOW E&M-EST. PATIENT-LVL IV: CPT | Mod: PBBFAC,,, | Performed by: STUDENT IN AN ORGANIZED HEALTH CARE EDUCATION/TRAINING PROGRAM

## 2025-05-21 PROCEDURE — 36415 COLL VENOUS BLD VENIPUNCTURE: CPT

## 2025-05-21 PROCEDURE — 3061F NEG MICROALBUMINURIA REV: CPT | Mod: CPTII,S$GLB,, | Performed by: STUDENT IN AN ORGANIZED HEALTH CARE EDUCATION/TRAINING PROGRAM

## 2025-05-21 PROCEDURE — 85610 PROTHROMBIN TIME: CPT

## 2025-05-21 PROCEDURE — 82306 VITAMIN D 25 HYDROXY: CPT

## 2025-05-21 PROCEDURE — 84443 ASSAY THYROID STIM HORMONE: CPT

## 2025-05-21 PROCEDURE — 87522 HEPATITIS C REVRS TRNSCRPJ: CPT

## 2025-05-21 PROCEDURE — 80307 DRUG TEST PRSMV CHEM ANLYZR: CPT

## 2025-05-21 PROCEDURE — 4010F ACE/ARB THERAPY RXD/TAKEN: CPT | Mod: CPTII,S$GLB,, | Performed by: STUDENT IN AN ORGANIZED HEALTH CARE EDUCATION/TRAINING PROGRAM

## 2025-05-21 PROCEDURE — 84207 ASSAY OF VITAMIN B-6: CPT

## 2025-05-21 PROCEDURE — 82105 ALPHA-FETOPROTEIN SERUM: CPT

## 2025-05-21 PROCEDURE — 86790 VIRUS ANTIBODY NOS: CPT

## 2025-05-21 PROCEDURE — 82746 ASSAY OF FOLIC ACID SERUM: CPT

## 2025-05-21 PROCEDURE — 3288F FALL RISK ASSESSMENT DOCD: CPT | Mod: CPTII,S$GLB,, | Performed by: STUDENT IN AN ORGANIZED HEALTH CARE EDUCATION/TRAINING PROGRAM

## 2025-05-21 PROCEDURE — 3079F DIAST BP 80-89 MM HG: CPT | Mod: CPTII,S$GLB,, | Performed by: STUDENT IN AN ORGANIZED HEALTH CARE EDUCATION/TRAINING PROGRAM

## 2025-05-21 PROCEDURE — 87340 HEPATITIS B SURFACE AG IA: CPT

## 2025-05-21 PROCEDURE — 3075F SYST BP GE 130 - 139MM HG: CPT | Mod: CPTII,S$GLB,, | Performed by: STUDENT IN AN ORGANIZED HEALTH CARE EDUCATION/TRAINING PROGRAM

## 2025-05-21 PROCEDURE — 3044F HG A1C LEVEL LT 7.0%: CPT | Mod: CPTII,S$GLB,, | Performed by: STUDENT IN AN ORGANIZED HEALTH CARE EDUCATION/TRAINING PROGRAM

## 2025-05-21 PROCEDURE — 80053 COMPREHEN METABOLIC PANEL: CPT

## 2025-05-21 PROCEDURE — 82248 BILIRUBIN DIRECT: CPT

## 2025-05-21 PROCEDURE — 82077 ASSAY SPEC XCP UR&BREATH IA: CPT

## 2025-05-21 PROCEDURE — 99214 OFFICE O/P EST MOD 30 MIN: CPT | Mod: S$GLB,,, | Performed by: STUDENT IN AN ORGANIZED HEALTH CARE EDUCATION/TRAINING PROGRAM

## 2025-05-21 PROCEDURE — 82570 ASSAY OF URINE CREATININE: CPT

## 2025-05-21 PROCEDURE — 1126F AMNT PAIN NOTED NONE PRSNT: CPT | Mod: CPTII,S$GLB,, | Performed by: STUDENT IN AN ORGANIZED HEALTH CARE EDUCATION/TRAINING PROGRAM

## 2025-05-21 PROCEDURE — 82607 VITAMIN B-12: CPT

## 2025-05-21 PROCEDURE — 86704 HEP B CORE ANTIBODY TOTAL: CPT

## 2025-05-21 PROCEDURE — 3008F BODY MASS INDEX DOCD: CPT | Mod: CPTII,S$GLB,, | Performed by: STUDENT IN AN ORGANIZED HEALTH CARE EDUCATION/TRAINING PROGRAM

## 2025-05-21 PROCEDURE — G2211 COMPLEX E/M VISIT ADD ON: HCPCS | Mod: S$GLB,,, | Performed by: STUDENT IN AN ORGANIZED HEALTH CARE EDUCATION/TRAINING PROGRAM

## 2025-05-21 PROCEDURE — 3066F NEPHROPATHY DOC TX: CPT | Mod: CPTII,S$GLB,, | Performed by: STUDENT IN AN ORGANIZED HEALTH CARE EDUCATION/TRAINING PROGRAM

## 2025-05-21 PROCEDURE — 86706 HEP B SURFACE ANTIBODY: CPT | Mod: 59

## 2025-05-21 PROCEDURE — 83036 HEMOGLOBIN GLYCOSYLATED A1C: CPT

## 2025-05-21 RX ORDER — VALSARTAN 320 MG/1
320 TABLET ORAL DAILY
Qty: 90 TABLET | Refills: 3 | Status: SHIPPED | OUTPATIENT
Start: 2025-05-21 | End: 2026-05-21

## 2025-05-21 RX ORDER — LANOLIN ALCOHOL/MO/W.PET/CERES
100 CREAM (GRAM) TOPICAL DAILY
Qty: 90 TABLET | Refills: 3 | Status: SHIPPED | OUTPATIENT
Start: 2025-05-21

## 2025-05-21 RX ORDER — HYDROCHLOROTHIAZIDE 25 MG/1
25 TABLET ORAL DAILY
Qty: 90 TABLET | Refills: 3 | Status: SHIPPED | OUTPATIENT
Start: 2025-05-21

## 2025-05-21 RX ORDER — NAPROXEN SODIUM 220 MG/1
81 TABLET, FILM COATED ORAL DAILY
Qty: 90 TABLET | Refills: 3 | Status: SHIPPED | OUTPATIENT
Start: 2025-05-21

## 2025-05-21 RX ORDER — NALTREXONE HYDROCHLORIDE 50 MG/1
50 TABLET, FILM COATED ORAL NIGHTLY
Qty: 90 TABLET | Refills: 3 | Status: SHIPPED | OUTPATIENT
Start: 2025-05-21

## 2025-05-22 LAB — HCV RNA SERPL NAA+PROBE-LOG IU: NOT DETECTED {LOG_IU}/ML

## 2025-05-23 ENCOUNTER — CLINICAL SUPPORT (OUTPATIENT)
Dept: ENDOSCOPY | Facility: HOSPITAL | Age: 65
End: 2025-05-23
Attending: STUDENT IN AN ORGANIZED HEALTH CARE EDUCATION/TRAINING PROGRAM
Payer: MEDICARE

## 2025-05-23 DIAGNOSIS — Z12.12 SCREENING FOR COLORECTAL CANCER: ICD-10-CM

## 2025-05-23 DIAGNOSIS — Z12.11 SCREENING FOR COLORECTAL CANCER: ICD-10-CM

## 2025-05-23 NOTE — ED PROVIDER NOTES
"  Source of History:  Medical record, patient      Chief complaint:  Per triage note: "Fall (Pt sustained a mechanical fall 1 week ago while dancing at a bar. Pt complaining of L flank pain. Denies hip pain, denies back pain. Pt noted to have a bruise to the L flank)  "    HPI:    Patient presents for evaluation of left flank and lower chest wall pain after mechanical trip and fall onto her left side while dancing at a bar.  She denies any associated dyspnea, presyncope, syncope, hemoptysis.  Pain is worse with movement.  Not improved with several doses of OTC analgesics.  She denies any associated fever, diaphoresis, vomiting.  She denies hitting her head during the incident.      ROS:   See HPI for pertinent review of systems      Review of patient's allergies indicates:  No Known Allergies    PMH:  As per HPI and below:  Past Medical History:   Diagnosis Date    Alcohol abuse, continuous 10/03/2024    ETOH abuse     Hyperlipemia     Hypertension     Unspecified cirrhosis of liver        Past Surgical History:   Procedure Laterality Date    BREAST LUMPECTOMY Right     benign    TUBAL LIGATION         Social History[1]    Physical Exam:      Nursing note and vitals reviewed.  BP (!) 159/81   Pulse 63   Temp 97.4 °F (36.3 °C) (Oral)   Resp 18   Wt 83.9 kg (185 lb)   SpO2 99%   BMI 32.77 kg/m²     Constitutional: No distress.  Eyes: EOMI. No discharge. Anicteric.  HENT:   Neck: Normal range of motion. Neck supple.  Cardiovascular: Normal rate. No murmur, no gallop and no friction rub heard.   Pulmonary/Chest: No respiratory distress. Effort normal. No wheezes, no rales, no rhonchi.  No crepitus.  Faint ecchymosis and focal tenderness at left lower chest wall in midaxillary line  Abdominal: Bowel sounds normal. Soft. No distension and no mass. There is no tenderness. There is no rebound, no guarding, no tenderness at McBurney's point.  Neurological: GCS 15. Alert and oriented to person, place, and time. No gross " cranial nerve, light touch or strength deficit. Coordination normal.   Skin: Skin is warm and dry.   EXT: 2+ radial pulses.         Medical Decision Making / Independent Interpretations / External Records Reviewed:      Patient is a 64 y.o. female with hypertension, cirrhosis, chronic alcohol abuse, frequent presentations to the emergency department for acute alcohol intoxication who presents for evaluation of left chest wall pain after mechanical trip and fall while at a bar about a week ago.  She act when she fell, but does not recall what.  On exam, patient well-appearing, with no clinical intoxication, has left chest wall tenderness with very faint ecchymosis at the lower left chest wall in the mid axillary line, no crepitus.   The initial differential included chest wall contusion, pneumothorax, rib fracture, hemothorax.      ED Course as of 05/22/25 2122   Thu May 15, 2025   0915 I independently interpreted CXR which shows no pneumothorax, no focal consolidation, no cardiomegaly, no acute process.    --  I discussed with pt and/or guardian/caretaker that this evaluation in the ED does not suggest any emergent or life threatening medical condition requiring admission or further immediate intervention or diagnostics. Regardless, an unremarkable evaluation in the ED does not preclude the development or presence of a serious or life threatening condition. Pt was instructed to return for any worsening, new, changed, or concerning symptoms.     I had a detailed discussion with patient and/or guardian/caretaker regarding findings, plan, return precautions, importance of medication adherence, need to follow-up with a PCP and specialist. All questions answered.     Note was created using voice recognition software. It may have occasional typographical errors not identified and edited despite initial review prior to signing.   [RC]      ED Course User Index  [RC] Antonio Mitchell MD              Procedures    --  I  decided to obtain the patient's medical records. I reviewed patient's prior external notes / results:  PCP note and pharmacy / prescription records.   --  Additional Medical Decision Making: Prescription drug management    Pt seen at a time of critical national shortage of IV fluids, affecting decision making and treatment considerations.       Medications   naproxen tablet 500 mg (500 mg Oral Given 5/15/25 0819)   methocarbamoL tablet 750 mg (750 mg Oral Given 5/15/25 0819)   HYDROcodone-acetaminophen 5-325 mg per tablet 1 tablet (1 tablet Oral Given 5/15/25 0943)              Future Appointments   Date Time Provider Department Center   5/23/2025  9:00 AM PRE-ADMIT DIRECT ACCESS PATIENTS 2, ENDO -[facility] ENDOPP4 [facility]   6/2/2025  2:00 PM [facility] CT OP LIMIT 450 LBS [facility] CTSCANO [facility]   6/23/2025  8:00 AM [facility] MAMMO1 [facility] MAMMO [facility]   7/3/2025  8:00 AM Zainab Milian MD [facility]          Diagnostic Impression:    1. Contusion of left chest wall, initial encounter    2. Chest wall pain             ED Disposition Condition    Discharge Good          ED Prescriptions       Medication Sig Dispense Start Date End Date Auth. Provider    naproxen (NAPROSYN) 500 MG tablet Take 1 tablet (500 mg total) by mouth 2 (two) times daily as needed (pain). 28 tablet 5/15/2025 5/29/2025 Antonio Mitchell MD    LIDOcaine (LIDODERM) 5 % Apply to affected area. Use as directed. May use 4% lidocaine patch as alternative. Patient not taking:  Reported on 5/21/2025 15 patch 5/15/2025 -- Antonio Mitchell MD    diclofenac sodium (VOLTAREN) 1 % Gel Apply 2 g topically 3 (three) times daily as needed (muscle spasm pain). Apply 2 grams to affected area 3 times daily as needed Patient not taking:  Reported on 5/21/2025 100 g 5/15/2025 -- Antonio Mitchell MD    methocarbamoL (ROBAXIN) 750 MG Tab (Expires today) Take 1 tablet (750 mg total) by mouth 3 (three) times daily as needed (Muscle spasm  pain). 21 tablet 5/15/2025 5/22/2025 Antonio Mitchell MD          Follow-up Information       Follow up With Specialties Details Why Contact Info    Zainab Milian MD Internal Medicine Schedule an appointment as soon as possible for a visit  For recheck with your primary care doctor [address]  [address]  [facility] [address]  [phone]                   [1]   Social History  Tobacco Use    Smoking status: Some Days     Current packs/day: 0.50     Average packs/day: 0.5 packs/day for 50.4 years (25.2 ttl pk-yrs)     Types: Cigarettes     Start date: 1975    Smokeless tobacco: Never   Substance Use Topics    Alcohol use: Yes     Alcohol/week: 42.0 standard drinks of alcohol     Types: 42 Standard drinks or equivalent per week     Comment: heavy drinker    Drug use: No        Antonio Mitchell MD  05/22/25 2123

## 2025-05-24 ENCOUNTER — HOSPITAL ENCOUNTER (EMERGENCY)
Facility: OTHER | Age: 65
Discharge: HOME | End: 2025-05-24
Payer: MEDICARE

## 2025-05-24 VITALS
HEART RATE: 93 BPM | TEMPERATURE: 98 F | WEIGHT: 183 LBS | RESPIRATION RATE: 16 BRPM | DIASTOLIC BLOOD PRESSURE: 78 MMHG | OXYGEN SATURATION: 96 % | BODY MASS INDEX: 32.43 KG/M2 | SYSTOLIC BLOOD PRESSURE: 161 MMHG | HEIGHT: 63 IN

## 2025-05-24 DIAGNOSIS — M25.561 CHRONIC PAIN OF BOTH KNEES: Primary | ICD-10-CM

## 2025-05-24 DIAGNOSIS — F10.10 ALCOHOL ABUSE: ICD-10-CM

## 2025-05-24 DIAGNOSIS — M25.562 CHRONIC PAIN OF BOTH KNEES: Primary | ICD-10-CM

## 2025-05-24 DIAGNOSIS — G89.29 CHRONIC PAIN OF BOTH KNEES: Primary | ICD-10-CM

## 2025-05-24 DIAGNOSIS — F14.10 COCAINE ABUSE: ICD-10-CM

## 2025-05-24 DIAGNOSIS — R42 LIGHTHEADEDNESS: ICD-10-CM

## 2025-05-24 LAB
ABSOLUTE EOSINOPHIL (OHS): 0.38 K/UL
ABSOLUTE MONOCYTE (OHS): 0.45 K/UL (ref 0.3–1)
ABSOLUTE NEUTROPHIL COUNT (OHS): 5.51 K/UL (ref 1.8–7.7)
ALBUMIN SERPL BCP-MCNC: 4.3 G/DL (ref 3.5–5.2)
ALP SERPL-CCNC: 60 UNIT/L (ref 40–150)
ALT SERPL W/O P-5'-P-CCNC: 26 UNIT/L (ref 10–44)
AMPHET UR QL SCN: NEGATIVE
ANION GAP (OHS): 14 MMOL/L (ref 8–16)
AST SERPL-CCNC: 22 UNIT/L (ref 11–45)
BARBITURATE SCN PRESENT UR: NEGATIVE
BASOPHILS # BLD AUTO: 0.02 K/UL
BASOPHILS NFR BLD AUTO: 0.3 %
BENZODIAZ UR QL SCN: NEGATIVE
BILIRUB SERPL-MCNC: 0.5 MG/DL (ref 0.1–1)
BILIRUB UR QL STRIP.AUTO: NEGATIVE
BNP SERPL-MCNC: <10 PG/ML (ref 0–99)
BUN SERPL-MCNC: 13 MG/DL (ref 8–23)
CALCIUM SERPL-MCNC: 9.1 MG/DL (ref 8.7–10.5)
CANNABINOIDS UR QL SCN: NEGATIVE
CHLORIDE SERPL-SCNC: 102 MMOL/L (ref 95–110)
CLARITY UR: CLEAR
CO2 SERPL-SCNC: 23 MMOL/L (ref 23–29)
COCAINE UR QL SCN: ABNORMAL
COLOR UR AUTO: YELLOW
CREAT SERPL-MCNC: 0.7 MG/DL (ref 0.5–1.4)
CREAT UR-MCNC: 259.9 MG/DL (ref 15–325)
ERYTHROCYTE [DISTWIDTH] IN BLOOD BY AUTOMATED COUNT: 14 % (ref 11.5–14.5)
ETHANOL SERPL-MCNC: <10 MG/DL
GFR SERPLBLD CREATININE-BSD FMLA CKD-EPI: >60 ML/MIN/1.73/M2
GLUCOSE SERPL-MCNC: 123 MG/DL (ref 70–110)
GLUCOSE UR QL STRIP: NEGATIVE
HCT VFR BLD AUTO: 45.4 % (ref 37–48.5)
HGB BLD-MCNC: 14.8 GM/DL (ref 12–16)
HGB UR QL STRIP: NEGATIVE
HOLD SPECIMEN: NORMAL
IMM GRANULOCYTES # BLD AUTO: 0.03 K/UL (ref 0–0.04)
IMM GRANULOCYTES NFR BLD AUTO: 0.4 % (ref 0–0.5)
KETONES UR QL STRIP: ABNORMAL
LEUKOCYTE ESTERASE UR QL STRIP: NEGATIVE
LYMPHOCYTES # BLD AUTO: 1.23 K/UL (ref 1–4.8)
MCH RBC QN AUTO: 27.1 PG (ref 27–31)
MCHC RBC AUTO-ENTMCNC: 32.6 G/DL (ref 32–36)
MCV RBC AUTO: 83 FL (ref 82–98)
METHADONE UR QL SCN: NEGATIVE
NITRITE UR QL STRIP: NEGATIVE
NUCLEATED RBC (/100WBC) (OHS): 0 /100 WBC
OPIATES UR QL SCN: NEGATIVE
PCP UR QL: NEGATIVE
PH UR STRIP: 6 [PH]
PLATELET # BLD AUTO: 243 K/UL (ref 150–450)
PMV BLD AUTO: 9.9 FL (ref 9.2–12.9)
POCT GLUCOSE: 122 MG/DL (ref 70–110)
POTASSIUM SERPL-SCNC: 3.9 MMOL/L (ref 3.5–5.1)
PROT SERPL-MCNC: 8.2 GM/DL (ref 6–8.4)
PROT UR QL STRIP: ABNORMAL
RBC # BLD AUTO: 5.46 M/UL (ref 4–5.4)
RELATIVE EOSINOPHIL (OHS): 5 %
RELATIVE LYMPHOCYTE (OHS): 16.1 % (ref 18–48)
RELATIVE MONOCYTE (OHS): 5.9 % (ref 4–15)
RELATIVE NEUTROPHIL (OHS): 72.3 % (ref 38–73)
SODIUM SERPL-SCNC: 139 MMOL/L (ref 136–145)
SP GR UR STRIP: 1.02
TROPONIN I SERPL DL<=0.01 NG/ML-MCNC: <0.006 NG/ML
UROBILINOGEN UR STRIP-ACNC: NEGATIVE EU/DL
WBC # BLD AUTO: 7.62 K/UL (ref 3.9–12.7)

## 2025-05-24 PROCEDURE — 85025 COMPLETE CBC W/AUTO DIFF WBC: CPT | Performed by: NURSE PRACTITIONER

## 2025-05-24 PROCEDURE — 80053 COMPREHEN METABOLIC PANEL: CPT | Performed by: NURSE PRACTITIONER

## 2025-05-24 PROCEDURE — 99285 EMERGENCY DEPT VISIT HI MDM: CPT | Mod: 25

## 2025-05-24 PROCEDURE — 81003 URINALYSIS AUTO W/O SCOPE: CPT | Performed by: NURSE PRACTITIONER

## 2025-05-24 PROCEDURE — 82962 GLUCOSE BLOOD TEST: CPT

## 2025-05-24 PROCEDURE — 25000003 PHARM REV CODE 250

## 2025-05-24 PROCEDURE — 83880 ASSAY OF NATRIURETIC PEPTIDE: CPT | Performed by: NURSE PRACTITIONER

## 2025-05-24 PROCEDURE — 84484 ASSAY OF TROPONIN QUANT: CPT | Performed by: NURSE PRACTITIONER

## 2025-05-24 PROCEDURE — 80307 DRUG TEST PRSMV CHEM ANLYZR: CPT

## 2025-05-24 PROCEDURE — 82077 ASSAY SPEC XCP UR&BREATH IA: CPT

## 2025-05-24 PROCEDURE — 96360 HYDRATION IV INFUSION INIT: CPT

## 2025-05-24 RX ORDER — NAPROXEN 500 MG/1
500 TABLET ORAL
Status: COMPLETED | OUTPATIENT
Start: 2025-05-24 | End: 2025-05-24

## 2025-05-24 RX ADMIN — NAPROXEN 500 MG: 500 TABLET ORAL at 09:05

## 2025-05-24 RX ADMIN — SODIUM CHLORIDE 1000 ML: 0.9 INJECTION, SOLUTION INTRAVENOUS at 08:05

## 2025-05-24 NOTE — FIRST PROVIDER EVALUATION
Emergency Department TeleTriage Encounter Note      CHIEF COMPLAINT    Chief Complaint   Patient presents with    Dizziness     Pt reporting lightheadedness upon awakening at 0800. [facility]-   No limb drift, dizziness, slurred speech. Last drink yesterday        VITAL SIGNS   Initial Vitals [05/24/25 1532]   BP Pulse Resp Temp SpO2   113/75 69 19 97.9 °F (36.6 °C) 97 %      MAP       --            ALLERGIES    Review of patient's allergies indicates:  No Known Allergies    PROVIDER TRIAGE NOTE  Verbal consent for the teletriage evaluation was given by the patient at the start of the evaluation.  All efforts will be made to maintain patient's privacy during the evaluation.      This is a teletriage evaluation of a 64 y.o. female presenting to the ED with c/o dizziness and SOB that started at 8 am. Limited physical exam via telehealth: The patient is awake, alert, answering questions appropriately and is not in respiratory distress.  As the Teletriage provider, I performed an initial assessment and ordered appropriate labs and imaging studies, if any, to facilitate the patient's care once placed in the ED. Once a room is available, care and a full evaluation will be completed by an alternate ED provider.  Any additional orders and the final disposition will be determined by that provider.  All imaging and labs will not be followed-up by the Teletriage Team, including myself.          ORDERS  Labs Reviewed - No data to display    ED Orders (720h ago, onward)      Start Ordered     Status Ordering Provider    05/24/25 1605 05/24/25 1604  Urinalysis, Reflex to Urine Culture Urine, Clean Catch  STAT         Ordered FISH JOY    05/24/25 1605 05/24/25 1604  B-Type natriuretic peptide (BNP)  STAT         Ordered FISH JOY    05/24/25 1605 05/24/25 1604  Troponin I  STAT         Ordered FISH JOY    05/24/25 1604 05/24/25 1604  Saline lock IV  Once         Ordered FISH JOY    05/24/25 1604 05/24/25 1604  Pulse  Oximetry Continuous  Continuous         Ordered FISH JOY    05/24/25 1604 05/24/25 1604  Cardiac Monitoring - Adult  Continuous        Comments: Notify Physician If:    Ordered FISH JOY    05/24/25 1604 05/24/25 1604  EKG 12-lead  Once         Ordered FISH JOY    05/24/25 1604 05/24/25 1604  CBC auto differential  STAT         Ordered FISH JOY    05/24/25 1604 05/24/25 1604  Comprehensive metabolic panel  STAT         Ordered FISH JOY    05/24/25 1604 05/24/25 1604  POCT glucose  Once         Ordered FISH JOY    05/24/25 1604 05/24/25 1604  X-Ray Chest PA And Lateral  1 time imaging         Ordered FISH JOY              Virtual Visit Note: The provider triage portion of this emergency department evaluation and documentation was performed via [employer], a HIPAA-compliant telemedicine application, in concert with a tele-presenter in the room. A face to face patient evaluation with one of my colleagues will occur once the patient is placed in an emergency department room.      DISCLAIMER: This note was prepared with [employer] voice recognition transcription software. Garbled syntax, mangled pronouns, and other bizarre constructions may be attributed to that software system.

## 2025-05-25 ENCOUNTER — HOSPITAL ENCOUNTER (EMERGENCY)
Facility: OTHER | Age: 65
Discharge: HOME | End: 2025-05-26
Attending: EMERGENCY MEDICINE
Payer: MEDICARE

## 2025-05-25 DIAGNOSIS — R55 SYNCOPE: Primary | ICD-10-CM

## 2025-05-25 LAB
ABSOLUTE EOSINOPHIL (OHS): 0.49 K/UL
ABSOLUTE MONOCYTE (OHS): 0.45 K/UL (ref 0.3–1)
ABSOLUTE NEUTROPHIL COUNT (OHS): 1.83 K/UL (ref 1.8–7.7)
ALBUMIN SERPL BCP-MCNC: 3 G/DL (ref 3.5–5.2)
ALP SERPL-CCNC: 54 UNIT/L (ref 40–150)
ALT SERPL W/O P-5'-P-CCNC: 16 UNIT/L (ref 10–44)
ANION GAP (OHS): 7 MMOL/L (ref 8–16)
AST SERPL-CCNC: 14 UNIT/L (ref 11–45)
BASOPHILS # BLD AUTO: 0.03 K/UL
BASOPHILS NFR BLD AUTO: 0.6 %
BILIRUB SERPL-MCNC: 0.2 MG/DL (ref 0.1–1)
BUN SERPL-MCNC: 12 MG/DL (ref 8–23)
CALCIUM SERPL-MCNC: 7.2 MG/DL (ref 8.7–10.5)
CHLORIDE SERPL-SCNC: 115 MMOL/L (ref 95–110)
CO2 SERPL-SCNC: 19 MMOL/L (ref 23–29)
CREAT SERPL-MCNC: 0.6 MG/DL (ref 0.5–1.4)
ERYTHROCYTE [DISTWIDTH] IN BLOOD BY AUTOMATED COUNT: 14 % (ref 11.5–14.5)
GFR SERPLBLD CREATININE-BSD FMLA CKD-EPI: >60 ML/MIN/1.73/M2
GLUCOSE SERPL-MCNC: 106 MG/DL (ref 70–110)
HCT VFR BLD AUTO: 41 % (ref 37–48.5)
HGB BLD-MCNC: 13.4 GM/DL (ref 12–16)
IMM GRANULOCYTES # BLD AUTO: 0.01 K/UL (ref 0–0.04)
IMM GRANULOCYTES NFR BLD AUTO: 0.2 % (ref 0–0.5)
LYMPHOCYTES # BLD AUTO: 2.64 K/UL (ref 1–4.8)
MAGNESIUM SERPL-MCNC: 1.5 MG/DL (ref 1.6–2.6)
MAGNESIUM SERPL-MCNC: 1.9 MG/DL (ref 1.6–2.6)
MCH RBC QN AUTO: 27.3 PG (ref 27–31)
MCHC RBC AUTO-ENTMCNC: 32.7 G/DL (ref 32–36)
MCV RBC AUTO: 84 FL (ref 82–98)
NUCLEATED RBC (/100WBC) (OHS): 0 /100 WBC
PLATELET # BLD AUTO: 243 K/UL (ref 150–450)
PMV BLD AUTO: 10.1 FL (ref 9.2–12.9)
POTASSIUM SERPL-SCNC: 2.8 MMOL/L (ref 3.5–5.1)
PROT SERPL-MCNC: 5.9 GM/DL (ref 6–8.4)
RBC # BLD AUTO: 4.9 M/UL (ref 4–5.4)
RELATIVE EOSINOPHIL (OHS): 9 %
RELATIVE LYMPHOCYTE (OHS): 48.4 % (ref 18–48)
RELATIVE MONOCYTE (OHS): 8.3 % (ref 4–15)
RELATIVE NEUTROPHIL (OHS): 33.5 % (ref 38–73)
SODIUM SERPL-SCNC: 141 MMOL/L (ref 136–145)
TROPONIN I SERPL DL<=0.01 NG/ML-MCNC: <0.006 NG/ML
WBC # BLD AUTO: 5.45 K/UL (ref 3.9–12.7)

## 2025-05-25 PROCEDURE — 93005 ELECTROCARDIOGRAM TRACING: CPT

## 2025-05-25 PROCEDURE — 82040 ASSAY OF SERUM ALBUMIN: CPT | Performed by: EMERGENCY MEDICINE

## 2025-05-25 PROCEDURE — 84484 ASSAY OF TROPONIN QUANT: CPT | Performed by: EMERGENCY MEDICINE

## 2025-05-25 PROCEDURE — 96361 HYDRATE IV INFUSION ADD-ON: CPT

## 2025-05-25 PROCEDURE — 85025 COMPLETE CBC W/AUTO DIFF WBC: CPT | Performed by: EMERGENCY MEDICINE

## 2025-05-25 PROCEDURE — 25000003 PHARM REV CODE 250: Performed by: EMERGENCY MEDICINE

## 2025-05-25 PROCEDURE — 99285 EMERGENCY DEPT VISIT HI MDM: CPT | Mod: 25

## 2025-05-25 PROCEDURE — 96360 HYDRATION IV INFUSION INIT: CPT

## 2025-05-25 PROCEDURE — 93010 ELECTROCARDIOGRAM REPORT: CPT | Mod: ,,, | Performed by: INTERNAL MEDICINE

## 2025-05-25 PROCEDURE — 83735 ASSAY OF MAGNESIUM: CPT | Performed by: EMERGENCY MEDICINE

## 2025-05-25 RX ORDER — SODIUM CHLORIDE 9 MG/ML
500 INJECTION, SOLUTION INTRAVENOUS
Status: COMPLETED | OUTPATIENT
Start: 2025-05-25 | End: 2025-05-26

## 2025-05-25 RX ORDER — MAGNESIUM SULFATE 1 G/100ML
1 INJECTION INTRAVENOUS ONCE
Status: DISCONTINUED | OUTPATIENT
Start: 2025-05-26 | End: 2025-05-25

## 2025-05-25 RX ADMIN — SODIUM CHLORIDE 500 ML: 9 INJECTION, SOLUTION INTRAVENOUS at 10:05

## 2025-05-25 NOTE — ED TRIAGE NOTES
Pt presents to the ER with complaints of lightheadedness and dizziness since waking this morning; no additional neuro complaints. Pt also reporting bilateral knee pain.

## 2025-05-25 NOTE — DISCHARGE INSTRUCTIONS
Diagnosis:  Lightheadedness    Tests today showed:   Labs Reviewed   COMPREHENSIVE METABOLIC PANEL - Abnormal       Result Value    Sodium 139      Potassium 3.9      Chloride 102      CO2 23      Glucose 123 (*)     BUN 13      Creatinine 0.7      Calcium 9.1      Protein Total 8.2      Albumin 4.3      Bilirubin Total 0.5      ALP 60      AST 22      ALT 26      Anion Gap 14      eGFR >60     URINALYSIS, REFLEX TO URINE CULTURE - Abnormal    Color, UA Yellow      Appearance, UA Clear      pH, UA 6.0      Spec Grav UA 1.025      Protein, UA Trace (*)     Glucose, UA Negative      Ketones, UA 1+ (*)     Bilirubin, UA Negative      Blood, UA Negative      Nitrites, UA Negative      Urobilinogen, UA Negative      Leukocyte Esterase, UA Negative     CBC WITH DIFFERENTIAL - Abnormal    WBC 7.62      RBC 5.46 (*)     HGB 14.8      HCT 45.4      MCV 83      MCH 27.1      MCHC 32.6      RDW 14.0      Platelet Count 243      MPV 9.9      Nucleated RBC 0      Neut % 72.3      Lymph % 16.1 (*)     Mono % 5.9      Eos % 5.0      Basophil % 0.3      Imm Grans % 0.4      Neut # 5.51      Lymph # 1.23      Mono # 0.45      Eos # 0.38      Baso # 0.02      Imm Grans # 0.03     DRUG SCREEN PANEL, URINE EMERGENCY - Abnormal    Benzodiazepine, Urine Negative      Methadone, Urine Negative      Cocaine, Urine Presumptive Positive (*)     Opiates, Urine Negative      Barbiturates, Urine Negative      Amphetamines, Urine Negative      THC Negative      Phencyclidine, Urine Negative      Urine Creatinine 259.9      Narrative:     This screen includes the following classes of drugs at the listed cut-off:     Benzodiazepines:        200 ng/ml   Methadone:              300 ng/ml   Cocaine metabolite:     300 ng/ml   Opiates:                300 ng/ml   Barbiturates:           200 ng/ml   Amphetamines:           1000 ng/ml   Marijuana metabs (THC): 50 ng/ml   Phencyclidine (PCP):    25 ng/ml     This is a screening test. If results do not  "correlate with clinical presentation, then a confirmatory send out test is advised.    This report is intended for use in clinical monitoring and management of patients. It is not intended for use in employment related drug testing."   POCT GLUCOSE - Abnormal    POCT Glucose 122 (*)    B-TYPE NATRIURETIC PEPTIDE - Normal    BNP <10     TROPONIN I - Normal    Troponin-I <0.006     ALCOHOL,MEDICAL (ETHANOL) - Normal    Alcohol, Serum <10     CBC W/ AUTO DIFFERENTIAL    Narrative:     The following orders were created for panel order CBC auto differential.  Procedure                               Abnormality         Status                     ---------                               -----------         ------                     CBC with Differential[[id number]]       Abnormal            Final result                 Please view results for these tests on the individual orders.   GREY TOP URINE HOLD    Extra Tube Hold for add-ons.     POCT GLUCOSE MONITORING CONTINUOUS     X-Ray Chest PA And Lateral   Final Result      No acute abnormality.         Electronically signed by: Chino Wheeler   Date:    05/24/2025   Time:    18:06          Treatments you had today:   Medications   sodium chloride 0.9% bolus 1,000 mL 1,000 mL (1,000 mLs Intravenous New Bag 5/24/25 2045)   naproxen tablet 500 mg (500 mg Oral Given 5/24/25 2104)       Follow-Up Plan:  - Follow-up with primary care doctor within 3 - 5 days  - Additional testing and/or evaluation as directed by your primary doctor    Return to the Emergency Department for symptoms including but not limited to: worsening symptoms, shortness of breath or chest pain, vomiting with inability to hold down fluids, fevers greater than 100.4°F, passing out/fainting/unconsciousness, or other concerning symptoms.    "

## 2025-05-25 NOTE — ED PROVIDER NOTES
Encounter Date: 5/24/2025       History     Chief Complaint   Patient presents with    Dizziness     Pt reporting lightheadedness upon awakening at 0800. NIH-   No limb drift, dizziness, slurred speech. Last drink yesterday      64-year-old female with a history of alcohol abuse is presenting to the emergency department complaining of lightheadedness.  Feels as though she is going to pass out.  No loss of consciousness, no recent head injury or trauma.  Also complaining of chronic bilateral knee pain.  States this is impairing her ability to walk.  Earlier this year was seen at pain management and supposed to follow up in 1 month but did not.  Also has a referral to Orthopedics but did not follow up.  Denies any fevers, chest pain, shortness of breath, nausea, vomiting, diarrhea.    The history is provided by the patient.     Review of patient's allergies indicates:  No Known Allergies  Past Medical History:   Diagnosis Date    Alcohol abuse, continuous 10/03/2024    ETOH abuse     Hyperlipemia     Hypertension     Unspecified cirrhosis of liver      Past Surgical History:   Procedure Laterality Date    BREAST LUMPECTOMY Right     benign    TUBAL LIGATION       Family History   Problem Relation Name Age of Onset    Lung cancer Mother       Social History[1]  Review of Systems    Physical Exam     Initial Vitals [05/24/25 1532]   BP Pulse Resp Temp SpO2   113/75 69 19 97.9 °F (36.6 °C) 97 %      MAP       --         Physical Exam    Nursing note and vitals reviewed.  Constitutional: She is not diaphoretic. No distress.   HENT:   Head: Normocephalic and atraumatic.   Eyes: Conjunctivae are normal.   Cardiovascular:  Normal rate and regular rhythm.           Pulmonary/Chest: No respiratory distress. She has no wheezes. She has no rhonchi. She has no rales.   Abdominal: Abdomen is soft. She exhibits no distension. There is no abdominal tenderness. There is no rebound and no guarding.   Musculoskeletal:         General:  No tenderness or edema. Normal range of motion.     Neurological: She is alert and oriented to person, place, and time. She has normal strength. No cranial nerve deficit or sensory deficit.   Normal gait.  No dysmetria with finger-to-nose or heel-to-shin.  No pronator drift.  No nystagmus.   Skin: Skin is warm and dry. No erythema.   Psychiatric: She has a normal mood and affect. Thought content normal.         ED Course   Procedures  Labs Reviewed   COMPREHENSIVE METABOLIC PANEL - Abnormal       Result Value    Sodium 139      Potassium 3.9      Chloride 102      CO2 23      Glucose 123 (*)     BUN 13      Creatinine 0.7      Calcium 9.1      Protein Total 8.2      Albumin 4.3      Bilirubin Total 0.5      ALP 60      AST 22      ALT 26      Anion Gap 14      eGFR >60     URINALYSIS, REFLEX TO URINE CULTURE - Abnormal    Color, UA Yellow      Appearance, UA Clear      pH, UA 6.0      Spec Grav UA 1.025      Protein, UA Trace (*)     Glucose, UA Negative      Ketones, UA 1+ (*)     Bilirubin, UA Negative      Blood, UA Negative      Nitrites, UA Negative      Urobilinogen, UA Negative      Leukocyte Esterase, UA Negative     CBC WITH DIFFERENTIAL - Abnormal    WBC 7.62      RBC 5.46 (*)     HGB 14.8      HCT 45.4      MCV 83      MCH 27.1      MCHC 32.6      RDW 14.0      Platelet Count 243      MPV 9.9      Nucleated RBC 0      Neut % 72.3      Lymph % 16.1 (*)     Mono % 5.9      Eos % 5.0      Basophil % 0.3      Imm Grans % 0.4      Neut # 5.51      Lymph # 1.23      Mono # 0.45      Eos # 0.38      Baso # 0.02      Imm Grans # 0.03     DRUG SCREEN PANEL, URINE EMERGENCY - Abnormal    Benzodiazepine, Urine Negative      Methadone, Urine Negative      Cocaine, Urine Presumptive Positive (*)     Opiates, Urine Negative      Barbiturates, Urine Negative      Amphetamines, Urine Negative      THC Negative      Phencyclidine, Urine Negative      Urine Creatinine 259.9      Narrative:     This screen includes the  "following classes of drugs at the listed cut-off:     Benzodiazepines:        200 ng/ml   Methadone:              300 ng/ml   Cocaine metabolite:     300 ng/ml   Opiates:                300 ng/ml   Barbiturates:           200 ng/ml   Amphetamines:           1000 ng/ml   Marijuana metabs (THC): 50 ng/ml   Phencyclidine (PCP):    25 ng/ml     This is a screening test. If results do not correlate with clinical presentation, then a confirmatory send out test is advised.    This report is intended for use in clinical monitoring and management of patients. It is not intended for use in employment related drug testing."   POCT GLUCOSE - Abnormal    POCT Glucose 122 (*)    B-TYPE NATRIURETIC PEPTIDE - Normal    BNP <10     TROPONIN I - Normal    Troponin-I <0.006     ALCOHOL,MEDICAL (ETHANOL) - Normal    Alcohol, Serum <10     CBC W/ AUTO DIFFERENTIAL    Narrative:     The following orders were created for panel order CBC auto differential.  Procedure                               Abnormality         Status                     ---------                               -----------         ------                     CBC with Differential[[id number]]       Abnormal            Final result                 Please view results for these tests on the individual orders.   GREY TOP URINE HOLD    Extra Tube Hold for add-ons.     POCT GLUCOSE MONITORING CONTINUOUS     EKG Readings: (Independently Interpreted)   Initial Reading: No STEMI. Previous EKG: Compared with most recent EKG Rhythm: Normal Sinus Rhythm. Heart Rate: 67. ST Segments: Normal ST Segments. T Waves Flipped: V6, V5, V4, III, II and AVF.   Chronic T-wave inversions.       Imaging Results              X-Ray Chest PA And Lateral (Final result)  Result time 05/24/25 18:06:13      Final result by Chino Rojas MD (05/24/25 18:06:13)                   Impression:      No acute abnormality.      Electronically signed by: Chino Rojas  Date:    05/24/2025  Time:    18:06    "            Narrative:    EXAMINATION:  XR CHEST PA AND LATERAL    CLINICAL HISTORY:  SOB;    TECHNIQUE:  PA and lateral views of the chest were performed.    COMPARISON:  05/15/2025    FINDINGS:  The lungs are clear, with normal appearance of pulmonary vasculature and no pleural effusion or pneumothorax.    The cardiac silhouette is normal in size. The hilar and mediastinal contours are unremarkable.    Bones are intact.                                       Medications   sodium chloride 0.9% bolus 1,000 mL 1,000 mL (0 mLs Intravenous Stopped 5/24/25 2145)   naproxen tablet 500 mg (500 mg Oral Given 5/24/25 2104)     Medical Decision Making  64-year-old female with a history of substance abuse, alcohol abuse is presenting to the emergency department complaining of lightheadedness.  Presents afebrile, nontoxic appearing and hemodynamically stable.  Cardiac evaluation was ordered at triage.  EKG without acute ischemic changes and she is not having any chest pain, shortness of breath.    Also complaining of chronic knee pain.  Seen by pain management in January, supposed to follow up but did not.  Also had referral in for Orthopedics.  No evidence of septic arthritis.  No history of gout.  Suspect acute on chronic osteoarthritis.  Will treat with naproxen here.  Counseled regarding need for outpatient follow up for further management.    In regards to lightheadedness, she is not orthostatic.  Urine drug screen reveals cocaine use.  Ethanol within normal limits here today.  No evidence of acute alcohol withdrawal.  No acute focal neurologic deficit.  Treating with IV fluids.     See ED course.        Amount and/or Complexity of Data Reviewed  Labs: ordered. Decision-making details documented in ED Course.  Radiology: ordered and independent interpretation performed. Decision-making details documented in ED Course.     Details: CXR shows no acute disease per my independent interpretation.  ECG/medicine tests: ordered and  independent interpretation performed. Decision-making details documented in ED Course.    Risk  Prescription drug management.               ED Course as of 05/24/25 2233   Sat May 24, 2025   2054 Cocaine, Urine(!): Presumptive Positive [KL]   2054 Urinalysis, Reflex to Urine Culture Urine, Clean Catch(!)  No evidence of infection in urine. [KL]   2055 Comprehensive metabolic panel(!)  CBC is grossly unremarkable.  No leukocytosis. No anemia.  [KL]   2138 Alcohol, Serum: <10 [KL]   2144 BP(!): 162/70 [KL]   2144 BP(!): 161/78  Hypertensive.  Low suspicion for orthostatic hypotension.  Suspect substance abuse contributing to feeling of lightheadedness.  Instructed to follow up with the primary care physician pending recheck of her blood pressure.  Instructed to continue all of her normal home medications.  No evidence of end-organ damage related to her elevated blood pressure.  She has no focal neurologic deficit. [KL]   2225 After IV fluids patient is feeling improved.    All results were discussed with patient. Strict ED precautions and return instructions were discussed. All questions were answered. Instructed to follow up with primary care doctor for re-evaluation. Stable for discharge and outpatient follow up.   [KL]      ED Course User Index  [KL] Ashia Sagastume MD                           Clinical Impression:  Final diagnoses:  [M25.561, M25.562, G89.29] Chronic pain of both knees (Primary)  [F14.10] Cocaine abuse  [F10.10] Alcohol abuse  [R42] Lightheadedness          ED Disposition Condition    Discharge Stable          ED Prescriptions    None       Follow-up Information       Follow up With Specialties Details Why Contact Info    [facility] - Emergency Dept Emergency Medicine Go to  As needed, If symptoms worsen [address] [facility] [address] [phone]    Zainab Milian MD Internal Medicine Schedule an appointment as soon as possible for a visit in 2 days  [address] [facility]  [address]  [facility] [address]  [phone]            Launch MDCalc MDM  MDCalc MDM Module  May 24 2025 10:32 PM [Ashia Sagastume]  Data:  - Independent interpretation: I independently reviewed the XR Chest PA+Lat. It showed no acute abnormality. See MDM section and/or ED Course for my interpretation. [Ashia Sagastume]  - Test/documents/historian: 3+ tests ordered  Problems: Alcohol abuse, cocaine abuse (high)  Risk: NaCl bolus (Rx drug management)             [1]   Social History  Tobacco Use    Smoking status: Some Days     Current packs/day: 0.50     Average packs/day: 0.5 packs/day for 50.4 years (25.2 ttl pk-yrs)     Types: Cigarettes     Start date: 1975    Smokeless tobacco: Never   Substance Use Topics    Alcohol use: Yes     Alcohol/week: 42.0 standard drinks of alcohol     Types: 42 Standard drinks or equivalent per week     Comment: heavy drinker    Drug use: Yes     Types: Cocaine        Ashia Sagastume MD  05/24/25 4049

## 2025-05-25 NOTE — ED NOTES
LOC: The patient is awake, alert, and oriented to self, place, time, and situation. Pt is calm and cooperative. Affect is appropriate.  Speech is appropriate and clear.     APPEARANCE: Patient resting on stretcher in no acute distress.  Patient is clean and well groomed.    SKIN: The skin is warm and dry; color consistent with ethnicity.  Patient has normal skin turgor and moist mucus membranes.  Skin intact; no breakdown or bruising noted.     MUSCULOSKELETAL: Patient moving upper extremities without difficulty but has limited mobility to the BLE due to bilateral knee pain; denies pain in the back.  Denies weakness.     RESPIRATORY: Airway is open and patent. Respirations spontaneous, even, easy, and non-labored.  Patient has a normal effort and rate.  No accessory muscle use noted. Denies cough.     CARDIAC:  Pt is hypertensive. Normal heart rate noted.  No peripheral edema noted. No complaints of chest pain.     ABDOMEN: Soft and non tender to palpation.  No distention noted. Pt denies abdominal pain; denies nausea, vomiting, diarrhea, or constipation.    NEUROLOGIC: Eyes open spontaneously.  Behavior appropriate to situation.  Follows commands; facial expression symmetrical.  Purposeful motor response noted; normal sensation in all extremities. Pt denies headache; reports lightheadedness and dizziness; denies visual disturbances; denies loss of balance; denies unilateral weakness.

## 2025-05-26 ENCOUNTER — HOSPITAL ENCOUNTER (EMERGENCY)
Facility: OTHER | Age: 65
Discharge: HOME | End: 2025-05-27
Payer: MEDICARE

## 2025-05-26 ENCOUNTER — RESULTS FOLLOW-UP (OUTPATIENT)
Dept: TRANSPLANT | Facility: CLINIC | Age: 65
End: 2025-05-26

## 2025-05-26 VITALS
HEART RATE: 65 BPM | RESPIRATION RATE: 18 BRPM | BODY MASS INDEX: 31.89 KG/M2 | HEIGHT: 63 IN | DIASTOLIC BLOOD PRESSURE: 89 MMHG | SYSTOLIC BLOOD PRESSURE: 157 MMHG | TEMPERATURE: 98 F | WEIGHT: 180 LBS

## 2025-05-26 VITALS
SYSTOLIC BLOOD PRESSURE: 160 MMHG | TEMPERATURE: 99 F | OXYGEN SATURATION: 98 % | DIASTOLIC BLOOD PRESSURE: 76 MMHG | RESPIRATION RATE: 16 BRPM | HEART RATE: 60 BPM

## 2025-05-26 DIAGNOSIS — R42 DIZZINESS: Primary | ICD-10-CM

## 2025-05-26 DIAGNOSIS — R42 VERTIGO: ICD-10-CM

## 2025-05-26 LAB
ABSOLUTE EOSINOPHIL (OHS): 0.32 K/UL
ABSOLUTE MONOCYTE (OHS): 0.36 K/UL (ref 0.3–1)
ABSOLUTE NEUTROPHIL COUNT (OHS): 1.49 K/UL (ref 1.8–7.7)
ALBUMIN SERPL BCP-MCNC: 3.7 G/DL (ref 3.5–5.2)
ALBUMIN SERPL BCP-MCNC: 3.9 G/DL (ref 3.5–5.2)
ALP SERPL-CCNC: 60 UNIT/L (ref 40–150)
ALP SERPL-CCNC: 64 UNIT/L (ref 40–150)
ALT SERPL W/O P-5'-P-CCNC: 18 UNIT/L (ref 10–44)
ALT SERPL W/O P-5'-P-CCNC: 20 UNIT/L (ref 10–44)
AMPHET UR QL SCN: NEGATIVE
ANION GAP (OHS): 12 MMOL/L (ref 8–16)
ANION GAP (OHS): 9 MMOL/L (ref 8–16)
AST SERPL-CCNC: 18 UNIT/L (ref 11–45)
AST SERPL-CCNC: 23 UNIT/L (ref 11–45)
BACTERIA #/AREA URNS AUTO: NORMAL /HPF
BARBITURATE SCN PRESENT UR: NEGATIVE
BASOPHILS # BLD AUTO: 0.05 K/UL
BASOPHILS NFR BLD AUTO: 1.1 %
BENZODIAZ UR QL SCN: NEGATIVE
BILIRUB SERPL-MCNC: 0.2 MG/DL (ref 0.1–1)
BILIRUB SERPL-MCNC: 0.2 MG/DL (ref 0.1–1)
BILIRUB UR QL STRIP.AUTO: NEGATIVE
BUN SERPL-MCNC: 13 MG/DL (ref 8–23)
BUN SERPL-MCNC: 13 MG/DL (ref 8–23)
CALCIUM SERPL-MCNC: 8.8 MG/DL (ref 8.7–10.5)
CALCIUM SERPL-MCNC: 8.8 MG/DL (ref 8.7–10.5)
CANNABINOIDS UR QL SCN: NEGATIVE
CHLORIDE SERPL-SCNC: 107 MMOL/L (ref 95–110)
CHLORIDE SERPL-SCNC: 108 MMOL/L (ref 95–110)
CLARITY UR: CLEAR
CO2 SERPL-SCNC: 19 MMOL/L (ref 23–29)
CO2 SERPL-SCNC: 24 MMOL/L (ref 23–29)
COCAINE UR QL SCN: NEGATIVE
COLOR UR AUTO: YELLOW
CREAT SERPL-MCNC: 0.7 MG/DL (ref 0.5–1.4)
CREAT SERPL-MCNC: 0.8 MG/DL (ref 0.5–1.4)
CREAT UR-MCNC: 168.5 MG/DL (ref 15–325)
ERYTHROCYTE [DISTWIDTH] IN BLOOD BY AUTOMATED COUNT: 13.8 % (ref 11.5–14.5)
GFR SERPLBLD CREATININE-BSD FMLA CKD-EPI: >60 ML/MIN/1.73/M2
GFR SERPLBLD CREATININE-BSD FMLA CKD-EPI: >60 ML/MIN/1.73/M2
GLUCOSE SERPL-MCNC: 120 MG/DL (ref 70–110)
GLUCOSE SERPL-MCNC: 131 MG/DL (ref 70–110)
GLUCOSE UR QL STRIP: NEGATIVE
HCT VFR BLD AUTO: 46.2 % (ref 37–48.5)
HGB BLD-MCNC: 14.1 GM/DL (ref 12–16)
HGB UR QL STRIP: NEGATIVE
HOLD SPECIMEN: 1
HOLD SPECIMEN: NORMAL
IMM GRANULOCYTES # BLD AUTO: 0.03 K/UL (ref 0–0.04)
IMM GRANULOCYTES NFR BLD AUTO: 0.7 % (ref 0–0.5)
KETONES UR QL STRIP: NEGATIVE
LEUKOCYTE ESTERASE UR QL STRIP: ABNORMAL
LYMPHOCYTES # BLD AUTO: 2.25 K/UL (ref 1–4.8)
MAGNESIUM SERPL-MCNC: 2 MG/DL (ref 1.6–2.6)
MCH RBC QN AUTO: 27.2 PG (ref 27–31)
MCHC RBC AUTO-ENTMCNC: 30.5 G/DL (ref 32–36)
MCV RBC AUTO: 89 FL (ref 82–98)
METHADONE UR QL SCN: NEGATIVE
MICROSCOPIC COMMENT: NORMAL
NITRITE UR QL STRIP: NEGATIVE
NUCLEATED RBC (/100WBC) (OHS): 0 /100 WBC
OHS QRS DURATION: 76 MS
OHS QTC CALCULATION: 443 MS
OPIATES UR QL SCN: NEGATIVE
PCP UR QL: NEGATIVE
PH UR STRIP: 7 [PH]
PLATELET # BLD AUTO: 166 K/UL (ref 150–450)
PMV BLD AUTO: 10.7 FL (ref 9.2–12.9)
POTASSIUM SERPL-SCNC: 3.5 MMOL/L (ref 3.5–5.1)
POTASSIUM SERPL-SCNC: 4.2 MMOL/L (ref 3.5–5.1)
PROT SERPL-MCNC: 7.1 GM/DL (ref 6–8.4)
PROT SERPL-MCNC: 7.8 GM/DL (ref 6–8.4)
PROT UR QL STRIP: NEGATIVE
RBC # BLD AUTO: 5.19 M/UL (ref 4–5.4)
RBC #/AREA URNS AUTO: 3 /HPF (ref 0–4)
RELATIVE EOSINOPHIL (OHS): 7.1 %
RELATIVE LYMPHOCYTE (OHS): 50 % (ref 18–48)
RELATIVE MONOCYTE (OHS): 8 % (ref 4–15)
RELATIVE NEUTROPHIL (OHS): 33.1 % (ref 38–73)
SODIUM SERPL-SCNC: 139 MMOL/L (ref 136–145)
SODIUM SERPL-SCNC: 140 MMOL/L (ref 136–145)
SP GR UR STRIP: 1.03
SQUAMOUS #/AREA URNS AUTO: 7 /HPF
UROBILINOGEN UR STRIP-ACNC: ABNORMAL EU/DL
WBC # BLD AUTO: 4.5 K/UL (ref 3.9–12.7)
WBC #/AREA URNS AUTO: 5 /HPF (ref 0–5)

## 2025-05-26 PROCEDURE — 80053 COMPREHEN METABOLIC PANEL: CPT | Performed by: NURSE PRACTITIONER

## 2025-05-26 PROCEDURE — 81003 URINALYSIS AUTO W/O SCOPE: CPT | Performed by: NURSE PRACTITIONER

## 2025-05-26 PROCEDURE — 93010 ELECTROCARDIOGRAM REPORT: CPT | Mod: ,,, | Performed by: INTERNAL MEDICINE

## 2025-05-26 PROCEDURE — 81001 URINALYSIS AUTO W/SCOPE: CPT | Performed by: NURSE PRACTITIONER

## 2025-05-26 PROCEDURE — 85025 COMPLETE CBC W/AUTO DIFF WBC: CPT | Performed by: NURSE PRACTITIONER

## 2025-05-26 PROCEDURE — 83735 ASSAY OF MAGNESIUM: CPT | Performed by: NURSE PRACTITIONER

## 2025-05-26 PROCEDURE — 80307 DRUG TEST PRSMV CHEM ANLYZR: CPT | Performed by: NURSE PRACTITIONER

## 2025-05-26 PROCEDURE — 93005 ELECTROCARDIOGRAM TRACING: CPT

## 2025-05-26 PROCEDURE — 99285 EMERGENCY DEPT VISIT HI MDM: CPT | Mod: 25

## 2025-05-26 RX ORDER — MECLIZINE HYDROCHLORIDE 25 MG/1
25 TABLET ORAL
Status: COMPLETED | OUTPATIENT
Start: 2025-05-27 | End: 2025-05-27

## 2025-05-26 NOTE — ED PROVIDER NOTES
"Encounter Date: 5/25/2025    SCRIBE #1 NOTE: I, Juaquin Leroy, am scribing for, and in the presence of,  Maria Fernanda Javier MD. I have scribed the following portions of the note - Other sections scribed: HPI, ROS.       History     Chief Complaint   Patient presents with    Loss of Consciousness     Pt reports, "Blacking out and feeling lightheaded while laying in bed." Per EMS, pt was ambulatory on arrival. AAOx4. GCS of 15.       Time seen by physician: 9:32 PM    This is a 64 y.o. female who presents with complaint of loss of consciousness PTA. She notes that as she was trying to lay down, she "blacked out". She denies feeling lightheaded or dizzy at the time but notes dizziness with sitting up here in the ED. She denies chest pain or shortness of breath. She was here yesterday for similar symptoms. She notes feeling like something is going on in her head and causing these symptoms. She denies drug or alcohol use today. She reports drinking plenty of fluids.  She denies any chest pain or shortness breath.    Patient denies any other complaints at this time.      The history is provided by the patient. No  was used.     Review of patient's allergies indicates:  No Known Allergies  Past Medical History:   Diagnosis Date    Alcohol abuse, continuous 10/03/2024    ETOH abuse     Hyperlipemia     Hypertension     Unspecified cirrhosis of liver      Past Surgical History:   Procedure Laterality Date    BREAST LUMPECTOMY Right     benign    TUBAL LIGATION       Family History   Problem Relation Name Age of Onset    Lung cancer Mother       Social History[1]  Review of Systems   Constitutional:  Negative for chills and fever.   HENT:  Negative for congestion and rhinorrhea.    Respiratory:  Negative for chest tightness and shortness of breath.    Cardiovascular:  Negative for chest pain and palpitations.   Gastrointestinal:  Negative for abdominal pain, diarrhea, nausea and vomiting.   Genitourinary:  " Negative for dysuria and flank pain.   Musculoskeletal:  Negative for back pain and neck pain.   Skin:  Negative for color change, rash and wound.   Neurological:  Positive for dizziness and syncope. Negative for light-headedness and headaches.       Physical Exam     Initial Vitals [05/25/25 2115]   BP Pulse Resp Temp SpO2   (!) 158/80 71 18 99 °F (37.2 °C) 98 %      MAP       --         Physical Exam    Nursing note and vitals reviewed.  Constitutional: She appears well-developed and well-nourished.   HENT:   Head: Normocephalic and atraumatic.   Eyes: Conjunctivae are normal.   Neck: Neck supple.   Normal range of motion.  Cardiovascular:  Normal rate, regular rhythm and normal heart sounds.           Pulmonary/Chest: Breath sounds normal. No respiratory distress. She has no wheezes. She has no rales.   Abdominal: Abdomen is soft. Bowel sounds are normal. She exhibits no distension. There is no abdominal tenderness. There is no rebound.   Musculoskeletal:         General: Normal range of motion.      Cervical back: Normal range of motion and neck supple.     Neurological: She is alert and oriented to person, place, and time.   Ambulatory with steady gait   Skin: Skin is warm and dry. Capillary refill takes less than 2 seconds.         ED Course   Procedures  Labs Reviewed   COMPREHENSIVE METABOLIC PANEL - Abnormal       Result Value    Sodium 141      Potassium 2.8 (*)     Chloride 115 (*)     CO2 19 (*)     Glucose 106      BUN 12      Creatinine 0.6      Calcium 7.2 (*)     Protein Total 5.9 (*)     Albumin 3.0 (*)     Bilirubin Total 0.2      ALP 54      AST 14      ALT 16      Anion Gap 7 (*)     eGFR >60     MAGNESIUM - Abnormal    Magnesium  1.5 (*)    CBC WITH DIFFERENTIAL - Abnormal    WBC 5.45      RBC 4.90      HGB 13.4      HCT 41.0      MCV 84      MCH 27.3      MCHC 32.7      RDW 14.0      Platelet Count 243      MPV 10.1      Nucleated RBC 0      Neut % 33.5 (*)     Lymph % 48.4 (*)     Mono % 8.3       Eos % 9.0 (*)     Basophil % 0.6      Imm Grans % 0.2      Neut # 1.83      Lymph # 2.64      Mono # 0.45      Eos # 0.49      Baso # 0.03      Imm Grans # 0.01     COMPREHENSIVE METABOLIC PANEL - Abnormal    Sodium 140      Potassium 3.5      Chloride 107      CO2 24      Glucose 120 (*)     BUN 13      Creatinine 0.7      Calcium 8.8      Protein Total 7.1      Albumin 3.7      Bilirubin Total 0.2      ALP 64      AST 18      ALT 18      Anion Gap 9      eGFR >60     TROPONIN I - Normal    Troponin-I <0.006     MAGNESIUM - Normal    Magnesium  1.9     CBC W/ AUTO DIFFERENTIAL    Narrative:     The following orders were created for panel order CBC auto differential.  Procedure                               Abnormality         Status                     ---------                               -----------         ------                     CBC with Differential[[id number]]       Abnormal            Final result                 Please view results for these tests on the individual orders.     EKG Readings: (Independently Interpreted)   9:51PM:  Rate of 69.  Normal sinus rhythm.  Left axis deviation.  Normal intervals.  No ST or ischemic changes.       Imaging Results              CT Head Without Contrast (Final result)  Result time 05/26/25 01:24:57      Final result by Silvina Jacobo MD (05/26/25 01:24:57)                   Impression:      No acute intracranial abnormality detected.      Electronically signed by: Silvina Jacobo  Date:    05/26/2025  Time:    01:24               Narrative:    EXAMINATION:  CT OF THE HEAD WITHOUT    CLINICAL HISTORY:  Loss of consciousness.Syncope, recurrent;    TECHNIQUE:  5 mm unenhanced axial images were obtained from the skull base to the vertex.    COMPARISON:  08/25/2024    FINDINGS:  Stable cerebral atrophy and chronic small vessel ischemic changes are present.  There is no acute intracranial hemorrhage, territorial infarct or mass effect, or midline shift. In the  visualized paranasal sinuses and mastoid air cells, there is mild mucoperiosteal thickening throughout the paranasal sinuses.                                       Medications   0.9% NaCl infusion (0 mLs Intravenous Stopped 5/26/25 0005)     Medical Decision Making  9:32PM:  Patient is a 64-year-old female who presents to the emergency department after having a syncopal episode at home.  Patient appears well, nontoxic.  She is neurologically intact at this time.  Will plan for labs, cardiac monitoring, will continue to follow and reassess.    Amount and/or Complexity of Data Reviewed  External Data Reviewed: notes.  Labs: ordered. Decision-making details documented in ED Course.  Radiology: ordered.  ECG/medicine tests: ordered and independent interpretation performed. Decision-making details documented in ED Course.    Risk  Prescription drug management.    4:00 AM:  Patient doing well, remains stable.  Her labs are so far negative and her EKG is within normal limits.  She has remained in normal sinus rhythm.  She was initially orthostatic with a 20 beat heart rate changed from lying to standing which improved with fluids.  Her CT head is negative.  Will plan to refer her for Cardiology for an outpatient Holter monitor.  Given her reassuring workup, I do not feel that further work up in the ED is indicated at this time.  I updated pt regarding results and I counseled pt regarding supportive care measures.  I have discussed with the pt ED return warnings and need for close PCP f/u.  Pt agreeable to plan and all questions answered.  I feel that pt is stable for discharge and management as an outpatient and no further intervention is needed at this time.  Pt is comfortable returning to the ED if needed.  Will DC home in stable condition.          Scribe Attestation:   Scribe #1: I performed the above scribed service and the documentation accurately describes the services I performed. I attest to the accuracy of the  note.              Physician Attestation for Scribe: I, Maria Fernanda Javier, reviewed documentation as scribed in my presence, which is both accurate and complete.                   Clinical Impression:  Final diagnoses:  [R55] Syncope (Primary)          ED Disposition Condition    Discharge Stable          ED Prescriptions    None       Follow-up Information       Follow up With Specialties Details Why Contact Info    Zainab Milian MD Internal Medicine   [address] [facility]  [address]  [facility] [address]  [phone]            Launch MDCalc MDM  MDCalc MDM Module  May 26 2025 4:36 AM [Maria Fernanda Javier]  Data:  - Test/documents/historian: 3+ tests ordered  Risk: NaCl infusion (Rx drug management), CT Head WO contr (CT w/o IV contrast)             [1]   Social History  Tobacco Use    Smoking status: Some Days     Current packs/day: 0.50     Average packs/day: 0.5 packs/day for 50.4 years (25.2 ttl pk-yrs)     Types: Cigarettes     Start date: 1975    Smokeless tobacco: Never   Substance Use Topics    Alcohol use: Yes     Alcohol/week: 42.0 standard drinks of alcohol     Types: 42 Standard drinks or equivalent per week     Comment: heavy drinker    Drug use: Yes     Types: Cocaine        Maria Fernanda Javier MD  05/26/25 7989

## 2025-05-26 NOTE — ED NOTES
Pt put in wheelchair and assisted to restroom; pt was able to ambulate back to stretcher with assistance

## 2025-05-26 NOTE — ED TRIAGE NOTES
"Pt presents to ED via EMS with complaint of "blacking out" and "feeling lightheaded" while laying in bed. States she lost consciousness for and unknown amount of time. Pt was seen in ED for same complaint yesterday. Per EMS, patient was ambulatory on arrival.  "

## 2025-05-27 LAB
OHS QRS DURATION: 76 MS
OHS QTC CALCULATION: 417 MS
W AMPHETAMINE, SERUM, QUALITATIVE: NEGATIVE
W BARBITURATE, SERUM, QUALITATIVE: NEGATIVE
W BENZODIAZEPINE, SERUM, QUALITATIVE: NEGATIVE
W COCAINE, SERUM, QUALITATIVE: NEGATIVE
W ETHANOL, SERUM: NEGATIVE
W METHADONE, SERUM, QUALITATIVE: NEGATIVE
W OPIATE, SERUM, QUALITATIVE: NEGATIVE
W PHENCYCLIDINE, SERUM, QUALITATIVE: NEGATIVE
W PROPOXYPHENE, SERUM, QUALITATIVE: NEGATIVE
W THC, SERUM, QUALITATIVE: NEGATIVE

## 2025-05-27 PROCEDURE — 25000003 PHARM REV CODE 250

## 2025-05-27 RX ORDER — MECLIZINE HYDROCHLORIDE 25 MG/1
25 TABLET ORAL 3 TIMES DAILY PRN
Qty: 20 TABLET | Refills: 3 | Status: SHIPPED | OUTPATIENT
Start: 2025-05-27

## 2025-05-27 RX ADMIN — MECLIZINE HYDROCHLORIDE 25 MG: 25 TABLET ORAL at 12:05

## 2025-05-27 NOTE — DISCHARGE INSTRUCTIONS
Diagnosis:  Vertigo    Tests today showed:   Labs Reviewed   COMPREHENSIVE METABOLIC PANEL - Abnormal       Result Value    Sodium 139      Potassium 4.2      Chloride 108      CO2 19 (*)     Glucose 131 (*)     BUN 13      Creatinine 0.8      Calcium 8.8      Protein Total 7.8      Albumin 3.9      Bilirubin Total 0.2      ALP 60      AST 23      ALT 20      Anion Gap 12      eGFR >60     URINALYSIS, REFLEX TO URINE CULTURE - Abnormal    Color, UA Yellow      Appearance, UA Clear      pH, UA 7.0      Spec Grav UA 1.030      Protein, UA Negative      Glucose, UA Negative      Ketones, UA Negative      Bilirubin, UA Negative      Blood, UA Negative      Nitrites, UA Negative      Urobilinogen, UA 2.0-3.0 (*)     Leukocyte Esterase, UA 2+ (*)    CBC WITH DIFFERENTIAL - Abnormal    WBC 4.50      RBC 5.19      HGB 14.1      HCT 46.2      MCV 89      MCH 27.2      MCHC 30.5 (*)     RDW 13.8      Platelet Count 166      MPV 10.7      Nucleated RBC 0      Neut % 33.1 (*)     Lymph % 50.0 (*)     Mono % 8.0      Eos % 7.1      Basophil % 1.1      Imm Grans % 0.7 (*)     Neut # 1.49 (*)     Lymph # 2.25      Mono # 0.36      Eos # 0.32      Baso # 0.05      Imm Grans # 0.03     DRUG SCREEN PANEL, URINE EMERGENCY - Normal    Benzodiazepine, Urine Negative      Methadone, Urine Negative      Cocaine, Urine Negative      Opiates, Urine Negative      Barbiturates, Urine Negative      Amphetamines, Urine Negative      THC Negative      Phencyclidine, Urine Negative      Urine Creatinine 168.5      Narrative:     This screen includes the following classes of drugs at the listed cut-off:     Benzodiazepines:        200 ng/ml   Methadone:              300 ng/ml   Cocaine metabolite:     300 ng/ml   Opiates:                300 ng/ml   Barbiturates:           200 ng/ml   Amphetamines:           1000 ng/ml   Marijuana metabs (THC): 50 ng/ml   Phencyclidine (PCP):    25 ng/ml     This is a screening test. If results do not correlate  "with clinical presentation, then a confirmatory send out test is advised.    This report is intended for use in clinical monitoring and management of patients. It is not intended for use in employment related drug testing."   MAGNESIUM - Normal    Magnesium  2.0     CBC W/ AUTO DIFFERENTIAL    Narrative:     The following orders were created for panel order CBC auto differential.  Procedure                               Abnormality         Status                     ---------                               -----------         ------                     CBC with Differential[[id number]]       Abnormal            Final result                 Please view results for these tests on the individual orders.   EXTRA TUBES    Narrative:     The following orders were created for panel order EXTRA TUBES.  Procedure                               Abnormality         Status                     ---------                               -----------         ------                     Light Blue Top Hold[[id number]]                             Final result                 Please view results for these tests on the individual orders.   LIGHT BLUE TOP HOLD    Extra Tube 1     GREY TOP URINE HOLD    Extra Tube Hold for add-ons.     URINALYSIS MICROSCOPIC    RBC, UA 3      WBC, UA 5      Bacteria, UA Rare      Squamous Epithelial Cells, UA 7      Microscopic Comment       POCT GLUCOSE MONITORING CONTINUOUS     US Lower Extremity Veins Bilateral   Final Result      No evidence of deep venous thrombosis in either lower extremity.         Electronically signed by: Silvina Jacobo   Date:    05/26/2025   Time:    23:51      X-Ray Chest PA And Lateral   Final Result      No acute intrathoracic abnormality.         Electronically signed by: Silvina Jacobo   Date:    05/26/2025   Time:    21:27          Treatments you had today:   Medications   meclizine tablet 25 mg (25 mg Oral Given 5/27/25 0000)       Follow-Up Plan:  - Follow-up with primary " care doctor within 3 - 5 days  - Additional testing and/or evaluation as directed by your primary doctor    Return to the Emergency Department for symptoms including but not limited to: worsening symptoms, shortness of breath or chest pain, vomiting with inability to hold down fluids, fevers greater than 100.4°F, passing out/fainting/unconsciousness, or other concerning symptoms.

## 2025-05-27 NOTE — FIRST PROVIDER EVALUATION
" Emergency Department TeleTriage Encounter Note      CHIEF COMPLAINT    Chief Complaint   Patient presents with    Loss of Consciousness     Reports repeated LOC when she lays down. Pt denies falling asleep, states "I black out". Daughter came in with pt, reports being concerned for a TIA. Pt endorses dizziness prior to LOC and changing positions. Denies current dizziness.        VITAL SIGNS   Initial Vitals [05/26/25 1939]   BP Pulse Resp Temp SpO2   (!) 157/89 65 18 97.8 °F (36.6 °C) --      MAP       --            ALLERGIES    Review of patient's allergies indicates:  No Known Allergies    PROVIDER TRIAGE NOTE  63 year old female presents to the ER with complaints of multiple syncopal episodes over the past 1 week. Seen in the ER last night for same. States that her symptoms have continued and reports multiple syncopal episodes since discharge from ER yesterday. No known head injury. Denies CP or SOB. Also reports ongoing bilateral leg pain and swelling and family is concerned for a DVT.     AAOx3, respirations even and non- labored, stable vitals, normal coloration of skin, sitting upright in triage chair, appears in no acute distress.          ORDERS  Labs Reviewed - No data to display    ED Orders (720h ago, onward)      None              Virtual Visit Note: The provider triage portion of this emergency department evaluation and documentation was performed via [employer], a HIPAA-compliant telemedicine application, in concert with a tele-presenter in the room. A face to face patient evaluation with one of my colleagues will occur once the patient is placed in an emergency department room.      DISCLAIMER: This note was prepared with [employer] voice recognition transcription software. Garbled syntax, mangled pronouns, and other bizarre constructions may be attributed to that software system.    "

## 2025-05-27 NOTE — ED TRIAGE NOTES
Pt reports that she started having syncopal episodes that started on Friday. She says she was seen Friday, Saturday and Sunday. She says she felt dizzy, laid down and then black out. She is having pain to her left and her right knee. She denies blurred vision but said she did earlier

## 2025-05-27 NOTE — ED PROVIDER NOTES
"Encounter Date: 5/26/2025       History     Chief Complaint   Patient presents with    Loss of Consciousness     Reports repeated LOC when she lays down. Pt denies falling asleep, states "I black out". Daughter came in with pt, reports being concerned for a TIA. Pt endorses dizziness prior to LOC and changing positions. Denies current dizziness.      64-year-old female with a history of alcohol abuse is presenting to the emergency department complaining of dizziness.  Feels as though she is going to pass out because her vision will black out.  Upon further elucidation explains that her symptoms occur from a standing position to lying down flat.  She does not have any chest pain, shortness of breath, nausea, vomiting, diarrhea.    This is her 4th emergency department evaluation for these symptoms.  Daughter presents today given concern about continue symptoms without diagnosis.    The history is provided by the patient and a relative.     Review of patient's allergies indicates:  No Known Allergies  Past Medical History:   Diagnosis Date    Alcohol abuse, continuous 10/03/2024    ETOH abuse     Hyperlipemia     Hypertension     Unspecified cirrhosis of liver      Past Surgical History:   Procedure Laterality Date    BREAST LUMPECTOMY Right     benign    TUBAL LIGATION       Family History   Problem Relation Name Age of Onset    Lung cancer Mother       Social History[1]  Review of Systems    Physical Exam     Initial Vitals [05/26/25 1939]   BP Pulse Resp Temp SpO2   (!) 157/89 65 18 97.8 °F (36.6 °C) --      MAP       --         Physical Exam    Nursing note and vitals reviewed.  Constitutional: She is not diaphoretic. No distress.   HENT:   Head: Normocephalic and atraumatic.   Eyes: Conjunctivae are normal.   Cardiovascular:  Normal rate and regular rhythm.           Pulmonary/Chest: No respiratory distress. She has no wheezes. She has no rhonchi. She has no rales.   Abdominal: Abdomen is soft. She exhibits no " distension. There is no abdominal tenderness. There is no rebound and no guarding.   Musculoskeletal:         General: No edema. Normal range of motion.     Neurological: She is alert and oriented to person, place, and time. She has normal strength. No cranial nerve deficit or sensory deficit.   Lashonda-Hallpike maneuver positive, patient is with significant horizontal nystagmus and symptomatic   Skin: Skin is warm and dry.   Psychiatric: She has a normal mood and affect. Thought content normal.         ED Course   Procedures  Labs Reviewed   COMPREHENSIVE METABOLIC PANEL - Abnormal       Result Value    Sodium 139      Potassium 4.2      Chloride 108      CO2 19 (*)     Glucose 131 (*)     BUN 13      Creatinine 0.8      Calcium 8.8      Protein Total 7.8      Albumin 3.9      Bilirubin Total 0.2      ALP 60      AST 23      ALT 20      Anion Gap 12      eGFR >60     URINALYSIS, REFLEX TO URINE CULTURE - Abnormal    Color, UA Yellow      Appearance, UA Clear      pH, UA 7.0      Spec Grav UA 1.030      Protein, UA Negative      Glucose, UA Negative      Ketones, UA Negative      Bilirubin, UA Negative      Blood, UA Negative      Nitrites, UA Negative      Urobilinogen, UA 2.0-3.0 (*)     Leukocyte Esterase, UA 2+ (*)    CBC WITH DIFFERENTIAL - Abnormal    WBC 4.50      RBC 5.19      HGB 14.1      HCT 46.2      MCV 89      MCH 27.2      MCHC 30.5 (*)     RDW 13.8      Platelet Count 166      MPV 10.7      Nucleated RBC 0      Neut % 33.1 (*)     Lymph % 50.0 (*)     Mono % 8.0      Eos % 7.1      Basophil % 1.1      Imm Grans % 0.7 (*)     Neut # 1.49 (*)     Lymph # 2.25      Mono # 0.36      Eos # 0.32      Baso # 0.05      Imm Grans # 0.03     DRUG SCREEN PANEL, URINE EMERGENCY - Normal    Benzodiazepine, Urine Negative      Methadone, Urine Negative      Cocaine, Urine Negative      Opiates, Urine Negative      Barbiturates, Urine Negative      Amphetamines, Urine Negative      THC Negative      Phencyclidine,  "Urine Negative      Urine Creatinine 168.5      Narrative:     This screen includes the following classes of drugs at the listed cut-off:     Benzodiazepines:        200 ng/ml   Methadone:              300 ng/ml   Cocaine metabolite:     300 ng/ml   Opiates:                300 ng/ml   Barbiturates:           200 ng/ml   Amphetamines:           1000 ng/ml   Marijuana metabs (THC): 50 ng/ml   Phencyclidine (PCP):    25 ng/ml     This is a screening test. If results do not correlate with clinical presentation, then a confirmatory send out test is advised.    This report is intended for use in clinical monitoring and management of patients. It is not intended for use in employment related drug testing."   MAGNESIUM - Normal    Magnesium  2.0     CBC W/ AUTO DIFFERENTIAL    Narrative:     The following orders were created for panel order CBC auto differential.  Procedure                               Abnormality         Status                     ---------                               -----------         ------                     CBC with Differential[[id number]]       Abnormal            Final result                 Please view results for these tests on the individual orders.   EXTRA TUBES    Narrative:     The following orders were created for panel order EXTRA TUBES.  Procedure                               Abnormality         Status                     ---------                               -----------         ------                     Light Blue Top Hold[[id number]]                             Final result                 Please view results for these tests on the individual orders.   LIGHT BLUE TOP HOLD    Extra Tube 1     GREY TOP URINE HOLD    Extra Tube Hold for add-ons.     URINALYSIS MICROSCOPIC    RBC, UA 3      WBC, UA 5      Bacteria, UA Rare      Squamous Epithelial Cells, UA 7      Microscopic Comment       POCT GLUCOSE MONITORING CONTINUOUS     EKG Readings: (Independently Interpreted)   Initial " Reading: No STEMI. Previous EKG: Compared with most recent EKG Rhythm: Normal Sinus Rhythm. Heart Rate: 63. ST Segments: Normal ST Segments. T Waves Flipped: III, AVF, V4, V5, V6 and V3.       Imaging Results              US Lower Extremity Veins Bilateral (Final result)  Result time 05/26/25 23:51:24      Final result by Silvina Jacobo MD (05/26/25 23:51:24)                   Impression:      No evidence of deep venous thrombosis in either lower extremity.      Electronically signed by: Silvina Jacobo  Date:    05/26/2025  Time:    23:51               Narrative:    EXAMINATION:  ULTRASOUND LOWER EXTREMITY VEINS BILATERAL    CLINICAL HISTORY:  Edema, unspecified    TECHNIQUE:  Duplex and color flow Doppler and dynamic compression was performed of the bilateral lower extremity veins was performed.    COMPARISON:  None    FINDINGS:  Right thigh veins: The common femoral, femoral, popliteal, upper greater saphenous, and deep femoral veins are patent and free of thrombus. The veins are normally compressible and have normal phasic flow and augmentation response.    Right calf veins: The visualized calf veins are patent.    Left thigh veins: The common femoral, femoral, popliteal, upper greater saphenous, and deep femoral veins are patent and free of thrombus. The veins are normally compressible and have normal phasic flow and augmentation response.    Left calf veins: The visualized calf veins are patent.    Miscellaneous: None                                       X-Ray Chest PA And Lateral (Final result)  Result time 05/26/25 21:27:10   Procedure changed from X-Ray Chest 1 View     Final result by Silvina Jacobo MD (05/26/25 21:27:10)                   Impression:      No acute intrathoracic abnormality.      Electronically signed by: Silvina Jacobo  Date:    05/26/2025  Time:    21:27               Narrative:    EXAMINATION:  CHEST PA AND LATERAL    CLINICAL HISTORY:  syncope; Syncope and  collapse    TECHNIQUE:  PA and lateral chest radiograph    COMPARISON:  05/24/2025    FINDINGS:  The cardiac silhouette is within normal limits.   There is no focal consolidation, pneumothorax, or pleural effusion.  Metallic objects are projecting over the left mid abdomen.                                       Medications   meclizine tablet 25 mg (25 mg Oral Given 5/27/25 0000)     Medical Decision Making  64-year-old female with a history of alcohol abuse is presenting to the emergency department complaining of dizziness.  Feels as though she is going to pass out because her vision will black out.  Upon further elucidation explains that her symptoms occur from a standing position to lying down flat.  She does not have any chest pain, shortness of breath, nausea, vomiting, diarrhea.    This is her 4th emergency department evaluation for these symptoms.  Daughter presents today given concern about continue symptoms without diagnosis.    Had patient's demonstrate what she described as passing out when she lies flat.  Becomes very dizzy, start screaming and has significant nystagmus.  There was not actual any loss of consciousness.  Will treat with meclizine.     Both patient and daughter were counseled at length regarding positional vertigo, exercises for this, treatment and recommended follow up with ENT.  They expressed understanding.  Workup here today continues to be reassuring.  No abnormality on chest x-ray.  Bilateral DVT ultrasound was requested by daughter, negative for DVT.  Drug screen negative today.  Urinalysis without evidence of infection.  Electrolytes within normal limits and kidney function normal.  Normal liver function.    Amount and/or Complexity of Data Reviewed  Labs: ordered. Decision-making details documented in ED Course.  Radiology: ordered and independent interpretation performed. Decision-making details documented in ED Course.     Details: Reviewed CT head, see ED course  ECG/medicine  tests: ordered and independent interpretation performed. Decision-making details documented in ED Course.               ED Course as of 05/27/25 0040   Mon May 26, 2025   2252 CT head from earlier this morning     FINDINGS:  Stable cerebral atrophy and chronic small vessel ischemic changes are present.  There is no acute intracranial hemorrhage, territorial infarct or mass effect, or midline shift. In the visualized paranasal sinuses and mastoid air cells, there is mild mucoperiosteal thickening throughout the paranasal sinuses.     Impression:     No acute intracranial abnormality detected.        Electronically signed by:Silvina Jacobo  Date:                                            05/26/2025  Time:                                           01:24   [KL]   Tue May 27, 2025   0034 Patient feels improved after meclizine.  Provided referral to ENT.  All results were discussed with patient and daughter. Strict ED precautions and return instructions were discussed. All questions were answered. Instructed to follow up with primary care doctor for re-evaluation. Stable for discharge and outpatient follow up.   [KL]      ED Course User Index  [KL] Ashia Sagastume MD                           Clinical Impression:  Final diagnoses:  [R42] Dizziness (Primary)  [R42] Vertigo          ED Disposition Condition    Discharge Stable          ED Prescriptions       Medication Sig Dispense Start Date End Date Auth. Provider    meclizine (ANTIVERT) 25 mg tablet Take 1 tablet (25 mg total) by mouth 3 (three) times daily as needed for Dizziness. 20 tablet 5/27/2025 -- Ashia Sagastume MD          Follow-up Information       Follow up With Specialties Details Why Contact Info    [facility] Dept Emergency Medicine Go to  If symptoms worsen, As needed [address] [facility] [id number] [phone]    Zainab Milian MD Internal Medicine Schedule an appointment as soon as possible for a visit in 2 days  [address]  [address]  [facility] [address]  [phone]            [facility] MDM  [facility]  May 27 2025 12:40 AM [Ashia Sagastume]  Data:  - Independent interpretation: I independently reviewed the XR Chest PA+Lat, US LE [id number]. It showed no acute abnormality. See MDM section and/or ED Course for my interpretation. [Ashia Sagastume]  - Test/documents/historian: 3+ tests ordered  Problems:  benign positional vertigo (moderate)  Risk:  meclizine (moderate)             [1]   Social History  Tobacco Use    Smoking status: Some Days     Current packs/day: 0.50     Average packs/day: 0.5 packs/day for 50.4 years (25.2 ttl pk-yrs)     Types: Cigarettes     Start date: 1975    Smokeless tobacco: Never   Substance Use Topics    Alcohol use: Yes     Alcohol/week: 42.0 standard drinks of alcohol     Types: 42 Standard drinks or equivalent per week     Comment: heavy drinker    Drug use: Yes     Types: Cocaine        Ashia Sagastume MD  05/27/25 0040

## 2025-05-28 LAB
W VITAMIN B1: 70 UG/L
W VITAMIN B6: 12 UG/L

## 2025-05-29 ENCOUNTER — TELEPHONE (OUTPATIENT)
Dept: INTERNAL MEDICINE | Facility: CLINIC | Age: 65
End: 2025-05-29
Payer: MEDICARE

## 2025-05-29 NOTE — TELEPHONE ENCOUNTER
----- Message from Ladi sent at 5/29/2025  8:27 AM CDT -----  Patient is requesting a sooner appointment.  Patient declined first available appointment listed as well as another facility and provider .  Patient will not accept being placed on the waitlist and is requesting a message be sent to doctor.Name of Caller: ELODIA VELIZ [[id number]]When is the first available appointment?: 7/1 Would the patient rather a call back or a response via [facility]?: call Best Call Back Number: [phone] Pt would like talk to the MA or nurse before seeing the doctor and pt did not go into detail on the matter. Please contact to further discuss and advise.

## 2025-06-02 ENCOUNTER — PATIENT MESSAGE (OUTPATIENT)
Dept: OTOLARYNGOLOGY | Facility: CLINIC | Age: 65
End: 2025-06-02
Payer: MEDICARE

## 2025-06-02 ENCOUNTER — OFFICE VISIT (OUTPATIENT)
Dept: INTERNAL MEDICINE | Facility: CLINIC | Age: 65
End: 2025-06-02
Payer: MEDICARE

## 2025-06-02 ENCOUNTER — HOSPITAL ENCOUNTER (OUTPATIENT)
Dept: RADIOLOGY | Facility: OTHER | Age: 65
Discharge: HOME | End: 2025-06-02
Attending: STUDENT IN AN ORGANIZED HEALTH CARE EDUCATION/TRAINING PROGRAM
Payer: MEDICARE

## 2025-06-02 VITALS
HEART RATE: 77 BPM | HEIGHT: 63 IN | WEIGHT: 184.31 LBS | BODY MASS INDEX: 32.66 KG/M2 | SYSTOLIC BLOOD PRESSURE: 136 MMHG | DIASTOLIC BLOOD PRESSURE: 82 MMHG | OXYGEN SATURATION: 99 %

## 2025-06-02 DIAGNOSIS — F17.210 NICOTINE DEPENDENCE, CIGARETTES, UNCOMPLICATED: ICD-10-CM

## 2025-06-02 DIAGNOSIS — H81.11 BENIGN PAROXYSMAL POSITIONAL VERTIGO OF RIGHT EAR: Primary | ICD-10-CM

## 2025-06-02 DIAGNOSIS — R73.03 PREDIABETES: ICD-10-CM

## 2025-06-02 DIAGNOSIS — R01.1 HEART MURMUR: ICD-10-CM

## 2025-06-02 DIAGNOSIS — F10.20 ALCOHOL USE DISORDER, SEVERE, DEPENDENCE: ICD-10-CM

## 2025-06-02 PROCEDURE — 1101F PT FALLS ASSESS-DOCD LE1/YR: CPT | Mod: CPTII,S$GLB,, | Performed by: STUDENT IN AN ORGANIZED HEALTH CARE EDUCATION/TRAINING PROGRAM

## 2025-06-02 PROCEDURE — G2211 COMPLEX E/M VISIT ADD ON: HCPCS | Mod: S$GLB,,, | Performed by: STUDENT IN AN ORGANIZED HEALTH CARE EDUCATION/TRAINING PROGRAM

## 2025-06-02 PROCEDURE — 3061F NEG MICROALBUMINURIA REV: CPT | Mod: CPTII,S$GLB,, | Performed by: STUDENT IN AN ORGANIZED HEALTH CARE EDUCATION/TRAINING PROGRAM

## 2025-06-02 PROCEDURE — 3288F FALL RISK ASSESSMENT DOCD: CPT | Mod: CPTII,S$GLB,, | Performed by: STUDENT IN AN ORGANIZED HEALTH CARE EDUCATION/TRAINING PROGRAM

## 2025-06-02 PROCEDURE — 3044F HG A1C LEVEL LT 7.0%: CPT | Mod: CPTII,S$GLB,, | Performed by: STUDENT IN AN ORGANIZED HEALTH CARE EDUCATION/TRAINING PROGRAM

## 2025-06-02 PROCEDURE — 3079F DIAST BP 80-89 MM HG: CPT | Mod: CPTII,S$GLB,, | Performed by: STUDENT IN AN ORGANIZED HEALTH CARE EDUCATION/TRAINING PROGRAM

## 2025-06-02 PROCEDURE — 3066F NEPHROPATHY DOC TX: CPT | Mod: CPTII,S$GLB,, | Performed by: STUDENT IN AN ORGANIZED HEALTH CARE EDUCATION/TRAINING PROGRAM

## 2025-06-02 PROCEDURE — 1126F AMNT PAIN NOTED NONE PRSNT: CPT | Mod: CPTII,S$GLB,, | Performed by: STUDENT IN AN ORGANIZED HEALTH CARE EDUCATION/TRAINING PROGRAM

## 2025-06-02 PROCEDURE — 71271 CT THORAX LUNG CANCER SCR C-: CPT | Mod: 26,,, | Performed by: RADIOLOGY

## 2025-06-02 PROCEDURE — 99214 OFFICE O/P EST MOD 30 MIN: CPT | Mod: S$GLB,,, | Performed by: STUDENT IN AN ORGANIZED HEALTH CARE EDUCATION/TRAINING PROGRAM

## 2025-06-02 PROCEDURE — 4010F ACE/ARB THERAPY RXD/TAKEN: CPT | Mod: CPTII,S$GLB,, | Performed by: STUDENT IN AN ORGANIZED HEALTH CARE EDUCATION/TRAINING PROGRAM

## 2025-06-02 PROCEDURE — 99999 PR PBB SHADOW E&M-EST. PATIENT-LVL IV: CPT | Mod: PBBFAC,,, | Performed by: STUDENT IN AN ORGANIZED HEALTH CARE EDUCATION/TRAINING PROGRAM

## 2025-06-02 PROCEDURE — 3075F SYST BP GE 130 - 139MM HG: CPT | Mod: CPTII,S$GLB,, | Performed by: STUDENT IN AN ORGANIZED HEALTH CARE EDUCATION/TRAINING PROGRAM

## 2025-06-02 PROCEDURE — 3008F BODY MASS INDEX DOCD: CPT | Mod: CPTII,S$GLB,, | Performed by: STUDENT IN AN ORGANIZED HEALTH CARE EDUCATION/TRAINING PROGRAM

## 2025-06-02 PROCEDURE — 71271 CT THORAX LUNG CANCER SCR C-: CPT | Mod: TC

## 2025-06-02 RX ORDER — MECLIZINE HYDROCHLORIDE 25 MG/1
25 TABLET ORAL 3 TIMES DAILY PRN
Qty: 20 TABLET | Refills: 3 | Status: SHIPPED | OUTPATIENT
Start: 2025-06-02

## 2025-06-02 NOTE — PROGRESS NOTES
"Subjective:       Patient ID: Marianne Gottlieb is a 64 y.o. female.    Chief Complaint: Benign paroxysmal positional vertigo of right ear [H81.11]    Patient is established with me, here today for the following:    History of Present Illness      VERTIGO AND ENT SYMPTOMS:  She experiences vertigo episodes with rapid eye rotation when lying back, more severe on the right side. Episodes are accompanied by loss of consciousness with certain movements and positional changes. She takes Meclizine 3 times daily which helps reduce movement during episodes. She also reports a sensation of fluid and a "funny feeling" in her ear canals.    MUSCULOSKELETAL:  She reports bilateral knee pain with swelling that initially started in one knee before spreading to the contralateral knee. She has difficulty with ambulation due to the pain but denies any recent falls.    SUBSTANCE USE:  She reports Naltrexone has not been effective in managing alcohol cravings. While taking the medication, she continued to drink alcohol and experienced increased cravings for other substances.    LABS:  A1C was 6.2, in pre-diabetic range. Magnesium level was normal.      ROS:  ENT: -nasal congestion, +ear pressure  Cardiovascular: -chest pain  Musculoskeletal: +joint pain, +joint swelling  Neurological: +vertigo, +abnormal eye movement, +syncope, +loss of consciousness          Current Outpatient Medications   Medication Instructions    aspirin 81 mg, Oral, Daily    diclofenac sodium (VOLTAREN) 2 g, Topical (Top), 3 times daily PRN, Apply 2 grams to affected area 3 times daily as needed    hydroCHLOROthiazide (HYDRODIURIL) 25 mg, Oral, Daily    LIDOcaine (LIDODERM) 5 % Apply to affected area. Use as directed. May use 4% lidocaine patch as alternative.    meclizine (ANTIVERT) 25 mg, Oral, 3 times daily PRN    naltrexone (DEPADE) 50 mg, Oral, Nightly, To help reduce alcohol cravings.    pravastatin (PRAVACHOL) 80 mg, Oral, Daily    thiamine 100 mg, " "Oral, Daily    valsartan (DIOVAN) 320 mg, Oral, Daily     Objective:      Vitals:    06/02/25 1429   BP: 136/82   Pulse: 77   SpO2: 99%   Weight: 83.6 kg (184 lb 4.9 oz)   Height: 5' 3" (1.6 m)   PainSc: 0-No pain     Body mass index is 32.65 kg/m².    Physical Exam  Vitals reviewed.   Constitutional:       General: She is not in acute distress.     Appearance: Normal appearance. She is not ill-appearing or diaphoretic.   HENT:      Head: Normocephalic and atraumatic.      Right Ear: Tympanic membrane, ear canal and external ear normal. There is no impacted cerumen.      Left Ear: Tympanic membrane, ear canal and external ear normal. There is no impacted cerumen.      Nose: Congestion and rhinorrhea present. Rhinorrhea is clear.      Right Nostril: No foreign body, epistaxis, septal hematoma or occlusion.      Left Nostril: No foreign body, epistaxis, septal hematoma or occlusion.      Right Turbinates: Swollen. Not pale.      Left Turbinates: Swollen. Not pale.      Mouth/Throat:      Mouth: Mucous membranes are moist.      Pharynx: Oropharynx is clear. Posterior oropharyngeal erythema (moderate, posterior oropharynx) present. No pharyngeal swelling, oropharyngeal exudate or uvula swelling.   Eyes:      General: No scleral icterus.        Right eye: No discharge.         Left eye: No discharge.      Conjunctiva/sclera: Conjunctivae normal.   Neck:      Thyroid: No thyromegaly or thyroid tenderness.      Trachea: Trachea normal.   Cardiovascular:      Rate and Rhythm: Normal rate and regular rhythm.      Heart sounds: Murmur heard.   Pulmonary:      Effort: Pulmonary effort is normal. No respiratory distress.      Breath sounds: Normal breath sounds. No stridor. No wheezing, rhonchi or rales.   Musculoskeletal:      Cervical back: Neck supple.   Lymphadenopathy:      Head:      Right side of head: No submandibular or posterior auricular adenopathy.      Left side of head: No submandibular or posterior auricular " adenopathy.      Cervical: No cervical adenopathy.      Right cervical: No superficial, deep or posterior cervical adenopathy.     Left cervical: No superficial, deep or posterior cervical adenopathy.      Upper Body:      Right upper body: No supraclavicular adenopathy.      Left upper body: No supraclavicular adenopathy.   Skin:     General: Skin is warm and dry.   Neurological:      Mental Status: She is alert. Mental status is at baseline.   Psychiatric:         Mood and Affect: Mood normal.         Behavior: Behavior normal.         Assessment:       1. Benign paroxysmal positional vertigo of right ear    2. Alcohol use disorder, severe, dependence    3. Heart murmur    4. Prediabetes        Plan:   Benign paroxysmal positional vertigo of right ear  -     Ambulatory Referral/Consult to Physical Therapy  -     meclizine (ANTIVERT) 25 mg tablet; Take 1 tablet (25 mg total) by mouth 3 (three) times daily as needed for Dizziness.  Dispense: 20 tablet; Refill: 3    Alcohol use disorder, severe, dependence  -     naltrexone (DEPADE) 50 mg tablet; Take 2 tablets (100 mg total) by mouth every evening. To help reduce alcohol cravings.  Dispense: 180 tablet; Refill: 3    Heart murmur  -     Echo; Future    Prediabetes      Assessment & Plan      PLAN SUMMARY:  - CT of chest ordered to evaluate lungs  - Echocardiogram ordered to assess heart structure and function  - Continue Meclizine as needed for dizziness, 3 times daily; prescription sent to [facility]  - Continue Naltrexone for alcohol cravings, with option to increase dosage if needed  - Referred to vestibular therapy for vertigo symptoms  - ENT appointment scheduled on June 10th for vertigo and ear discomfort evaluation  - Increase water intake for hydration; avoid sugary drinks  - Move slowly when changing positions, especially when lying down  - Follow-up in 1 month to review CT findings, test results, and overall progress    SUBSTANCE INDUCED MOOD DISORDER:  -  Noted patient is using Naltrexone while consuming alcohol.  - Currently, Naltrexone is not effectively reducing cravings.  - Will continue medication with option to increase dosage if needed to improve effectiveness.    INTERSTITIAL PULMONARY DISEASE, UNSPECIFIED:  - Ordered a CT of the chest to evaluate patient's lungs.  - Will review results when available to assess current lung status.  - Scheduled follow-up visit in 1 month to discuss CT findings and other health updates.    BENIGN PAROXYSMAL VERTIGO (BPPV):  - Diagnosed BPPV based on emergency room findings and symptoms of dizziness and vertigo, especially when lying down or moving quickly, with rapid eye rotation during episodes.  - Explained BPPV as a condition that can occur without clear cause and may resolve spontaneously or recur.  - Emphasized the importance of hydration and advised the patient to move slowly when changing positions, especially when lying down.  - Continued Meclizine as needed for dizziness symptoms, taken 3 times daily, with additional prescription sent to [facility] pharmacy.  - Discussed vestibular therapy as a specific physical therapy for vertigo and explained availability of techniques to reset inner ear balance mechanism at home.  - Referred to vestibular therapy and scheduled ENT appointment on June 10th for further evaluation.    ALCOHOL DEPENDENCE:  - Noted the patient reports drinking alcohol despite taking Naltrexone.  - Assessed current use of medication for alcohol cravings.  - Continued Naltrexone prescription, with option to increase dosage if needed.    KNEE PAIN:  - Noted the patient reports bilateral knee pain and swelling, making ambulation difficult.  - Examination confirmed swelling and pain on palpation of both right and left knees.    PREDIABETES:  - Noted the patient's blood sugar is elevated with an A1C of 6.2, indicating pre-diabetic range.  - Advised on sugar content in beverages, recommending water as the  optimal hydration option.  - Instructed the patient to avoid sugary drinks like Jasper-Aid a  nd apple juice to manage glucose levels.    EAR DISCOMFORT:  - Noted the patient reports feeling like there is fluid in the ears, causing discomfort.  - Evaluation confirmed fluid and swelling present.  - ENT appointment scheduled on June 10th will address this issue along with vertigo symptoms.    FOLLOW-UP:  - Marianne to increase water intake to stay well-hydrated.  - Follow up in 1 month to review test results and overall progress.  - Contact the office if CT results are needed before the next appointment.         Zainab Milian MD  6/2/2025

## 2025-06-03 ENCOUNTER — HOSPITAL ENCOUNTER (OUTPATIENT)
Dept: CARDIOLOGY | Facility: OTHER | Age: 65
Discharge: HOME | End: 2025-06-03
Attending: STUDENT IN AN ORGANIZED HEALTH CARE EDUCATION/TRAINING PROGRAM
Payer: MEDICARE

## 2025-06-03 VITALS
HEART RATE: 77 BPM | WEIGHT: 184 LBS | DIASTOLIC BLOOD PRESSURE: 82 MMHG | SYSTOLIC BLOOD PRESSURE: 136 MMHG | HEIGHT: 63 IN | BODY MASS INDEX: 32.6 KG/M2

## 2025-06-03 DIAGNOSIS — R01.1 HEART MURMUR: ICD-10-CM

## 2025-06-03 LAB
AORTIC SIZE INDEX (SOV): 1.4 CM/M2
AORTIC SIZE INDEX: 1.4 CM/M2
ASCENDING AORTA: 2.7 CM
AV INDEX (PROSTH): 0.8
AV MEAN GRADIENT: 4 MMHG
AV PEAK GRADIENT: 8 MMHG
AV VALVE AREA BY VELOCITY RATIO: 2 CM²
AV VALVE AREA: 2.3 CM²
AV VELOCITY RATIO: 0.71
BSA FOR ECHO PROCEDURE: 1.93 M2
CV ECHO LV RWT: 0.51 CM
DOP CALC AO PEAK VEL: 1.4 M/S
DOP CALC AO VTI: 26.3 CM
DOP CALC LVOT AREA: 2.8 CM2
DOP CALC LVOT DIAMETER: 1.9 CM
DOP CALC LVOT PEAK VEL: 1 M/S
DOP CALC LVOT STROKE VOLUME: 59.8 CM3
DOP CALC RVOT PEAK VEL: 0.81 M/S
DOP CALC RVOT VTI: 12.8 CM
DOP CALCLVOT PEAK VEL VTI: 21.1 CM
E WAVE DECELERATION TIME: 275 MSEC
E/A RATIO: 0.49
E/E' RATIO: 7 M/S
ECHO LV POSTERIOR WALL: 1 CM (ref 0.6–1.1)
FRACTIONAL SHORTENING: 33.3 % (ref 28–44)
INTERVENTRICULAR SEPTUM: 1 CM (ref 0.6–1.1)
IVC DIAMETER: 0.98 CM
IVRT: 129 MSEC
LA MAJOR: 5.1 CM
LA MINOR: 4.9 CM
LA WIDTH: 3.7 CM
LEFT ATRIUM AREA SYSTOLIC (APICAL 2 CHAMBER): 14.94 CM2
LEFT ATRIUM AREA SYSTOLIC (APICAL 4 CHAMBER): 15.73 CM2
LEFT ATRIUM SIZE: 3.5 CM
LEFT ATRIUM VOLUME INDEX MOD: 22 ML/M2
LEFT ATRIUM VOLUME INDEX: 29 ML/M2
LEFT ATRIUM VOLUME MOD: 42 ML
LEFT ATRIUM VOLUME: 55 CM3
LEFT INTERNAL DIMENSION IN SYSTOLE: 2.6 CM (ref 2.1–4)
LEFT VENTRICLE DIASTOLIC VOLUME INDEX: 34.76 ML/M2
LEFT VENTRICLE DIASTOLIC VOLUME: 65 ML
LEFT VENTRICLE END SYSTOLIC VOLUME APICAL 2 CHAMBER: 40.49 ML
LEFT VENTRICLE END SYSTOLIC VOLUME APICAL 4 CHAMBER: 41.66 ML
LEFT VENTRICLE MASS INDEX: 65.3 G/M2
LEFT VENTRICLE SYSTOLIC VOLUME INDEX: 12.8 ML/M2
LEFT VENTRICLE SYSTOLIC VOLUME: 24 ML
LEFT VENTRICULAR INTERNAL DIMENSION IN DIASTOLE: 3.9 CM (ref 3.5–6)
LEFT VENTRICULAR MASS: 122.1 G
LV LATERAL E/E' RATIO: 5.7 M/S
LV SEPTAL E/E' RATIO: 8 M/S
LVED V (TEICH): 65.41 ML
LVES V (TEICH): 24.46 ML
LVOT MG: 1.78 MMHG
LVOT MV: 0.62 CM/S
MV PEAK A VEL: 0.81 M/S
MV PEAK E VEL: 0.4 M/S
MV STENOSIS PRESSURE HALF TIME: 79.73 MS
MV VALVE AREA P 1/2 METHOD: 2.76 CM2
OHS CV RV/LV RATIO: 0.67 CM
PISA MRMAX VEL: 1.68 M/S
PISA TR MAX VEL: 1.6 M/S
PULM VEIN S/D RATIO: 1.63
PV MEAN GRADIENT: 2 MMHG
PV PEAK D VEL: 0.27 M/S
PV PEAK GRADIENT: 3 MMHG
PV PEAK S VEL: 0.44 M/S
PV PEAK VELOCITY: 0.84 M/S
RA MAJOR: 4.7 CM
RA PRESSURE ESTIMATED: 3 MMHG
RA WIDTH: 3.1 CM
RIGHT VENTRICLE DIASTOLIC BASEL DIMENSION: 2.6 CM
RV TB RVSP: 5 MMHG
RV TISSUE DOPPLER FREE WALL SYSTOLIC VELOCITY 1 (APICAL 4 CHAMBER VIEW): 12.57 CM/S
SINUS: 2.6 CM
STJ: 2.4 CM
TDI LATERAL: 0.07 M/S
TDI SEPTAL: 0.05 M/S
TDI: 0.06 M/S
TR MAX PG: 10 MMHG
TV REST PULMONARY ARTERY PRESSURE: 13 MMHG
Z-SCORE OF LEFT VENTRICULAR DIMENSION IN END DIASTOLE: -2.9
Z-SCORE OF LEFT VENTRICULAR DIMENSION IN END SYSTOLE: -1.68

## 2025-06-03 PROCEDURE — 93306 TTE W/DOPPLER COMPLETE: CPT

## 2025-06-03 PROCEDURE — 93306 TTE W/DOPPLER COMPLETE: CPT | Mod: 26,,, | Performed by: INTERNAL MEDICINE

## 2025-06-09 DIAGNOSIS — H91.90 HEARING LOSS, UNSPECIFIED HEARING LOSS TYPE, UNSPECIFIED LATERALITY: Primary | ICD-10-CM

## 2025-06-10 ENCOUNTER — CLINICAL SUPPORT (OUTPATIENT)
Facility: CLINIC | Age: 65
End: 2025-06-10
Payer: MEDICARE

## 2025-06-10 ENCOUNTER — HOSPITAL ENCOUNTER (EMERGENCY)
Facility: OTHER | Age: 65
Discharge: HOME | End: 2025-06-10
Attending: EMERGENCY MEDICINE
Payer: MEDICARE

## 2025-06-10 ENCOUNTER — OFFICE VISIT (OUTPATIENT)
Dept: OTOLARYNGOLOGY | Facility: CLINIC | Age: 65
End: 2025-06-10
Payer: MEDICARE

## 2025-06-10 VITALS
SYSTOLIC BLOOD PRESSURE: 180 MMHG | RESPIRATION RATE: 17 BRPM | HEIGHT: 63 IN | DIASTOLIC BLOOD PRESSURE: 87 MMHG | BODY MASS INDEX: 33.66 KG/M2 | WEIGHT: 190 LBS | OXYGEN SATURATION: 97 % | HEART RATE: 64 BPM | TEMPERATURE: 98 F

## 2025-06-10 VITALS
HEART RATE: 72 BPM | BODY MASS INDEX: 32.61 KG/M2 | DIASTOLIC BLOOD PRESSURE: 93 MMHG | SYSTOLIC BLOOD PRESSURE: 154 MMHG | WEIGHT: 184.06 LBS | HEIGHT: 63 IN

## 2025-06-10 DIAGNOSIS — H81.11 BPPV (BENIGN PAROXYSMAL POSITIONAL VERTIGO), RIGHT: Primary | ICD-10-CM

## 2025-06-10 DIAGNOSIS — H90.3 BILATERAL SENSORINEURAL HEARING LOSS: Primary | ICD-10-CM

## 2025-06-10 DIAGNOSIS — R42 VERTIGO: Primary | ICD-10-CM

## 2025-06-10 DIAGNOSIS — R42 DIZZINESS: ICD-10-CM

## 2025-06-10 DIAGNOSIS — H90.3 SENSORINEURAL HEARING LOSS (SNHL) OF BOTH EARS: ICD-10-CM

## 2025-06-10 LAB
ABSOLUTE EOSINOPHIL (OHS): 0.48 K/UL
ABSOLUTE MONOCYTE (OHS): 0.62 K/UL (ref 0.3–1)
ABSOLUTE NEUTROPHIL COUNT (OHS): 3.93 K/UL (ref 1.8–7.7)
ALBUMIN SERPL BCP-MCNC: 3.6 G/DL (ref 3.5–5.2)
ALP SERPL-CCNC: 49 UNIT/L (ref 40–150)
ALT SERPL W/O P-5'-P-CCNC: 18 UNIT/L (ref 10–44)
ANION GAP (OHS): 13 MMOL/L (ref 8–16)
AST SERPL-CCNC: 18 UNIT/L (ref 11–45)
BASOPHILS # BLD AUTO: 0.04 K/UL
BASOPHILS NFR BLD AUTO: 0.5 %
BILIRUB SERPL-MCNC: 0.3 MG/DL (ref 0.1–1)
BUN SERPL-MCNC: 12 MG/DL (ref 8–23)
CALCIUM SERPL-MCNC: 8.7 MG/DL (ref 8.7–10.5)
CHLORIDE SERPL-SCNC: 109 MMOL/L (ref 95–110)
CO2 SERPL-SCNC: 21 MMOL/L (ref 23–29)
CREAT SERPL-MCNC: 0.7 MG/DL (ref 0.5–1.4)
ERYTHROCYTE [DISTWIDTH] IN BLOOD BY AUTOMATED COUNT: 14.3 % (ref 11.5–14.5)
GFR SERPLBLD CREATININE-BSD FMLA CKD-EPI: >60 ML/MIN/1.73/M2
GLUCOSE SERPL-MCNC: 148 MG/DL (ref 70–110)
HCT VFR BLD AUTO: 46.4 % (ref 37–48.5)
HGB BLD-MCNC: 14.6 GM/DL (ref 12–16)
IMM GRANULOCYTES # BLD AUTO: 0.03 K/UL (ref 0–0.04)
IMM GRANULOCYTES NFR BLD AUTO: 0.4 % (ref 0–0.5)
LYMPHOCYTES # BLD AUTO: 2.37 K/UL (ref 1–4.8)
MCH RBC QN AUTO: 26.5 PG (ref 27–31)
MCHC RBC AUTO-ENTMCNC: 31.5 G/DL (ref 32–36)
MCV RBC AUTO: 84 FL (ref 82–98)
NUCLEATED RBC (/100WBC) (OHS): 0 /100 WBC
PLATELET # BLD AUTO: 253 K/UL (ref 150–450)
PMV BLD AUTO: 10 FL (ref 9.2–12.9)
POTASSIUM SERPL-SCNC: 4.3 MMOL/L (ref 3.5–5.1)
PROT SERPL-MCNC: 7.1 GM/DL (ref 6–8.4)
RBC # BLD AUTO: 5.5 M/UL (ref 4–5.4)
RELATIVE EOSINOPHIL (OHS): 6.4 %
RELATIVE LYMPHOCYTE (OHS): 31.7 % (ref 18–48)
RELATIVE MONOCYTE (OHS): 8.3 % (ref 4–15)
RELATIVE NEUTROPHIL (OHS): 52.7 % (ref 38–73)
SODIUM SERPL-SCNC: 143 MMOL/L (ref 136–145)
WBC # BLD AUTO: 7.47 K/UL (ref 3.9–12.7)

## 2025-06-10 PROCEDURE — 99204 OFFICE O/P NEW MOD 45 MIN: CPT | Mod: S$GLB,,, | Performed by: STUDENT IN AN ORGANIZED HEALTH CARE EDUCATION/TRAINING PROGRAM

## 2025-06-10 PROCEDURE — 36415 COLL VENOUS BLD VENIPUNCTURE: CPT | Performed by: EMERGENCY MEDICINE

## 2025-06-10 PROCEDURE — 92557 COMPREHENSIVE HEARING TEST: CPT | Mod: S$GLB,,, | Performed by: AUDIOLOGIST-HEARING AID FITTER

## 2025-06-10 PROCEDURE — 96361 HYDRATE IV INFUSION ADD-ON: CPT

## 2025-06-10 PROCEDURE — 4010F ACE/ARB THERAPY RXD/TAKEN: CPT | Mod: CPTII,S$GLB,, | Performed by: STUDENT IN AN ORGANIZED HEALTH CARE EDUCATION/TRAINING PROGRAM

## 2025-06-10 PROCEDURE — 3066F NEPHROPATHY DOC TX: CPT | Mod: CPTII,S$GLB,, | Performed by: STUDENT IN AN ORGANIZED HEALTH CARE EDUCATION/TRAINING PROGRAM

## 2025-06-10 PROCEDURE — 25000003 PHARM REV CODE 250: Performed by: NURSE PRACTITIONER

## 2025-06-10 PROCEDURE — 85025 COMPLETE CBC W/AUTO DIFF WBC: CPT | Performed by: EMERGENCY MEDICINE

## 2025-06-10 PROCEDURE — 99284 EMERGENCY DEPT VISIT MOD MDM: CPT | Mod: 25

## 2025-06-10 PROCEDURE — 95992 CANALITH REPOSITIONING PROC: CPT | Mod: S$GLB,,, | Performed by: STUDENT IN AN ORGANIZED HEALTH CARE EDUCATION/TRAINING PROGRAM

## 2025-06-10 PROCEDURE — 96360 HYDRATION IV INFUSION INIT: CPT

## 2025-06-10 PROCEDURE — 3044F HG A1C LEVEL LT 7.0%: CPT | Mod: CPTII,S$GLB,, | Performed by: STUDENT IN AN ORGANIZED HEALTH CARE EDUCATION/TRAINING PROGRAM

## 2025-06-10 PROCEDURE — 3077F SYST BP >= 140 MM HG: CPT | Mod: CPTII,S$GLB,, | Performed by: STUDENT IN AN ORGANIZED HEALTH CARE EDUCATION/TRAINING PROGRAM

## 2025-06-10 PROCEDURE — 80053 COMPREHEN METABOLIC PANEL: CPT | Performed by: EMERGENCY MEDICINE

## 2025-06-10 PROCEDURE — 3080F DIAST BP >= 90 MM HG: CPT | Mod: CPTII,S$GLB,, | Performed by: STUDENT IN AN ORGANIZED HEALTH CARE EDUCATION/TRAINING PROGRAM

## 2025-06-10 PROCEDURE — 1125F AMNT PAIN NOTED PAIN PRSNT: CPT | Mod: CPTII,S$GLB,, | Performed by: STUDENT IN AN ORGANIZED HEALTH CARE EDUCATION/TRAINING PROGRAM

## 2025-06-10 PROCEDURE — 25000003 PHARM REV CODE 250: Performed by: EMERGENCY MEDICINE

## 2025-06-10 PROCEDURE — 3061F NEG MICROALBUMINURIA REV: CPT | Mod: CPTII,S$GLB,, | Performed by: STUDENT IN AN ORGANIZED HEALTH CARE EDUCATION/TRAINING PROGRAM

## 2025-06-10 PROCEDURE — 3008F BODY MASS INDEX DOCD: CPT | Mod: CPTII,S$GLB,, | Performed by: STUDENT IN AN ORGANIZED HEALTH CARE EDUCATION/TRAINING PROGRAM

## 2025-06-10 PROCEDURE — 92567 TYMPANOMETRY: CPT | Mod: S$GLB,,, | Performed by: AUDIOLOGIST-HEARING AID FITTER

## 2025-06-10 PROCEDURE — 3288F FALL RISK ASSESSMENT DOCD: CPT | Mod: CPTII,S$GLB,, | Performed by: STUDENT IN AN ORGANIZED HEALTH CARE EDUCATION/TRAINING PROGRAM

## 2025-06-10 PROCEDURE — 1101F PT FALLS ASSESS-DOCD LE1/YR: CPT | Mod: CPTII,S$GLB,, | Performed by: STUDENT IN AN ORGANIZED HEALTH CARE EDUCATION/TRAINING PROGRAM

## 2025-06-10 RX ORDER — MECLIZINE HYDROCHLORIDE 25 MG/1
50 TABLET ORAL
Status: COMPLETED | OUTPATIENT
Start: 2025-06-10 | End: 2025-06-10

## 2025-06-10 RX ADMIN — SODIUM CHLORIDE 1000 ML: 9 INJECTION, SOLUTION INTRAVENOUS at 07:06

## 2025-06-10 RX ADMIN — MECLIZINE HYDROCHLORIDE 50 MG: 25 TABLET ORAL at 06:06

## 2025-06-10 NOTE — FIRST PROVIDER EVALUATION
"Medical screening examination initiated.  I have conducted a focused provider triage encounter, findings are as follows:    Brief history of present illness:  severe dizziness after ENT appointment PTA, states the did a vertigo test on her and she is now worse.  Unable to ambulate    Vitals:    06/10/25 1732   BP: (!) 154/103   BP Location: Left arm   Patient Position: Sitting   Pulse: 69   Resp: 18   Temp: 97.9 °F (36.6 °C)   SpO2: 96%   Weight: 86.2 kg (190 lb)   Height: 5' 3" (1.6 m)       Pertinent physical exam:  well appearing    Brief workup plan:  Antivert     Preliminary workup initiated; this workup will be continued and followed by the physician or advanced practice provider that is assigned to the patient when roomed.  "

## 2025-06-10 NOTE — Clinical Note
Your patient, Marianne Gottlieb, was recently seen for an audiogram.  My assessment and recommendations are enclosed.  If you should have any questions or concerns, please contact me at [phone].   Sincerely, Jey Ceron, CCC-A Audiologist [facility]

## 2025-06-10 NOTE — ED TRIAGE NOTES
Pt presents to the ED with c/o dizziness after recent visit to ENT. Pt reports difficulty ambulating. Pt AAxO4, NAD noted.

## 2025-06-10 NOTE — ED PROVIDER NOTES
Chief complaint:  Dizziness (Pt presents to the ER with complaints of severe vertigo after a vertigo test at the ENTs office./)      Source of information:  The patient, old chart    HPI:  Marianne Gottlieb is a 64 y.o. female presenting with dizziness.  She states that she has been suffering with this for awhile, has been prescribed an unknown medication from her primary care doctor and saw ENT today but after he did a test where he turned her and laid her down, the dizziness got worse and she could not walk because of it.  No headache or change in her vision.  No chest pain or shortness breath.  Also complains of chronic arthritis in bilateral knees. No other acute complaints    ROS: As per HPI    Review of patient's allergies indicates:  No Known Allergies    Medications Ordered Prior to Encounter[1]    PMH:  As per HPI and below:  Past Medical History:   Diagnosis Date    Alcohol abuse, continuous 10/03/2024    ETOH abuse     Hyperlipemia     Hypertension     Unspecified cirrhosis of liver      Past Surgical History:   Procedure Laterality Date    BREAST LUMPECTOMY Right     benign    TUBAL LIGATION           Physical Exam:    Vitals:    06/10/25 1732   BP: (!) 154/103   Pulse: 69   Resp: 18   Temp: 97.9 °F (36.6 °C)       General: No acute distress.  Speech is clear, not slurred.  Clinically sober. Well developed. Well nourished.  Eyes: PERRL. EOM intact. no photophobia, no nystagmus  Conjunctivae - no pallor or icterus.   ENT: HEAD: Normal - atraumatic. Normal external ears. Normal nose.  No facial asymmetry. Mucous membranes - moist.  Neck: Neck supple. no meningismus. No cervical lymphadenopathy.  No JVD.  Cardiac: Regular rate and rhythm, normal S1 and S2.  No murmur.  2+ radial pulse bilaterally.  Musculoskeletal:  Normal weight-bearing and gait No deformities. Normal ROM x4.   Integument: No acute skin rashes. No clubbing or cyanosis.  No peripheral edema.  Neurologic: No gross neurological deficits.   Normal speech.  5/5 strength x4.  Gait not assessed.  Psychiatric: Awake, alert.  Oriented x3.  Normal speech and mentation.        Labs Reviewed   CBC WITH DIFFERENTIAL - Abnormal       Result Value    WBC 7.47      RBC 5.50 (*)     HGB 14.6      HCT 46.4      MCV 84      MCH 26.5 (*)     MCHC 31.5 (*)     RDW 14.3      Platelet Count 253      MPV 10.0      Nucleated RBC 0      Neut % 52.7      Lymph % 31.7      Mono % 8.3      Eos % 6.4      Basophil % 0.5      Imm Grans % 0.4      Neut # 3.93      Lymph # 2.37      Mono # 0.62      Eos # 0.48      Baso # 0.04      Imm Grans # 0.03     CBC W/ AUTO DIFFERENTIAL    Narrative:     The following orders were created for panel order CBC Auto Differential.                  Procedure                               Abnormality         Status                                     ---------                               -----------         ------                                     CBC with Differential[[id number]]       Abnormal            Final result                                                 Please view results for these tests on the individual orders.   COMPREHENSIVE METABOLIC PANEL   COMPREHENSIVE METABOLIC PANEL       Medications   meclizine tablet 50 mg (50 mg Oral Given 6/10/25 1853)   sodium chloride 0.9% bolus 1,000 mL 1,000 mL (1,000 mLs Intravenous New Bag 6/10/25 7198)         MDM:    64 y.o. female with recent vertigo, exacerbated after Hartwick-Hallpike or similar maneuvers and ENT.  She was given Antivert, IV fluids here and is feeling better.  Also given small meal.  She already has prescription for Antivert at home.  Her CBC was unremarkable.  CMP hemolyzed and we are currently awaiting redraw.  Care is turned over at 9:00 p.m. waiting these results but if normal expect patient to be able to be discharge.  She has been referred to Physical therapy for vestibular training    Medications   meclizine tablet 50 mg (50 mg Oral Given 6/10/25 1853)   sodium chloride  0.9% bolus 1,000 mL 1,000 mL (1,000 mLs Intravenous New Bag 6/10/25 1901)       [facility] MDM  [facility] Module  Farshad 10 2025 9:11 PM [Valdemar Barnes]  Data:  - Test/documents: 1 test ordered  Problems:  vertigo (moderate)  Risk: NaCl bolus (Rx drug management)        ASSESSMENT:   No diagnosis found.           [1]   No current facility-administered medications on file prior to encounter.     Current Outpatient Medications on File Prior to Encounter   Medication Sig Dispense Refill    aspirin 81 MG Chew Take 1 tablet (81 mg total) by mouth once daily. 90 tablet 3    diclofenac sodium (VOLTAREN) 1 % Gel Apply 2 g topically 3 (three) times daily as needed (muscle spasm pain). Apply 2 grams to affected area 3 times daily as needed (Patient not taking: Reported on 5/21/2025) 100 g 0    hydroCHLOROthiazide (HYDRODIURIL) 25 MG tablet Take 1 tablet (25 mg total) by mouth once daily. 90 tablet 3    LIDOcaine (LIDODERM) 5 % Apply to affected area. Use as directed. May use 4% lidocaine patch as alternative. (Patient not taking: Reported on 5/21/2025) 15 patch 1    meclizine (ANTIVERT) 25 mg tablet Take 1 tablet (25 mg total) by mouth 3 (three) times daily as needed for Dizziness. 20 tablet 3    naltrexone (DEPADE) 50 mg tablet Take 1 tablet (50 mg total) by mouth every evening. To help reduce alcohol cravings. 90 tablet 3    pravastatin (PRAVACHOL) 80 MG tablet Take 1 tablet (80 mg total) by mouth once daily. 90 tablet 3    thiamine 100 MG tablet Take 1 tablet (100 mg total) by mouth once daily. 90 tablet 3    valsartan (DIOVAN) 320 MG tablet Take 1 tablet (320 mg total) by mouth once daily. 90 tablet 3        Valdemar Barnes II, MD  06/10/25 4974

## 2025-06-10 NOTE — PROGRESS NOTES
Ear, Nose, & Throat  Otolaryngology - Head & Neck Surgery    Summary of Visit:  Marianne Gottlieb is a kind patient who was referred to me by Dr. Ashia Sagastume in consultation for vertigo.     Subjective:     Chief Complaint:   Chief Complaint   Patient presents with    Tinnitus       Marianne Gottlieb is a 64 y.o. female who was referred to me by Dr. Ashia Sagastume in consultation for vertigo    She describes positional vertigo and imbalance which is associated with fluctuating aural symptoms.     She reports experiencing vertigo symptoms for approximately 1 month, characterized by room-spinning sensation when lying down on right side. She experiences associated ear fullness and questionable syncope while attempting to lie down. She's been seen in the ED several times for this issue. She has a learning disability and presents today with her daughter on the phone in [place]. Her daughter has not seen these episodes. History difficult to discern    Minimum Duration: 1minutes  Maximum Duration: 1 hours  Triggers: rolling-over, turning, looking over the shoulder, and lying to sitting    She does not have a history of migraines .. Associated migraine-type symptoms include: None.     Otologic history: None  Other risk factors include: TIA and Diuretic use.      Past Medical History  Active Ambulatory Problems     Diagnosis Date Noted    Primary hypertension 11/16/2022    Hyperlipidemia 11/16/2022    History of hepatitis C 11/16/2022    Alcohol use disorder, severe, dependence 11/16/2022    Nicotine dependence, cigarettes, uncomplicated 11/16/2022    History of cardioembolic cerebrovascular accident (CVA) 08/16/2023    Hypokalemia 08/16/2023    Substance abuse 08/16/2023    Prediabetes 05/20/2024    Abnormal CT lung screening 07/16/2024    Alcohol abuse, continuous 10/03/2024    Difficulty in walking 02/13/2025     Resolved Ambulatory Problems     Diagnosis Date Noted    Hepatic cirrhosis 11/16/2022    Lumbar  "radicular pain 06/12/2024     Past Medical History:   Diagnosis Date    ETOH abuse     Hyperlipemia     Hypertension     Unspecified cirrhosis of liver        Past Surgical History  She has a past surgical history that includes Tubal ligation and Breast lumpectomy (Right).    Past Surgical History:   Procedure Laterality Date    BREAST LUMPECTOMY Right     benign    TUBAL LIGATION          Family History  Her family history includes Lung cancer in her mother.    Social History  She reports that she has been smoking cigarettes. She started smoking about 50 years ago. She has a 25.2 pack-year smoking history. She has never used smokeless tobacco. She reports current alcohol use of about 42.0 standard drinks of alcohol per week. She reports current drug use. Drug: Cocaine.    Allergies  She has no known allergies.    Medications  She has a current medication list which includes the following prescription(s): aspirin, hydrochlorothiazide, meclizine, naltrexone, pravastatin, thiamine, valsartan, diclofenac sodium, and lidocaine.    ROS:  Pertinent positive and negative review of systems as noted in HPI.     Objective:     BP (!) 154/93 (BP Location: Left arm, Patient Position: Sitting)   Pulse 72   Ht 5' 3" (1.6 m)   Wt 83.5 kg (184 lb 1.4 oz)   BMI 32.61 kg/m²      ORTHOSTATICS  not performed    Constitutional: Well appearing, communicating. No acute distress  Eyes:    Pupils: Equal and reactive  EOM: Intact bilaterally  Head/Face: House Brackmann I Bilaterally.  Right Ear:    Auricle normally developed   EAC: normal   Tympanic membrane: intact   Middle Ear: No effusion present and Ossicles in normal position  Left Ear:    Auricle normally developed   EAC: normal   Tympanic membrane: intact   Middle Ear: No effusion present and Ossicles in normal position  Vestibular:   Spontaneous Nystagmus: none   Smooth Pursuit: grossly unremarkable   Saccades: grossly unremarkable     Gaze-Evoked Nystagmus: None   Head-Impulse: " DNT   Fixation Suppression: DNT  Gait: Normal    Lashonda-Hallpike: Right turn, geotropic nystagmus    Passive Head Shake: DNT    Test of Skew: DNT   Fakuda Step Test: DNT  Neuro/Psychiatric:     Affect: Normal   Cognition: Impaired, chronic  Cerebellar   Alternating Finger to Nose: DNT   Rapid Alternating Movements: DNT   Scanning Speech: DNT  Proprioception   Romberg: DNT  Respiratory: No increased WOB, no stridor     Data Review:   MEDICAL RECORDS    I have reviewed the following medical records relevant to the care of this patient from unique sources outside of my institution or departmental specialty:  Emergency Medicine    LABS    I have independently reviewed the lab results shown above. Findings significant for the conditions being treated include: none    IMAGING        AUDIO    I have independently reviewed the following audiogram and reviewed the report. Findings as follows:     Pure Tone Audiometry: Symmetric  Right - Normal (0-25 dB) to Mild (26-40 dB) high frequency sensorineural hearing loss  Left - Normal (0-25 dB) to Mild (26-40 dB) high frequency sensorineural hearing loss    Tympanometry  Right: Type A  Left: Type A    Word Discrimination Score  Right: 84%  Left 92%    Acoustic Reflexes  Right Stimulation - Right reflex: DNT   Left Reflex: DNT  Left Stimulation - Right reflex: DNT  Left Reflex: DNT    Procedures:   Procedure: Canalith Repositioning Maneuver [id number]     Pre procedure Diagnosis:  Benign paroxysmal positional vertigo, right     Post procedure Diagnosis: same     Response: Incomplete     Procedure: After verbal consent was obtained, the patient's head was turned to the right and laid supine.  This position and each subsequent position was held for 30 seconds.  The patient's head was then turned to the contralateral side.  The patient was then rolled onto the contralateral side maintaining the position of their head over the shoulder.  Lastly the patient was placed in a sitting position while  maintaining their head positioning.  Lastly there head was turned to a neutral position.     The patient did not tolerate the procedure well.  She started screaming and saying she was scared with the initial vicenta hallpike. Nystagmus resolved with epley; however, patient stated she was too subjectively dizzy to walk home.       Assessment:     1. BPPV (benign paroxysmal positional vertigo), right    2. Sensorineural hearing loss (SNHL) of both ears      Plan:     I had a long discussion with the patient regarding her condition and the further workup and management options.  Had a conversation with her daughter via the telephone. She has right pBPPV which responded on exam to epley maneuver. The patient gets very frightened by this experience with possible contribution from her learning disorder. Will refer to vestibular therapy.     Repeat audiogram annually.     Voice recognition software was used in the creation of this note/communication and any sound-alike errors which may have occurred from its use should be taken in context when interpreting. If such errors prevent a clear understanding of the note/communication, please contact the office for clarification.     Orders Placed This Encounter   Procedures    Ambulatory Referral/Consult to Physical Therapy

## 2025-06-10 NOTE — PROGRESS NOTES
Jey Ceron, CCC-A  Audiologist - [facility] [address] [place] [address]  [email or web address]  [phone]    Patient: Marianne Gottlieb   MRN: [id number]  [address] [facility]   Home Phone [phone]   Work Phone [phone]   Mobile [phone]   Home Phone Not on file.   : 1960  MAY: 6/10/2025      AUDIOLOGICAL EVALUATION    Ms. Marianne Gottlieb was seen in the clinic today for an audiological evaluation.  Ms. Gottlieb reported dizziness.  Ms. Gottlieb denied decreased hearing, tinnitus, history of noise exposure, and family history of hearing loss.    Audiological testing revealed a normal to mild high-frequency sensorineural hearing loss for the Right ear and a normal to mild high-frequency sensorineural hearing loss for the Left ear.  A speech reception threshold was obtained at 20 dB HL for the Right ear and at 20 dB HL for the Left ear.  Speech discrimination was 84% for the Right ear and 92% for the Left ear.      Tympanometry testing revealed a Type A tympanogram for the Right ear and a Type A tympanogram for the Left ear.        RECOMMENDATIONS:   It is recommended that Marianne Gottlieb:  Follow up medically with Dr. Persaud.  Continue to receive audiological monitoring annually.  Use precaution and/or hearing protection in noisy environments.    If you should have any questions or concerns regarding the above information, please do not hesitate to contact me at [phone].      _______________________________  Jey Ceron, VALENTIN-A  Audiologist

## 2025-06-11 NOTE — ED NOTES
Pt able to sit on side of bed without assist. Reports dizziness when transitioning from laying to sitting and moving head to right side. Pt able to transfer self into wheel chair without difficulty. Pt wheeled to bathroom. Pt reporting stiffness in bilat knees.

## 2025-06-11 NOTE — ED TRIAGE NOTES
"Pt arrives to ED with no medical complaints. Pt states "im a woman." pt reports drinking alcohol. Pt lying in bed, respirations even, unlabored, eyes open spontaneously, NAD noted.  " [de-identified] : Bilateral knee\par  \par  Constitutional: \par  The patient is healthy-appearing and in no apparent distress. \par  \par  Gait:\par  The patient ambulates with a normal gait and no limp.\par  \par  Cardiovascular System: \par  The capillary refill is less than 2 seconds. \par  \par  Skin: \par  There are no skin abnormalities.\par  \par  Right Knee:\par   \par  Bony Palpation: \par  There is no tenderness of the medial joint line. \par  There is no tenderness of the lateral joint line.\par  There is no tenderness of the medial femoral chondyle.\par  There is no tenderness of the lateral femoral chondyle.\par  There is no tenderness of the tibial tubercle.\par  There is no tenderness of the superior patella.\par  There is no tenderness of the inferior patella.\par  There is no tenderness of the medial patellar facet.\par  There is no tenderness of the lateral patellar facet.\par  \par  Soft Tissue Palpation: \par  There is no tenderness of the medial retinaculum.\par  There is no tenderness of the lateral retinaculum.\par  There is no tenderness of the quadriceps tendon.\par  There is no tenderness of the patella tendon.\par  There is no tenderness of the ITB.\par  There is no tenderness of the pes anserine.\par  \par  Active Range of Motion: \par  The range of motion at the knee actively and passively is full. \par  \par  Special Tests: \par  There is a negative Apley.\par  There is a negative Steinmanns. \par  There is a negative Lachman and Anterior Drawer.\par  There is a negative Posterior Drawer.  \par  There is no varus or valgus laxity.\par  Strength: \par  There is 5/5 hip flexion and 5/5 knee flexion and extension.  \par  \par  Left Knee:\par   \par  Bony Palpation: \par  There is no tenderness of the medial joint line. \par  There is no tenderness of the lateral joint line.\par  There is tenderness of the medial femoral chondyle.\par  There is tenderness of the lateral femoral chondyle.\par  There is no tenderness of the tibial tubercle.\par  There is no tenderness of the superior patella.\par  There is no tenderness of the inferior patella.\par  There is tenderness of the medial patellar facet.\par  There is tenderness of the lateral patellar facet.\par  \par  Soft Tissue Palpation: \par  There is no tenderness of the medial retinaculum.\par  There is no tenderness of the lateral retinaculum.\par  There is no tenderness of the quadriceps tendon.\par  There is no tenderness of the patella tendon.\par  There is no tenderness of the ITB.\par  There is no tenderness of the pes anserine.\par  \par  Active Range of Motion: \par  The range of motion at the knee actively and passively is full. \par  \par  Special Tests: \par  There is a negative Apley.\par  There is a negative Steinmanns. \par  There is a negative Lachman and Anterior Drawer.\par  There is a negative Posterior Drawer.  \par  There is no varus or valgus laxity.\par  \par  Strength: \par  There is 5/5 hip flexion and 5/5 knee flexion and extension.  \par  \par  Psychiatric: \par  The patient demonstrates a normal mood and affect and is active and alert\par  \par

## 2025-06-11 NOTE — ED NOTES
06/10/25 2202   Safety Interventions   Quick Updates - Free Text Pt sitting up in bed awake. NADN. Denies any needs or concerns at this time   Updates Patient is resting comfortably;Bed rails up - Call light within reach;Vitals stable;Denies pain   Patient Rounds bed in low position;bed wheels locked;call light in patient/parent reach;clutter free environment maintained;ID band on;visualized patient;placement of personal items at bedside

## 2025-06-11 NOTE — DISCHARGE INSTRUCTIONS
We have sent a referral for physical therapy to start regular treatments to help with your dizziness (vertigo).

## 2025-06-17 ENCOUNTER — OFFICE VISIT (OUTPATIENT)
Dept: INTERNAL MEDICINE | Facility: CLINIC | Age: 65
End: 2025-06-17
Payer: MEDICARE

## 2025-06-17 VITALS
TEMPERATURE: 98 F | OXYGEN SATURATION: 98 % | WEIGHT: 185.88 LBS | SYSTOLIC BLOOD PRESSURE: 140 MMHG | HEIGHT: 63 IN | DIASTOLIC BLOOD PRESSURE: 90 MMHG | HEART RATE: 74 BPM | BODY MASS INDEX: 32.93 KG/M2

## 2025-06-17 DIAGNOSIS — J02.9 SORE THROAT: ICD-10-CM

## 2025-06-17 DIAGNOSIS — J84.9 INTERSTITIAL PULMONARY DISEASE, UNSPECIFIED: ICD-10-CM

## 2025-06-17 DIAGNOSIS — R05.1 ACUTE COUGH: Primary | ICD-10-CM

## 2025-06-17 DIAGNOSIS — F19.94 SUBSTANCE INDUCED MOOD DISORDER: ICD-10-CM

## 2025-06-17 LAB
CTP QC/QA: YES
POC MOLECULAR INFLUENZA A AGN: NEGATIVE
POC MOLECULAR INFLUENZA B AGN: NEGATIVE
S PYO RRNA THROAT QL PROBE: NEGATIVE
SARS-COV-2 RDRP RESP QL NAA+PROBE: NEGATIVE

## 2025-06-17 PROCEDURE — 87635 SARS-COV-2 COVID-19 AMP PRB: CPT | Mod: QW,S$GLB,, | Performed by: COUNSELOR

## 2025-06-17 PROCEDURE — 3077F SYST BP >= 140 MM HG: CPT | Mod: CPTII,S$GLB,, | Performed by: COUNSELOR

## 2025-06-17 PROCEDURE — 87880 STREP A ASSAY W/OPTIC: CPT | Mod: QW,S$GLB,, | Performed by: COUNSELOR

## 2025-06-17 PROCEDURE — 3044F HG A1C LEVEL LT 7.0%: CPT | Mod: CPTII,S$GLB,, | Performed by: COUNSELOR

## 2025-06-17 PROCEDURE — 4010F ACE/ARB THERAPY RXD/TAKEN: CPT | Mod: CPTII,S$GLB,, | Performed by: COUNSELOR

## 2025-06-17 PROCEDURE — 3080F DIAST BP >= 90 MM HG: CPT | Mod: CPTII,S$GLB,, | Performed by: COUNSELOR

## 2025-06-17 PROCEDURE — 99999 PR PBB SHADOW E&M-EST. PATIENT-LVL IV: CPT | Mod: PBBFAC,,, | Performed by: COUNSELOR

## 2025-06-17 PROCEDURE — 1125F AMNT PAIN NOTED PAIN PRSNT: CPT | Mod: CPTII,S$GLB,, | Performed by: COUNSELOR

## 2025-06-17 PROCEDURE — 3061F NEG MICROALBUMINURIA REV: CPT | Mod: CPTII,S$GLB,, | Performed by: COUNSELOR

## 2025-06-17 PROCEDURE — 1159F MED LIST DOCD IN RCRD: CPT | Mod: CPTII,S$GLB,, | Performed by: COUNSELOR

## 2025-06-17 PROCEDURE — 3008F BODY MASS INDEX DOCD: CPT | Mod: CPTII,S$GLB,, | Performed by: COUNSELOR

## 2025-06-17 PROCEDURE — 87502 INFLUENZA DNA AMP PROBE: CPT | Mod: QW,S$GLB,, | Performed by: COUNSELOR

## 2025-06-17 PROCEDURE — 3066F NEPHROPATHY DOC TX: CPT | Mod: CPTII,S$GLB,, | Performed by: COUNSELOR

## 2025-06-17 PROCEDURE — 1101F PT FALLS ASSESS-DOCD LE1/YR: CPT | Mod: CPTII,S$GLB,, | Performed by: COUNSELOR

## 2025-06-17 PROCEDURE — 3288F FALL RISK ASSESSMENT DOCD: CPT | Mod: CPTII,S$GLB,, | Performed by: COUNSELOR

## 2025-06-17 PROCEDURE — 99214 OFFICE O/P EST MOD 30 MIN: CPT | Mod: S$GLB,,, | Performed by: COUNSELOR

## 2025-06-17 RX ORDER — PROMETHAZINE HYDROCHLORIDE AND DEXTROMETHORPHAN HYDROBROMIDE 6.25; 15 MG/5ML; MG/5ML
5 SYRUP ORAL
Qty: 180 ML | Refills: 0 | Status: SHIPPED | OUTPATIENT
Start: 2025-06-17 | End: 2025-06-27

## 2025-06-17 RX ORDER — BENZONATATE 200 MG/1
200 CAPSULE ORAL 3 TIMES DAILY PRN
Qty: 30 CAPSULE | Refills: 0 | Status: SHIPPED | OUTPATIENT
Start: 2025-06-17 | End: 2025-06-27

## 2025-06-17 NOTE — PROGRESS NOTES
Subjective:       Patient ID: Marianne Gottlieb is a 64 y.o. female with history of CVA, severe alcohol use disorder, HTN, HLD, hypokalemia, prediabetes, hepatitis-C.    Chief Complaint: Acute cough [R05.1]    Patient is new to me, PCP is Dr. Zainab Milian. Here today for the following:    History of Present Illness    CHIEF COMPLAINT:  Patient presents today for cough and sore throat.    HISTORY OF PRESENT ILLNESS:  She reports cough that started yesterday with associated growling sound in throat and sore throat. She denies fever, chills, breathing problems, chest pain, nasal congestion, and ear pain. She has taken cold and flu medicine twice yesterday when symptoms began and two cough drops today for symptom relief. She had recent COVID-19 exposure within the past week through close contact with her cousin who visited frequently during this period. Her cousin has since recovered and returned to work.     LDCT 6/2/25 shows chronic ground-glass opacities with a left lower lobe predominance mildly improved compared to prior. Previously has shown bilateral involvement in perihilar distribution.  Consider ILD versus other inflammatory process.    PFT 07/24 Spirometry shows a reduced vital capacity suggesting restriction. Spirometry remains unimproved following bronchodilator.  Lung volumes show moderate restriction is present. DLCO is mildly decreased. However, patient difficulty with testing makes  DLCO interpretation potentially unreliable.    No history of kidney disease.    SOCIAL HISTORY:  She reports intermittent cigarette smoking during alcohol consumption, potentially consuming up to a pack during these episodes. She is currently abstaining from smoking due to throat issues causing severe coughing. She denies daily smoking habits.    MEDICATIONS:  She takes meclizine as needed for vertigo symptoms but these are well controlled today.    ROS:  General: -fever, -chills, -fatigue  ENT: -ear pain, -nasal  "congestion, +sore throat  Cardiovascular: -chest pain, -palpitations, -lower extremity edema  Respiratory: +cough, -shortness of breath  Musculoskeletal: -joint pain, -muscle pain  Skin: -rash, -lesion  Neurological: -headache, -dizziness, -numbness, -tingling         Current Outpatient Medications   Medication Instructions    aspirin 81 mg, Oral, Daily    benzonatate (TESSALON) 200 mg, Oral, 3 times daily PRN    diclofenac sodium (VOLTAREN) 2 g, Topical (Top), 3 times daily PRN, Apply 2 grams to affected area 3 times daily as needed    hydroCHLOROthiazide (HYDRODIURIL) 25 mg, Oral, Daily    LIDOcaine (LIDODERM) 5 % Apply to affected area. Use as directed. May use 4% lidocaine patch as alternative.    meclizine (ANTIVERT) 25 mg, Oral, 3 times daily PRN    naltrexone (DEPADE) 50 mg, Oral, Nightly, To help reduce alcohol cravings.    pravastatin (PRAVACHOL) 80 mg, Oral, Daily    promethazine-dextromethorphan (PROMETHAZINE-DM) 6.25-15 mg/5 mL Syrp 5 mLs, Oral, Every 4-6 hours PRN    thiamine 100 mg, Oral, Daily    valsartan (DIOVAN) 320 mg, Oral, Daily     Objective:      Vitals:    06/17/25 1031   BP: (!) 140/90   Pulse: 74   Temp: 98.3 °F (36.8 °C)   SpO2: 98%   Weight: 84.3 kg (185 lb 13.6 oz)   Height: 5' 3" (1.6 m)   PainSc: 10-Worst pain ever   PainLoc: Throat     Body mass index is 32.92 kg/m².    Physical Exam  Constitutional:       General: She is not in acute distress.     Appearance: Normal appearance. She is well-developed. She is obese. She is not ill-appearing, toxic-appearing or diaphoretic.   HENT:      Head: Normocephalic and atraumatic.      Right Ear: Tympanic membrane, ear canal and external ear normal. There is no impacted cerumen.      Left Ear: Tympanic membrane, ear canal and external ear normal. There is no impacted cerumen.      Nose: Nose normal. No congestion or rhinorrhea.      Mouth/Throat:      Mouth: Mucous membranes are moist.      Pharynx: Oropharynx is clear. Posterior oropharyngeal " erythema present. No oropharyngeal exudate.      Comments: Mild erythema of posterior pharynx.   Eyes:      General: No scleral icterus.        Right eye: No discharge.         Left eye: No discharge.      Extraocular Movements: Extraocular movements intact.      Conjunctiva/sclera: Conjunctivae normal.   Neck:      Thyroid: No thyromegaly.      Trachea: No tracheal deviation.   Cardiovascular:      Rate and Rhythm: Normal rate and regular rhythm.      Pulses: Normal pulses.      Heart sounds: Normal heart sounds. No murmur heard.     No friction rub. No gallop.   Pulmonary:      Effort: Pulmonary effort is normal. No respiratory distress.      Breath sounds: Normal breath sounds. No stridor. No wheezing, rhonchi or rales.   Musculoskeletal:         General: No deformity.      Cervical back: Neck supple.      Right lower leg: No edema.      Left lower leg: No edema.   Lymphadenopathy:      Cervical: No cervical adenopathy.   Skin:     General: Skin is warm and dry.      Findings: No erythema.   Neurological:      Mental Status: She is alert and oriented to person, place, and time.      Gait: Gait normal.      Comments: Patient had difficulty understanding many instructions and confused about past lung findings and relation to current symptoms.   Psychiatric:         Mood and Affect: Mood normal.         Behavior: Behavior normal.         Thought Content: Thought content normal.         Judgment: Judgment normal.         Assessment:       1. Acute cough    2. Sore throat    3. Substance induced mood disorder    4. Interstitial pulmonary disease, unspecified        IMPRESSION:  - Assessed patient presenting with cough and sore throat.  - Ordered COVID-19, influenza, and strep throat tests to rule out infectious causes.  - Considered history of lung disease and smoking when formulating treatment plan.  - Initiated symptomatic treatment with OTC and prescription medications while awaiting test results.    Plan:     PLAN  SUMMARY:  - COVID-19, influenza, and streptococcal pharyngitis tests ordered, all negative.   - Prescribed Mucinex DM for daytime cough management  - Prescribed promethazine DM for nighttime cough management  - Prescribed Tessalon Perles to be taken with Mucinex  - Advised to avoid alcohol while taking prescribed medications  - Cautioned against taking meclizine with promethazine.  - Reinforced importance of continued smoking cessation  - Instructed to follow up if respiratory symptoms do not improve with prescribed treatments  - Contact office if dyspnea develops or pyrexia occurs    Acute cough  - Patient reports a bad cough that started yesterday.  - Ordered COVID-19, influenza, and streptococcal pharyngitis tests to rule out infectious causes.  - Instructed patient to follow up if symptoms do not improve with prescribed treatments.  - For cough management, prescribed Mucinex DM for daytime use, promethazine DM for nighttime use, and Tessalon Perles to be taken with Mucinex.  -     POCT COVID-19 Rapid Screening  -     POCT Influenza A/B Molecular  -     promethazine-dextromethorphan (PROMETHAZINE-DM) 6.25-15 mg/5 mL Syrp; Take 5 mLs by mouth every 4 to 6 hours as needed.  Dispense: 180 mL; Refill: 0  -     benzonatate (TESSALON) 200 MG capsule; Take 1 capsule (200 mg total) by mouth 3 (three) times daily as needed.  Dispense: 30 capsule; Refill: 0    Sore throat  - Patient reports pharyngeal pain with exam showing mild pharyngitis.  - Streptococcal pharyngitis test ordered as noted above to rule out infectious causes.  -     POCT Rapid Strep A    Substance induced mood disorder  - Advised the patient to avoid drinking alcohol while taking prescribed medications.  - Patient reports smoking cigarettes only when drinking, which occurs approximately every 2 weeks.  - Patient denies daily smoking.    Interstitial pulmonary disease, unspecified  - Patient has possible ILD.  - Auscultation revealed clear lungs,  indicating no infection.  - Instructed the patient to avoid smoking due to underlying lung condition as it can exacerbate their condition.  - Educated the patient on importance of monitoring symptoms, particularly dyspnea.    ## COVID-19 EXPOSURE:  - Patient reports being around a cousin who had COVID-19 last week.  - COVID-19 test ordered as mentioned above.  - Instructed patient to contact the office if dyspnea develops or pyrexia occurs.    ## VESTIBULAR DISORDER:  - Patient reports history of vertigo but is not currently experiencing symptoms.  - Cautioned against taking meclizine concurrently with the prescribed nighttime cough syrup due to potential interaction.    ## NICOTINE DEPENDENCE IN REMISSION:  - Patient reports not currently smoking cigarettes due to throat issues and cough.  - Reinforced importance of continued smoking cessation, especially given current respiratory symptoms.         This note was generated with the assistance of ambient listening technology. Verbal consent was obtained by the patient and accompanying visitor(s) for the recording of patient appointment to facilitate this note. I attest to having reviewed and edited the generated note for accuracy, though some syntax or spelling errors may persist. Please contact the author of this note for any clarification.    Basim Parry PA-C    6/17/2025

## 2025-06-17 NOTE — PATIENT INSTRUCTIONS
You can take Mucinex DM for your cough, which you can buy at the store.  You can take this during the day for your cough.    I have prescribed you promethazine DM, which is a cough syrup you can  at the pharmacy. You can take this at night to help with your cough. Try not to use this at the same time as Mucinex DM.    I have also sent in tessalon perles. You can take this up to three times per day along with your other medications.     For your sore throat, you can keep using your cough drops or drink tea with honey.     If your symptoms do not get better, please let me know.

## 2025-06-23 ENCOUNTER — HOSPITAL ENCOUNTER (OUTPATIENT)
Dept: RADIOLOGY | Facility: OTHER | Age: 65
Discharge: HOME | End: 2025-06-23
Attending: STUDENT IN AN ORGANIZED HEALTH CARE EDUCATION/TRAINING PROGRAM
Payer: MEDICARE

## 2025-06-23 DIAGNOSIS — Z12.31 SCREENING MAMMOGRAM FOR BREAST CANCER: ICD-10-CM

## 2025-06-23 PROCEDURE — 77067 SCR MAMMO BI INCL CAD: CPT | Mod: TC

## 2025-06-23 PROCEDURE — 77067 SCR MAMMO BI INCL CAD: CPT | Mod: 26,,, | Performed by: RADIOLOGY

## 2025-06-23 PROCEDURE — 77063 BREAST TOMOSYNTHESIS BI: CPT | Mod: 26,,, | Performed by: RADIOLOGY

## 2025-06-26 RX ORDER — NALTREXONE HYDROCHLORIDE 50 MG/1
100 TABLET, FILM COATED ORAL NIGHTLY
Qty: 180 TABLET | Refills: 3 | Status: SHIPPED | OUTPATIENT
Start: 2025-06-26

## 2025-07-03 ENCOUNTER — TELEPHONE (OUTPATIENT)
Dept: ENDOSCOPY | Facility: HOSPITAL | Age: 65
End: 2025-07-03

## 2025-07-03 ENCOUNTER — CLINICAL SUPPORT (OUTPATIENT)
Dept: ENDOSCOPY | Facility: HOSPITAL | Age: 65
End: 2025-07-03
Attending: STUDENT IN AN ORGANIZED HEALTH CARE EDUCATION/TRAINING PROGRAM
Payer: MEDICARE

## 2025-07-03 ENCOUNTER — OFFICE VISIT (OUTPATIENT)
Dept: INTERNAL MEDICINE | Facility: CLINIC | Age: 65
End: 2025-07-03
Payer: MEDICARE

## 2025-07-03 ENCOUNTER — LAB VISIT (OUTPATIENT)
Dept: LAB | Facility: OTHER | Age: 65
End: 2025-07-03
Attending: STUDENT IN AN ORGANIZED HEALTH CARE EDUCATION/TRAINING PROGRAM
Payer: MEDICARE

## 2025-07-03 VITALS
WEIGHT: 182 LBS | HEIGHT: 63 IN | WEIGHT: 150 LBS | BODY MASS INDEX: 26.58 KG/M2 | BODY MASS INDEX: 32.25 KG/M2 | HEIGHT: 63 IN

## 2025-07-03 VITALS
WEIGHT: 182.13 LBS | SYSTOLIC BLOOD PRESSURE: 136 MMHG | OXYGEN SATURATION: 98 % | HEART RATE: 69 BPM | DIASTOLIC BLOOD PRESSURE: 84 MMHG | HEIGHT: 63 IN | BODY MASS INDEX: 32.27 KG/M2

## 2025-07-03 DIAGNOSIS — Z86.0100 HISTORY OF COLON POLYPS: ICD-10-CM

## 2025-07-03 DIAGNOSIS — Z12.12 SCREENING FOR COLORECTAL CANCER: ICD-10-CM

## 2025-07-03 DIAGNOSIS — Z12.12 SCREENING FOR COLORECTAL CANCER: Primary | ICD-10-CM

## 2025-07-03 DIAGNOSIS — J45.20 MILD INTERMITTENT REACTIVE AIRWAY DISEASE WITHOUT COMPLICATION: ICD-10-CM

## 2025-07-03 DIAGNOSIS — F10.20 ALCOHOL USE DISORDER, SEVERE, DEPENDENCE: ICD-10-CM

## 2025-07-03 DIAGNOSIS — H81.11 BENIGN PAROXYSMAL POSITIONAL VERTIGO OF RIGHT EAR: ICD-10-CM

## 2025-07-03 DIAGNOSIS — Z12.11 SCREENING FOR COLORECTAL CANCER: Primary | ICD-10-CM

## 2025-07-03 DIAGNOSIS — Z12.11 SCREENING FOR COLORECTAL CANCER: ICD-10-CM

## 2025-07-03 DIAGNOSIS — Z23 NEED FOR VACCINATION: ICD-10-CM

## 2025-07-03 DIAGNOSIS — R91.8 ABNORMAL CT LUNG SCREENING: ICD-10-CM

## 2025-07-03 DIAGNOSIS — Z12.11 SCREEN FOR COLON CANCER: Primary | ICD-10-CM

## 2025-07-03 DIAGNOSIS — F17.210 NICOTINE DEPENDENCE, CIGARETTES, UNCOMPLICATED: ICD-10-CM

## 2025-07-03 DIAGNOSIS — E78.2 MIXED HYPERLIPIDEMIA: ICD-10-CM

## 2025-07-03 LAB
CHOLEST SERPL-MCNC: 177 MG/DL (ref 120–199)
CHOLEST/HDLC SERPL: 2.9 {RATIO} (ref 2–5)
CK SERPL-CCNC: 125 U/L (ref 20–180)
CRP SERPL-MCNC: 2.6 MG/L
CYCLIC CITRULLINATED PEPTIDE (CCP) (OHS): 13.6 U/ML
ERYTHROCYTE [SEDIMENTATION RATE] IN BLOOD BY PHOTOMETRIC METHOD: 37 MM/HR
HDLC SERPL-MCNC: 61 MG/DL (ref 40–75)
HDLC SERPL: 34.5 % (ref 20–50)
LDLC SERPL CALC-MCNC: 95 MG/DL (ref 63–159)
NONHDLC SERPL-MCNC: 116 MG/DL
RHEUMATOID FACT SERPL-ACNC: <13 IU/ML
TRIGL SERPL-MCNC: 105 MG/DL (ref 30–150)

## 2025-07-03 PROCEDURE — 85652 RBC SED RATE AUTOMATED: CPT

## 2025-07-03 PROCEDURE — 86235 NUCLEAR ANTIGEN ANTIBODY: CPT | Mod: 59

## 2025-07-03 PROCEDURE — 36415 COLL VENOUS BLD VENIPUNCTURE: CPT

## 2025-07-03 PROCEDURE — 86200 CCP ANTIBODY: CPT

## 2025-07-03 PROCEDURE — 82550 ASSAY OF CK (CPK): CPT

## 2025-07-03 PROCEDURE — 86140 C-REACTIVE PROTEIN: CPT

## 2025-07-03 PROCEDURE — 86235 NUCLEAR ANTIGEN ANTIBODY: CPT

## 2025-07-03 PROCEDURE — 86038 ANTINUCLEAR ANTIBODIES: CPT

## 2025-07-03 PROCEDURE — 82085 ASSAY OF ALDOLASE: CPT

## 2025-07-03 PROCEDURE — 80061 LIPID PANEL: CPT

## 2025-07-03 PROCEDURE — 80321 ALCOHOLS BIOMARKERS 1OR 2: CPT

## 2025-07-03 PROCEDURE — 99999 PR PBB SHADOW E&M-EST. PATIENT-LVL IV: CPT | Mod: PBBFAC,,, | Performed by: STUDENT IN AN ORGANIZED HEALTH CARE EDUCATION/TRAINING PROGRAM

## 2025-07-03 PROCEDURE — 86431 RHEUMATOID FACTOR QUANT: CPT

## 2025-07-03 RX ORDER — ALBUTEROL SULFATE 90 UG/1
2 INHALANT RESPIRATORY (INHALATION) EVERY 4 HOURS PRN
Qty: 18 G | Refills: 11 | Status: SHIPPED | OUTPATIENT
Start: 2025-07-03 | End: 2025-07-03

## 2025-07-03 RX ORDER — PRAVASTATIN SODIUM 80 MG/1
80 TABLET ORAL DAILY
Qty: 90 TABLET | Refills: 3 | Status: SHIPPED | OUTPATIENT
Start: 2025-07-03

## 2025-07-03 RX ORDER — MECLIZINE HYDROCHLORIDE 25 MG/1
25 TABLET ORAL 3 TIMES DAILY PRN
Qty: 20 TABLET | Refills: 3 | Status: SHIPPED | OUTPATIENT
Start: 2025-07-03

## 2025-07-03 RX ORDER — ALBUTEROL SULFATE 90 UG/1
2 INHALANT RESPIRATORY (INHALATION) EVERY 4 HOURS PRN
Qty: 18 G | Refills: 11 | Status: SHIPPED | OUTPATIENT
Start: 2025-07-03

## 2025-07-03 NOTE — PROGRESS NOTES
After obtaining consent, and per orders of Dr. Milian, injection of PCV20 Lot [id number] Exp 08/2026 given in the LD by David. Patient tolerated well and band aid applied. Patient instructed to remain in clinic for 15 minutes afterwards, and to report any adverse reaction to me immediately.     [Negative] : Allergic/Immunologic

## 2025-07-03 NOTE — TELEPHONE ENCOUNTER
Patient is scheduled for a Colonoscopy on 8/14/25 with MARY Andrade. Referral for procedure from workqueue referral - see appointments tab. Instructions mailed.      Instructions for Colonoscopy  PEG (polyethylene glycol) - Common Brands: Golytely, Colyte, Nulytely, Gavilyte, Trilyte    Date of procedure: 8/14/25 - Arrive at 6:30 AM    Location of Department:   [facility] [address] [place] [address]  Take the [facility] Elevators to 4th Floor Endoscopy Lab    As soon as possible:   your prep from pharmacy and over the counter DULCOLAX LAXATIVE TABLETS (Bisacodyl 5 mg oral tab)     On the ENTIRE day before your procedure:  You may ONLY have clear liquids, such as:  Water  Sports drinks (Gatorade, Power-Aid)  Coffee or tea (no cream or nondairy creamer)  Clear juices without pulp (apple, white grape)  Gelatin desserts (no fruit or toppings)  Clear soda (Sprite, Coke, ginger ale)  Chicken broth (until 12 midnight the night before procedure)    What You CANNOT do:   Do not EAT solid food.  Do not drink milk or anything colored red.  Do not drink alcohol.  Do not take oral medications within 1 hour of starting each dose of prep.  No gum chewing or candy morning of procedure.                       Note:   Disregard the insert instructions from pharmacy.  Medication taken by mouth may not be absorbed properly when taken within 1 hour before the start of each dose of PEG Bowel Prep.  It is not uncommon to experience some abdominal cramping, nausea and/or vomiting when taking the prep. If you have nausea and/or vomiting while taking the prep, stop drinking for 20 to 30 minutes then continue.    How to take prep:    PEG Bowel Prep is a (2-day) prep.    One (1) bottle of prep is required for a complete preparation for colonoscopy. You must drink water with each dose of prep, and additional water after each dose.    DOSE 1--Day Before Colonoscopy 8/13/25     Drink at least 6 to 8 glasses of clear  liquids from time you wake up until you begin your prep, and then continue until bedtime to avoid dehydration.     12:00 pm (NOON) Mix your entire container of prep with lukewarm water and refrigerate. Take four (4) Dulcolax (Bisacodyl) tablets with at least 8 ounces or more of clear liquids.       6:00 pm:  You must complete Steps 1 and 2 below before going to bed:    Step 1- Drink half of the liquid in the container within one (1) hour.   Step 2- Refrigerate the remaining half of the liquid for dose 2.                       DOSE 2--Day of the Colonoscopy 8/14/25 at 2-3 AM    For this dose, repeat Step 1 shown above using the remaining half of the liquid prep.   You may continue drinking water/clear liquids until (3:30 AM).      Please watch this informational video. It is important to watch this animated consent video prior to your arrival. If you haven't watched the video prior to arriving, you will be asked to watch it during admission, which can cause delays.     Options for viewing:  Using a keyboard:  press and hold the control tab (Ctrl) and left mouse click to follow link Colonoscopy Instructional Video                                                        OR    Type link address into your web browser's address bar:  [email or web address]          IMPORTANT INFORMATION TO KNOW BEFORE YOUR PROCEDURE    If your procedure requires the administration of anesthesia, it is necessary for a responsible adult to drive you home. (Medical Transportation, Uber, Lyft, Taxi, etc. may ONLY be used if a responsible adult is present to accompany you home.  The responsible adult CANNOT be the  of the service).   person must be available to return to pick you up within 15 minutes of being notified of discharge.    Please bring a picture ID, insurance card, & copayment    Take Medications as directed below:    If you begin taking any new blood thinning medications, injectable weight  loss/diabetes medications (other than insulin), or Adipex (phentermine) please contact the endoscopy scheduling department as soon as possible. [phone]    If you are diabetic, see the attached instructions sheet regarding your medication(s).    If you take HEART, BLOOD PRESSURE, SEIZURE, PAIN, LUNG (including inhalers/nebulizers), ANTI-REJECTION (transplant patients) or PSYCHIATRIC medications, please take at your regular times with a sip of water, unless otherwise directed by the scheduling nurse.    Important contact information:    Endoscopy Scheduling [phone] / Hours of operation Monday-Friday 8:00 AM-4:30 PM    Questions about insurance or financial obligations call [phone] or [phone]    After hours questions requiring immediate assistance, contact [facility] at [phone] or [phone]    Cancellations: Please call [phone] to cancel/reschedule if the need arises. We understand that unpredictable situations may occur that cause you to need to reschedule, but we ask that you let us know as far ahead of time as possible since there is a high demand for appointments.    NOTE - On occasion, unforeseen circumstances may cause a delay in your procedure start time. We respect your time and appreciate your patience during these circumstances.

## 2025-07-03 NOTE — PROGRESS NOTES
Subjective:       Patient ID: Marianne Gottlieb is a 64 y.o. female.    Chief Complaint: Screening for colorectal cancer [Z12.11, Z12.12]    Patient is established with me, here today for the following:    History of Present Illness      VERTIGO:  She reports history of intermittent vertigo that comes and goes. Currently experiencing improvement in symptoms. She takes Meclizine 25mg as needed for management.    RESPIRATORY:  She reports a persistent cough. She has been using OTC Vicks DM and Vicks/Lemon cough drops for symptom management. CT showed ground glass opacities at the base of the lungs with findings suggestive of possible interstitial lung disease, though improved from previous scan.    SUBSTANCE USE:  Last alcohol use was Tuesday, consuming beer and whiskey. She takes naltrexone to help reduce alcohol cravings, which helps slow down drinking. She reports smoking approximately half a pack of cigarettes since Tuesday, noting that smoking only occurs during drinking episodes.    LABS AND IMAGING:  Mammogram was normal with repeat screening scheduled for June. Microalbumin creatinine ratio and liver enzymes were within normal limits.    FAMILY HISTORY:  Mother had osteoarthritis and lung cancer, with the latter reportedly developing after a persistent cough.      ROS:  Respiratory: -cough, -shortness of breath, +increased substance use  Psychiatric: +alcohol abuse        Health maintenance -   Colonoscopy performed JULY2020, with Dr. Luz Maria Bradley at [facility]. Recommended repeat in 5 years.  Denies family history of colorectal cancer.   Mammogram BI-RADS 1 in JUNE2025.   History of prior abnormal mammogram.  Underwent lumpectomy right breast.  Denies family history of breast cancers.  Denies family history of ovarian cancers.  Last pap performed FEB2023.   Denies history of prior abnormal pap smears.  DEXA completed JUNE2024.  Showed normal bone density.   Denies family history of osteoporosis.  Denies  significant family history of cardiac disease.  UTD on COVID primary/booster, Tdap, PPSV23 vaccinations.  Due for COVID, shingles, RSV, PCV20 vaccinations.  Started smoking at age 12, at most 1 PPD. Smoking about 0.5 PPD currently.  Denies drug use.  Low dose lung CT LUNG-RADS 1 in JUNE2025.  Completed HIV screening.                     Chronic ground-glass opacities at the lung bases, left greater than right, mildly improved from prior.  Findings could be inflammatory in nature or could be related to underlying interstitial lung disease.   Concern for possible ILD vs COPD  PFT's completed JULY2024     HTN -   Currently prescribed HCTZ and valsartan   Lab Results   Component Value Date    MICALBCREAT 6.4 05/21/2025     BP Readings from Last 5 Encounters:   06/17/25 (!) 140/90   06/10/25 (!) 180/87   06/10/25 (!) 154/93   06/03/25 136/82   06/02/25 136/82     HLD -   Endorses taking pravastatin as directed  Lab Results   Component Value Date    CHOL 180 02/02/2024     Lab Results   Component Value Date    TRIG 83 02/02/2024     Lab Results   Component Value Date    LDLCALC 100.4 02/02/2024     Lab Results   Component Value Date    HDL 63 02/02/2024       Prediabetes -   Lab Results   Component Value Date    HGBA1C 6.2 (H) 05/21/2025    HGBA1C 6.1 (H) 05/20/2024    HGBA1C 6.1 (H) 02/02/2024                 History of hepatitis C -   Endorses treated for hepatitis C years ago with [facility]  Previously following with Dr. Schneider for hepatology.  Lab Results   Component Value Date    AST 18 06/10/2025    ALT 18 06/10/2025    ALKPHOS 49 06/10/2025     Lab Results   Component Value Date    FERRITIN 277 01/18/2023     Hepatitis A Antibody IgG   Date Value Ref Range Status   01/18/2023 Reactive  Final     Comment:     IgG anti-HAV detected. The presence of IgG anti-HAV  implies past infection (recent or distant) or   vaccination against HAV. Detectable levels above  the assay cutoff suggest immunity to HAV infection.       Hep  "A IgG Interp   Date Value Ref Range Status   05/21/2025 Reactive (A)   Final     Comment:     IgG anti-HAV detected. The presence of IgG anti-HAV   implies past infection (recent or distant) or   vaccination against HAV. Detectable levels above   the assay cutoff suggest immunity to HAV infection.       Hepatitis B Surface Ag   Date Value Ref Range Status   01/18/2023 Non-reactive Non-reactive Final     Hep BsAg Interp   Date Value Ref Range Status   05/21/2025 Non-Reactive Non-Reactive Final     Hep B Core Total Ab   Date Value Ref Range Status   01/18/2023 Non-reactive Non-reactive Final     Hep BcAb Interp   Date Value Ref Range Status   05/21/2025 Non-Reactive Non-Reactive Final     Hep B S Ab   Date Value Ref Range Status   01/18/2023 <3.00 mIU/mL Final   01/18/2023 Non-reactive  Final     Comment:     Individual is considered not immune to HBV infection.     Hep BsAb Interp   Date Value Ref Range Status   05/21/2025 Non-Reactive   Final     Comment:     Individual is considered not immune to HBV infection      Alcohol use disorder -   Started on Naltrexone  Still with episodes of binge drinking, but decrease frequency        Current Outpatient Medications   Medication Instructions    aspirin 81 mg, Oral, Daily    diclofenac sodium (VOLTAREN) 2 g, Topical (Top), 3 times daily PRN, Apply 2 grams to affected area 3 times daily as needed    hydroCHLOROthiazide (HYDRODIURIL) 25 mg, Oral, Daily    LIDOcaine (LIDODERM) 5 % Apply to affected area. Use as directed. May use 4% lidocaine patch as alternative.    meclizine (ANTIVERT) 25 mg, Oral, 3 times daily PRN    naltrexone (DEPADE) 100 mg, Oral, Nightly, To help reduce alcohol cravings.    pravastatin (PRAVACHOL) 80 mg, Oral, Daily    thiamine 100 mg, Oral, Daily    valsartan (DIOVAN) 320 mg, Oral, Daily     Objective:      Vitals:    07/03/25 0817   BP: 136/84   Pulse: 69   SpO2: 98%   Weight: 82.6 kg (182 lb 1.6 oz)   Height: 5' 3" (1.6 m)   PainSc: 0-No pain "     Body mass index is 32.26 kg/m².    Physical Exam  Vitals reviewed.   Constitutional:       General: She is not in acute distress.     Appearance: She is not ill-appearing or diaphoretic.   Cardiovascular:      Rate and Rhythm: Normal rate and regular rhythm.      Heart sounds: Normal heart sounds. No murmur heard.     No friction rub. No gallop.   Pulmonary:      Effort: Pulmonary effort is normal. No respiratory distress.      Breath sounds: Normal breath sounds. No stridor. No wheezing, rhonchi or rales.   Skin:     General: Skin is warm and dry.      Comments: Color WNL   Neurological:      Mental Status: She is alert. Mental status is at baseline.   Psychiatric:         Mood and Affect: Mood normal.         Behavior: Behavior normal.         Assessment:       1. Screening for colorectal cancer    2. Need for vaccination    3. Abnormal CT lung screening    4. Mixed hyperlipidemia    5. Benign paroxysmal positional vertigo of right ear    6. Alcohol use disorder, severe, dependence    7. Nicotine dependence, cigarettes, uncomplicated    8. Mild intermittent reactive airway disease without complication        Plan:   Screening for colorectal cancer  -     Ambulatory referral/consult to Endo Procedure ; Future; Expected date: 07/04/2025    Need for vaccination  -     pneumoc 20-heaven conj-dip cr(PF) (PREVNAR-20 (PF)) injection Syrg 0.5 mL    Abnormal CT lung screening  -     Ambulatory referral/consult to Pulmonology; Future; Expected date: 07/10/2025  -     ERIK Screen w/Reflex; Future; Expected date: 07/03/2025  -     Rheumatoid Factor; Future; Expected date: 07/03/2025  -     Cyclic Citrullinated Peptide Antibody, IgG; Future; Expected date: 07/03/2025  -     Aldolase; Future; Expected date: 07/03/2025  -     CK; Future; Expected date: 07/03/2025  -     Anti-Kandis 1 Antibody, IgG; Future; Expected date: 07/03/2025  -     SCL-70 Antibodies; Future; Expected date: 07/03/2025  -     C-Reactive Protein;  Future; Expected date: 07/03/2025  -     Sedimentation rate; Future; Expected date: 07/03/2025    Mixed hyperlipidemia  -     Lipid Panel; Future; Expected date: 07/03/2025  -     pravastatin (PRAVACHOL) 80 MG tablet; Take 1 tablet (80 mg total) by mouth once daily.  Dispense: 90 tablet; Refill: 3    Benign paroxysmal positional vertigo of right ear  -     meclizine (ANTIVERT) 25 mg tablet; Take 1 tablet (25 mg total) by mouth 3 (three) times daily as needed for Dizziness.  Dispense: 20 tablet; Refill: 3    Alcohol use disorder, severe, dependence  -     Phosphatidylethanol (PETH); Future; Expected date: 07/03/2025    Nicotine dependence, cigarettes, uncomplicated  -     Discontinue: albuterol (VENTOLIN HFA) 90 mcg/actuation inhaler; Inhale 2 puffs into the lungs every 4 (four) hours as needed for Wheezing or Shortness of Breath. Rescue  Dispense: 18 g; Refill: 11  -     inhalation spacing device; Use as directed for inhalation.  Dispense: 1 each; Refill: 0  -     albuterol (VENTOLIN HFA) 90 mcg/actuation inhaler; Inhale 2 puffs into the lungs every 4 (four) hours as needed for Wheezing or Shortness of Breath. Rescue  Dispense: 18 g; Refill: 11    Mild intermittent reactive airway disease without complication  -     inhalation spacing device; Use as directed for inhalation.  Dispense: 1 each; Refill: 0  -     albuterol (VENTOLIN HFA) 90 mcg/actuation inhaler; Inhale 2 puffs into the lungs every 4 (four) hours as needed for Wheezing or Shortness of Breath. Rescue  Dispense: 18 g; Refill: 11        Assessment & Plan      PLAN SUMMARY:  - Administered pneumococcal vaccine in office  - Refilled Meclizine prescription for vertigo management  - Ordered comprehensive labs for rheumatologic conditions evaluation  - Referred patient to pulmonologist for specialized evaluation  - Continued naltrexone prescription for alcohol cravings  - Ordered lipid panel  - Prescribed albuterol inhaler with spacer for cough  - Consider  colonoscopy for colon cancer screening  - Follow up in October    ## LUNG DISEASE:  - Reviewed recent lung CT showing persistent ground glass opacities at lung bases concerning for interstitial lung disease (ILD), though improved compared to previous imaging.  - Explained ILD and potential causes to patient.  - Ordered comprehensive labs to evaluate for rheumatologic conditions, myositis, and other ILD-associated conditions.  - Referred patient to pulmonologist for specialized evaluation and management.  - Prescribed albuterol inhaler with spacer for shortness of breath or significant cough and provided education on proper use.  - Administered pneumococcal vaccine in office.    ## ALCOHOL DEPENDENCE:  - Marianne reports improvement in alcohol consumption with naltrexone use, which helps slow down drinking and reduces frequency of consumption.  - Reports drinking beer and whisky on Tuesday with no alcohol consumption since then.  - Continued naltrexone prescription to help reduce alcohol cravings.    ## NICOTINE DEPENDENCE:  - Marianne smokes only when drinking alcohol, with a pack lasting about a week.  - Observed improvement in smoking habits, with no smoking since Tuesday and half a pack remaining.  - Encouraged continued efforts to reduce smoking, especially when not drinking alcohol.    ## BENIGN PAROXYSMAL VERTIGO:  - Evaluated intermittent vertigo symptoms, which are currently improved.  - Refilled Meclizine prescription for symptom management as requested.    ## FAMILY HISTORY OF LUNG CANCER:  - Documented family history of lung cancer in patient's mother, which initially presented as a bad cough.  - Identified smoking history as a risk factor.    ## FAMILY HISTORY OF MUSCULOSKELETAL DISEASES:  - Documented family history of arthritis (non-rheumatoid) in patient's mother.  - No other known family history of musculoskeletal or connective tissue diseases.    ## PREVENTIVE CARE:  - Reviewed recent normal  mammogram results.  - Consider colonoscopy for colon cancer screening.  - Ordered lipid panel.  - Administered pneumococcal vaccine.    ## FOLLOW-UP:  - Follow up in October.           Zainab Milian MD      7/3/2025

## 2025-07-04 LAB — ALDOLASE (OHS): 2.5 U/L (ref 1.2–7.6)

## 2025-07-05 LAB
ENA JO1 IGG SER-ACNC: <0.2 U
ENA SCL70 IGG SER IA-ACNC: <0.2 U

## 2025-07-07 LAB
ANA (OHS): NORMAL
PLPETH BLD-MCNC: 279 NG/ML
POPETH BLD-MCNC: 337 NG/ML
TOXICOLOGIST REVIEW: NORMAL

## 2025-07-10 NOTE — ED TRIAGE NOTES
Pt presents to the ed with alcohol intoxication. Pt has hx of ETOH abuse. Pt clearly intoxicated, slurring words, and making sexually inappropriately remarks. Pt denies cp, sob, HA, dizziness, fever, chills, n/v/d, abdominal pain and dysuria. Pt escorted to room via security, NAD noted, will continue to monitor.  
Female

## 2025-07-18 ENCOUNTER — TELEPHONE (OUTPATIENT)
Dept: ENDOSCOPY | Facility: HOSPITAL | Age: 65
End: 2025-07-18
Payer: MEDICARE

## 2025-08-04 ENCOUNTER — TELEPHONE (OUTPATIENT)
Dept: ENDOSCOPY | Facility: HOSPITAL | Age: 65
End: 2025-08-04
Payer: MEDICARE

## 2025-08-04 NOTE — TELEPHONE ENCOUNTER
Endoscopy Scheduling Department  [facility] [address] [place] [address]  Take the [facility] Elevators to 4th Floor Endoscopy Lab      Date: 08/04/2025      Medical Record # [id number]        Dear  Marianne Gottlieb        An order for the following procedure(s) Colonoscopy was placed for you by   Zainab Milian MD .    Since multiple attempts have been made to get in touch with you, this is the last notification. Please call the scheduling nurse to schedule this procedure as soon as possible.    If you have already scheduled this appointment, please disregard this letter. If you would like to cancel this request, please call the number listed below.     Sincerely,        Endoscopy Scheduling Department [phone]        Comments: Office hours are Monday through Friday 8-430p.

## 2025-08-20 ENCOUNTER — HOSPITAL ENCOUNTER (EMERGENCY)
Facility: OTHER | Age: 65
Discharge: HOME | End: 2025-08-20
Attending: EMERGENCY MEDICINE
Payer: MEDICARE

## 2025-08-20 VITALS
HEIGHT: 63 IN | TEMPERATURE: 98 F | WEIGHT: 150 LBS | OXYGEN SATURATION: 96 % | DIASTOLIC BLOOD PRESSURE: 96 MMHG | HEART RATE: 77 BPM | BODY MASS INDEX: 26.58 KG/M2 | SYSTOLIC BLOOD PRESSURE: 148 MMHG | RESPIRATION RATE: 20 BRPM

## 2025-08-20 DIAGNOSIS — R09.A9: Primary | ICD-10-CM

## 2025-08-20 PROCEDURE — 99281 EMR DPT VST MAYX REQ PHY/QHP: CPT
